# Patient Record
Sex: FEMALE | Race: BLACK OR AFRICAN AMERICAN | NOT HISPANIC OR LATINO | Employment: FULL TIME | URBAN - METROPOLITAN AREA
[De-identification: names, ages, dates, MRNs, and addresses within clinical notes are randomized per-mention and may not be internally consistent; named-entity substitution may affect disease eponyms.]

---

## 2018-05-28 ENCOUNTER — APPOINTMENT (EMERGENCY)
Dept: RADIOLOGY | Facility: HOSPITAL | Age: 28
End: 2018-05-28
Payer: COMMERCIAL

## 2018-05-28 ENCOUNTER — HOSPITAL ENCOUNTER (EMERGENCY)
Facility: HOSPITAL | Age: 28
Discharge: HOME | End: 2018-05-28
Attending: EMERGENCY MEDICINE
Payer: COMMERCIAL

## 2018-05-28 VITALS
RESPIRATION RATE: 18 BRPM | DIASTOLIC BLOOD PRESSURE: 63 MMHG | OXYGEN SATURATION: 100 % | TEMPERATURE: 98.6 F | HEART RATE: 80 BPM | SYSTOLIC BLOOD PRESSURE: 138 MMHG

## 2018-05-28 DIAGNOSIS — S29.019A THORACIC MYOFASCIAL STRAIN, INITIAL ENCOUNTER: ICD-10-CM

## 2018-05-28 DIAGNOSIS — M54.9 MUSCULOSKELETAL BACK PAIN: ICD-10-CM
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
## 2018-05-28 DIAGNOSIS — T23.172A SUPERFICIAL BURN OF LEFT WRIST, INITIAL ENCOUNTER: Primary | ICD-10-CM

## 2018-05-28 PROCEDURE — 73110 X-RAY EXAM OF WRIST: CPT

## 2018-05-28 PROCEDURE — 99284 EMERGENCY DEPT VISIT MOD MDM: CPT

## 2018-05-28 RX ORDER — NAPROXEN 500 MG/1
500 TABLET ORAL 2 TIMES DAILY WITH MEALS
Qty: 14 TABLET | Refills: 0 | Status: SHIPPED | OUTPATIENT
Start: 2018-05-28 | End: 2018-08-10 | Stop reason: SDUPTHER

## 2018-05-28 RX ORDER — ACETAMINOPHEN 325 MG/1
650 TABLET ORAL ONCE
Status: COMPLETED | OUTPATIENT
Start: 2018-05-28 | End: 2018-05-28

## 2018-05-28 RX ORDER — IBUPROFEN 600 MG/1
600 TABLET ORAL ONCE
Status: COMPLETED | OUTPATIENT
Start: 2018-05-28 | End: 2018-05-28

## 2018-05-28 RX ORDER — IBUPROFEN 400 MG/1
400 TABLET ORAL ONCE
Status: DISCONTINUED | OUTPATIENT
Start: 2018-05-28 | End: 2018-05-28

## 2018-05-28 RX ORDER — BACITRACIN, NEOMYCIN, POLYMYXIN B 400; 3.5; 5 [USP'U]/G; MG/G; [USP'U]/G
1 OINTMENT TOPICAL ONCE
Status: COMPLETED | OUTPATIENT
Start: 2018-05-28 | End: 2018-05-28

## 2018-05-28 RX ADMIN — IBUPROFEN 600 MG: 600 TABLET ORAL at 22:24

## 2018-05-28 RX ADMIN — ACETAMINOPHEN 650 MG: 325 TABLET ORAL at 22:13

## 2018-05-28 RX ADMIN — BACITRACIN, NEOMYCIN, POLYMYXIN B 1 SMALL APPLICATION: 400; 3.5; 5 OINTMENT TOPICAL at 22:24
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
## 2018-05-29 NOTE — DISCHARGE INSTRUCTIONS
Please follow up with family practice for further care, if symptoms worsen please return to the emergency department immediately   Airbag Injury   WHAT YOU NEED TO KNOW:   Airbags must inflate quickly to be effective in an accident  The speed and force of the airbag can cause eye injuries, burns, irritated skin, and open wounds when it inflates  DISCHARGE INSTRUCTIONS:   Return to the emergency department if:   · You have new or increased chest pain  · You have a bad headache and feel sleepy or confused  · Your pain gets worse, even with medicine  · Your wounds become red, painful, and tender  They may be hot or swollen  Contact your healthcare provider if:  · You have a new cough or you are wheezing  · You have increased tears, redness, or pain in your eyes  · You hear ringing or buzzing, or you lose your hearing  Airbag injury care:   · Keep wounds covered  with a clean, dry bandage as directed  You may be told to apply antibacterial ointment to your wound to prevent infection  You may need stitches  Care for your stitches as directed  · Use ice packs  as directed to treat swelling around your eyes  · Use cool cloths  to soothe red, irritated, or burned skin  Medicines:   · Prescription pain medicine  may be given  Ask your healthcare provider how to take this medicine safely  · Steroid ointment  decreases redness, pain, and swelling  · Take your medicine as directed  Contact your healthcare provider if you think your medicine is not helping or if you have side effects  Tell him of her if you are allergic to any medicine  Keep a list of the medicines, vitamins, and herbs you take  Include the amounts, and when and why you take them  Bring the list or the pill bottles to follow-up visits  Carry your medicine list with you in case of an emergency  Prevent an airbag injury:   · Make sure everyone wears a seatbelt       · Do not allow children younger than 13 years to sit in the front seat  Children who are 8 years or younger should ride in a properly fitting car seat or booster seat  Ask for more information on child safety seats  · Check that the airbag switch is off if a child 15years of age is riding in the front seat  · Do not place a rear-facing infant seat in the front of a vehicle  Babies should ride in a rear-facing infant seat in the back of a vehicle until they are 3year old and weigh 20 pounds  · Do not place the lap belt over your stomach  The lap belt should fit snugly over your hips  This is very important if you are pregnant  Never place a shoulder belt under your arm or behind your back  · Sit at least 10 inches away from the steering wheel when you drive  Recline the seat or tilt the steering wheel down  · Drive with your hands on each side of the steering wheel  Do not drive with your hands on top of the steering wheel  · Sit as far back from the dashboard as you can if you are a passenger  Follow up with your healthcare provider as directed:  Write down your questions so you remember to ask them during your visits  © 2017 [facility] Information is for End User's use only and may not be sold, redistributed or otherwise used for commercial purposes  All illustrations and images included in CareNotes® are the copyrighted property of A D A M , Inc  or [address]  The above information is an  only  It is not intended as medical advice for individual conditions or treatments  Talk to your doctor, nurse or pharmacist before following any medical regimen to see if it is safe and effective for you  Musculoskeletal Pain   WHAT YOU NEED TO KNOW:   Muscle pain can be dull, achy, or sharp  You may have pain and tenderness to the touch as well  It can occur anywhere on your body and is often brought on by exercise  Muscle pain may occur from an injury, such as a sprain, tendonitis, or bone fracture   Muscle pain can also be the result of medical conditions, such as polymyositis, fibromyalgia, and connective tissue disorders  DISCHARGE INSTRUCTIONS:   Self care:   · Rest  as directed and avoid activity that causes pain  You may be able to return to normal activity when you can move without pain  Follow directions for rest and activity  You are at risk for injury for 3 weeks after your symptoms go away  · Ice  your painful muscle area to decrease pain and swelling  Use an ice pack, or put ice in a plastic bag and cover it with a towel  Always  put a cloth between the ice and your skin  Apply the ice as often as directed for the first 24 to 48 hours  · Compression  with a splint, brace, or elastic bandage helps decrease pain and swelling  This may be needed for muscle pain in arms or legs  A splint, brace, or bandage will also help protect the painful area when you move around  · Elevate  a painful arm or leg to reduce swelling and pain  Elevate your limb while you are sitting or lying down  Prop a painful leg on pillows to keep it above the level of your heart  Medicines:   · NSAIDs  help decrease swelling and pain or fever  This medicine is available with or without a doctor's order  NSAIDs can cause stomach bleeding or kidney problems in certain people  If you take blood thinner medicine, always ask your healthcare provider if NSAIDs are safe for you  Always read the medicine label and follow directions  · Acetaminophen  is used to decrease pain  It is available without a doctor's order  Ask your healthcare provider how much to take and when to take it  Follow directions  Acetaminophen can cause liver damage if not taken correctly  Do not take more than one medicine that contains acetaminophen unless directed  · Muscle relaxers  help relax your muscles to decrease pain and muscle spasms  · Steroids  may be given to decrease redness, pain, and swelling  · Take your medicine as directed    Contact your healthcare provider if you think your medicine is not helping or if you have side effects  Tell him if you are allergic to any medicine  Keep a list of the medicines, vitamins, and herbs you take  Include the amounts, and when and why you take them  Bring the list or the pill bottles to follow-up visits  Carry your medicine list with you in case of an emergency  Follow up with your healthcare provider as directed: You may need more tests to help healthcare providers find the cause of your muscle pain  You may need physical therapy to learn muscle strengthening exercises  Write down your questions so you remember to ask them during your visits  Contact your healthcare provider if:   · You have a fever  · You have trouble sleeping because of your pain  · Your painful area becomes more tender, red, and warm to the touch  · You have decreased movement of the painful area  · You have questions or concerns about your condition or care  Return to the emergency department if:   · You have increased severe pain when you move the painful muscle area  · You lose feeling in your painful muscle area  · You have new or worse swelling in the painful area  Your skin may feel tight  · You have increased muscle pain or pain that does not improve with treatment  © 2017 [employer] [employer]  Information is for End User's use only and may not be sold, redistributed or otherwise used for commercial purposes  All illustrations and images included in CareNotes® are the copyrighted property of A D A M , Inc  or Arash Rice  The above information is an  only  It is not intended as medical advice for individual conditions or treatments  Talk to your doctor, nurse or pharmacist before following any medical regimen to see if it is safe and effective for you
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
## 2018-05-30 PROBLEM — L20.9 ATOPIC DERMATITIS: Status: ACTIVE | Noted: 2018-05-30

## 2018-05-30 PROBLEM — K59.09 CHRONIC CONSTIPATION: Status: ACTIVE | Noted: 2017-05-15

## 2018-05-30 PROBLEM — E66.9 OBESITY WITH BODY MASS INDEX 30 OR GREATER: Status: ACTIVE | Noted: 2017-05-15

## 2018-05-30 RX ORDER — ALBUTEROL SULFATE 90 UG/1
AEROSOL, METERED RESPIRATORY (INHALATION)
COMMUNITY
End: 2018-06-01 | Stop reason: SDUPTHER

## 2018-05-30 RX ORDER — MONTELUKAST SODIUM 10 MG/1
TABLET ORAL
COMMUNITY
End: 2019-06-14 | Stop reason: SDUPTHER

## 2018-06-01 ENCOUNTER — OFFICE VISIT (OUTPATIENT)
Dept: FAMILY MEDICINE CLINIC | Facility: CLINIC | Age: 28
End: 2018-06-01
Payer: COMMERCIAL

## 2018-06-01 VITALS
TEMPERATURE: 99.1 F | DIASTOLIC BLOOD PRESSURE: 64 MMHG | RESPIRATION RATE: 16 BRPM | HEIGHT: 62 IN | BODY MASS INDEX: 35.74 KG/M2 | OXYGEN SATURATION: 97 % | SYSTOLIC BLOOD PRESSURE: 118 MMHG | HEART RATE: 96 BPM | WEIGHT: 194.2 LBS

## 2018-06-01 DIAGNOSIS — T23.162D SUPERFICIAL BURN OF BACK OF LEFT HAND, SUBSEQUENT ENCOUNTER: ICD-10-CM

## 2018-06-01 DIAGNOSIS — F51.01 PRIMARY INSOMNIA: ICD-10-CM

## 2018-06-01 DIAGNOSIS — M54.2 CERVICAL PAIN: ICD-10-CM

## 2018-06-01 DIAGNOSIS — J45.20 MILD INTERMITTENT ASTHMA WITHOUT COMPLICATION: ICD-10-CM
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
## 2018-06-01 DIAGNOSIS — M62.838 TRAPEZIUS MUSCLE SPASM: ICD-10-CM

## 2018-06-01 DIAGNOSIS — J30.1 SEASONAL ALLERGIC RHINITIS DUE TO POLLEN: ICD-10-CM

## 2018-06-01 DIAGNOSIS — E66.9 OBESITY WITH BODY MASS INDEX 30 OR GREATER: ICD-10-CM

## 2018-06-01 PROBLEM — K59.09 CHRONIC CONSTIPATION: Status: RESOLVED | Noted: 2017-05-15 | Resolved: 2018-06-01

## 2018-06-01 PROCEDURE — 99204 OFFICE O/P NEW MOD 45 MIN: CPT | Performed by: NURSE PRACTITIONER

## 2018-06-01 RX ORDER — ALBUTEROL SULFATE 90 UG/1
AEROSOL, METERED RESPIRATORY (INHALATION)
Qty: 1 INHALER | Refills: 2 | Status: SHIPPED | OUTPATIENT
Start: 2018-06-01 | End: 2020-04-23

## 2018-06-01 RX ORDER — CYCLOBENZAPRINE HCL 10 MG
10 TABLET ORAL
Qty: 20 TABLET | Refills: 0 | Status: SHIPPED | OUTPATIENT
Start: 2018-06-01 | End: 2019-03-18 | Stop reason: SDUPTHER

## 2018-06-01 NOTE — LETTER
June 1, 2018     Patient: Jayla Schreiber   YOB: 1990   Date of Visit: 6/1/2018       To Whom it May Concern:    Jayla Schreiber is under my professional care  She was seen in my office on 6/1/2018  She may return to work on 6/6/18  If you have any questions or concerns, please don't hesitate to call           Sincerely,          MARY KAY Burr        CC: No Recipients
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
## 2018-06-04 ENCOUNTER — TELEPHONE (OUTPATIENT)
Dept: FAMILY MEDICINE CLINIC | Facility: CLINIC | Age: 28
End: 2018-06-04
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
## 2018-06-04 NOTE — TELEPHONE ENCOUNTER
I put in a new work excuse  Please let her know that if she is unable to return to work at that time, she will need to be seen by a specialist to determine if she can or cannot return to work    Thank you

## 2018-06-06 ENCOUNTER — TELEPHONE (OUTPATIENT)
Dept: FAMILY MEDICINE CLINIC | Facility: CLINIC | Age: 28
End: 2018-06-06

## 2018-06-08 ENCOUNTER — OFFICE VISIT (OUTPATIENT)
Dept: FAMILY MEDICINE CLINIC | Facility: CLINIC | Age: 28
End: 2018-06-08
Payer: COMMERCIAL

## 2018-06-08 VITALS
DIASTOLIC BLOOD PRESSURE: 80 MMHG | WEIGHT: 198.8 LBS | BODY MASS INDEX: 36.58 KG/M2 | TEMPERATURE: 98.5 F | SYSTOLIC BLOOD PRESSURE: 130 MMHG | HEIGHT: 62 IN | RESPIRATION RATE: 16 BRPM | HEART RATE: 84 BPM

## 2018-06-08 DIAGNOSIS — M54.6 ACUTE BILATERAL THORACIC BACK PAIN: ICD-10-CM
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
## 2018-06-08 DIAGNOSIS — S16.1XXD STRAIN OF NECK MUSCLE, SUBSEQUENT ENCOUNTER: Primary | ICD-10-CM

## 2018-06-08 PROBLEM — S16.1XXA NECK MUSCLE STRAIN: Status: ACTIVE | Noted: 2018-06-08

## 2018-06-08 PROCEDURE — 99214 OFFICE O/P EST MOD 30 MIN: CPT | Performed by: FAMILY MEDICINE
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
## 2018-07-31 ENCOUNTER — TELEPHONE (OUTPATIENT)
Dept: FAMILY MEDICINE CLINIC | Facility: CLINIC | Age: 28
End: 2018-07-31

## 2018-07-31 DIAGNOSIS — S16.1XXS STRAIN OF NECK MUSCLE, SEQUELA: ICD-10-CM
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
## 2018-08-04 RX ORDER — TOPIRAMATE 25 MG/1
TABLET ORAL
Refills: 0 | COMMUNITY
Start: 2018-06-25 | End: 2019-03-08

## 2018-08-10 ENCOUNTER — OFFICE VISIT (OUTPATIENT)
Dept: FAMILY MEDICINE CLINIC | Facility: CLINIC | Age: 28
End: 2018-08-10
Payer: COMMERCIAL

## 2018-08-10 VITALS
WEIGHT: 194.4 LBS | RESPIRATION RATE: 16 BRPM | SYSTOLIC BLOOD PRESSURE: 116 MMHG | TEMPERATURE: 97.8 F | HEART RATE: 78 BPM | DIASTOLIC BLOOD PRESSURE: 72 MMHG | OXYGEN SATURATION: 98 % | BODY MASS INDEX: 35.77 KG/M2 | HEIGHT: 62 IN
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
## 2018-08-10 DIAGNOSIS — E66.09 CLASS 2 OBESITY DUE TO EXCESS CALORIES WITHOUT SERIOUS COMORBIDITY WITH BODY MASS INDEX (BMI) OF 35.0 TO 35.9 IN ADULT: ICD-10-CM

## 2018-08-10 DIAGNOSIS — M79.18 MYOFACIAL MUSCLE PAIN: Primary | ICD-10-CM

## 2018-08-10 DIAGNOSIS — M54.2 BILATERAL POSTERIOR NECK PAIN: ICD-10-CM

## 2018-08-10 DIAGNOSIS — Z13.1 SCREENING FOR DIABETES MELLITUS: ICD-10-CM

## 2018-08-10 DIAGNOSIS — R63.5 WEIGHT GAIN: ICD-10-CM

## 2018-08-10 PROBLEM — E66.812 CLASS 2 OBESITY DUE TO EXCESS CALORIES WITH BODY MASS INDEX (BMI) OF 35.0 TO 35.9 IN ADULT: Status: ACTIVE | Noted: 2018-08-10

## 2018-08-10 PROCEDURE — 99214 OFFICE O/P EST MOD 30 MIN: CPT | Performed by: FAMILY MEDICINE

## 2018-08-10 RX ORDER — NAPROXEN 500 MG/1
500 TABLET ORAL 2 TIMES DAILY WITH MEALS
Qty: 30 TABLET | Refills: 0 | Status: SHIPPED | OUTPATIENT
Start: 2018-08-10 | End: 2019-03-18 | Stop reason: SDUPTHER
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
## 2018-08-11 ENCOUNTER — TELEPHONE (OUTPATIENT)
Dept: FAMILY MEDICINE CLINIC | Facility: CLINIC | Age: 28
End: 2018-08-11

## 2018-08-11 NOTE — ASSESSMENT & PLAN NOTE
Referred patient to weight management program in [facility]  Recommended to follow a low carb, low-fat diet  Encouraged regular exercise

## 2018-08-11 NOTE — TELEPHONE ENCOUNTER
----- Message from Javad Nunez MD sent at 8/10/2018  8:22 PM EDT -----  Please mail order for weight management evaluation to patient  Please provide patient with phone #
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
## 2018-08-28 ENCOUNTER — OFFICE VISIT (OUTPATIENT)
Dept: PAIN MEDICINE | Facility: CLINIC | Age: 28
End: 2018-08-28
Payer: COMMERCIAL

## 2018-08-28 VITALS
DIASTOLIC BLOOD PRESSURE: 78 MMHG | WEIGHT: 198 LBS | SYSTOLIC BLOOD PRESSURE: 100 MMHG | HEART RATE: 92 BPM | BODY MASS INDEX: 36.44 KG/M2 | HEIGHT: 62 IN

## 2018-08-28 DIAGNOSIS — M54.2 BILATERAL POSTERIOR NECK PAIN: ICD-10-CM

## 2018-08-28 DIAGNOSIS — M79.18 MYOFACIAL MUSCLE PAIN: Primary | ICD-10-CM

## 2018-08-28 DIAGNOSIS — M54.6 ACUTE BILATERAL THORACIC BACK PAIN: ICD-10-CM
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
## 2018-08-28 PROCEDURE — 99203 OFFICE O/P NEW LOW 30 MIN: CPT | Performed by: PHYSICAL MEDICINE & REHABILITATION

## 2018-08-28 RX ORDER — DESONIDE 0.5 MG/G
CREAM TOPICAL
COMMUNITY
End: 2019-09-27 | Stop reason: ALTCHOICE
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
## 2018-08-28 NOTE — PATIENT INSTRUCTIONS
1   Continue physical therapy new prescription generated today  2  Continue anti-inflammatory muscle relaxer as needed  3  Blood work will call with results

## 2018-08-29 LAB
25(OH)D3 SERPL-MCNC: 29 NG/ML (ref 30–100)
ALBUMIN SERPL-MCNC: 3.9 G/DL (ref 3.6–5.1)
ALBUMIN/GLOB SERPL: 1.4 (CALC) (ref 1–2.5)
ALP SERPL-CCNC: 80 U/L (ref 33–115)
ALT SERPL-CCNC: 12 U/L (ref 6–29)
AST SERPL-CCNC: 15 U/L (ref 10–30)
BILIRUB SERPL-MCNC: 0.7 MG/DL (ref 0.2–1.2)
BUN SERPL-MCNC: 12 MG/DL (ref 7–25)
BUN/CREAT SERPL: NORMAL (CALC) (ref 6–22)
CALCIUM SERPL-MCNC: 8.9 MG/DL (ref 8.6–10.2)
CHLORIDE SERPL-SCNC: 105 MMOL/L (ref 98–110)
CO2 SERPL-SCNC: 29 MMOL/L (ref 20–32)
CREAT SERPL-MCNC: 0.82 MG/DL (ref 0.5–1.1)
GLOBULIN SER CALC-MCNC: 2.7 G/DL (CALC) (ref 1.9–3.7)
GLUCOSE SERPL-MCNC: 79 MG/DL (ref 65–99)
HBA1C MFR BLD: 5.1 % OF TOTAL HGB
POTASSIUM SERPL-SCNC: 4.2 MMOL/L (ref 3.5–5.3)
PROT SERPL-MCNC: 6.6 G/DL (ref 6.1–8.1)
SL AMB EGFR AFRICAN AMERICAN: 114 ML/MIN/1.73M2
SL AMB EGFR NON AFRICAN AMERICAN: 98 ML/MIN/1.73M2
SODIUM SERPL-SCNC: 139 MMOL/L (ref 135–146)
TSH SERPL-ACNC: 2.2 MIU/L

## 2018-09-08 ENCOUNTER — TELEPHONE (OUTPATIENT)
Dept: FAMILY MEDICINE CLINIC | Facility: CLINIC | Age: 28
End: 2018-09-08

## 2018-10-04 ENCOUNTER — TELEPHONE (OUTPATIENT)
Dept: OBGYN CLINIC | Facility: HOSPITAL | Age: 28
End: 2018-10-04
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
## 2018-10-04 DIAGNOSIS — S16.1XXS STRAIN OF NECK MUSCLE, SEQUELA: ICD-10-CM

## 2018-10-04 DIAGNOSIS — M54.2 BILATERAL POSTERIOR NECK PAIN: ICD-10-CM

## 2018-10-04 DIAGNOSIS — M54.2 NECK PAIN: ICD-10-CM

## 2018-10-04 NOTE — TELEPHONE ENCOUNTER
Patient sees Dr Юлия Odom   [phone]    Patient is asking for a new physical therapy script because her script  on 18   Patient will need a new script that will cover 18 & on   Please fax to [phone]
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
## 2018-10-09 ENCOUNTER — OFFICE VISIT (OUTPATIENT)
Dept: PAIN MEDICINE | Facility: CLINIC | Age: 28
End: 2018-10-09
Payer: COMMERCIAL

## 2018-10-09 VITALS
BODY MASS INDEX: 36.8 KG/M2 | SYSTOLIC BLOOD PRESSURE: 120 MMHG | DIASTOLIC BLOOD PRESSURE: 68 MMHG | HEART RATE: 88 BPM | WEIGHT: 200 LBS | HEIGHT: 62 IN

## 2018-10-09 DIAGNOSIS — S16.1XXS STRAIN OF NECK MUSCLE, SEQUELA: ICD-10-CM
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
## 2018-10-09 DIAGNOSIS — M79.18 MYOFASCIAL MUSCLE PAIN: ICD-10-CM

## 2018-10-09 DIAGNOSIS — R20.9 SENSORY DISTURBANCE: ICD-10-CM

## 2018-10-09 DIAGNOSIS — M54.6 ACUTE BILATERAL THORACIC BACK PAIN: ICD-10-CM

## 2018-10-09 PROCEDURE — 99213 OFFICE O/P EST LOW 20 MIN: CPT | Performed by: PHYSICAL MEDICINE & REHABILITATION

## 2018-10-09 NOTE — PATIENT INSTRUCTIONS
1  Continue physical therapy  2   MRI scanning of the cervical spine [facility] will schedule

## 2018-10-12 ENCOUNTER — TELEPHONE (OUTPATIENT)
Dept: PAIN MEDICINE | Facility: MEDICAL CENTER | Age: 28
End: 2018-10-12

## 2018-10-12 NOTE — TELEPHONE ENCOUNTER
[facility] called asking if pt MRI of cervical spine order can be faxed to them to [phone] so that they could perform the procedure for pt

## 2018-10-23 ENCOUNTER — TRANSCRIBE ORDERS (OUTPATIENT)
Dept: ADMINISTRATIVE | Facility: HOSPITAL | Age: 28
End: 2018-10-23

## 2018-10-24 ENCOUNTER — HOSPITAL ENCOUNTER (OUTPATIENT)
Dept: MRI IMAGING | Facility: HOSPITAL | Age: 28
Discharge: HOME | End: 2018-10-24
Attending: PHYSICAL MEDICINE & REHABILITATION
Payer: COMMERCIAL

## 2018-10-24 DIAGNOSIS — M79.18 MYOFASCIAL MUSCLE PAIN: ICD-10-CM

## 2018-10-24 DIAGNOSIS — S16.1XXS STRAIN OF NECK MUSCLE, SEQUELA: ICD-10-CM

## 2018-10-24 DIAGNOSIS — R20.9 SENSORY DISTURBANCE: ICD-10-CM
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
## 2018-10-24 PROCEDURE — 72141 MRI NECK SPINE W/O DYE: CPT

## 2018-11-09 PROBLEM — M48.02 CERVICAL SPINAL STENOSIS: Status: ACTIVE | Noted: 2018-11-09
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
## 2018-11-12 ENCOUNTER — OFFICE VISIT (OUTPATIENT)
Dept: PAIN MEDICINE | Facility: CLINIC | Age: 28
End: 2018-11-12
Payer: COMMERCIAL

## 2018-11-12 VITALS
BODY MASS INDEX: 37.17 KG/M2 | WEIGHT: 202 LBS | HEART RATE: 68 BPM | DIASTOLIC BLOOD PRESSURE: 82 MMHG | SYSTOLIC BLOOD PRESSURE: 110 MMHG | HEIGHT: 62 IN

## 2018-11-12 DIAGNOSIS — M54.2 BILATERAL POSTERIOR NECK PAIN: ICD-10-CM

## 2018-11-12 DIAGNOSIS — M47.812 FACET ARTHRITIS OF CERVICAL REGION: ICD-10-CM

## 2018-11-12 DIAGNOSIS — M48.02 CERVICAL SPINAL STENOSIS: ICD-10-CM
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
## 2018-11-12 PROCEDURE — 99213 OFFICE O/P EST LOW 20 MIN: CPT | Performed by: PHYSICIAN ASSISTANT

## 2018-11-27 ENCOUNTER — TELEPHONE (OUTPATIENT)
Dept: FAMILY MEDICINE CLINIC | Facility: CLINIC | Age: 28
End: 2018-11-27

## 2018-11-27 NOTE — TELEPHONE ENCOUNTER
Eladio from [facility] is looking for patient's weight for 2017  I can't find the weight listed in the chart  Can be reached at [phone]  Please advise

## 2018-11-27 NOTE — TELEPHONE ENCOUNTER
Looking through patient's visit's since establishing care with us in June 2018, she weighed 194lbs on 6/1/18, 198lbs on 6/8/18, and on 8/10/18 she weighed 194lbs  Called Faith Jean Baptiste back to let her know

## 2018-11-27 NOTE — TELEPHONE ENCOUNTER
They asked to have the notes faxed over to see the weights in writing  Faxed to [phone], and phone number called was [phone]

## 2018-12-04 RX ORDER — ONDANSETRON 4 MG/1
4 TABLET, ORALLY DISINTEGRATING ORAL EVERY 8 HOURS
Refills: 0 | COMMUNITY
Start: 2018-11-26 | End: 2019-03-08

## 2018-12-04 RX ORDER — TACROLIMUS 1 MG/G
OINTMENT TOPICAL
Refills: 0 | COMMUNITY
Start: 2018-10-28 | End: 2019-09-27 | Stop reason: ALTCHOICE

## 2018-12-04 RX ORDER — TOBRAMYCIN AND DEXAMETHASONE 3; 1 MG/ML; MG/ML
SUSPENSION/ DROPS OPHTHALMIC
Refills: 1 | COMMUNITY
Start: 2018-11-07 | End: 2019-03-08

## 2018-12-04 RX ORDER — SCOPOLAMINE 1 MG/3 DAY
PATCH,TRANSDERMAL 3 DAY TRANSDERMAL
Refills: 0 | COMMUNITY
Start: 2018-11-26 | End: 2019-03-08

## 2018-12-05 ENCOUNTER — OFFICE VISIT (OUTPATIENT)
Dept: FAMILY MEDICINE CLINIC | Facility: CLINIC | Age: 28
End: 2018-12-05
Payer: COMMERCIAL

## 2018-12-05 VITALS
DIASTOLIC BLOOD PRESSURE: 70 MMHG | HEIGHT: 62 IN | TEMPERATURE: 98.1 F | SYSTOLIC BLOOD PRESSURE: 110 MMHG | BODY MASS INDEX: 36.8 KG/M2 | RESPIRATION RATE: 20 BRPM | HEART RATE: 100 BPM | OXYGEN SATURATION: 99 % | WEIGHT: 200 LBS

## 2018-12-05 DIAGNOSIS — E66.09 CLASS 2 OBESITY DUE TO EXCESS CALORIES WITHOUT SERIOUS COMORBIDITY WITH BODY MASS INDEX (BMI) OF 35.0 TO 35.9 IN ADULT: ICD-10-CM

## 2018-12-05 DIAGNOSIS — L30.0 NUMMULAR ECZEMATOUS DERMATITIS: Primary | ICD-10-CM

## 2018-12-05 PROBLEM — E66.812 CLASS 2 OBESITY DUE TO EXCESS CALORIES WITHOUT SERIOUS COMORBIDITY WITH BODY MASS INDEX (BMI) OF 36.0 TO 36.9 IN ADULT: Status: ACTIVE | Noted: 2017-05-15

## 2018-12-05 PROBLEM — E66.812 CLASS 2 OBESITY DUE TO EXCESS CALORIES WITHOUT SERIOUS COMORBIDITY WITH BODY MASS INDEX (BMI) OF 36.0 TO 36.9 IN ADULT: Status: RESOLVED | Noted: 2017-05-15 | Resolved: 2018-12-05

## 2018-12-05 PROCEDURE — 3008F BODY MASS INDEX DOCD: CPT | Performed by: FAMILY MEDICINE

## 2018-12-05 PROCEDURE — 99214 OFFICE O/P EST MOD 30 MIN: CPT | Performed by: FAMILY MEDICINE

## 2018-12-05 RX ORDER — FLUOCINONIDE 0.5 MG/G
OINTMENT TOPICAL 2 TIMES DAILY
Qty: 30 G | Refills: 0 | Status: SHIPPED | OUTPATIENT
Start: 2018-12-05 | End: 2019-09-27 | Stop reason: ALTCHOICE

## 2018-12-05 NOTE — ASSESSMENT & PLAN NOTE
Patient is scheduled for gastric sleeve surgery this month with Dr Evie Read at [facility]   Patient tried dietary modification and exercise program with no significant success in losing weight  Will write a letter of medical necessity and send to her medical insurance

## 2018-12-05 NOTE — PROGRESS NOTES
Chief Complaint   Patient presents with    Eczema     Flaring up     Health Maintenance   Topic Date Due    Pneumococcal PPSV23 Highest Risk Adult (1 of 3 - PCV13) 05/05/2010    DTaP,Tdap,and Td Vaccines (1 - Tdap) 05/05/2012    PAP SMEAR  05/05/2012    INFLUENZA VACCINE  07/01/2018    Depression Screening PHQ  06/01/2019     Assessment/Plan:    Nummular eczematous dermatitis  Prescription given for Fluocinonide 0 05% ointment to apply twice daily to affected areas on neck, back, arms, hands  Recommended to schedule appointment with dermatology  Class 2 obesity due to excess calories with body mass index (BMI) of 35 0 to 35 9 in adult  Patient is scheduled for gastric sleeve surgery this month with Dr Ross Swanson at [facility]   Patient tried dietary modification and exercise program with no significant success in losing weight  Will write a letter of medical necessity and send to her medical insurance  I have spent 25 minutes with Patient  today in which greater than 50% of this time was spent in counseling/coordination of care regarding Diagnostic results, Risks and benefits of tx options, Intructions for management, Patient and family education, Importance of tx compliance and Risk factor reductions  There are no diagnoses linked to this encounter  Subjective:      Patient ID: Fiordaliza Abreu is a 33 y o  female  HPI     Patient presents to the office c/o flare up of eczema for the last 2- 3 weeks  C/o dry itchy scaly patches on her neck, back, arms, hands  Patient was previously seen by dermatologist who recently moved to [place]     She needs to find a new dermatologist in the Network  Patient has been using Protopic  0 1 % cream with no significant improvement in symptoms  Patient is scheduled for gastric sleeve surgery for obesity at [facility] with Dr Ross Swanson this month      She is asking for a letter for insurance company stating that PCP recommends to proceed with surgical procedure  Patient tried dietary modifications and exercise program with no significant success in losing weight  Denies chest pain, shortness of breath, dizziness  Patient had pre-admission testing done in [facility] on 11/20/18  Glucose 79, creatinine 0 77, potassium 4 8  Hb 13 2  Denies tobacco use  The following portions of the patient's history were reviewed and updated as appropriate: allergies, current medications, past family history, past social history, past surgical history and problem list     Review of Systems   Constitutional: Negative for activity change, appetite change, chills, fatigue and fever  HENT: Negative for congestion, nosebleeds, sore throat and trouble swallowing  Eyes: Negative for pain, discharge, redness, itching and visual disturbance  Respiratory: Negative for cough, chest tightness, shortness of breath and wheezing  Cardiovascular: Negative for chest pain, palpitations and leg swelling  Gastrointestinal: Negative for abdominal pain, constipation, diarrhea, nausea and vomiting  Genitourinary: Negative for difficulty urinating, dysuria, flank pain, frequency, hematuria and pelvic pain  Musculoskeletal: Negative for arthralgias, back pain, joint swelling, myalgias and neck pain  Skin: Positive for rash (on neck, back, arms, hands)  Negative for wound  Neurological: Negative for dizziness, syncope and headaches  Hematological: Negative  Psychiatric/Behavioral: Negative  Objective:      /70 (BP Location: Left arm, Patient Position: Sitting, Cuff Size: Large)   Pulse 100   Temp 98 1 °F (36 7 °C) (Oral)   Resp 20   Ht 5' 2" (1 575 m)   Wt 90 7 kg (200 lb)   SpO2 99%   BMI 36 58 kg/m²        Physical Exam   Constitutional: She appears well-developed and well-nourished  Obese   HENT:   Head: Normocephalic and atraumatic     Right Ear: External ear normal    Left Ear: External ear normal    Mouth/Throat: Oropharynx is clear and moist    Eyes: Pupils are equal, round, and reactive to light  Cardiovascular: Normal rate, regular rhythm and normal heart sounds  No murmur heard  No BL LE edema   Pulmonary/Chest: Effort normal and breath sounds normal  She has no wheezes  She has no rales  Abdominal: Soft  Bowel sounds are normal  There is no tenderness  Musculoskeletal: Normal range of motion  She exhibits no edema, tenderness or deformity  Skin: Skin is warm and dry  Dry scaly patches on posterior neck, lower back, arms, hands   Psychiatric: She has a normal mood and affect  Nursing note and vitals reviewed  BMI Counseling: Body mass index is 36 58 kg/m²  Discussed the patient's BMI with her  The BMI is above average  BMI counseling and education was provided to the patient  Nutrition recommendations include reducing portion sizes, decreasing overall calorie intake, 3-5 servings of fruits/vegetables daily, reducing fast food intake, consuming healthier snacks, decreasing soda and/or juice intake, moderation in carbohydrate intake, increasing intake of lean protein, reducing intake of saturated fat and trans fat and reducing intake of cholesterol

## 2018-12-05 NOTE — ASSESSMENT & PLAN NOTE
Prescription given for Fluocinonide 0 05% ointment to apply twice daily to affected areas on neck, back, arms, hands  Recommended to schedule appointment with dermatology

## 2019-03-07 NOTE — PROGRESS NOTES
ASSESSMENT & PLAN: Alexsander Falcon is a 33 y o  No obstetric history on file  with normal gynecologic exam     1   Routine well woman exam done today  2  Pap:  The patient's last pap was unkown  Pap with reflex was done today  Current ASCCP Guidelines reviewed  3   STD testing  was performed today  4  Gardasil recommendations reviewed  Patient has not previously been vaccinated  Will double check with insurance and return for injections  5  The following were reviewed in today's visit: breast self exam, STD testing, family planning choices, adequate intake of calcium and vitamin D, exercise and healthy diet  6  Contraception : nexplanon 207    CC: Annual Gynecologic Examination    HPI: Alexsander Falcon is a 33 y o  No obstetric history on file  who presents for annual gynecologic examination  She has the following concerns:  none    Health Maintenance:    She exercises 5 days per week with walking  She wears her seatbelt routinely  She does perform regular monthly self breast exams  She feels safe at home  Patients does not follow a particular diet  Her last pap was : 2017      Past Medical History:   Diagnosis Date    Anemia     Asthma     Eczema     Obesity        Past Surgical History:   Procedure Laterality Date    EXPLORATORY LAPAROTOMY      12/2016, no findings     NO PAST SURGERIES     (diagnostic laparoscopy, not ex lap)    Past OB/Gyn History:  OB History    None     ,   No LMP recorded  Patient has had an implant  History of sexually transmitted infection Yes, chlamydia at the age of 24  History of abnormal pap smears  No     Patient is currently sexually active  heterosexual Birth control: Nexplanon since 2017        Family History   Problem Relation Age of Onset    Hypertension Mother     Hyperlipidemia Maternal Grandmother     Lung cancer Maternal Grandmother     Liver cancer Maternal Grandfather        Social History:  Social History     Socioeconomic History    Marital status: Single     Spouse name: Not on file    Number of children: Not on file    Years of education: Not on file    Highest education level: Not on file   Occupational History    Not on file   Social Needs    Financial resource strain: Not on file    Food insecurity:     Worry: Not on file     Inability: Not on file    Transportation needs:     Medical: Not on file     Non-medical: Not on file   Tobacco Use    Smoking status: Never Smoker    Smokeless tobacco: Never Used   Substance and Sexual Activity    Alcohol use: Yes     Comment: occasional  wine    Drug use: No    Sexual activity: Not on file   Lifestyle    Physical activity:     Days per week: Not on file     Minutes per session: Not on file    Stress: Not on file   Relationships    Social connections:     Talks on phone: Not on file     Gets together: Not on file     Attends [employer] service: Not on file     Active member of club or organization: Not on file     Attends meetings of clubs or organizations: Not on file     Relationship status: Not on file    Intimate partner violence:     Fear of current or ex partner: Not on file     Emotionally abused: Not on file     Physically abused: Not on file     Forced sexual activity: Not on file   Other Topics Concern    Not on file   Social History Narrative    Not on file       Allergies   Allergen Reactions    Cat Hair Extract     Dog Epithelium     Penicillins        Current Outpatient Medications:     albuterol (PROVENTIL HFA) 90 mcg/act inhaler, 1-2 puffs every 4-6 hours prn for wheezing and SOB (Patient not taking: Reported on 12/5/2018 ), Disp: 1 Inhaler, Rfl: 2    cyclobenzaprine (FLEXERIL) 10 mg tablet, Take 1 tablet (10 mg total) by mouth daily at bedtime as needed for muscle spasms, Disp: 20 tablet, Rfl: 0    desonide (DESOWEN) 0 05 % cream, APPLY TO face except eyelids TWICE A WEEK ONLY, Disp: , Rfl:     fluocinonide (LIDEX) 0 05 % ointment, Apply topically 2 (two) times a day, Disp: 30 g, Rfl: 0    montelukast (SINGULAIR) 10 mg tablet, Take 1 tablet by oral route , Disp: , Rfl:     naproxen (NAPROSYN) 500 mg tablet, Take 1 tablet (500 mg total) by mouth 2 (two) times a day with meals, Disp: 30 tablet, Rfl: 0    ondansetron (ZOFRAN-ODT) 4 mg disintegrating tablet, Take 4 mg by mouth every 8 (eight) hours, Disp: , Rfl: 0    tacrolimus (PROTOPIC) 0 1 % ointment, ALLISON A THIN LAYER TO THE AROUND THE EYES AND AROUND THE MOUTH AT NIGHTTIME  DO NOT APPLY TO WET OR DAMP SKIN  AVOID SUN EXPOSURE, Disp: , Rfl: 0    tobramycin-dexamethasone (TOBRADEX) ophthalmic suspension, INSTILL 1 DROP INTO BOTH EYES 3 TIMES A DAY, Disp: , Rfl: 1    topiramate (TOPAMAX) 25 mg tablet, TAKE 1 TABLET BEFORE BREAKFAST AND DINNER, Disp: , Rfl: 0    TRANSDERM-SCOP, 1 5 MG, 1 MG/3DAYS TD 72 hr patch, PLACE 1 PATCH ON THE SKIN TOPICALLY Q 3 DAYS, Disp: , Rfl: 0    Review of Systems:  A complete review of systems was performed and was negative, except as listed  none    Physical Exam:  There were no vitals taken for this visit  GEN: The patient was alert and oriented x3, pleasant well-appearing female in no acute distress  HEENT:  Unremarkable, no anterior or posterior lymphadenopathy, no thyromegaly  CV:  RRR, no murmurs  RESP:  Clear to auscultation bilaterally  BREAST:  Symmetric breasts with no palpable breast masses or obvious breast lesions  She has no retractions or nipple discharge  She has no axillary abnormalities or palpable masses  Self breast exam is taught  ABD:  Soft, nontender, nondistended, normoactive bowel sounds,   EXT:  WWP, nontender, no edema, nexplanon palpable in left arm  BACK:  No CVA tenderness, no tenderness to palpation along spine  PELVIC:  Normal appearing external female genitalia, normal vaginal epithelium, No discharge  Cervix present   Bimanual: absent CMT,  normal uterus, non-tender  No palpable adnexal masses

## 2019-03-08 ENCOUNTER — APPOINTMENT (OUTPATIENT)
Dept: LAB | Facility: AMBULARY SURGERY CENTER | Age: 29
End: 2019-03-08
Attending: OBSTETRICS & GYNECOLOGY
Payer: COMMERCIAL

## 2019-03-08 ENCOUNTER — OFFICE VISIT (OUTPATIENT)
Dept: OBGYN CLINIC | Facility: CLINIC | Age: 29
End: 2019-03-08
Payer: COMMERCIAL

## 2019-03-08 VITALS
BODY MASS INDEX: 33.68 KG/M2 | WEIGHT: 183 LBS | SYSTOLIC BLOOD PRESSURE: 118 MMHG | DIASTOLIC BLOOD PRESSURE: 76 MMHG | HEIGHT: 62 IN

## 2019-03-08 DIAGNOSIS — Z11.3 SCREEN FOR STD (SEXUALLY TRANSMITTED DISEASE): ICD-10-CM

## 2019-03-08 DIAGNOSIS — Z01.419 WELL WOMAN EXAM WITH ROUTINE GYNECOLOGICAL EXAM: Primary | ICD-10-CM

## 2019-03-08 PROCEDURE — 87591 N.GONORRHOEAE DNA AMP PROB: CPT | Performed by: OBSTETRICS & GYNECOLOGY

## 2019-03-08 PROCEDURE — G0145 SCR C/V CYTO,THINLAYER,RESCR: HCPCS | Performed by: OBSTETRICS & GYNECOLOGY

## 2019-03-08 PROCEDURE — 87491 CHLMYD TRACH DNA AMP PROBE: CPT | Performed by: OBSTETRICS & GYNECOLOGY

## 2019-03-08 PROCEDURE — 36415 COLL VENOUS BLD VENIPUNCTURE: CPT

## 2019-03-08 PROCEDURE — 87340 HEPATITIS B SURFACE AG IA: CPT

## 2019-03-08 PROCEDURE — 99385 PREV VISIT NEW AGE 18-39: CPT | Performed by: OBSTETRICS & GYNECOLOGY

## 2019-03-08 PROCEDURE — 86592 SYPHILIS TEST NON-TREP QUAL: CPT

## 2019-03-08 PROCEDURE — 87389 HIV-1 AG W/HIV-1&-2 AB AG IA: CPT

## 2019-03-08 RX ORDER — PHENTERMINE HYDROCHLORIDE 37.5 MG/1
37.5 CAPSULE ORAL EVERY MORNING
COMMUNITY
End: 2019-06-14 | Stop reason: ALTCHOICE

## 2019-03-09 LAB — HBV SURFACE AG SER QL: NORMAL

## 2019-03-11 LAB
C TRACH DNA SPEC QL NAA+PROBE: NEGATIVE
N GONORRHOEA DNA SPEC QL NAA+PROBE: NEGATIVE
RPR SER QL: NORMAL

## 2019-03-13 LAB
HIV 1+2 AB+HIV1 P24 AG SERPL QL IA: NORMAL
LAB AP GYN PRIMARY INTERPRETATION: NORMAL
Lab: NORMAL

## 2019-03-18 DIAGNOSIS — M54.2 CERVICAL PAIN: ICD-10-CM

## 2019-03-18 DIAGNOSIS — M62.838 TRAPEZIUS MUSCLE SPASM: ICD-10-CM

## 2019-03-18 DIAGNOSIS — M79.18 MYOFASCIAL MUSCLE PAIN: ICD-10-CM

## 2019-03-18 DIAGNOSIS — M54.2 BILATERAL POSTERIOR NECK PAIN: ICD-10-CM

## 2019-03-18 RX ORDER — CYCLOBENZAPRINE HCL 10 MG
10 TABLET ORAL
Qty: 20 TABLET | Refills: 0 | Status: SHIPPED | OUTPATIENT
Start: 2019-03-18 | End: 2020-04-23

## 2019-03-18 RX ORDER — NAPROXEN 500 MG/1
TABLET ORAL
Qty: 30 TABLET | Refills: 0 | Status: SHIPPED | OUTPATIENT
Start: 2019-03-18 | End: 2020-04-23

## 2019-06-14 ENCOUNTER — OFFICE VISIT (OUTPATIENT)
Dept: FAMILY MEDICINE CLINIC | Facility: CLINIC | Age: 29
End: 2019-06-14
Payer: COMMERCIAL

## 2019-06-14 VITALS
HEART RATE: 86 BPM | DIASTOLIC BLOOD PRESSURE: 78 MMHG | HEIGHT: 62 IN | SYSTOLIC BLOOD PRESSURE: 116 MMHG | TEMPERATURE: 98.1 F | RESPIRATION RATE: 20 BRPM | WEIGHT: 168.4 LBS | BODY MASS INDEX: 30.99 KG/M2

## 2019-06-14 DIAGNOSIS — J30.1 SEASONAL ALLERGIC RHINITIS DUE TO POLLEN: Primary | ICD-10-CM

## 2019-06-14 DIAGNOSIS — J45.20 MILD INTERMITTENT ASTHMA WITHOUT COMPLICATION: ICD-10-CM

## 2019-06-14 PROCEDURE — 3008F BODY MASS INDEX DOCD: CPT | Performed by: FAMILY MEDICINE

## 2019-06-14 PROCEDURE — 99213 OFFICE O/P EST LOW 20 MIN: CPT | Performed by: FAMILY MEDICINE

## 2019-06-14 PROCEDURE — 1036F TOBACCO NON-USER: CPT | Performed by: FAMILY MEDICINE

## 2019-06-14 RX ORDER — PREDNISONE 10 MG/1
TABLET ORAL
Qty: 14 TABLET | Refills: 0 | Status: SHIPPED | OUTPATIENT
Start: 2019-06-14 | End: 2019-09-17 | Stop reason: ALTCHOICE

## 2019-06-14 RX ORDER — MONTELUKAST SODIUM 10 MG/1
10 TABLET ORAL
Qty: 90 TABLET | Refills: 3 | Status: SHIPPED | OUTPATIENT
Start: 2019-06-14 | End: 2020-04-23

## 2019-06-14 RX ORDER — PREDNISONE 10 MG/1
TABLET ORAL
Refills: 0 | COMMUNITY
Start: 2019-05-31 | End: 2019-06-14 | Stop reason: ALTCHOICE

## 2019-09-17 ENCOUNTER — OFFICE VISIT (OUTPATIENT)
Dept: FAMILY MEDICINE CLINIC | Facility: CLINIC | Age: 29
End: 2019-09-17
Payer: COMMERCIAL

## 2019-09-17 VITALS
RESPIRATION RATE: 16 BRPM | TEMPERATURE: 98.9 F | BODY MASS INDEX: 31.03 KG/M2 | OXYGEN SATURATION: 97 % | DIASTOLIC BLOOD PRESSURE: 74 MMHG | HEIGHT: 62 IN | WEIGHT: 168.6 LBS | HEART RATE: 86 BPM | SYSTOLIC BLOOD PRESSURE: 128 MMHG

## 2019-09-17 DIAGNOSIS — K59.00 CONSTIPATION, UNSPECIFIED CONSTIPATION TYPE: ICD-10-CM

## 2019-09-17 DIAGNOSIS — N94.6 SEVERE MENSTRUAL CRAMPS: Primary | ICD-10-CM

## 2019-09-17 PROCEDURE — 99214 OFFICE O/P EST MOD 30 MIN: CPT | Performed by: FAMILY MEDICINE

## 2019-09-17 RX ORDER — DUPILUMAB 300 MG/2ML
INJECTION, SOLUTION SUBCUTANEOUS
COMMUNITY
Start: 2019-08-26 | End: 2020-04-23

## 2019-09-17 RX ORDER — ACETAMINOPHEN AND CODEINE PHOSPHATE 300; 30 MG/1; MG/1
1 TABLET ORAL EVERY 6 HOURS PRN
Qty: 30 TABLET | Refills: 0 | Status: SHIPPED | OUTPATIENT
Start: 2019-09-17 | End: 2020-04-23

## 2019-09-17 RX ORDER — KETOROLAC TROMETHAMINE 30 MG/ML
30 INJECTION, SOLUTION INTRAMUSCULAR; INTRAVENOUS ONCE
Status: COMPLETED | OUTPATIENT
Start: 2019-09-17 | End: 2019-09-17

## 2019-09-17 RX ORDER — SENNA PLUS 8.6 MG/1
1 TABLET ORAL 2 TIMES DAILY
Qty: 30 TABLET | Refills: 0
Start: 2019-09-17 | End: 2020-04-23

## 2019-09-17 RX ADMIN — KETOROLAC TROMETHAMINE 30 MG: 30 INJECTION, SOLUTION INTRAMUSCULAR; INTRAVENOUS at 10:00

## 2019-09-17 NOTE — PROGRESS NOTES
Chief Complaint   Patient presents with   [id number] [employer] Maintenance   Topic Date Due    Pneumococcal Vaccine: Pediatrics (0 to 5 Years) and At-Risk Patients (6 to 59 Years) (1 of 3 - PCV13) 05/05/1997    DTaP,Tdap,and Td Vaccines (1 - Tdap) 05/05/2012    INFLUENZA VACCINE  07/01/2019    BMI: Followup Plan  12/05/2019    Depression Screening PHQ  09/17/2020    BMI: Adult  09/17/2020    PAP SMEAR  03/08/2022    Pneumococcal Vaccine: 65+ Years (1 of 2 - PCV13) 05/05/2056    HEPATITIS B VACCINES  Aged Out     Assessment/Plan:    Severe menstrual cramps  Toradol 30 mg administered IM  Rx given for Tylenol # 3 to take 1 tablet every 6 hours prn for severe menstrual cramps  PDMP checked and appropriate  Recommended to follow -up with gynecology if ontinues with severe menstrual cramps, heavy menstrual flow  Constipation  Patient was recommended to increase fluid intake  Take Senokot 1 tablet twice daily, try  MiraLax  If continue with constipation, develops nausea,vomiting strongly advised to go to the emergency room for further evaluation to r/o bowel obstruction  I have spent 25 minutes with Patient  today in which greater than 50% of this time was spent in counseling/coordination of care regarding Risks and benefits of tx options, Intructions for management, Patient and family education, Importance of tx compliance, Risk factor reductions and Impressions  Diagnoses and all orders for this visit:    Severe menstrual cramps  -     ketorolac (TORADOL) injection 30 mg  -     acetaminophen-codeine (TYLENOL/CODEINE #3) 300-30 MG per tablet; Take 1 tablet by mouth every 6 (six) hours as needed for moderate pain    Constipation, unspecified constipation type  -     senna (SENOKOT) 8 6 MG tablet; Take 1 tablet (8 6 mg total) by mouth 2 (two) times a day    Other orders  -     DUPIXENT subcutaneous injection          Subjective:      Patient ID: Antonella Anguiano is a 30 y o  female  HPI     Patient presents today c/o severe menstrual cramps, abdominal pain, heavy menstrual flow  He usually has painful menstrual cycles  Started with last menstrual period on 9/12/19  She took Midol, Naproxen with no relief in symptoms  Patient had annual gynecologic exam in  March 2019  She was seen by gynecologist Dr Eliecer Dias  Patient has Nexplanon implant for birth control since 2017  Patient c/o constipation, had last bowel movement 1 week ago  Denies nausea, vomiting  Denies tobacco use  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  The following portions of the patient's history were reviewed and updated as appropriate: current medications, past family history, past medical history, past social history, past surgical history and problem list     Review of Systems   Constitutional: Negative for activity change, appetite change, chills, fatigue and fever  HENT: Negative  Eyes: Negative  Respiratory: Negative for cough, chest tightness, shortness of breath and wheezing  Cardiovascular: Negative for chest pain, palpitations and leg swelling  Gastrointestinal: Positive for abdominal pain (abdominal cramps) and constipation  Negative for blood in stool, nausea and vomiting  Genitourinary: Positive for menstrual problem (severe menstrual cramps)  Negative for difficulty urinating, dysuria, flank pain, frequency and hematuria  Musculoskeletal: Negative for arthralgias and myalgias  Skin: Negative for rash and wound  Neurological: Negative for dizziness and headaches  Hematological: Negative  Psychiatric/Behavioral: Negative            Objective:      /74 (BP Location: Left arm, Patient Position: Sitting, Cuff Size: Large)   Pulse 86   Temp 98 9 °F (37 2 °C) (Tympanic)   Resp 16   Ht 5' 2" (1 575 m)   Wt 76 5 kg (168 lb 9 6 oz)   SpO2 97%   BMI 30 84 kg/m²          Physical Exam   Constitutional: She appears well-developed and well-nourished  HENT:   Head: Normocephalic and atraumatic  Eyes: Pupils are equal, round, and reactive to light  Conjunctivae are normal    Cardiovascular: Normal rate, regular rhythm and normal heart sounds  No murmur heard  Pulmonary/Chest: Effort normal and breath sounds normal    Abdominal: Soft  Bowel sounds are normal  Tenderness: mild tenderness in epigastric area  There is no rebound and no guarding  Musculoskeletal: Normal range of motion  She exhibits no edema, tenderness or deformity  Skin: Skin is warm and dry  No rash noted  Psychiatric: She has a normal mood and affect  Nursing note and vitals reviewed

## 2019-09-17 NOTE — ASSESSMENT & PLAN NOTE
Patient was recommended to increase fluid intake  Take Senokot 1 tablet twice daily, try  MiraLax  If continue with constipation, develops nausea,vomiting strongly advised to go to the emergency room for further evaluation to r/o bowel obstruction

## 2019-09-17 NOTE — ASSESSMENT & PLAN NOTE
Toradol 30 mg administered IM  Rx given for Tylenol # 3 to take 1 tablet every 6 hours prn for severe menstrual cramps  PDMP checked and appropriate  Recommended to follow -up with gynecology if ontinues with severe menstrual cramps, heavy menstrual flow

## 2019-09-27 ENCOUNTER — OFFICE VISIT (OUTPATIENT)
Dept: OBGYN CLINIC | Facility: CLINIC | Age: 29
End: 2019-09-27
Payer: COMMERCIAL

## 2019-09-27 VITALS
BODY MASS INDEX: 30.11 KG/M2 | DIASTOLIC BLOOD PRESSURE: 72 MMHG | WEIGHT: 163.6 LBS | SYSTOLIC BLOOD PRESSURE: 116 MMHG | HEIGHT: 62 IN

## 2019-09-27 DIAGNOSIS — N94.6 SEVERE MENSTRUAL CRAMPS: ICD-10-CM

## 2019-09-27 DIAGNOSIS — N93.9 ABNORMAL UTERINE BLEEDING: Primary | ICD-10-CM

## 2019-09-27 PROCEDURE — 99214 OFFICE O/P EST MOD 30 MIN: CPT | Performed by: OBSTETRICS & GYNECOLOGY

## 2019-09-28 NOTE — PROGRESS NOTES
Assessment/Plan:  1YH [id number] with abnormal uterine bleeding characterized by prolonged bleeding for 2 weeks accompanied with cramping  Complicated by constipation for past 2 weeks  Diagnoses and all orders for this visit:    Abnormal uterine bleeding  -     US pelvis complete non OB; Future    Discussed irregular bleeding pattern and possible denuding of endometrium with prolonged progesterone effect but also frequent side effect of irregular bleeding with Nexplanon  Will have ultrasound performed to evaluate EMS stripe  Discussed consideration of adding low dose OCP to help with bleeding if it continues for 2 more weeks  Will await US results and patient to RTO in 2 weeks for discussion and follow up of bleeding  Severe menstrual cramps  -     US pelvis complete non OB; Future  Discussed that severe menstrual cramps could be associated with her constipation issues  Discussed that her constipation can cause lower pelvic cramping  Encouraged patient to utilize stool softener and miralax to assist  Discussed that if stool softener and miralax does not help consideration should be had for a fleets enema to avoid needing disimpaction  Total visit time was approximately 30 minutes and greater than 50% of the visit in face to face counseling and coordination of care discussing constipation and irregular bleeding  Subjective:    Patient ID: Vu Barber is a 30 y o  female  Chief Complaint   Patient presents with    Vaginal Bleeding     Pt c/o constipation, pelvic pain, and bleeding for 2 wks  Pt has the nexplanon  Patient is a 34yo [id number] here for problem visit with complaints of prolonged menstrual bleeding for the past 2 weeks  Patient states that for the past 2 weeks she has experienced uterine bleeding as well as severe menstrual cramping  She reports her bleeding has waxed and waned in amount of flow and she at most is using about 3 tampons in a day   She also has constipation and states that she has not had a significant bowel movement for the past 2 weeks  She states that she had very small bowel movement that was soft but formed  She recently saw her PCP for the cramping and constipation  She has used miralax once to help with constipation but did not like the effects and felt that it did not help  She reports that her constipation has been a chronic problem and she has used stool softener in the past but did not like the abdominal cramping that it caused  She has Nexplanon for contraception which was inserted 2017  She states that she has been satisfied with it and has experienced irregular menses since insertion  She reports she has been having 3-4 menses in a year which last approximately 7-10 days with light flow until now  The following portions of the patient's history were reviewed and updated as appropriate: allergies, current medications, past family history, past medical history, past social history, past surgical history and problem list     Review of Systems   Constitutional: Negative for chills, fatigue, fever and unexpected weight change  Respiratory: Negative for chest tightness and shortness of breath  Cardiovascular: Negative for chest pain  Gastrointestinal: Positive for abdominal pain and constipation  Negative for nausea and vomiting  Genitourinary: Positive for menstrual problem, pelvic pain and vaginal bleeding  Negative for dysuria, flank pain, vaginal discharge and vaginal pain  Musculoskeletal: Positive for back pain  Negative for myalgias  Neurological: Negative for dizziness, syncope, weakness, light-headedness and headaches  Hematological: Negative  Psychiatric/Behavioral: Negative            Objective:      /72 (BP Location: Right arm, Patient Position: Sitting, Cuff Size: Standard)   Ht 5' 2" (1 575 m)   Wt 74 2 kg (163 lb 9 6 oz)   LMP 09/12/2019   BMI 29 92 kg/m²          Physical Exam   Constitutional: She is oriented to person, place, and time  She appears well-developed and well-nourished  No distress  HENT:   Head: Normocephalic and atraumatic  Cardiovascular: Normal rate  Pulmonary/Chest: Effort normal  No respiratory distress  Neurological: She is alert and oriented to person, place, and time  Skin: She is not diaphoretic  Psychiatric: She has a normal mood and affect   Her behavior is normal  Judgment and thought content normal

## 2019-11-18 NOTE — PROGRESS NOTES
Assessment/Plan:     Bacterial vaginosis noted on wet mount  Rx sent to requested pharmacy  No sex during treatment  Complete all medication as directed  Call with any recurrence of symptoms  Use vaginal yeast treatment as discussed  Return to office 3/20  Diagnoses and all orders for this visit:    BV (bacterial vaginosis)  -     POCT wet mount  -     metroNIDAZOLE (FLAGYL) 500 mg tablet; Take 1 tablet (500 mg total) by mouth every 12 (twelve) hours for 7 days    Vaginal yeast infection  -     POCT wet mount  -     miconazole (MONISTAT-7) 2 % vaginal cream; Insert 1 applicator into the vagina daily at bedtime    Other orders  -     Cancel: POCT wet mount              Subjective:      Patient ID: Andrews Jerez is a 30 y o  female  Andrews Jerez is a 30 y o  female who is here today for a problem visit  She believes she has a yeast infection  She was recently on antibiotics due to a dental issue  She noticed internal vaginal itching x 4-5 days  No vaginal odor or discharge  Menses Q 6 months x 14 days since her Nexplanon insertion  Her menses is acceptable and she is pleased with her nexplanon  Andrews Jerez is sexually active with male partner of 1-2 years  No condom  He is asymptomatic  The following portions of the patient's history were reviewed and updated as appropriate: allergies, current medications, past family history, past medical history, past social history, past surgical history and problem list     Review of Systems   Constitutional: Negative  Gastrointestinal: Negative for abdominal pain, constipation, diarrhea, nausea and vomiting  Genitourinary: Positive for menstrual problem  Negative for decreased urine volume, difficulty urinating, dyspareunia, dysuria, flank pain, genital sores, pelvic pain, urgency, vaginal bleeding, vaginal discharge and vaginal pain  Vaginal itching     Musculoskeletal: Negative for arthralgias and myalgias  Skin: Negative      Hematological: Negative for adenopathy  Psychiatric/Behavioral: Negative  All other systems reviewed and are negative  Objective:      /76 (BP Location: Right arm, Patient Position: Sitting, Cuff Size: Standard)   Pulse 70   Wt 74 9 kg (165 lb 3 2 oz)   BMI 30 22 kg/m²          Physical Exam   Constitutional: She is oriented to person, place, and time  She appears well-developed and well-nourished  Genitourinary: Uterus normal  Rectal exam shows no external hemorrhoid  No labial fusion  There is no rash, tenderness, lesion or injury on the right labia  There is no rash, tenderness, lesion or injury on the left labia  Cervix exhibits discharge  Cervix exhibits no motion tenderness and no friability  Right adnexum displays no mass, no tenderness and no fullness  Left adnexum displays no mass, no tenderness and no fullness  No erythema, tenderness or bleeding in the vagina  No foreign body in the vagina  No signs of injury around the vagina  Vaginal discharge (thin white copious discharge) found  Lymphadenopathy:        Right: No inguinal adenopathy present  Left: No inguinal adenopathy present  Neurological: She is alert and oriented to person, place, and time  Skin: Skin is warm and dry  Psychiatric: She has a normal mood and affect  Nursing note and vitals reviewed

## 2019-11-19 ENCOUNTER — OFFICE VISIT (OUTPATIENT)
Dept: OBGYN CLINIC | Facility: CLINIC | Age: 29
End: 2019-11-19
Payer: COMMERCIAL

## 2019-11-19 VITALS
WEIGHT: 165.2 LBS | SYSTOLIC BLOOD PRESSURE: 104 MMHG | DIASTOLIC BLOOD PRESSURE: 76 MMHG | HEART RATE: 70 BPM | BODY MASS INDEX: 30.22 KG/M2

## 2019-11-19 DIAGNOSIS — N76.0 BV (BACTERIAL VAGINOSIS): Primary | ICD-10-CM

## 2019-11-19 DIAGNOSIS — B96.89 BV (BACTERIAL VAGINOSIS): Primary | ICD-10-CM

## 2019-11-19 DIAGNOSIS — B37.3 VAGINAL YEAST INFECTION: ICD-10-CM

## 2019-11-19 PROBLEM — B37.31 VAGINAL YEAST INFECTION: Status: ACTIVE | Noted: 2019-11-19

## 2019-11-19 PROCEDURE — 87210 SMEAR WET MOUNT SALINE/INK: CPT | Performed by: NURSE PRACTITIONER

## 2019-11-19 PROCEDURE — 99214 OFFICE O/P EST MOD 30 MIN: CPT | Performed by: NURSE PRACTITIONER

## 2019-11-19 RX ORDER — METRONIDAZOLE 500 MG/1
500 TABLET ORAL EVERY 12 HOURS SCHEDULED
Qty: 14 TABLET | Refills: 0 | Status: SHIPPED | OUTPATIENT
Start: 2019-11-19 | End: 2019-11-26

## 2019-11-19 NOTE — PATIENT INSTRUCTIONS
Bacterial vaginosis noted on wet mount  Rx sent to requested pharmacy  No sex during treatment  Complete all medication as directed  Call with any recurrence of symptoms     Use vaginal yeast treatment as discussed  Return to office 3/20

## 2019-12-31 ENCOUNTER — OFFICE VISIT (OUTPATIENT)
Dept: OBGYN CLINIC | Facility: CLINIC | Age: 29
End: 2019-12-31
Payer: COMMERCIAL

## 2019-12-31 VITALS
SYSTOLIC BLOOD PRESSURE: 110 MMHG | BODY MASS INDEX: 30.62 KG/M2 | HEART RATE: 82 BPM | WEIGHT: 167.4 LBS | DIASTOLIC BLOOD PRESSURE: 70 MMHG

## 2019-12-31 DIAGNOSIS — Z30.46 NEXPLANON REMOVAL: Primary | ICD-10-CM

## 2019-12-31 PROCEDURE — 11982 REMOVE DRUG IMPLANT DEVICE: CPT | Performed by: NURSE PRACTITIONER

## 2019-12-31 NOTE — PROGRESS NOTES
Remove and insert drug implant  Date/Time: 12/31/2019 8:51 AM  Performed by: MARY KAY Jackson  Authorized by: MARY KAY Jackson     Consent:     Consent obtained:  Verbal and written    Consent given by:  Patient    Procedural risks discussed:  Bleeding, failure rate, infection, repeat procedure, possible loss of function, possible continued pain and possible conversion to open surgery    Patient questions answered: yes      Patient agrees, verbalizes understanding, and wants to proceed: yes      Educational handouts given: no      Instructions and paperwork completed: yes    Indication:     Indication: Presence of non-biodegradable drug delivery implant      Indication comment:  Removal of nexplanon  Pre-procedure:     Pre-procedure timeout performed: yes      Prepped with: povidone-iodine      Local anesthetic: bupivicaine 1%    The site was cleaned and prepped in a sterile fashion: yes    Procedure:     Procedure:  Removal    Small stab incision was made in arm: yes      Left/right:  Left    Site was closed with steri-strips and pressure bandage applied: yes    Comments:      4 cm long implant is removed completely and shown to patient  Tolerated procedure without complaints

## 2019-12-31 NOTE — PATIENT INSTRUCTIONS
4 cm long implant is removed completely  Keep pressure bandage on site x 24 hours then remove  Remove small bandage over incision in 3-5 days  Shower daily once dressings are removed with dial antibacterial soap  Call office with any abnormal signs (increased pain, bleeding, drainage or fever)  Use immediate back up contraceptive method to prevent pregnancy  RTO in 1 week for incision check

## 2020-01-09 ENCOUNTER — OFFICE VISIT (OUTPATIENT)
Dept: OBGYN CLINIC | Facility: CLINIC | Age: 30
End: 2020-01-09
Payer: COMMERCIAL

## 2020-01-09 VITALS
SYSTOLIC BLOOD PRESSURE: 110 MMHG | HEART RATE: 76 BPM | DIASTOLIC BLOOD PRESSURE: 78 MMHG | WEIGHT: 168.6 LBS | BODY MASS INDEX: 30.84 KG/M2

## 2020-01-09 DIAGNOSIS — Z31.9 PATIENT DESIRES PREGNANCY: ICD-10-CM

## 2020-01-09 DIAGNOSIS — Z98.890 POST-OPERATIVE STATE: Primary | ICD-10-CM

## 2020-01-09 PROCEDURE — 99212 OFFICE O/P EST SF 10 MIN: CPT | Performed by: NURSE PRACTITIONER

## 2020-01-09 NOTE — PROGRESS NOTES
Assessment/Plan:     Nexplanon removal site is healed  Start daily MVI with folic acid or prenatal vitamin  Discussed appropriate diet and exercise in pregnancy      Diagnoses and all orders for this visit:    Post-operative state    Patient desires pregnancy              Subjective:      Patient ID: Jennifer Muñoz is a 30 y o  female  Jennifer Muñoz is a 30 y o  female who is here today for a follow up visit  Nexplanon removed as requested on 12/31/19 as pregnancy is desired   No health concerns and states her removal site healed well  No pain or signs of infeciton  The following portions of the patient's history were reviewed and updated as appropriate: allergies, current medications, past family history, past medical history, past social history, past surgical history and problem list     Review of Systems   Constitutional: Negative  Eyes: Negative for visual disturbance  Respiratory: Negative for chest tightness and shortness of breath  Cardiovascular: Negative for chest pain, palpitations and leg swelling  Gastrointestinal: Negative for abdominal pain, constipation, diarrhea, nausea and vomiting  Genitourinary: Negative for dysuria, menstrual problem, vaginal bleeding and vaginal discharge  Skin: Negative  Neurological: Negative for weakness, light-headedness and headaches  Psychiatric/Behavioral: Negative  All other systems reviewed and are negative  Objective:      /78 (BP Location: Right arm, Patient Position: Sitting, Cuff Size: Standard)   Pulse 76   Wt 76 5 kg (168 lb 9 6 oz)   LMP 12/17/2019   BMI 30 84 kg/m²          Physical Exam   Constitutional: She is oriented to person, place, and time  She appears well-developed and well-nourished  Neurological: She is alert and oriented to person, place, and time  Skin: Skin is warm and dry  Left upper arm nexplanon removal site is healed with a small scab  Psychiatric: She has a normal mood and affect  Nursing note and vitals reviewed

## 2020-01-09 NOTE — PATIENT INSTRUCTIONS
Nexplanon removal site is healed  Start daily MVI with folic acid or prenatal vitamin  Discussed appropriate diet in pregnancy

## 2020-03-10 ENCOUNTER — ANNUAL EXAM (OUTPATIENT)
Dept: OBGYN CLINIC | Facility: CLINIC | Age: 30
End: 2020-03-10
Payer: COMMERCIAL

## 2020-03-10 VITALS
BODY MASS INDEX: 32.39 KG/M2 | HEIGHT: 62 IN | HEART RATE: 80 BPM | SYSTOLIC BLOOD PRESSURE: 100 MMHG | WEIGHT: 176 LBS | DIASTOLIC BLOOD PRESSURE: 62 MMHG

## 2020-03-10 DIAGNOSIS — Z01.419 ENCNTR FOR GYN EXAM (GENERAL) (ROUTINE) W/O ABN FINDINGS: Primary | ICD-10-CM

## 2020-03-10 DIAGNOSIS — N94.6 DYSMENORRHEA: ICD-10-CM

## 2020-03-10 PROCEDURE — 99395 PREV VISIT EST AGE 18-39: CPT | Performed by: NURSE PRACTITIONER

## 2020-03-10 NOTE — PATIENT INSTRUCTIONS
Pap every 3 years if normal-due 2022, STI testing as indicated-declined, exercise most days of week, obtain appropriate diet and hydration, Calcium 1000 mg + 600 vit D daily, birth control as directed (ACHES reviewed)  HPV 9 vaccine recommended through age 39  Check with your insurance for coverage  If covered, call office to schedule start of vaccine series  Annual mammogram starting at age 36, monthly breast self exam  [facility] 20 times twice daily  Continue daily prenatal vitamin  Ibuprofen 600 mg by mouth with onset of bleeding or cramping, whichever is first  Take second dose of ibuprofen  400 mg by mouth with food and repeat every 6 hours x 3 days

## 2020-03-10 NOTE — PROGRESS NOTES
Assessment/Plan:     Pap every 3 years if normal-due 2022, STI testing as indicated-declined, exercise most days of week, obtain appropriate diet and hydration, Calcium 1000 mg + 600 vit D daily, birth control as directed (ACHES reviewed)  HPV 9 vaccine recommended through age 39  Check with your insurance for coverage  If covered, call office to schedule start of vaccine series  Annual mammogram starting at age [address], monthly breast self exam  [facility] 20 times twice daily  Continue daily prenatal vitamin  Ibuprofen 600 mg by mouth with onset of bleeding or cramping, whichever is first  Take second dose of ibuprofen  400 mg by mouth with food and repeat every 6 hours x 3 days  Negative depression screen  Diagnoses and all orders for this visit:    Encntr for gyn exam (general) (routine) w/o abn findings    Dysmenorrhea    BMI 32 0-32 9,adult              Subjective:      Patient ID: Jhony Uriarte is a 30 y o  female  Jhony Uriarte is a 30 y o  female who is here today for her annual visit  No health concerns  Monthly menses x 5-6 days with mod flow  Menses is acceptable  Admits to lower pelvic cramping that starts 1-2 days before her menses  Tylenol extra strength ineffective  Jhony Uriarte is sexually active with male partner of 3 years  Taking daily prenatal vitamin  Pregnancy is desired  No symptoms of BV  Not currently exercising  Works FT at the [employer]  The following portions of the patient's history were reviewed and updated as appropriate: allergies, current medications, past family history, past medical history, past social history, past surgical history and problem list     Review of Systems   Constitutional: Negative  Negative for activity change, appetite change, chills, diaphoresis, fatigue, fever and unexpected weight change  HENT: Negative for congestion, dental problem, sneezing, sore throat and trouble swallowing  Eyes: Negative for visual disturbance     Respiratory: Negative for chest tightness and shortness of breath  Cardiovascular: Negative for chest pain and leg swelling  Gastrointestinal: Negative for abdominal pain, constipation, diarrhea, nausea and vomiting  Genitourinary: Negative for difficulty urinating, dyspareunia, dysuria, frequency, hematuria, menstrual problem, pelvic pain, urgency, vaginal bleeding, vaginal discharge and vaginal pain  Musculoskeletal: Negative for back pain and neck pain  Skin: Negative  Allergic/Immunologic: Negative  Neurological: Negative for weakness and headaches  Hematological: Negative for adenopathy  Psychiatric/Behavioral: Negative  Objective:      /62 (BP Location: Left arm, Patient Position: Sitting, Cuff Size: Standard)   Pulse 80   Ht 5' 1 5" (1 562 m)   Wt 79 8 kg (176 lb)   LMP 02/16/2020   BMI 32 72 kg/m²          Physical Exam   Constitutional: She is oriented to person, place, and time  She appears well-developed and well-nourished  HENT:   Head: Normocephalic  Neck: Normal range of motion  No thyromegaly present  Cardiovascular: Normal rate, regular rhythm, normal heart sounds and intact distal pulses  Pulmonary/Chest: Effort normal and breath sounds normal  Right breast exhibits no inverted nipple, no mass, no nipple discharge, no skin change and no tenderness  Left breast exhibits no inverted nipple, no mass, no nipple discharge, no skin change and no tenderness  No breast tenderness, discharge or bleeding  Breasts are symmetrical    Abdominal: Soft  Normal appearance  Genitourinary: Vagina normal and uterus normal  Rectal exam shows no external hemorrhoid  No breast tenderness, discharge or bleeding  Pelvic exam was performed with patient supine  No labial fusion  There is no rash, tenderness, lesion or injury on the right labia  There is no rash, tenderness, lesion or injury on the left labia  Cervix exhibits no motion tenderness, no discharge and no friability   Right adnexum displays no mass, no tenderness and no fullness  Left adnexum displays no mass, no tenderness and no fullness  Musculoskeletal: Normal range of motion  Lymphadenopathy:        Head (right side): No submental, no submandibular, no tonsillar and no occipital adenopathy present  Head (left side): No submental, no submandibular, no tonsillar and no occipital adenopathy present  She has no axillary adenopathy  No inguinal adenopathy noted on the right or left side  Right: No inguinal and no supraclavicular adenopathy present  Left: No inguinal and no supraclavicular adenopathy present  Neurological: She is alert and oriented to person, place, and time  Skin: Skin is warm and dry  Psychiatric: She has a normal mood and affect

## 2020-03-16 NOTE — PROGRESS NOTES
Assessment/Plan:     UPT positive today  Complete dating US in 2-3 weeks  OB intake in 5 weeks after dating US  Continue daily prenatal vitamin  Discussed dietary concerns in pregnancy  Tylenol only for pain needs  Diagnoses and all orders for this visit:    Amenorrhea  -     Chlamydia/GC amplified DNA by PCR  -     US OB < 14 weeks with transvaginal; Future    Possible exposure to STD  -     Chlamydia/GC amplified DNA by PCR              Subjective:      Patient ID: Annamarie Dominguez is a 30 y o  female  Annamarie Dominguez is a 30 y o   (2 VIP) female who is here today for a problem visit with c/o amenorrhea  She had a positive UPT at home 3 days ago  LMP 2020  [facility] 2020  Today 4 1 weeks  Nexplanon removed with desire to conceive 19  Monthly menses x 5 days with mod flow  Pregnancy is planned and she is thrilled  She has not told her partner of 1 year yet  She is trying to figure out a clever way to tell him  Taking a daily PNV  Works FT at [employer]  The following portions of the patient's history were reviewed and updated as appropriate: allergies, current medications, past family history, past medical history, past social history, past surgical history and problem list     Review of Systems   Constitutional: Negative  Eyes: Negative for visual disturbance  Respiratory: Negative for chest tightness and shortness of breath  Cardiovascular: Negative for chest pain, palpitations and leg swelling  Gastrointestinal: Negative for abdominal pain, constipation, diarrhea, nausea and vomiting  Genitourinary: Positive for menstrual problem  Negative for difficulty urinating, dyspareunia, dysuria, flank pain, frequency, pelvic pain, vaginal bleeding, vaginal discharge and vaginal pain  Skin: Negative  Neurological: Negative for weakness, light-headedness and headaches  Psychiatric/Behavioral: Negative  All other systems reviewed and are negative          Objective:      BP 114/66 (BP Location: Right arm, Patient Position: Sitting, Cuff Size: Standard)   Pulse 84   Wt 79 1 kg (174 lb 6 4 oz)   LMP 02/16/2020   BMI 32 42 kg/m²          Physical Exam   Constitutional: She is oriented to person, place, and time  She appears well-developed and well-nourished  Genitourinary: Vagina normal  Rectal exam shows no external hemorrhoid  Pelvic exam was performed with patient supine  No labial fusion  There is no rash, tenderness, lesion or injury on the right labia  There is no rash, tenderness, lesion or injury on the left labia  Uterus is enlarged  Uterus is not deviated, not fixed and not tender  Cervix exhibits no motion tenderness, no discharge and no friability  Right adnexum displays no mass, no tenderness and no fullness  Left adnexum displays no mass, no tenderness and no fullness  Musculoskeletal: Normal range of motion  Lymphadenopathy: No inguinal adenopathy noted on the right or left side  Right: No inguinal adenopathy present  Left: No inguinal adenopathy present  Neurological: She is alert and oriented to person, place, and time  Skin: Skin is warm and dry  Psychiatric: She has a normal mood and affect  Nursing note and vitals reviewed

## 2020-03-17 ENCOUNTER — OFFICE VISIT (OUTPATIENT)
Dept: OBGYN CLINIC | Facility: CLINIC | Age: 30
End: 2020-03-17
Payer: COMMERCIAL

## 2020-03-17 VITALS
SYSTOLIC BLOOD PRESSURE: 114 MMHG | DIASTOLIC BLOOD PRESSURE: 66 MMHG | WEIGHT: 174.4 LBS | HEART RATE: 84 BPM | BODY MASS INDEX: 32.42 KG/M2

## 2020-03-17 DIAGNOSIS — Z20.2 POSSIBLE EXPOSURE TO STD: ICD-10-CM

## 2020-03-17 DIAGNOSIS — N91.2 AMENORRHEA: Primary | ICD-10-CM

## 2020-03-17 PROCEDURE — 87591 N.GONORRHOEAE DNA AMP PROB: CPT | Performed by: NURSE PRACTITIONER

## 2020-03-17 PROCEDURE — 87491 CHLMYD TRACH DNA AMP PROBE: CPT | Performed by: NURSE PRACTITIONER

## 2020-03-17 PROCEDURE — 99214 OFFICE O/P EST MOD 30 MIN: CPT | Performed by: NURSE PRACTITIONER

## 2020-03-17 PROCEDURE — 1036F TOBACCO NON-USER: CPT | Performed by: NURSE PRACTITIONER

## 2020-03-17 NOTE — PATIENT INSTRUCTIONS
UPT positive today  Complete dating US this or next week  OB intake in 1-2 weeks after dating US  Continue daily prenatal vitamin  Discussed dietary concerns in pregnancy  Tylenol only for pain needs

## 2020-03-19 LAB
C TRACH DNA SPEC QL NAA+PROBE: NEGATIVE
N GONORRHOEA DNA SPEC QL NAA+PROBE: NEGATIVE

## 2020-03-31 ENCOUNTER — OFFICE VISIT (OUTPATIENT)
Dept: URGENT CARE | Facility: CLINIC | Age: 30
End: 2020-03-31
Payer: COMMERCIAL

## 2020-03-31 VITALS
RESPIRATION RATE: 14 BRPM | BODY MASS INDEX: 32.13 KG/M2 | OXYGEN SATURATION: 100 % | HEART RATE: 85 BPM | TEMPERATURE: 98.4 F | SYSTOLIC BLOOD PRESSURE: 108 MMHG | WEIGHT: 174.6 LBS | HEIGHT: 62 IN | DIASTOLIC BLOOD PRESSURE: 53 MMHG

## 2020-03-31 DIAGNOSIS — H66.001 ACUTE SUPPURATIVE OTITIS MEDIA OF RIGHT EAR WITHOUT SPONTANEOUS RUPTURE OF TYMPANIC MEMBRANE, RECURRENCE NOT SPECIFIED: Primary | ICD-10-CM

## 2020-03-31 PROCEDURE — G0382 LEV 3 HOSP TYPE B ED VISIT: HCPCS | Performed by: PHYSICIAN ASSISTANT

## 2020-03-31 RX ORDER — AZITHROMYCIN 250 MG/1
TABLET, FILM COATED ORAL
Qty: 6 TABLET | Refills: 0 | Status: SHIPPED | OUTPATIENT
Start: 2020-03-31 | End: 2020-04-04

## 2020-03-31 NOTE — PATIENT INSTRUCTIONS
Prescription sent to the pharmacy for azithromycin-use as directed  Tylenol as needed for pain  Follow up with PCP in 3-5 days  Proceed to  ER if symptoms worsen  Otitis Media   AMBULATORY CARE:   Otitis media  is an ear infection  Common symptoms include the following:   · Fever or a headache    · Ear pain    · Trouble hearing    · Ear feels plugged or full or you have ringing or buzzing in your ear    · Dizziness or you lose your balance    · Nausea or vomiting  Seek immediate care for the following symptoms:   · Seizure    · Fever and a stiff neck  Treatment for otitis media  may include any of the following:  · NSAIDs , such as ibuprofen, help decrease swelling, pain, and fever  This medicine is available with or without a doctor's order  NSAIDs can cause stomach bleeding or kidney problems in certain people  If you take blood thinner medicine, always ask your healthcare provider if NSAIDs are safe for you  Always read the medicine label and follow directions  · Ear drops  to help treat your ear pain  · Antibiotics  to help kill the germs that caused your ear infection  Care for otitis media:   · Use heat  Place a warm, moist washcloth on your ear to decrease pain  Apply for 15 to 20 minutes, 3 to 4 times a day    · Use ice  Ice helps decrease swelling and pain  Use an ice pack or put crushed ice in a plastic bag  Cover the ice pack with a towel and place it on your ear for 15 to 20 minutes, 3 to 4 times a day for 2 days  Prevent otitis media:   · Wash your hands often  This will help prevent the spread of germs  Encourage everyone in your house to wash their hands with soap and water after they use the bathroom  Everyone should also wash their hands after they change a child's diaper and before they prepare or eat food  · Stay away from people who are ill  Germs are easily and quickly spread through contact    Follow up with your healthcare provider as directed:  Write down your questions so you remember to ask them during your visits  © 2017 [employer] Information is for End User's use only and may not be sold, redistributed or otherwise used for commercial purposes  All illustrations and images included in CareNotes® are the copyrighted property of A [employer]  or [employer]ash Rice  The above information is an  only  It is not intended as medical advice for individual conditions or treatments  Talk to your doctor, nurse or pharmacist before following any medical regimen to see if it is safe and effective for you

## 2020-03-31 NOTE — PROGRESS NOTES
[facility]        NAME: Jennifer Muñoz is a 30 y o  female  : 1990    MRN: [id number]  DATE: 2020  TIME: 9:02 AM    Assessment and Plan   Acute suppurative otitis media of right ear without spontaneous rupture of tympanic membrane, recurrence not specified [H66 001]  1  Acute suppurative otitis media of right ear without spontaneous rupture of tympanic membrane, recurrence not specified  azithromycin (ZITHROMAX) 250 mg tablet         Patient Instructions   Prescription sent to the pharmacy for azithromycin-use as directed  Tylenol as needed for pain  Follow up with PCP in 3-5 days  Proceed to  ER if symptoms worsen  Chief Complaint     Chief Complaint   Patient presents with    Earache     Pt c/o right ear pain that when she swallows it hurts  Pt reports that is started yesterday am         History of Present Illness   The patient is a 25-year-old female who presents with right ear pain that started yesterday  She is currently 6 weeks pregnant  She denies nasal or sinus congestion  Negative facial pain or tenderness  Right ear pain without drainage or hearing changes  Negative dizziness  Negative headache  Negative fever or chills  Negative cough  She states that she has had ear infections in the past     HPI    Review of Systems   Review of Systems   Constitutional: Negative for activity change, chills, fatigue and fever  HENT: Positive for ear pain  Negative for congestion, ear discharge, facial swelling, mouth sores, postnasal drip, rhinorrhea, sinus pressure, sinus pain, sneezing, sore throat and trouble swallowing  Eyes: Negative for pain, discharge, redness and itching  Respiratory: Negative for apnea, cough, chest tightness, shortness of breath, wheezing and stridor  Cardiovascular: Negative for chest pain  Gastrointestinal: Negative for abdominal distention, abdominal pain, diarrhea, nausea and vomiting     Genitourinary: Negative for difficulty urinating  Musculoskeletal: Negative for arthralgias and myalgias  Skin: Negative for pallor and rash  Allergic/Immunologic: Negative  Neurological: Negative for dizziness, light-headedness and headaches  Hematological: Negative  Psychiatric/Behavioral: Negative  All other systems reviewed and are negative          Current Medications       Current Outpatient Medications:     acetaminophen-codeine (TYLENOL/CODEINE #3) 300-30 MG per tablet, Take 1 tablet by mouth every 6 (six) hours as needed for moderate pain, Disp: 30 tablet, Rfl: 0    albuterol (PROVENTIL HFA) 90 mcg/act inhaler, 1-2 puffs every 4-6 hours prn for wheezing and SOB, Disp: 1 Inhaler, Rfl: 2    cyclobenzaprine (FLEXERIL) 10 mg tablet, TAKE 1 TABLET (10 MG TOTAL) BY MOUTH DAILY AT BEDTIME AS NEEDED FOR MUSCLE SPASMS, Disp: 20 tablet, Rfl: 0    DUPIXENT subcutaneous injection, , Disp: , Rfl:     montelukast (SINGULAIR) 10 mg tablet, Take 1 tablet (10 mg total) by mouth daily at bedtime, Disp: 90 tablet, Rfl: 3    naproxen (NAPROSYN) 500 mg tablet, TAKE 1 TABLET BY MOUTH TWICE A DAY WITH FOOD, Disp: 30 tablet, Rfl: 0    senna (SENOKOT) 8 6 MG tablet, Take 1 tablet (8 6 mg total) by mouth 2 (two) times a day, Disp: 30 tablet, Rfl: 0    azithromycin (ZITHROMAX) 250 mg tablet, Take 2 tablets on day 1 and then 1 tablet daily x 4 days, Disp: 6 tablet, Rfl: 0    Current Allergies     Allergies as of 03/31/2020 - Reviewed 03/31/2020   Allergen Reaction Noted    Cat hair extract  06/01/2018    Dog epithelium  06/01/2018    Penicillins  05/28/2018            The following portions of the patient's history were reviewed and updated as appropriate: allergies, current medications, past family history, past medical history, past social history, past surgical history and problem list      Past Medical History:   Diagnosis Date    Anemia     Asthma     Eczema     Obesity        Past Surgical History:   Procedure Laterality Date    BARIATRIC SURGERY  2019    EXPLORATORY LAPAROTOMY      2016, no findings     GASTRECTOMY SLEEVE LAPAROSCOPIC      NO PAST SURGERIES         Family History   Problem Relation Age of Onset    Hypertension Mother    Sepulveda Diabetes Mother         gestastional    Migraines Mother     Diabetes Maternal Grandmother     Heart failure Maternal Grandmother     Liver cancer Maternal Grandfather     Diabetes Maternal Grandfather     Asthma Father             Lung cancer Paternal Grandmother     No Known Problems Sister     No Known Problems Brother     No Known Problems Sister     No Known Problems Sister          Medications have been verified  Objective   /53   Pulse 85   Temp 98 4 °F (36 9 °C)   Resp 14   Ht 5' 2" (1 575 m)   Wt 79 2 kg (174 lb 9 6 oz)   SpO2 100%   BMI 31 93 kg/m²        Physical Exam     Physical Exam   Constitutional: She appears well-developed and well-nourished  No distress  HENT:   Head: Normocephalic and atraumatic  Right Ear: Hearing, external ear and ear canal normal  No lacerations  No drainage, swelling or tenderness  No foreign bodies  No mastoid tenderness  Tympanic membrane is injected, scarred and erythematous  Tympanic membrane is not perforated, not retracted and not bulging  No middle ear effusion  No hemotympanum  No decreased hearing is noted  Left Ear: Hearing, tympanic membrane, external ear and ear canal normal  No lacerations  No drainage, swelling or tenderness  No foreign bodies  No mastoid tenderness  Tympanic membrane is not injected, not scarred, not perforated, not erythematous, not retracted and not bulging  No middle ear effusion  No hemotympanum  No decreased hearing is noted  Nose: Nose normal    Mouth/Throat: Oropharynx is clear and moist  No oropharyngeal exudate  Eyes: Pupils are equal, round, and reactive to light  Conjunctivae and EOM are normal  Right eye exhibits no discharge  Left eye exhibits no discharge   No scleral icterus  Neck: Normal range of motion  Neck supple  Pulmonary/Chest: Effort normal  No respiratory distress  Skin: Skin is warm and dry  Capillary refill takes less than 2 seconds  She is not diaphoretic  Psychiatric: She has a normal mood and affect  Her behavior is normal    Nursing note and vitals reviewed

## 2020-04-09 ENCOUNTER — HOSPITAL ENCOUNTER (OUTPATIENT)
Dept: ULTRASOUND IMAGING | Facility: HOSPITAL | Age: 30
Discharge: HOME | End: 2020-04-09
Payer: COMMERCIAL

## 2020-04-09 DIAGNOSIS — N91.2 AMENORRHEA: ICD-10-CM

## 2020-04-09 PROCEDURE — 76801 OB US < 14 WKS SINGLE FETUS: CPT

## 2020-04-23 ENCOUNTER — TELEMEDICINE (OUTPATIENT)
Dept: OBGYN CLINIC | Facility: CLINIC | Age: 30
End: 2020-04-23

## 2020-04-23 ENCOUNTER — TELEPHONE (OUTPATIENT)
Dept: OBGYN CLINIC | Facility: CLINIC | Age: 30
End: 2020-04-23

## 2020-04-23 VITALS — BODY MASS INDEX: 32.02 KG/M2 | WEIGHT: 174 LBS | HEIGHT: 62 IN

## 2020-04-23 DIAGNOSIS — Z3A.09 9 WEEKS GESTATION OF PREGNANCY: ICD-10-CM

## 2020-04-23 DIAGNOSIS — O21.9 NAUSEA/VOMITING IN PREGNANCY: ICD-10-CM

## 2020-04-23 DIAGNOSIS — Z34.81 ENCOUNTER FOR SUPERVISION OF OTHER NORMAL PREGNANCY IN FIRST TRIMESTER: Primary | ICD-10-CM

## 2020-04-23 PROBLEM — Z34.90 SUPERVISION OF NORMAL PREGNANCY: Status: ACTIVE | Noted: 2020-04-23

## 2020-04-23 PROCEDURE — OBC: Performed by: NURSE PRACTITIONER

## 2020-04-23 RX ORDER — .BETA.-CAROTENE, ASCORBIC ACID, CHOLECALCIFEROL, .ALPHA.-TOCOPHEROL ACETATE, DL-, THIAMINE MONONITRATE, RIBOFLAVIN, NIACINAMIDE, PYRIDOXINE HYDROCHLORIDE, FOLIC ACID, CYANOCOBALAMIN, CALCIUM PANTOTHENATE, CALCIUM CARBONATE, FERROUS FUMARATE, ZINC OXIDE, AND DOCUSATE SODIUM 1000; 100; 400; 30; 3; 3; 15; 20; 1; 12; 7; 200; 29; 20; 25 [IU]/1; MG/1; [IU]/1; [IU]/1; MG/1; MG/1; MG/1; MG/1; MG/1; UG/1; MG/1; MG/1; MG/1; MG/1; MG/1
TABLET, COATED ORAL
COMMUNITY
End: 2021-07-26

## 2020-04-23 RX ORDER — PYRIDOXINE HCL (VITAMIN B6) 25 MG
25 TABLET ORAL EVERY 8 HOURS PRN
Qty: 30 TABLET | Refills: 0 | Status: SHIPPED | OUTPATIENT
Start: 2020-04-23 | End: 2020-07-23

## 2020-04-28 DIAGNOSIS — Z3A.09 9 WEEKS GESTATION OF PREGNANCY: Primary | ICD-10-CM

## 2020-04-28 DIAGNOSIS — Z13.1 SCREENING FOR DIABETES MELLITUS (DM): ICD-10-CM

## 2020-04-28 DIAGNOSIS — Z98.84 H/O GASTRIC BYPASS: ICD-10-CM

## 2020-04-28 RX ORDER — LANCETS 28 GAUGE
EACH MISCELLANEOUS
Qty: 1 EACH | Refills: 0 | Status: SHIPPED | OUTPATIENT
Start: 2020-04-28 | End: 2020-07-23

## 2020-04-28 RX ORDER — BLOOD-GLUCOSE METER
KIT MISCELLANEOUS
Qty: 1 EACH | Refills: 0 | Status: SHIPPED | OUTPATIENT
Start: 2020-04-28 | End: 2020-07-23

## 2020-04-30 ENCOUNTER — APPOINTMENT (OUTPATIENT)
Dept: LAB | Facility: CLINIC | Age: 30
End: 2020-04-30
Payer: COMMERCIAL

## 2020-04-30 ENCOUNTER — TELEPHONE (OUTPATIENT)
Dept: OBGYN CLINIC | Facility: CLINIC | Age: 30
End: 2020-04-30

## 2020-04-30 DIAGNOSIS — Z34.81 ENCOUNTER FOR SUPERVISION OF OTHER NORMAL PREGNANCY IN FIRST TRIMESTER: ICD-10-CM

## 2020-04-30 DIAGNOSIS — Z3A.09 9 WEEKS GESTATION OF PREGNANCY: ICD-10-CM

## 2020-04-30 LAB
ABO GROUP BLD: NORMAL
BACTERIA UR QL AUTO: ABNORMAL /HPF
BASOPHILS # BLD AUTO: 0.03 THOUSANDS/ΜL (ref 0–0.1)
BASOPHILS NFR BLD AUTO: 0 % (ref 0–1)
BILIRUB UR QL STRIP: NEGATIVE
BLD GP AB SCN SERPL QL: NEGATIVE
CLARITY UR: CLEAR
COLOR UR: YELLOW
EOSINOPHIL # BLD AUTO: 0.27 THOUSAND/ΜL (ref 0–0.61)
EOSINOPHIL NFR BLD AUTO: 2 % (ref 0–6)
ERYTHROCYTE [DISTWIDTH] IN BLOOD BY AUTOMATED COUNT: 12.3 % (ref 11.6–15.1)
GLUCOSE UR STRIP-MCNC: NEGATIVE MG/DL
HBV SURFACE AG SER QL: NORMAL
HCT VFR BLD AUTO: 35.9 % (ref 34.8–46.1)
HCV AB SER QL: NORMAL
HGB BLD-MCNC: 11.9 G/DL (ref 11.5–15.4)
HGB UR QL STRIP.AUTO: NEGATIVE
IMM GRANULOCYTES # BLD AUTO: 0.04 THOUSAND/UL (ref 0–0.2)
IMM GRANULOCYTES NFR BLD AUTO: 0 % (ref 0–2)
KETONES UR STRIP-MCNC: NEGATIVE MG/DL
LEUKOCYTE ESTERASE UR QL STRIP: NEGATIVE
LYMPHOCYTES # BLD AUTO: 2.28 THOUSANDS/ΜL (ref 0.6–4.47)
LYMPHOCYTES NFR BLD AUTO: 19 % (ref 14–44)
MCH RBC QN AUTO: 30 PG (ref 26.8–34.3)
MCHC RBC AUTO-ENTMCNC: 33.1 G/DL (ref 31.4–37.4)
MCV RBC AUTO: 90 FL (ref 82–98)
MONOCYTES # BLD AUTO: 0.68 THOUSAND/ΜL (ref 0.17–1.22)
MONOCYTES NFR BLD AUTO: 6 % (ref 4–12)
MUCOUS THREADS UR QL AUTO: ABNORMAL
NEUTROPHILS # BLD AUTO: 8.58 THOUSANDS/ΜL (ref 1.85–7.62)
NEUTS SEG NFR BLD AUTO: 73 % (ref 43–75)
NITRITE UR QL STRIP: NEGATIVE
NON-SQ EPI CELLS URNS QL MICRO: ABNORMAL /HPF
NRBC BLD AUTO-RTO: 0 /100 WBCS
PH UR STRIP.AUTO: 6 [PH]
PLATELET # BLD AUTO: 306 THOUSANDS/UL (ref 149–390)
PMV BLD AUTO: 10.3 FL (ref 8.9–12.7)
PROT UR STRIP-MCNC: NEGATIVE MG/DL
RBC # BLD AUTO: 3.97 MILLION/UL (ref 3.81–5.12)
RBC #/AREA URNS AUTO: ABNORMAL /HPF
RH BLD: POSITIVE
RPR SER QL: NORMAL
RUBV IGG SERPL IA-ACNC: 47.6 IU/ML
SP GR UR STRIP.AUTO: >=1.03 (ref 1–1.03)
SPECIMEN EXPIRATION DATE: NORMAL
UROBILINOGEN UR QL STRIP.AUTO: 1 E.U./DL
WBC # BLD AUTO: 11.88 THOUSAND/UL (ref 4.31–10.16)
WBC #/AREA URNS AUTO: ABNORMAL /HPF

## 2020-04-30 PROCEDURE — 36415 COLL VENOUS BLD VENIPUNCTURE: CPT

## 2020-04-30 PROCEDURE — 81001 URINALYSIS AUTO W/SCOPE: CPT

## 2020-04-30 PROCEDURE — 86787 VARICELLA-ZOSTER ANTIBODY: CPT

## 2020-04-30 PROCEDURE — 83020 HEMOGLOBIN ELECTROPHORESIS: CPT

## 2020-04-30 PROCEDURE — 80081 OBSTETRIC PANEL INC HIV TSTG: CPT

## 2020-04-30 PROCEDURE — 87086 URINE CULTURE/COLONY COUNT: CPT

## 2020-04-30 PROCEDURE — 86803 HEPATITIS C AB TEST: CPT

## 2020-05-01 LAB
BACTERIA UR CULT: NORMAL
HIV 1+2 AB+HIV1 P24 AG SERPL QL IA: NORMAL

## 2020-05-05 ENCOUNTER — TELEPHONE (OUTPATIENT)
Dept: PERINATAL CARE | Facility: CLINIC | Age: 30
End: 2020-05-05

## 2020-05-05 LAB
DEPRECATED HGB OTHER BLD-IMP: 0 %
HGB A MFR BLD: 1.8 % (ref 1.8–3.2)
HGB A MFR BLD: 98.2 % (ref 96.4–98.8)
HGB C MFR BLD: 0 %
HGB F MFR BLD: 0 % (ref 0–2)
HGB FRACT BLD-IMP: NORMAL
HGB S BLD QL SOLY: NEGATIVE
HGB S MFR BLD: 0 %
VZV IGG SER IA-ACNC: NORMAL

## 2020-05-07 ENCOUNTER — TELEPHONE (OUTPATIENT)
Dept: PERINATAL CARE | Facility: CLINIC | Age: 30
End: 2020-05-07

## 2020-05-20 ENCOUNTER — TELEPHONE (OUTPATIENT)
Dept: OTHER | Facility: OTHER | Age: 30
End: 2020-05-20

## 2020-05-20 ENCOUNTER — TELEPHONE (OUTPATIENT)
Dept: PERINATAL CARE | Facility: CLINIC | Age: 30
End: 2020-05-20

## 2020-05-22 ENCOUNTER — TELEPHONE (OUTPATIENT)
Dept: PERINATAL CARE | Facility: CLINIC | Age: 30
End: 2020-05-22

## 2020-05-26 ENCOUNTER — TELEPHONE (OUTPATIENT)
Dept: PERINATAL CARE | Facility: CLINIC | Age: 30
End: 2020-05-26

## 2020-05-28 ENCOUNTER — TELEMEDICINE (OUTPATIENT)
Dept: OBGYN CLINIC | Facility: CLINIC | Age: 30
End: 2020-05-28

## 2020-05-28 DIAGNOSIS — Z34.82 ENCOUNTER FOR SUPERVISION OF OTHER NORMAL PREGNANCY IN SECOND TRIMESTER: Primary | ICD-10-CM

## 2020-05-28 DIAGNOSIS — Z3A.14 14 WEEKS GESTATION OF PREGNANCY: ICD-10-CM

## 2020-05-28 PROCEDURE — PNV: Performed by: NURSE PRACTITIONER

## 2020-05-28 RX ORDER — DUPILUMAB 300 MG/2ML
300 INJECTION, SOLUTION SUBCUTANEOUS
COMMUNITY
Start: 2020-04-29

## 2020-05-28 RX ORDER — CEPHALEXIN 500 MG/1
500 CAPSULE ORAL EVERY 6 HOURS SCHEDULED
COMMUNITY
End: 2020-07-23

## 2020-06-03 ENCOUNTER — TRANSCRIBE ORDERS (OUTPATIENT)
Dept: LAB | Facility: CLINIC | Age: 30
End: 2020-06-03

## 2020-06-03 ENCOUNTER — TELEPHONE (OUTPATIENT)
Dept: OTHER | Facility: OTHER | Age: 30
End: 2020-06-03

## 2020-06-12 ENCOUNTER — TELEPHONE (OUTPATIENT)
Dept: GYNECOLOGY | Facility: CLINIC | Age: 30
End: 2020-06-12

## 2020-06-12 NOTE — TELEPHONE ENCOUNTER
Can you please verify that she is going to complete her glucose test? See Dr Miranda Ferrera note  She vomited the last time and doesn't want to do the glucose checks herself       Emi Yancey

## 2020-06-23 ENCOUNTER — TELEPHONE (OUTPATIENT)
Dept: OBGYN CLINIC | Facility: CLINIC | Age: 30
End: 2020-06-23

## 2020-06-24 ENCOUNTER — ROUTINE PRENATAL (OUTPATIENT)
Dept: OBGYN CLINIC | Facility: CLINIC | Age: 30
End: 2020-06-24

## 2020-06-24 VITALS — DIASTOLIC BLOOD PRESSURE: 68 MMHG | BODY MASS INDEX: 32.63 KG/M2 | SYSTOLIC BLOOD PRESSURE: 116 MMHG | WEIGHT: 178.4 LBS

## 2020-06-24 DIAGNOSIS — Z3A.18 18 WEEKS GESTATION OF PREGNANCY: ICD-10-CM

## 2020-06-24 DIAGNOSIS — Z34.82 ENCOUNTER FOR SUPERVISION OF OTHER NORMAL PREGNANCY IN SECOND TRIMESTER: Primary | ICD-10-CM

## 2020-06-24 PROCEDURE — PNV: Performed by: OBSTETRICS & GYNECOLOGY

## 2020-07-22 ENCOUNTER — TELEPHONE (OUTPATIENT)
Dept: PERINATAL CARE | Facility: OTHER | Age: 30
End: 2020-07-22

## 2020-07-22 NOTE — TELEPHONE ENCOUNTER
Spoke with patient and confirmed appointment with [facility]  1 support person (must be over age of 15) may accompany patient  Will you and your support person be able to wear a mask, without a valve, during entire appointment? yes  [facility] does not allow cell phone use, recording device or streaming during the ultrasound  Check in and rooming questions will be done via phone, when you arrive in the parking lot please call the following inside line # prior to entering office:    [place] line: [phone]    Have you or your support person traveled outside the state in the last 2 weeks? No  If yes, what state did you travel to? n/a     Do you or your support person have:  Fever or flu-like symptoms? No  Symptoms of upper respiratory infection like runny nose, sore throat or cough? No  Do you have new headache that you have not had in the past? No  Have you experienced any new shortness of breath recently? No  Do you have any new loss of taste or smell? No  Do you have any new diarrhea, nausea or vomiting? No  Have you recently been in contact with anyone who has been sick or diagnosed with COVID19 infection? No  Have you been recommended to quarantine because of an exposure to a confirmed positive COVID19 person? No    You and your support person will have temperature screening upon arrival     Patient verbalized understanding of all instructions

## 2020-07-23 ENCOUNTER — ROUTINE PRENATAL (OUTPATIENT)
Dept: PERINATAL CARE | Facility: CLINIC | Age: 30
End: 2020-07-23
Payer: COMMERCIAL

## 2020-07-23 ENCOUNTER — ROUTINE PRENATAL (OUTPATIENT)
Dept: OBGYN CLINIC | Facility: CLINIC | Age: 30
End: 2020-07-23

## 2020-07-23 VITALS
BODY MASS INDEX: 34.85 KG/M2 | TEMPERATURE: 97.9 F | SYSTOLIC BLOOD PRESSURE: 119 MMHG | DIASTOLIC BLOOD PRESSURE: 73 MMHG | HEIGHT: 62 IN | WEIGHT: 189.4 LBS | HEART RATE: 80 BPM

## 2020-07-23 VITALS — WEIGHT: 189.8 LBS | TEMPERATURE: 97.9 F | BODY MASS INDEX: 34.71 KG/M2

## 2020-07-23 DIAGNOSIS — Z34.82 ENCOUNTER FOR SUPERVISION OF OTHER NORMAL PREGNANCY IN SECOND TRIMESTER: ICD-10-CM

## 2020-07-23 DIAGNOSIS — Z3A.22 22 WEEKS GESTATION OF PREGNANCY: ICD-10-CM

## 2020-07-23 DIAGNOSIS — O99.842 BARIATRIC SURGERY STATUS COMPLICATING PREGNANCY, SECOND TRIMESTER: Primary | ICD-10-CM

## 2020-07-23 DIAGNOSIS — O99.212 MATERNAL OBESITY, ANTEPARTUM, SECOND TRIMESTER: ICD-10-CM

## 2020-07-23 DIAGNOSIS — Z34.82 ENCOUNTER FOR SUPERVISION OF OTHER NORMAL PREGNANCY IN SECOND TRIMESTER: Primary | ICD-10-CM

## 2020-07-23 DIAGNOSIS — Z36.86 ENCOUNTER FOR ANTENATAL SCREENING FOR CERVICAL LENGTH: ICD-10-CM

## 2020-07-23 PROBLEM — Z98.890 POST-OPERATIVE STATE: Status: RESOLVED | Noted: 2020-01-09 | Resolved: 2020-07-23

## 2020-07-23 PROBLEM — O21.9 NAUSEA/VOMITING IN PREGNANCY: Status: RESOLVED | Noted: 2020-04-23 | Resolved: 2020-07-23

## 2020-07-23 PROBLEM — N94.6 DYSMENORRHEA: Status: RESOLVED | Noted: 2020-03-10 | Resolved: 2020-07-23

## 2020-07-23 PROBLEM — Z01.419 ENCNTR FOR GYN EXAM (GENERAL) (ROUTINE) W/O ABN FINDINGS: Status: RESOLVED | Noted: 2020-03-10 | Resolved: 2020-07-23

## 2020-07-23 PROBLEM — B37.3 VAGINAL YEAST INFECTION: Status: RESOLVED | Noted: 2019-11-19 | Resolved: 2020-07-23

## 2020-07-23 PROBLEM — N76.0 BV (BACTERIAL VAGINOSIS): Status: RESOLVED | Noted: 2019-11-19 | Resolved: 2020-07-23

## 2020-07-23 PROBLEM — N91.2 AMENORRHEA: Status: RESOLVED | Noted: 2020-03-17 | Resolved: 2020-07-23

## 2020-07-23 PROBLEM — B37.31 VAGINAL YEAST INFECTION: Status: RESOLVED | Noted: 2019-11-19 | Resolved: 2020-07-23

## 2020-07-23 PROBLEM — Z31.9 PATIENT DESIRES PREGNANCY: Status: RESOLVED | Noted: 2020-01-09 | Resolved: 2020-07-23

## 2020-07-23 PROBLEM — B96.89 BV (BACTERIAL VAGINOSIS): Status: RESOLVED | Noted: 2019-11-19 | Resolved: 2020-07-23

## 2020-07-23 PROBLEM — R63.5 WEIGHT GAIN: Status: RESOLVED | Noted: 2018-08-10 | Resolved: 2020-07-23

## 2020-07-23 PROBLEM — Z30.46 NEXPLANON REMOVAL: Status: RESOLVED | Noted: 2019-12-31 | Resolved: 2020-07-23

## 2020-07-23 PROBLEM — N94.6 SEVERE MENSTRUAL CRAMPS: Status: RESOLVED | Noted: 2019-09-17 | Resolved: 2020-07-23

## 2020-07-23 PROBLEM — E66.812 CLASS 2 OBESITY DUE TO EXCESS CALORIES WITH BODY MASS INDEX (BMI) OF 35.0 TO 35.9 IN ADULT: Status: RESOLVED | Noted: 2018-08-10 | Resolved: 2020-07-23

## 2020-07-23 PROBLEM — E66.09 CLASS 2 OBESITY DUE TO EXCESS CALORIES WITH BODY MASS INDEX (BMI) OF 35.0 TO 35.9 IN ADULT: Status: RESOLVED | Noted: 2018-08-10 | Resolved: 2020-07-23

## 2020-07-23 PROCEDURE — 99201 PR OFFICE OUTPATIENT NEW 10 MINUTES: CPT | Performed by: OBSTETRICS & GYNECOLOGY

## 2020-07-23 PROCEDURE — 1036F TOBACCO NON-USER: CPT | Performed by: OBSTETRICS & GYNECOLOGY

## 2020-07-23 PROCEDURE — 76811 OB US DETAILED SNGL FETUS: CPT | Performed by: OBSTETRICS & GYNECOLOGY

## 2020-07-23 PROCEDURE — 76817 TRANSVAGINAL US OBSTETRIC: CPT | Performed by: OBSTETRICS & GYNECOLOGY

## 2020-07-23 PROCEDURE — PNV: Performed by: OBSTETRICS & GYNECOLOGY

## 2020-07-23 PROCEDURE — 3008F BODY MASS INDEX DOCD: CPT | Performed by: OBSTETRICS & GYNECOLOGY

## 2020-07-23 RX ORDER — ASPIRIN 81 MG/1
162 TABLET, CHEWABLE ORAL DAILY
Qty: 180 TABLET | Refills: 1 | Status: SHIPPED | OUTPATIENT
Start: 2020-07-23 | End: 2020-09-20 | Stop reason: HOSPADM

## 2020-07-23 NOTE — PROGRESS NOTES
Please refer to the [facility] ultrasound report in Ob Procedures for additional information regarding today's visit

## 2020-07-23 NOTE — PROGRESS NOTES
This is a 35 y o   at 22w4d who presents for return OB visit  No complaints  Denies contractions, leakage, bleeding  Endorses fetal movement   BP: 119/73 TWG: 15lb    Had level 2 US today - normal but not complete, repeat in 4 wks scheduled  Completed work forms today for patient RE: lifting during pregnancy  Patient plans to breastfeed  Plans for contraception: nexplanon  NOT eligible for IP LARC and will need interval insertion     F/up 4 wks

## 2020-07-23 NOTE — PROGRESS NOTES
A transvaginal ultrasound was performed   Sonographer note on use of High Level Disinfection Process (Trophon) for transvaginal probe# 2 used, serial # [id number]  Em Davenport RDMS

## 2020-07-23 NOTE — PROGRESS NOTES
Patient feels well, has no concerns  Would like to discuss work restrictions  Had ultrasound at [facility] earlier today  Urine trace protein, negative glucose

## 2020-07-23 NOTE — LETTER
July 23, 2020     Khadar Parker MD  [address]  [address]  [place]    Patient: Dc Solomon   YOB: 1990   Date of Visit: 7/23/2020       Dear Dr Gomez Erm:    Thank you for referring Kylie Jha to me for evaluation  Below are my notes for this consultation  If you have questions, please do not hesitate to call me  I look forward to following your patient along with you           Sincerely,        Houston Rojas MD        CC: No Recipients

## 2020-08-03 ENCOUNTER — TELEPHONE (OUTPATIENT)
Dept: OBGYN CLINIC | Facility: CLINIC | Age: 30
End: 2020-08-03

## 2020-08-03 NOTE — TELEPHONE ENCOUNTER
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

## 2020-08-03 NOTE — TELEPHONE ENCOUNTER
Advised patient we can give her OB pregnancy note with lifting restrictions, states she already has that note  Verbalized understanding

## 2020-08-18 ENCOUNTER — DOCUMENTATION (OUTPATIENT)
Dept: OBGYN CLINIC | Facility: CLINIC | Age: 30
End: 2020-08-18

## 2020-08-18 NOTE — PROGRESS NOTES
Pt's dermatologist's office called and asked whether Ketoconazole and Triamcinolone creams applied topically are safe in pregnancy  Per Dr Adele Crisostomo, both are fine

## 2020-08-19 ENCOUNTER — TELEPHONE (OUTPATIENT)
Dept: OBGYN CLINIC | Facility: CLINIC | Age: 30
End: 2020-08-19

## 2020-08-19 ENCOUNTER — OFFICE VISIT (OUTPATIENT)
Dept: URGENT CARE | Facility: CLINIC | Age: 30
End: 2020-08-19
Payer: COMMERCIAL

## 2020-08-19 ENCOUNTER — TELEPHONE (OUTPATIENT)
Dept: PERINATAL CARE | Facility: OTHER | Age: 30
End: 2020-08-19

## 2020-08-19 ENCOUNTER — APPOINTMENT (INPATIENT)
Dept: CT IMAGING | Facility: HOSPITAL | Age: 30
End: 2020-08-19
Payer: COMMERCIAL

## 2020-08-19 ENCOUNTER — HOSPITAL ENCOUNTER (INPATIENT)
Facility: HOSPITAL | Age: 30
LOS: 32 days | Discharge: HOME | End: 2020-09-20
Attending: EMERGENCY MEDICINE | Admitting: OBSTETRICS & GYNECOLOGY
Payer: COMMERCIAL

## 2020-08-19 VITALS
HEIGHT: 62 IN | BODY MASS INDEX: 34.78 KG/M2 | TEMPERATURE: 98.9 F | RESPIRATION RATE: 20 BRPM | WEIGHT: 189 LBS | DIASTOLIC BLOOD PRESSURE: 105 MMHG | OXYGEN SATURATION: 100 % | HEART RATE: 81 BPM | SYSTOLIC BLOOD PRESSURE: 187 MMHG

## 2020-08-19 DIAGNOSIS — Z3A.28 28 WEEKS GESTATION OF PREGNANCY: ICD-10-CM

## 2020-08-19 DIAGNOSIS — Z3A.26 26 WEEKS GESTATION OF PREGNANCY: ICD-10-CM

## 2020-08-19 DIAGNOSIS — R06.02 SHORTNESS OF BREATH: ICD-10-CM

## 2020-08-19 DIAGNOSIS — M48.02 CERVICAL SPINAL STENOSIS: ICD-10-CM

## 2020-08-19 DIAGNOSIS — Z3A.22 22 WEEKS GESTATION OF PREGNANCY: ICD-10-CM

## 2020-08-19 DIAGNOSIS — O36.5990 IUGR (INTRAUTERINE GROWTH RESTRICTION) AFFECTING CARE OF MOTHER: ICD-10-CM

## 2020-08-19 DIAGNOSIS — R03.0 ELEVATED BP WITHOUT DIAGNOSIS OF HYPERTENSION: Primary | ICD-10-CM

## 2020-08-19 DIAGNOSIS — O14.90 PREECLAMPSIA: ICD-10-CM

## 2020-08-19 LAB
ABO GROUP BLD: NORMAL
ALBUMIN SERPL BCP-MCNC: 2.7 G/DL (ref 3.5–5)
ALP SERPL-CCNC: 125 U/L (ref 46–116)
ALT SERPL W P-5'-P-CCNC: 28 U/L (ref 12–78)
AMPHETAMINES SERPL QL SCN: NEGATIVE
ANION GAP SERPL CALCULATED.3IONS-SCNC: 5 MMOL/L (ref 4–13)
APTT PPP: 29 SECONDS (ref 23–37)
AST SERPL W P-5'-P-CCNC: 30 U/L (ref 5–45)
BACTERIA UR QL AUTO: ABNORMAL /HPF
BARBITURATES UR QL: NEGATIVE
BASOPHILS # BLD AUTO: 0.06 THOUSANDS/ΜL (ref 0–0.1)
BASOPHILS NFR BLD AUTO: 0 % (ref 0–1)
BENZODIAZ UR QL: NEGATIVE
BILIRUB SERPL-MCNC: 0.31 MG/DL (ref 0.2–1)
BILIRUB UR QL STRIP: NEGATIVE
BLD GP AB SCN SERPL QL: NEGATIVE
BUN SERPL-MCNC: 12 MG/DL (ref 5–25)
CALCIUM SERPL-MCNC: 8.6 MG/DL (ref 8.3–10.1)
CHLORIDE SERPL-SCNC: 106 MMOL/L (ref 100–108)
CLARITY UR: CLEAR
CO2 SERPL-SCNC: 26 MMOL/L (ref 21–32)
COCAINE UR QL: NEGATIVE
COLOR UR: YELLOW
CREAT SERPL-MCNC: 0.85 MG/DL (ref 0.6–1.3)
CREAT UR-MCNC: 422 MG/DL
EOSINOPHIL # BLD AUTO: 0.64 THOUSAND/ΜL (ref 0–0.61)
EOSINOPHIL NFR BLD AUTO: 4 % (ref 0–6)
ERYTHROCYTE [DISTWIDTH] IN BLOOD BY AUTOMATED COUNT: 12.5 % (ref 11.6–15.1)
FIBRINOGEN PPP-MCNC: 410 MG/DL (ref 227–495)
FINE GRAN CASTS URNS QL MICRO: ABNORMAL /LPF
GFR SERPL CREATININE-BSD FRML MDRD: 107 ML/MIN/1.73SQ M
GLUCOSE SERPL-MCNC: 78 MG/DL (ref 65–140)
GLUCOSE UR STRIP-MCNC: NEGATIVE MG/DL
HCT VFR BLD AUTO: 37.6 % (ref 34.8–46.1)
HGB BLD-MCNC: 12.7 G/DL (ref 11.5–15.4)
HGB UR QL STRIP.AUTO: ABNORMAL
HYALINE CASTS #/AREA URNS LPF: ABNORMAL /LPF
IMM GRANULOCYTES # BLD AUTO: 0.11 THOUSAND/UL (ref 0–0.2)
IMM GRANULOCYTES NFR BLD AUTO: 1 % (ref 0–2)
INR PPP: 1.08 (ref 0.84–1.19)
KETONES UR STRIP-MCNC: ABNORMAL MG/DL
LEUKOCYTE ESTERASE UR QL STRIP: NEGATIVE
LYMPHOCYTES # BLD AUTO: 2.32 THOUSANDS/ΜL (ref 0.6–4.47)
LYMPHOCYTES NFR BLD AUTO: 13 % (ref 14–44)
MCH RBC QN AUTO: 31.4 PG (ref 26.8–34.3)
MCHC RBC AUTO-ENTMCNC: 33.8 G/DL (ref 31.4–37.4)
MCV RBC AUTO: 93 FL (ref 82–98)
METHADONE UR QL: NEGATIVE
MONOCYTES # BLD AUTO: 1.54 THOUSAND/ΜL (ref 0.17–1.22)
MONOCYTES NFR BLD AUTO: 9 % (ref 4–12)
MUCOUS THREADS UR QL AUTO: ABNORMAL
NEUTROPHILS # BLD AUTO: 13.27 THOUSANDS/ΜL (ref 1.85–7.62)
NEUTS SEG NFR BLD AUTO: 73 % (ref 43–75)
NITRITE UR QL STRIP: NEGATIVE
NON-SQ EPI CELLS URNS QL MICRO: ABNORMAL /HPF
NRBC BLD AUTO-RTO: 0 /100 WBCS
NT-PROBNP SERPL-MCNC: 888 PG/ML
OPIATES UR QL SCN: NEGATIVE
OXYCODONE+OXYMORPHONE UR QL SCN: NEGATIVE
PCP UR QL: NEGATIVE
PH UR STRIP.AUTO: 5.5 [PH] (ref 4.5–8)
PLATELET # BLD AUTO: 240 THOUSANDS/UL (ref 149–390)
PMV BLD AUTO: 11.1 FL (ref 8.9–12.7)
POTASSIUM SERPL-SCNC: 3.7 MMOL/L (ref 3.5–5.3)
PROT SERPL-MCNC: 6.6 G/DL (ref 6.4–8.2)
PROT UR STRIP-MCNC: >=300 MG/DL
PROT UR-MCNC: 1491 MG/DL
PROT/CREAT UR: 3.53 MG/G{CREAT} (ref 0–0.1)
PROTHROMBIN TIME: 14.2 SECONDS (ref 11.6–14.5)
RBC # BLD AUTO: 4.05 MILLION/UL (ref 3.81–5.12)
RBC #/AREA URNS AUTO: ABNORMAL /HPF
RH BLD: POSITIVE
SARS-COV-2 RNA RESP QL NAA+PROBE: NEGATIVE
SODIUM SERPL-SCNC: 137 MMOL/L (ref 136–145)
SP GR UR STRIP.AUTO: >=1.03 (ref 1–1.03)
SPECIMEN EXPIRATION DATE: NORMAL
THC UR QL: NEGATIVE
UROBILINOGEN UR QL STRIP.AUTO: 0.2 E.U./DL
WBC # BLD AUTO: 17.94 THOUSAND/UL (ref 4.31–10.16)
WBC #/AREA URNS AUTO: ABNORMAL /HPF

## 2020-08-19 PROCEDURE — 87086 URINE CULTURE/COLONY COUNT: CPT

## 2020-08-19 PROCEDURE — 83880 ASSAY OF NATRIURETIC PEPTIDE: CPT | Performed by: STUDENT IN AN ORGANIZED HEALTH CARE EDUCATION/TRAINING PROGRAM

## 2020-08-19 PROCEDURE — 4A1HXCZ MONITORING OF PRODUCTS OF CONCEPTION, CARDIAC RATE, EXTERNAL APPROACH: ICD-10-PCS | Performed by: OBSTETRICS & GYNECOLOGY

## 2020-08-19 PROCEDURE — 86900 BLOOD TYPING SEROLOGIC ABO: CPT | Performed by: STUDENT IN AN ORGANIZED HEALTH CARE EDUCATION/TRAINING PROGRAM

## 2020-08-19 PROCEDURE — 99215 OFFICE O/P EST HI 40 MIN: CPT

## 2020-08-19 PROCEDURE — 82570 ASSAY OF URINE CREATININE: CPT | Performed by: STUDENT IN AN ORGANIZED HEALTH CARE EDUCATION/TRAINING PROGRAM

## 2020-08-19 PROCEDURE — 85730 THROMBOPLASTIN TIME PARTIAL: CPT | Performed by: STUDENT IN AN ORGANIZED HEALTH CARE EDUCATION/TRAINING PROGRAM

## 2020-08-19 PROCEDURE — NC001 PR NO CHARGE: Performed by: OBSTETRICS & GYNECOLOGY

## 2020-08-19 PROCEDURE — 85025 COMPLETE CBC W/AUTO DIFF WBC: CPT | Performed by: PHYSICIAN ASSISTANT

## 2020-08-19 PROCEDURE — 85384 FIBRINOGEN ACTIVITY: CPT | Performed by: STUDENT IN AN ORGANIZED HEALTH CARE EDUCATION/TRAINING PROGRAM

## 2020-08-19 PROCEDURE — 80053 COMPREHEN METABOLIC PANEL: CPT | Performed by: PHYSICIAN ASSISTANT

## 2020-08-19 PROCEDURE — G0382 LEV 3 HOSP TYPE B ED VISIT: HCPCS | Performed by: PHYSICIAN ASSISTANT

## 2020-08-19 PROCEDURE — 99285 EMERGENCY DEPT VISIT HI MDM: CPT

## 2020-08-19 PROCEDURE — 99222 1ST HOSP IP/OBS MODERATE 55: CPT | Performed by: OBSTETRICS & GYNECOLOGY

## 2020-08-19 PROCEDURE — 81001 URINALYSIS AUTO W/SCOPE: CPT

## 2020-08-19 PROCEDURE — 86901 BLOOD TYPING SEROLOGIC RH(D): CPT | Performed by: STUDENT IN AN ORGANIZED HEALTH CARE EDUCATION/TRAINING PROGRAM

## 2020-08-19 PROCEDURE — 87635 SARS-COV-2 COVID-19 AMP PRB: CPT | Performed by: STUDENT IN AN ORGANIZED HEALTH CARE EDUCATION/TRAINING PROGRAM

## 2020-08-19 PROCEDURE — 85610 PROTHROMBIN TIME: CPT | Performed by: STUDENT IN AN ORGANIZED HEALTH CARE EDUCATION/TRAINING PROGRAM

## 2020-08-19 PROCEDURE — 96375 TX/PRO/DX INJ NEW DRUG ADDON: CPT

## 2020-08-19 PROCEDURE — 86850 RBC ANTIBODY SCREEN: CPT | Performed by: STUDENT IN AN ORGANIZED HEALTH CARE EDUCATION/TRAINING PROGRAM

## 2020-08-19 PROCEDURE — 99221 1ST HOSP IP/OBS SF/LOW 40: CPT | Performed by: OBSTETRICS & GYNECOLOGY

## 2020-08-19 PROCEDURE — 99284 EMERGENCY DEPT VISIT MOD MDM: CPT | Performed by: PHYSICIAN ASSISTANT

## 2020-08-19 PROCEDURE — 84156 ASSAY OF PROTEIN URINE: CPT | Performed by: STUDENT IN AN ORGANIZED HEALTH CARE EDUCATION/TRAINING PROGRAM

## 2020-08-19 PROCEDURE — 96374 THER/PROPH/DIAG INJ IV PUSH: CPT

## 2020-08-19 PROCEDURE — 86592 SYPHILIS TEST NON-TREP QUAL: CPT | Performed by: STUDENT IN AN ORGANIZED HEALTH CARE EDUCATION/TRAINING PROGRAM

## 2020-08-19 PROCEDURE — G1004 CDSM NDSC: HCPCS

## 2020-08-19 PROCEDURE — 36415 COLL VENOUS BLD VENIPUNCTURE: CPT | Performed by: PHYSICIAN ASSISTANT

## 2020-08-19 PROCEDURE — 80307 DRUG TEST PRSMV CHEM ANLYZR: CPT | Performed by: STUDENT IN AN ORGANIZED HEALTH CARE EDUCATION/TRAINING PROGRAM

## 2020-08-19 PROCEDURE — 71275 CT ANGIOGRAPHY CHEST: CPT

## 2020-08-19 RX ORDER — MAGNESIUM SULFATE HEPTAHYDRATE 40 MG/ML
2 INJECTION, SOLUTION INTRAVENOUS ONCE
Status: COMPLETED | OUTPATIENT
Start: 2020-08-19 | End: 2020-08-19

## 2020-08-19 RX ORDER — SODIUM CHLORIDE, SODIUM LACTATE, POTASSIUM CHLORIDE, CALCIUM CHLORIDE 600; 310; 30; 20 MG/100ML; MG/100ML; MG/100ML; MG/100ML
25 INJECTION, SOLUTION INTRAVENOUS CONTINUOUS
Status: DISCONTINUED | OUTPATIENT
Start: 2020-08-19 | End: 2020-08-21

## 2020-08-19 RX ORDER — ALBUTEROL SULFATE 90 UG/1
2 AEROSOL, METERED RESPIRATORY (INHALATION) EVERY 4 HOURS PRN
Status: DISCONTINUED | OUTPATIENT
Start: 2020-08-19 | End: 2020-09-20 | Stop reason: HOSPADM

## 2020-08-19 RX ORDER — HYDRALAZINE HYDROCHLORIDE 20 MG/ML
5 INJECTION INTRAMUSCULAR; INTRAVENOUS ONCE
Status: COMPLETED | OUTPATIENT
Start: 2020-08-19 | End: 2020-08-19

## 2020-08-19 RX ORDER — BETAMETHASONE SODIUM PHOSPHATE AND BETAMETHASONE ACETATE 3; 3 MG/ML; MG/ML
12 INJECTION, SUSPENSION INTRA-ARTICULAR; INTRALESIONAL; INTRAMUSCULAR; SOFT TISSUE EVERY 24 HOURS
Status: COMPLETED | OUTPATIENT
Start: 2020-08-19 | End: 2020-08-20

## 2020-08-19 RX ORDER — ALBUTEROL SULFATE 90 UG/1
AEROSOL, METERED RESPIRATORY (INHALATION)
COMMUNITY
Start: 2020-08-17

## 2020-08-19 RX ORDER — NIFEDIPINE 30 MG/1
30 TABLET, EXTENDED RELEASE ORAL DAILY
Status: DISCONTINUED | OUTPATIENT
Start: 2020-08-19 | End: 2020-09-18

## 2020-08-19 RX ORDER — MAGNESIUM SULFATE HEPTAHYDRATE 40 MG/ML
4 INJECTION, SOLUTION INTRAVENOUS ONCE
Status: COMPLETED | OUTPATIENT
Start: 2020-08-19 | End: 2020-08-19

## 2020-08-19 RX ORDER — NIFEDIPINE 30 MG/1
30 TABLET, EXTENDED RELEASE ORAL DAILY
Status: DISCONTINUED | OUTPATIENT
Start: 2020-08-19 | End: 2020-08-19

## 2020-08-19 RX ORDER — ACETAMINOPHEN 325 MG/1
650 TABLET ORAL EVERY 6 HOURS PRN
COMMUNITY
End: 2021-06-03

## 2020-08-19 RX ORDER — MAGNESIUM SULFATE HEPTAHYDRATE 40 MG/ML
2 INJECTION, SOLUTION INTRAVENOUS CONTINUOUS
Status: DISCONTINUED | OUTPATIENT
Start: 2020-08-19 | End: 2020-08-20

## 2020-08-19 RX ORDER — HYDRALAZINE HYDROCHLORIDE 20 MG/ML
10 INJECTION INTRAMUSCULAR; INTRAVENOUS ONCE
Status: COMPLETED | OUTPATIENT
Start: 2020-08-19 | End: 2020-08-19

## 2020-08-19 RX ADMIN — BETAMETHASONE SODIUM PHOSPHATE AND BETAMETHASONE ACETATE 12 MG: 3; 3 INJECTION, SUSPENSION INTRA-ARTICULAR; INTRALESIONAL; INTRAMUSCULAR at 19:15

## 2020-08-19 RX ADMIN — NIFEDIPINE 30 MG: 30 TABLET, FILM COATED, EXTENDED RELEASE ORAL at 19:15

## 2020-08-19 RX ADMIN — IOHEXOL 85 ML: 350 INJECTION, SOLUTION INTRAVENOUS at 18:51

## 2020-08-19 RX ADMIN — HYDRALAZINE HYDROCHLORIDE 5 MG: 20 INJECTION INTRAMUSCULAR; INTRAVENOUS at 15:47

## 2020-08-19 RX ADMIN — HYDRALAZINE HYDROCHLORIDE 5 MG: 20 INJECTION INTRAMUSCULAR; INTRAVENOUS at 15:18

## 2020-08-19 RX ADMIN — HYDRALAZINE HYDROCHLORIDE 10 MG: 20 INJECTION INTRAMUSCULAR; INTRAVENOUS at 16:43

## 2020-08-19 RX ADMIN — MAGNESIUM SULFATE IN WATER 2 G: 40 INJECTION, SOLUTION INTRAVENOUS at 19:39

## 2020-08-19 RX ADMIN — MAGNESIUM SULFATE IN WATER 4 G: 40 INJECTION, SOLUTION INTRAVENOUS at 19:16

## 2020-08-19 RX ADMIN — MAGNESIUM SULFATE IN WATER 2 G/HR: 40 INJECTION, SOLUTION INTRAVENOUS at 19:54

## 2020-08-19 RX ADMIN — SODIUM CHLORIDE, SODIUM LACTATE, POTASSIUM CHLORIDE, AND CALCIUM CHLORIDE 25 ML/HR: .6; .31; .03; .02 INJECTION, SOLUTION INTRAVENOUS at 17:15

## 2020-08-19 NOTE — TELEPHONE ENCOUNTER
Left message on nurse line at [phone] states she was not feeling any better so she proceeded to Urgent for breathing treatment  They took her BP it was elevated (187/105 per note at urgent care)  They sent her to [facility] ED  Would like return call  Routing to provider to update

## 2020-08-19 NOTE — CONSULTS
Consultation - M  Aleksandra Draper 35 y o  female MRN: [id number]  Unit/Bed#: [facility] Encounter: [id number]      Inpatient consult to Perinatology  Consult performed by: Crow Nguyen MD  Consult ordered by: Crow Nguyen MD          Chief Complaint   Patient presents with    Shortness of Breath     c/o SOB x 1 week  Pt sent to ER from Urgent Center for evaluation/tx of SOB and HTN  Pt stated she feels her asthma is getting bad  Pt denies HA, visual disturbances, CP, or complications with pregnancy  Informed by OBGYN to have pt Evaluated in ER and then sent to OBGYN for non-stress test of baby    Pre-Eclampsia       History of Present Illness   Physician Requesting Consult: María Gregorio MD  Reason for Consult / Principal Problem: Preeclampsia  Subspeciality: Perinatology    HPI:  Aleksandra Draper is a 35 y o   female with an ANCA of 2020, by Last Menstrual Period at 26w3d gestation who is being evaluated for Pre-eclampsia  HPI from H&P: Patient called today with complaints of shortness of breath for 1 week duration  She states that over the last week she has progressively needed to stent in order to breathe appropriately  She is uncomfortable laying on her back or on her side  She does have asthma and recently started using her inhaler again  However due to her recent complaints she was advised to go to Urgent Care  While at urgent care for evaluation she of severe range blood pressure was told to immediately proceed to the emergency room  In the emergency room she continued to have severe range pressures and was transferred to Labor and delivery triage received received IV hydralazine for blood pressure control  Her current obstetrical history is significant for pre-eclampsia, with severe features, obesity, previous bariatric surgery (sleeve gastrectomy), and asthma    She has been unable to tolerate a 1 hour glucose tolerance test and was not compliant with LDASA therapy because she "does not like taking pillls "      Contractions: None  Leakage of fluid: None  Bleeding: None  Fetal movement: present  Review of Systems   Constitutional: Negative  HENT: Negative  Eyes: Negative  Respiratory: Positive for cough and shortness of breath  Cardiovascular: Negative  Gastrointestinal: Negative  Endocrine: Negative  Genitourinary: Negative  Musculoskeletal: Negative  Skin: Negative  Neurological: Negative  Psychiatric/Behavioral: Negative        All systems reviewed are negative    PREGNANCY COMPLICATIONS: pre-eclampsia, with severe features    OB History    Para Term  AB Living   3 0 0 0 2 0   SAB TAB Ectopic Multiple Live Births   0 2 0 0 0      # Outcome Date GA Lbr Garry/2nd Weight Sex Delivery Anes PTL Lv   3 Current            2 TAB            1 TAB               Obstetric Comments   No complication post VIP       Baby complications/comments: Estrada IUP    Historical Information   Past Medical History:   Diagnosis Date    Anemia     Asthma     Eczema     Obesity      Past Surgical History:   Procedure Laterality Date    BARIATRIC SURGERY  2019    EXPLORATORY LAPAROTOMY      2016, no findings     GASTRECTOMY SLEEVE LAPAROSCOPIC      NO PAST SURGERIES       Social History   Social History     Substance and Sexual Activity   Alcohol Use Not Currently    Alcohol/week: 2 0 standard drinks    Types: 2 Glasses of wine per week     Social History     Substance and Sexual Activity   Drug Use No     Social History     Tobacco Use   Smoking Status Never Smoker   Smokeless Tobacco Never Used     Family History:   Family History   Problem Relation Age of Onset    Hypertension Mother     Diabetes Mother         gestastional    Migraines Mother     Diabetes Maternal Grandmother     Heart failure Maternal Grandmother     Liver cancer Maternal Grandfather     Diabetes Maternal Grandfather     Asthma Father     Lung cancer Paternal Grandmother     No Known Problems Sister     No Known Problems Brother     No Known Problems Sister     No Known Problems Sister        Meds/Allergies      Medications Prior to Admission   Medication    acetaminophen (TYLENOL) 325 mg tablet    albuterol (PROVENTIL HFA,VENTOLIN HFA) 90 mcg/act inhaler    aspirin 81 mg chewable tablet    DUPIXENT subcutaneous injection    Prenatal Vit-DSS-Fe Fum-FA (PRENATAL 19) 29-1 MG TABS        Allergies   Allergen Reactions    Cat Hair Extract     Dog Epithelium     Penicillins        OBJECTIVE:    Vitals: Blood pressure (!) 169/103, pulse 104, temperature 98 7 °F (37 1 °C), temperature source Oral, resp  rate 20, height 5' 2" (1 575 m), weight 85 7 kg (189 lb), last menstrual period 2020, SpO2 (!) 57 %  Body mass index is 34 57 kg/m²  Physical Exam  Constitutional:       General: She is not in acute distress  Appearance: She is well-developed  She is not diaphoretic  HENT:      Head: Normocephalic and atraumatic  Eyes:      Pupils: Pupils are equal, round, and reactive to light  Neck:      Musculoskeletal: Normal range of motion  Trachea: No tracheal deviation  Cardiovascular:      Rate and Rhythm: Normal rate and regular rhythm  Heart sounds: Normal heart sounds  No murmur  No friction rub  No gallop  Pulmonary:      Effort: Pulmonary effort is normal  No respiratory distress  Breath sounds: No stridor  Rales present  No wheezing  Abdominal:      General: Bowel sounds are normal  There is no distension  Palpations: Abdomen is soft  There is no mass  Tenderness: There is no abdominal tenderness  There is no guarding or rebound  Musculoskeletal: Normal range of motion  General: No tenderness or deformity  Skin:     General: Skin is warm and dry  Coloration: Skin is not pale  Findings: No erythema or rash     Neurological:      Mental Status: She is alert and oriented to person, place, and time        Coordination: Coordination normal       Deep Tendon Reflexes: Reflexes normal       Comments: No clonus illicited           Fetal heart rate: Baseline: 150 bpm, Variability: Moderate 6 - 25 bpm, Accelerations: Reactive and Decelerations: Absent  Garretson: none    Prenatal Labs:   Blood Type:   Lab Results   Component Value Date/Time    ABO Grouping B 04/30/2020 09:29 AM     , D (Rh type):   Lab Results   Component Value Date/Time    Rh Factor Positive 04/30/2020 09:29 AM     , Antibody Screen: No results found for: ANTIBODYSCR , HCT/HGB:   Lab Results   Component Value Date/Time    Hematocrit 37 6 08/19/2020 02:40 PM    Hemoglobin 12 7 08/19/2020 02:40 PM      , MCV:   Lab Results   Component Value Date/Time    MCV 93 08/19/2020 02:40 PM      , Platelets:   Lab Results   Component Value Date/Time    Platelets 245 16/38/1373 02:40 PM      , 1 hour Glucola: No results found for: [id number], 3 hour GTT: No results found for: [id number], Varicella:   Lab Results   Component Value Date/Time    Varicella IgG IMMUNE 04/30/2020 09:29 AM       , Rubella:   Lab Results   Component Value Date/Time    Rubella IgG Quant 47 6 04/30/2020 09:29 AM        , VDRL/RPR:   Lab Results   Component Value Date/Time    RPR Non-Reactive 04/30/2020 09:29 AM      , Urine Culture/Screen:   Lab Results   Component Value Date/Time    Urine Culture 30,000-39,000 cfu/ml  04/30/2020 09:29 AM       , Urine Drug Screen: No results found for: AMPHETUR, BARBTUR, BDZUR, THCUR, COCAINEUR, METHADONEUR, OPIATEUR, PCPUR, MTHAMUR, ECSTASYUR, TRICYCLICSUR, Hep B:   Lab Results   Component Value Date/Time    Hepatitis B Surface Ag Non-reactive 04/30/2020 09:29 AM     , Hep C: No components found for: HEPCSAG, EXTHEPCSAG   , HIV:   Lab Results   Component Value Date/Time    HIV-1/HIV-2 Ab Non-Reactive 04/30/2020 09:29 AM     , Chlamydia: No results found for: EXTCHLAMYDIA  , Gonorrhea:   Lab Results   Component Value Date/Time    N gonorrhoeae, DNA Probe Negative 2020 10:25 AM           Assessment/Plan     ASSESSMENT:   35 y o    with IUP at 26w3d  gestation with preeclampsia with severe fetaures    PLAN:   Agree with current plan   ProBNP and CTA to rule out intrathoracic pathology, consider echo if proBNP abnormal   Coags   F/u AM labs   Continuous monitoring   Consider pharmacologic prophylactic anticoagulation    Lucina Mejia MD  2020  5:29 PM

## 2020-08-19 NOTE — ED PROVIDER NOTES
History  Chief Complaint   Patient presents with    Shortness of Breath     c/o SOB x 1 week  Pt sent to ER from Urgent Center for evaluation/tx of SOB and HTN  Pt stated she feels her asthma is getting bad  Pt denies HA, visual disturbances, CP, or complications with pregnancy  Informed by OBGYN to have pt Evaluated in ER and then sent to OBGYN for non-stress test of baby   Torin Ta  is a 35year old  female 26w3d pregnant with a pmh of asthma and obesity who is presenting to the ED with SOB x 1 week  Patient states she has been having increased difficulty breathing this week, particularly at night when she is trying to sleep  Patient has been sleeping face down on lots of pillows to try to get comfortable and breathe  She reports it feels like her asthma is acting up  She used her inhaler yesterday for the first time in over a year with minimal relief  Patient has never been hospitalized or intubated for her asthma  Patient went to urgent care for evaluation today and was sent to the ED for her elevated BP  She denies headache, vision changes, dizziness, chest pain, abdominal pain, N/V/D and dysuria  Patient states she has never before been hypertensive and reports no pregnancy complications  Prior to Admission Medications   Prescriptions Last Dose Informant Patient Reported? Taking?    DUPIXENT subcutaneous injection Past Week at Unknown time Self Yes Yes   Sig: Inject 300 mg under the skin every 14 (fourteen) days 300/2ml   Prenatal Vit-DSS-Fe Fum-FA (PRENATAL 19) 29-1 MG TABS Past Week at Unknown time Self Yes Yes   Sig: Take by mouth   acetaminophen (TYLENOL) 325 mg tablet 2020 at Unknown time  Yes Yes   Sig: Take 650 mg by mouth every 6 (six) hours as needed for mild pain   albuterol (PROVENTIL HFA,VENTOLIN HFA) 90 mcg/act inhaler 2020 at Unknown time  Yes Yes   aspirin 81 mg chewable tablet Past Month at Unknown time  No Yes   Sig: Chew 2 tablets (162 mg total) daily Facility-Administered Medications: None       Past Medical History:   Diagnosis Date    Abnormal Pap smear of cervix     Anemia     Anxiety     Asthma     Bronchitis     past    Eczema     Hypertension     Migraine     Obesity     Pneumonia     in past       Past Surgical History:   Procedure Laterality Date    BARIATRIC SURGERY  2019    EXPLORATORY LAPAROTOMY      2016, no findings     GASTRECTOMY SLEEVE LAPAROSCOPIC  2019       Family History   Problem Relation Age of Onset    Hypertension Mother     Diabetes Mother         gestastional    Migraines Mother     Diabetes Maternal Grandmother     Heart failure Maternal Grandmother     Liver cancer Maternal Grandfather     Diabetes Maternal Grandfather     Asthma Father             Lung cancer Paternal Grandmother     No Known Problems Sister     No Known Problems Brother     No Known Problems Sister     No Known Problems Sister      I have reviewed and agree with the history as documented  E-Cigarette/Vaping    E-Cigarette Use Never User      E-Cigarette/Vaping Substances    Nicotine No     THC No     CBD No     Flavoring No     Other No     Unknown No      Social History     Tobacco Use    Smoking status: Never Smoker    Smokeless tobacco: Never Used   Substance Use Topics    Alcohol use: Not Currently     Alcohol/week: 2 0 standard drinks     Types: 2 Glasses of wine per week    Drug use: No       Review of Systems   Constitutional: Negative for chills and fever  HENT: Negative for congestion and sore throat  Eyes: Negative for photophobia, pain and visual disturbance  Respiratory: Positive for cough and shortness of breath  Cardiovascular: Negative for chest pain and palpitations  Gastrointestinal: Negative for abdominal pain, diarrhea, nausea and vomiting  Genitourinary: Negative for difficulty urinating and dysuria  Musculoskeletal: Negative for gait problem and joint swelling     Skin: Negative for color change and rash  Neurological: Negative for dizziness, facial asymmetry, light-headedness, numbness and headaches  Physical Exam  Physical Exam  Vitals signs and nursing note reviewed  Constitutional:       General: She is not in acute distress  Appearance: She is obese  She is not ill-appearing, toxic-appearing or diaphoretic  HENT:      Head: Normocephalic and atraumatic  Eyes:      Extraocular Movements: Extraocular movements intact  Neck:      Musculoskeletal: Normal range of motion and neck supple  Cardiovascular:      Rate and Rhythm: Normal rate and regular rhythm  Pulses: Normal pulses  Heart sounds: Normal heart sounds  No murmur  No friction rub  No gallop  Pulmonary:      Effort: Pulmonary effort is normal  No tachypnea, accessory muscle usage or respiratory distress  Abdominal:      General: There is distension  Musculoskeletal: Normal range of motion  Right lower leg: She exhibits no tenderness  No edema  Left lower leg: She exhibits no tenderness  No edema  Skin:     General: Skin is warm and dry  Capillary Refill: Capillary refill takes less than 2 seconds  Neurological:      General: No focal deficit present  Mental Status: She is alert and oriented to person, place, and time  Psychiatric:         Mood and Affect: Mood is anxious           Behavior: Behavior normal          Vital Signs  ED Triage Vitals   Temperature Pulse Respirations Blood Pressure SpO2   08/19/20 1340 08/19/20 1340 08/19/20 1340 08/19/20 1340 08/19/20 1340   98 9 °F (37 2 °C) 87 20 (!) 176/94 100 %      Temp Source Heart Rate Source Patient Position - Orthostatic VS BP Location FiO2 (%)   08/19/20 1340 08/19/20 1435 08/19/20 1435 08/19/20 1435 --   Oral Monitor Sitting Right arm       Pain Score       08/19/20 1517       9           Vitals:    08/20/20 2347 08/21/20 0453 08/21/20 0737 08/21/20 1230   BP: 129/79 129/70 128/90 131/79   Pulse: 103 100 103 (!) 109   Patient Position - Orthostatic VS: Sitting Sitting           Visual Acuity      ED Medications  Medications   albuterol (PROVENTIL HFA,VENTOLIN HFA) inhaler 2 puff (has no administration in time range)   NIFEdipine (PROCARDIA XL) 24 hr tablet 30 mg (30 mg Oral Given 8/21/20 0837)   acetaminophen (TYLENOL) tablet 650 mg (650 mg Oral Given 8/20/20 2013)   heparin (porcine) subcutaneous injection 5,000 Units (5,000 Units Subcutaneous Given 8/21/20 0837)   polyethylene glycol (MIRALAX) packet 17 g (has no administration in time range)   hydrALAZINE (APRESOLINE) injection 5 mg (5 mg Intravenous Given 8/19/20 1518)   hydrALAZINE (APRESOLINE) injection 5 mg (5 mg Intravenous Given 8/19/20 1547)   hydrALAZINE (APRESOLINE) injection 10 mg (10 mg Intravenous Given 8/19/20 1643)   betamethasone acetate-betamethasone sodium phosphate (CELESTONE) injection 12 mg (12 mg Intramuscular Given 8/20/20 1943)   magnesium sulfate 4 g/100 mL IVPB (premix) 4 g (0 g Intravenous Stopped 8/19/20 1938)     Followed by   magnesium sulfate 2 g/50 mL IVPB (premix) 2 g (0 g Intravenous Stopped 8/19/20 1954)   iohexol (OMNIPAQUE) 350 MG/ML injection (MULTI-DOSE) 85 mL (85 mL Intravenous Given 8/19/20 1851)   furosemide (LASIX) injection 20 mg (20 mg Intravenous Given 8/20/20 0144)       Diagnostic Studies  Results Reviewed     Procedure Component Value Units Date/Time    Urine culture [[id number]] Collected:  08/19/20 1435    Lab Status:  Final result Specimen:  Urine, Clean Catch Updated:  08/20/20 1411     Urine Culture No Growth <1000 cfu/mL    NT-BNP PRO [[id number]]  (Abnormal) Collected:  08/19/20 1440    Lab Status:  Final result Specimen:  Blood from Arm, Right Updated:  08/19/20 1754     NT-proBNP 888 pg/mL     Urine Microscopic [[id number]]  (Abnormal) Collected:  08/19/20 1435    Lab Status:  Final result Specimen:  Urine, Clean Catch Updated:  08/19/20 1553     RBC, UA 0-1 /hpf      WBC, UA 2-4 /hpf      Epithelial Cells Occasional /hpf      Bacteria, UA Occasional /hpf      Hyaline Casts, UA 0-1 /lpf      Fine granular casts 0-1 /lpf      MUCUS THREADS Moderate    Comprehensive metabolic panel [[id number]]  (Abnormal) Collected:  08/19/20 1440    Lab Status:  Final result Specimen:  Blood from Arm, Right Updated:  08/19/20 1523     Sodium 137 mmol/L      Potassium 3 7 mmol/L      Chloride 106 mmol/L      CO2 26 mmol/L      ANION GAP 5 mmol/L      BUN 12 mg/dL      Creatinine 0 85 mg/dL      Glucose 78 mg/dL      Calcium 8 6 mg/dL      AST 30 U/L      ALT 28 U/L      Alkaline Phosphatase 125 U/L      Total Protein 6 6 g/dL      Albumin 2 7 g/dL      Total Bilirubin 0 31 mg/dL      eGFR 107 ml/min/1 73sq m     Narrative:       [place] guidelines for Chronic Kidney Disease (CKD):     Stage 1 with normal or high GFR (GFR > 90 mL/min/1 73 square meters)    Stage 2 Mild CKD (GFR = 60-89 mL/min/1 73 square meters)    Stage 3A Moderate CKD (GFR = 45-59 mL/min/1 73 square meters)    Stage 3B Moderate CKD (GFR = 30-44 mL/min/1 73 square meters)    Stage 4 Severe CKD (GFR = 15-29 mL/min/1 73 square meters)    Stage 5 End Stage CKD (GFR <15 mL/min/1 73 square meters)  Note: GFR calculation is accurate only with a steady state creatinine    Protein / creatinine ratio, urine [[id number]]  (Abnormal) Collected:  08/19/20 1441    Lab Status:  Final result Specimen:  Urine Updated:  08/19/20 1522     Creatinine, Ur 422 0 mg/dL      Protein Urine Random 1,491 mg/dL      Prot/Creat Ratio, Ur 3 53    CBC and differential [[id number]]  (Abnormal) Collected:  08/19/20 1440    Lab Status:  Final result Specimen:  Blood from Arm, Right Updated:  08/19/20 1454     WBC 17 94 Thousand/uL      RBC 4 05 Million/uL      Hemoglobin 12 7 g/dL      Hematocrit 37 6 %      MCV 93 fL      MCH 31 4 pg      MCHC 33 8 g/dL      RDW 12 5 %      MPV 11 1 fL      Platelets 343 Thousands/uL      nRBC 0 /100 WBCs      Neutrophils Relative 73 % Immat GRANS % 1 %      Lymphocytes Relative 13 %      Monocytes Relative 9 %      Eosinophils Relative 4 %      Basophils Relative 0 %      Neutrophils Absolute 13 27 Thousands/µL      Immature Grans Absolute 0 11 Thousand/uL      Lymphocytes Absolute 2 32 Thousands/µL      Monocytes Absolute 1 54 Thousand/µL      Eosinophils Absolute 0 64 Thousand/µL      Basophils Absolute 0 06 Thousands/µL     Urine Macroscopic, POC [[id number]]  (Abnormal) Collected:  08/19/20 1451    Lab Status:  Final result Specimen:  Urine Updated:  08/19/20 1433     Color, UA Yellow     Clarity, UA Clear     pH, UA 5 5     Leukocytes, UA Negative     Nitrite, UA Negative     Protein, UA >=300 mg/dl      Glucose, UA Negative mg/dl      Ketones, UA 15 (1+) mg/dl      Urobilinogen, UA 0 2 E U /dl      Bilirubin, UA Negative     Blood, UA Trace     Specific Gravity, UA >=1 030    Narrative:       [facility] RESULT                 CTA chest pe study   Final Result by Brittanie Sheth MD (08/19 1916)      No pulmonary embolism  Small bilateral pleural effusions  Enlarged fatty liver  Workstation performed: [id number]                    Procedures  Procedures         ED Course                                             MDM  Number of Diagnoses or Management Options  Preeclampsia:   Shortness of breath:   Diagnosis management comments: /94 in triage  Repeat BP further elevated  Urinalysis + Protein  Patient is being immediately transferred to L&D for further evaluation and BP control          Amount and/or Complexity of Data Reviewed  Clinical lab tests: ordered and reviewed          Disposition  Final diagnoses:   Preeclampsia   Shortness of breath     Time reflects when diagnosis was documented in both MDM as applicable and the Disposition within this note     Time User Action Codes Description Comment    8/19/2020  2:49 PM Hazle Quitter Add [O14 90] Preeclampsia     8/19/2020  2:50 PM Hazle Quitter Add [R06 02] Shortness of breath     8/19/2020  4:27 PM ShanthiAndrey Prasad Add [Z3A 22] 22 weeks gestation of pregnancy     8/19/2020  6:16 PM Vanessa Keas Add [Z3A 26] 26 weeks gestation of pregnancy     8/21/2020  1:21 PM Paola Tadeo Add [M48 02] Cervical spinal stenosis       ED Disposition     ED Disposition Condition Date/Time Comment    Send to L&D After Provider Cassie  Wed Aug 19, 2020  2:49 PM       Follow-up Information    None         Current Discharge Medication List      CONTINUE these medications which have NOT CHANGED    Details   acetaminophen (TYLENOL) 325 mg tablet Take 650 mg by mouth every 6 (six) hours as needed for mild pain      albuterol (PROVENTIL HFA,VENTOLIN HFA) 90 mcg/act inhaler     Comments: Substitution to a formulary equivalent within the same pharmaceutical class is authorized  aspirin 81 mg chewable tablet Chew 2 tablets (162 mg total) daily  Qty: 180 tablet, Refills: 1    Associated Diagnoses: 22 weeks gestation of pregnancy      DUPIXENT subcutaneous injection Inject 300 mg under the skin every 14 (fourteen) days 300/2ml      Prenatal Vit-DSS-Fe Fum-FA (PRENATAL 19) 29-1 MG TABS Take by mouth           No discharge procedures on file      PDMP Review     None          ED Provider  Electronically Signed by           Erick oJrdan PA-C  08/21/20 3592

## 2020-08-19 NOTE — PROGRESS NOTES
[facility]    NAME: Santosh Prajapati is a 35 y o  female  : 1990    MRN: [id number]  DATE: 2020  TIME: 3:35 PM    Assessment and Plan   Elevated BP without diagnosis of hypertension [R03 0]  1  Elevated BP without diagnosis of hypertension     2  Shortness of breath       Patient Instructions   Pulse ox 100%, lungs clear  More concerned about BP  Sent to ER after making her OB ([facility]) aware    Follow up with PCP in 3-5 days  Proceed to  ER if symptoms worsen  Chief Complaint     Chief Complaint   Patient presents with    Asthma     pt presents c/o asthma and shortness of breath x 1 week, states feels like unable to catch breath, pt also 26 weeks pregnant         History of Present Illness       Elida Fong is a 34 y/o female who presents to clinic complaining of shortness of breath x1 week  She states that the only time she feels like she can catch her breath is when she is on all fours  She states that when she lays on her side when she sleeps or is sitting it is difficult for her to catch her breath  She has a history of mild asthma but has not had to use her albuterol in a while in fact it was   She had to call her primary few days ago to refill it  She has been using for the last 2 days with some improvement   She is also currently 26 weeks pregnant  She denies any headache or visual changes  She has not called her OB  Review of Systems   Review of Systems   Constitutional: Negative  Eyes: Negative for visual disturbance  Respiratory: Positive for chest tightness and shortness of breath  Negative for cough and wheezing  Cardiovascular: Negative for chest pain and leg swelling  Neurological: Negative for dizziness, light-headedness and headaches  Current Medications     No current facility-administered medications for this visit  No current outpatient medications on file      Facility-Administered Medications Ordered in Other Visits:   Wash Caller albuterol (PROVENTIL HFA,VENTOLIN HFA) inhaler 2 puff, 2 puff, Inhalation, Q4H PRN, Emma Velasquez MD    Current Allergies     Allergies as of 2020 - Reviewed 2020   Allergen Reaction Noted    Cat hair extract  2018    Dog epithelium  2018    Penicillins  2018            The following portions of the patient's history were reviewed and updated as appropriate: allergies, current medications, past family history, past medical history, past social history, past surgical history and problem list      Past Medical History:   Diagnosis Date    Anemia     Asthma     Eczema     Obesity        Past Surgical History:   Procedure Laterality Date    BARIATRIC SURGERY  2019    EXPLORATORY LAPAROTOMY      2016, no findings     GASTRECTOMY SLEEVE LAPAROSCOPIC      NO PAST SURGERIES         Family History   Problem Relation Age of Onset    Hypertension Mother     Diabetes Mother         gestastional    Migraines Mother     Diabetes Maternal Grandmother     Heart failure Maternal Grandmother     Liver cancer Maternal Grandfather     Diabetes Maternal Grandfather     Asthma Father             Lung cancer Paternal Grandmother     No Known Problems Sister     No Known Problems Brother     No Known Problems Sister     No Known Problems Sister          Medications have been verified  Objective   BP (!) 187/105   Pulse 81   Temp 98 9 °F (37 2 °C)   Resp 20   Ht 5' 2" (1 575 m)   Wt 85 7 kg (189 lb)   LMP 2020 (Exact Date)   SpO2 100%   BMI 34 57 kg/m²        Physical Exam     Physical Exam  Vitals signs and nursing note reviewed  Constitutional:       General: She is not in acute distress  Appearance: Normal appearance  She is not ill-appearing  Cardiovascular:      Rate and Rhythm: Normal rate and regular rhythm  Heart sounds: Normal heart sounds  Pulmonary:      Effort: Pulmonary effort is normal  No respiratory distress  Breath sounds: Normal breath sounds  No stridor  No wheezing, rhonchi or rales  Musculoskeletal:      Right lower leg: No edema  Left lower leg: No edema  Neurological:      Mental Status: She is alert and oriented to person, place, and time     Psychiatric:         Mood and Affect: Mood normal          Behavior: Behavior normal

## 2020-08-19 NOTE — TELEPHONE ENCOUNTER
26w3d OB calling c/o difficulty breathing  States it is better when she is on hands and knees, but when lying on side she has increased difficulty  She has asthma took inhaler yesterday which helped a little  Denies any headache, visual changes, chest pain, palpations, loss of taste or smell  Advised if she is having difficulty breathing she should report to ED  Unsure if she wants to go to ED could it possible be baby's position  Advised to take inhaler  Routing to provider for further advice

## 2020-08-19 NOTE — TELEPHONE ENCOUNTER
Spoke with patient and confirmed appointment with [facility]  1 support person ( must be over age of 15) may accompany patient  Will you and your support person be able to wear a mask ,without a valve , during entire appointment? YES   To minimize your exposure in our waiting area,check in and rooming questions will be done via phone  When you arrive in the parking lot please call the following inside line # prior to entering office:    Arleen Kearney [phone]      Have you or your support person traveled outside the state in the last 2 weeks? NO  If yes, what state did you travel to? NO    Do you or your support person have:  Fever or flu- like symptoms? NO  Symptoms of upper respiratory infection like runny nose, sore throat or cough? NO  Do you have new headache that you have not had in the past?NO  Have you experienced any new shortness of breath recently? NO  Do you have any new loss of taste or smell? NO  Do you have any new diarrhea, nausea or vomiting? NO  Have you recently been in contact with anyone who has been sick or diagnosed with COVID-19 infection? NO  Have you been recommended to quarantine because of an exposure to a confirmed positive COVID19 person? NO  You and your support person will have temperature screening upon arrival   Patient verbalized understanding of all instructions   -------------------------------------------------------------

## 2020-08-19 NOTE — H&P
H&P Exam - Obstetrics   Davide Sánchez 35 y o  female MRN: [id number]  Unit/Bed#: LD TRIAGE 3 Encounter: [id number]      History of Present Illness     Chief Complaint: Shortness of breath    HPI:  Davide Sánchez is a 35 y o   female with an ANCA of 2020, by Last Menstrual Period at 26w3d weeks gestation who is being admitted for preeclampsia with severe features  Patient called today with complaints of shortness of breath for 1 week duration  She states that over the last week she has progressively needed to stent in order to breathe appropriately  She is uncomfortable laying on her back or on her side  She does have asthma and recently started using her inhaler again  However due to her recent complaints she was advised to go to Urgent Care  While at urgent care for evaluation she of severe range blood pressure was told to immediately proceed to the emergency room  In the emergency room she continued to have severe range pressures and was transferred to Labor and delivery triage received received IV hydralazine for blood pressure control  Contractions:  Denies  Loss of fluid:  Denies  Vaginal bleeding:  Denies  Fetal movement:  Present    She is a [facility] patient  PREGNANCY COMPLICATIONS:   1) history of bariatric surgery  2) obesity  3) asthma    OB History    Para Term  AB Living   3 0 0 0 2 0   SAB TAB Ectopic Multiple Live Births   0 2 0 0 0      # Outcome Date GA Lbr Garry/2nd Weight Sex Delivery Anes PTL Lv   3 Current            2 TAB            1 TAB               Obstetric Comments   No complication post VIP       Baby complications/comments:  Estrada IUP    Review of Systems   Constitutional: Negative  HENT: Negative  Eyes: Negative  Respiratory: Positive for cough and shortness of breath  Cardiovascular: Negative  Gastrointestinal: Negative  Endocrine: Negative  Genitourinary: Negative  Musculoskeletal: Negative  Skin: Negative  Neurological: Negative  Psychiatric/Behavioral: Negative  Historical Information   Past Medical History:   Diagnosis Date    Anemia     Asthma     Eczema     Obesity      Past Surgical History:   Procedure Laterality Date    BARIATRIC SURGERY  1/2019    EXPLORATORY LAPAROTOMY      12/2016, no findings     GASTRECTOMY SLEEVE LAPAROSCOPIC      NO PAST SURGERIES       Social History   Social History     Substance and Sexual Activity   Alcohol Use Not Currently    Alcohol/week: 2 0 standard drinks    Types: 2 Glasses of wine per week     Social History     Substance and Sexual Activity   Drug Use No     Social History     Tobacco Use   Smoking Status Never Smoker   Smokeless Tobacco Never Used     Family History: non-contributory    Meds/Allergies      Medications Prior to Admission   Medication    acetaminophen (TYLENOL) 325 mg tablet    albuterol (PROVENTIL HFA,VENTOLIN HFA) 90 mcg/act inhaler    aspirin 81 mg chewable tablet    DUPIXENT subcutaneous injection    Prenatal Vit-DSS-Fe Fum-FA (PRENATAL 19) 29-1 MG TABS        Allergies   Allergen Reactions    Cat Hair Extract     Dog Epithelium     Penicillins        OBJECTIVE:    Vitals: Blood pressure 169/100, pulse 85, temperature 98 7 °F (37 1 °C), temperature source Oral, resp  rate 20, height 5' 2" (1 575 m), weight 85 7 kg (189 lb), last menstrual period 02/16/2020, SpO2 100 %  Body mass index is 34 57 kg/m²  Physical Exam  Constitutional:       General: She is not in acute distress  Appearance: She is well-developed  She is obese  She is not ill-appearing, toxic-appearing or diaphoretic  HENT:      Head: Normocephalic and atraumatic  Eyes:      Pupils: Pupils are equal, round, and reactive to light  Neck:      Musculoskeletal: Normal range of motion  Trachea: No tracheal deviation  Cardiovascular:      Rate and Rhythm: Normal rate and regular rhythm  Heart sounds: Normal heart sounds  No murmur   No friction rub  No gallop  Pulmonary:      Effort: Pulmonary effort is normal  No respiratory distress  Breath sounds: No stridor  Rales (Right-sided) present  No wheezing  Abdominal:      General: There is no distension  Palpations: Abdomen is soft  There is no mass  Tenderness: There is no abdominal tenderness  There is no guarding or rebound  Musculoskeletal: Normal range of motion  General: No tenderness or deformity  Skin:     General: Skin is warm and dry  Coloration: Skin is not pale  Findings: No erythema or rash  Neurological:      Mental Status: She is alert and oriented to person, place, and time        Coordination: Coordination normal            Fetal heart rate:    150/moderate variability/10 x 10 accelerations,     [place]:     none    EFW: 1 lbs    GBS: Unknown    Prenatal Labs:   Blood Type:   Lab Results   Component Value Date/Time    ABO Grouping B 04/30/2020 09:29 AM     , D (Rh type):   Lab Results   Component Value Date/Time    Rh Factor Positive 04/30/2020 09:29 AM     , Antibody Screen: No results found for: ANTIBODYSCR , HCT/HGB:   Lab Results   Component Value Date/Time    Hematocrit 37 6 08/19/2020 02:40 PM    Hemoglobin 12 7 08/19/2020 02:40 PM      , MCV:   Lab Results   Component Value Date/Time    MCV 93 08/19/2020 02:40 PM      , Platelets:   Lab Results   Component Value Date/Time    Platelets 426 65/13/6367 02:40 PM      , 1 hour Glucola: No results found for: [id number], 3 hour GTT: No results found for: OYPPLAP5QQ, Varicella:   Lab Results   Component Value Date/Time    Varicella IgG IMMUNE 04/30/2020 09:29 AM       , Rubella:   Lab Results   Component Value Date/Time    Rubella IgG Quant 47 6 04/30/2020 09:29 AM        , VDRL/RPR:   Lab Results   Component Value Date/Time    RPR Non-Reactive 04/30/2020 09:29 AM      , Urine Culture/Screen:   Lab Results   Component Value Date/Time    Urine Culture 30,000-39,000 cfu/ml  04/30/2020 09:29 AM       , Urine Drug Screen: No results found for: AMPHETUR, BARBTUR, BDZUR, THCUR, COCAINEUR, METHADONEUR, OPIATEUR, PCPUR, MTHAMUR, ECSTASYUR, TRICYCLICSUR, Hep B:   Lab Results   Component Value Date/Time    Hepatitis B Surface Ag Non-reactive 2020 09:29 AM     , Hep C: No components found for: HEPCSAG, EXTHEPCSAG   , HIV:   Lab Results   Component Value Date/Time    HIV-1/HIV-2 Ab Non-Reactive 2020 09:29 AM     , Chlamydia: No results found for: EXTCHLAMYDIA  , Gonorrhea:   Lab Results   Component Value Date/Time    N gonorrhoeae, DNA Probe Negative 2020 10:25 AM         Invasive Devices     Peripheral Intravenous Line            Peripheral IV 20 Right Antecubital less than 1 day                  Assessment/Plan     ASSESSMENT:  31yo  at 26w3d weeks gestation who is being admitted for preeclampsia with severe features  PLAN:   1) Admit   2) CMP, CBC, P:C ratio, Blood Type   3) Start with magnesium sulfate   4) GBS unknown   5) consider anesthesia consultation for airway management   6) consult MFM   7) Discussed with Dr Pablo Goodman      This patient will be an INPATIENT  and I certify the anticipated length of stay is >2 Midnights      Mya Jordan MD  OBGYN PGY-2  2020  4:14 PM

## 2020-08-19 NOTE — TELEPHONE ENCOUNTER
She is currently in the ED and L&D team has been made aware for evaluation due to continued elevated BP

## 2020-08-19 NOTE — TELEPHONE ENCOUNTER
Reviewed provider's recommendation - please go to ER or Urgent care if she continues to have SOB  Patient states she is feeling a bit better since inhaler  Aware to call PCP if she needs to use inhaler for often  Verbalized understanding

## 2020-08-19 NOTE — TELEPHONE ENCOUNTER
Agree with recommendations provided  If SOB continues should report either to ED or urgent care  If she is needing to use her inhaler more often, she should also consider seeing her PCP for asthma evaluation if increase or change in medication needed

## 2020-08-20 ENCOUNTER — APPOINTMENT (INPATIENT)
Dept: NON INVASIVE DIAGNOSTICS | Facility: HOSPITAL | Age: 30
End: 2020-08-20
Payer: COMMERCIAL

## 2020-08-20 PROBLEM — O36.5990 PREGNANCY AFFECTED BY FETAL GROWTH RESTRICTION: Status: ACTIVE | Noted: 2020-08-20

## 2020-08-20 PROBLEM — O14.12 SEVERE PREECLAMPSIA, SECOND TRIMESTER: Status: ACTIVE | Noted: 2020-08-19

## 2020-08-20 LAB
ALBUMIN SERPL BCP-MCNC: 2.4 G/DL (ref 3.5–5)
ALBUMIN SERPL BCP-MCNC: 2.6 G/DL (ref 3.5–5)
ALP SERPL-CCNC: 107 U/L (ref 46–116)
ALP SERPL-CCNC: 129 U/L (ref 46–116)
ALT SERPL W P-5'-P-CCNC: 23 U/L (ref 12–78)
ALT SERPL W P-5'-P-CCNC: 29 U/L (ref 12–78)
ANION GAP SERPL CALCULATED.3IONS-SCNC: 10 MMOL/L (ref 4–13)
ANION GAP SERPL CALCULATED.3IONS-SCNC: 10 MMOL/L (ref 4–13)
AST SERPL W P-5'-P-CCNC: 20 U/L (ref 5–45)
AST SERPL W P-5'-P-CCNC: 26 U/L (ref 5–45)
BACTERIA UR CULT: NORMAL
BILIRUB SERPL-MCNC: 0.22 MG/DL (ref 0.2–1)
BILIRUB SERPL-MCNC: 0.35 MG/DL (ref 0.2–1)
BUN SERPL-MCNC: 14 MG/DL (ref 5–25)
BUN SERPL-MCNC: 17 MG/DL (ref 5–25)
CALCIUM SERPL-MCNC: 7.7 MG/DL (ref 8.3–10.1)
CALCIUM SERPL-MCNC: 8.4 MG/DL (ref 8.3–10.1)
CHLORIDE SERPL-SCNC: 102 MMOL/L (ref 100–108)
CHLORIDE SERPL-SCNC: 105 MMOL/L (ref 100–108)
CO2 SERPL-SCNC: 22 MMOL/L (ref 21–32)
CO2 SERPL-SCNC: 22 MMOL/L (ref 21–32)
CREAT SERPL-MCNC: 0.97 MG/DL (ref 0.6–1.3)
CREAT SERPL-MCNC: 1.09 MG/DL (ref 0.6–1.3)
ERYTHROCYTE [DISTWIDTH] IN BLOOD BY AUTOMATED COUNT: 12.7 % (ref 11.6–15.1)
ERYTHROCYTE [DISTWIDTH] IN BLOOD BY AUTOMATED COUNT: 12.8 % (ref 11.6–15.1)
GFR SERPL CREATININE-BSD FRML MDRD: 79 ML/MIN/1.73SQ M
GFR SERPL CREATININE-BSD FRML MDRD: 91 ML/MIN/1.73SQ M
GLUCOSE SERPL-MCNC: 136 MG/DL (ref 65–140)
GLUCOSE SERPL-MCNC: 136 MG/DL (ref 65–140)
HCT VFR BLD AUTO: 33.3 % (ref 34.8–46.1)
HCT VFR BLD AUTO: 37.1 % (ref 34.8–46.1)
HGB BLD-MCNC: 11.4 G/DL (ref 11.5–15.4)
HGB BLD-MCNC: 12.6 G/DL (ref 11.5–15.4)
MCH RBC QN AUTO: 31.3 PG (ref 26.8–34.3)
MCH RBC QN AUTO: 31.4 PG (ref 26.8–34.3)
MCHC RBC AUTO-ENTMCNC: 34 G/DL (ref 31.4–37.4)
MCHC RBC AUTO-ENTMCNC: 34.2 G/DL (ref 31.4–37.4)
MCV RBC AUTO: 92 FL (ref 82–98)
MCV RBC AUTO: 92 FL (ref 82–98)
PLATELET # BLD AUTO: 250 THOUSANDS/UL (ref 149–390)
PLATELET # BLD AUTO: 253 THOUSANDS/UL (ref 149–390)
PMV BLD AUTO: 10.6 FL (ref 8.9–12.7)
PMV BLD AUTO: 11.4 FL (ref 8.9–12.7)
POTASSIUM SERPL-SCNC: 4.5 MMOL/L (ref 3.5–5.3)
POTASSIUM SERPL-SCNC: 4.6 MMOL/L (ref 3.5–5.3)
PROT SERPL-MCNC: 6 G/DL (ref 6.4–8.2)
PROT SERPL-MCNC: 6.6 G/DL (ref 6.4–8.2)
RBC # BLD AUTO: 3.63 MILLION/UL (ref 3.81–5.12)
RBC # BLD AUTO: 4.03 MILLION/UL (ref 3.81–5.12)
RPR SER QL: NORMAL
SODIUM SERPL-SCNC: 134 MMOL/L (ref 136–145)
SODIUM SERPL-SCNC: 137 MMOL/L (ref 136–145)
WBC # BLD AUTO: 18.65 THOUSAND/UL (ref 4.31–10.16)
WBC # BLD AUTO: 23.25 THOUSAND/UL (ref 4.31–10.16)

## 2020-08-20 PROCEDURE — 76820 UMBILICAL ARTERY ECHO: CPT | Performed by: OBSTETRICS & GYNECOLOGY

## 2020-08-20 PROCEDURE — 76816 OB US FOLLOW-UP PER FETUS: CPT | Performed by: OBSTETRICS & GYNECOLOGY

## 2020-08-20 PROCEDURE — 59025 FETAL NON-STRESS TEST: CPT | Performed by: OBSTETRICS & GYNECOLOGY

## 2020-08-20 PROCEDURE — 80053 COMPREHEN METABOLIC PANEL: CPT | Performed by: STUDENT IN AN ORGANIZED HEALTH CARE EDUCATION/TRAINING PROGRAM

## 2020-08-20 PROCEDURE — 93306 TTE W/DOPPLER COMPLETE: CPT | Performed by: INTERNAL MEDICINE

## 2020-08-20 PROCEDURE — 93306 TTE W/DOPPLER COMPLETE: CPT

## 2020-08-20 PROCEDURE — 99232 SBSQ HOSP IP/OBS MODERATE 35: CPT | Performed by: OBSTETRICS & GYNECOLOGY

## 2020-08-20 PROCEDURE — NC001 PR NO CHARGE: Performed by: OBSTETRICS & GYNECOLOGY

## 2020-08-20 PROCEDURE — 85027 COMPLETE CBC AUTOMATED: CPT | Performed by: STUDENT IN AN ORGANIZED HEALTH CARE EDUCATION/TRAINING PROGRAM

## 2020-08-20 RX ORDER — HEPARIN SODIUM 5000 [USP'U]/ML
5000 INJECTION, SOLUTION INTRAVENOUS; SUBCUTANEOUS EVERY 12 HOURS SCHEDULED
Status: DISCONTINUED | OUTPATIENT
Start: 2020-08-20 | End: 2020-09-08

## 2020-08-20 RX ORDER — ACETAMINOPHEN 325 MG/1
650 TABLET ORAL EVERY 6 HOURS PRN
Status: DISCONTINUED | OUTPATIENT
Start: 2020-08-20 | End: 2020-09-15

## 2020-08-20 RX ORDER — FUROSEMIDE 10 MG/ML
20 INJECTION INTRAMUSCULAR; INTRAVENOUS ONCE
Status: COMPLETED | OUTPATIENT
Start: 2020-08-20 | End: 2020-08-20

## 2020-08-20 RX ADMIN — MAGNESIUM SULFATE IN WATER 2 G/HR: 40 INJECTION, SOLUTION INTRAVENOUS at 05:51

## 2020-08-20 RX ADMIN — NIFEDIPINE 30 MG: 30 TABLET, FILM COATED, EXTENDED RELEASE ORAL at 10:05

## 2020-08-20 RX ADMIN — BETAMETHASONE SODIUM PHOSPHATE AND BETAMETHASONE ACETATE 12 MG: 3; 3 INJECTION, SUSPENSION INTRA-ARTICULAR; INTRALESIONAL; INTRAMUSCULAR at 19:43

## 2020-08-20 RX ADMIN — FUROSEMIDE 20 MG: 10 INJECTION, SOLUTION INTRAMUSCULAR; INTRAVENOUS at 01:44

## 2020-08-20 RX ADMIN — HEPARIN SODIUM 5000 UNITS: 5000 INJECTION INTRAVENOUS; SUBCUTANEOUS at 22:30

## 2020-08-20 RX ADMIN — ACETAMINOPHEN 650 MG: 325 TABLET, FILM COATED ORAL at 20:13

## 2020-08-20 RX ADMIN — ACETAMINOPHEN 650 MG: 325 TABLET, FILM COATED ORAL at 08:03

## 2020-08-20 NOTE — QUICK NOTE
I reviewed Mariam's lab work, which was significant for a NT-proBNP of 888  WBC 17 94  Hgb 12 7g/dL  COVID neg  Coags, fibrinogen WNL  UDS negative  I also reviewed her CTA PE study which showed no pulmonary embolism, small bilateral pleural effusions, enlarged fatty liver  I ordered an echo last evening at 1815, given SOB with known diagnosis of preeclampsia with severe features, with elevated NT-proBNP of 888 and concern for cardiomyopathy  Hospital supervisor was called, stated that she can try to call in tech from home to perform echo  Tech stated they do not come in from home unless cardiology approves the request      I spoke with Dr Jovita Epperson of cardiology  He stated that many patients get admitted with SOB and elevated proBNP and that it does not meet indication for after hours study  I explained that she is a 30w1d pregnant with comorbidities including preeclampsia with severe features, obesity, previous bariatric surgery, asthma, and that cardiomyopathy is a serious diagnosis for a pregnant patient  He re-iterated that echo would not  and is not a treatment  A stat echo is rarely down after hours  He assured me that she will receive her echo tomorrow morning and recommended a dose of lasix for symptomatic management  I discussed this with Dr Cortez Steve (attending) and Dr Shivam Johnson (MFM)  Both are okay with dose of lasix although will have to watch closely for intravascular depletion given fluid restriction with magnesium for preE with severe features  I evaluated patient  She is currently sleeping, states her SOB is still present but better while lying in bed  States she feels better than when she originally presented at 1700 today  Current vitals:  Vitals:    08/19/20 2330   BP: 133/72   Pulse: 104   Resp: 18   Temp: 98 °F (36 7 °C)   SpO2: 99%     Will continue to closely monitor  Susan Dean (RN) notified of updates         Mark Carmichael MD  OBGYN, PGY-4  8/20/2020  1:00 AM

## 2020-08-20 NOTE — PROGRESS NOTES
Pt seen on PM rounds with Dr Leonardo Wall  Reviewed normal echo results, as well as today's ultrasound which noted fetal growth restriction with elevated Doppler studies  Reviewed her trend in rising creatinine, which we are watching closely  It continues to up-trend to 1 09 this evening  If Cr were 1 5 or greater, this would be considered a contraindication to expectant management of severe preeclampsia  We also reviewed the pleural effusion and normal echo  This is likely a severe feature of her preeclampsia  If she requires additional diuresis or develops an oxygen requirement, that would be a contraindication to expectant management as well  Continue strict I/O's overnight  Will start SQ Heparin for DVT prophylaxis    Bari Villagran MD

## 2020-08-20 NOTE — PLAN OF CARE
Problem: ANTEPARTUM  Goal: Maintain pregnancy as long as maternal and/or fetal condition is stable  Description: INTERVENTIONS:  - Maternal surveillance  - Fetal surveillance  - Monitor uterine activity  - Medications as ordered  - Bedrest  Outcome: Progressing

## 2020-08-20 NOTE — UTILIZATION REVIEW
Initial Clinical Review    Admission: Date/Time/Statement:   Admission Orders (From admission, onward)     Ordered        08/19/20 1636  Inpatient Admission  Once                   Orders Placed This Encounter   Procedures    Inpatient Admission     Standing Status:   Standing     Number of Occurrences:   1     Order Specific Question:   Admitting Physician     Answer:   Jamila Salas [[id number]]     Order Specific Question:   Level of Care     Answer:   Med Surg [[id number]]     Order Specific Question:   Estimated length of stay     Answer:   More than 2 Midnights     Order Specific Question:   Certification     Answer:   I certify that inpatient services are medically necessary for this patient for a duration of greater than two midnights  See H&P and MD Progress Notes for additional information about the patient's course of treatment  ED Arrival Information     Expected Arrival Acuity Means of Arrival Escorted By Service Admission Type    - 8/19/2020 13:36 Urgent Walk-In Family Member OB/GYN Urgent    Arrival Complaint    hbp/26wks preg        Chief Complaint   Patient presents with    Shortness of Breath     c/o SOB x 1 week  Pt sent to ER from Urgent Center for evaluation/tx of SOB and HTN  Pt stated she feels her asthma is getting bad  Pt denies HA, visual disturbances, CP, or complications with pregnancy  Informed by OBGYN to have pt Evaluated in ER and then sent to OBGYN for non-stress test of baby    Pre-Eclampsia     Assessment/Plan:   36 yo G 3 P 0 @ 26 4/7 wks  presented to ED and transferred to [facility] as inpatient admission for preeclampsia Patient states SOB x 1 week, she was a hx of asthma and recently started inhalers again  Patient Bp noted to be severe and IV hydralazine given  On exam rales noted right side  Will start po procardia,Trend HELLP labs and monitor UOP closely (Cr slightly above expected for pregnancy at 0 85)  Will evaluate for causes of concurrent shortness of breath  COVID-19 negative  Plan for CT PE protocol, and Pro-BNP to evaluate for cardiomyopathy   Pt at risk for fluid overload, will limit IV fluids  BTM X 2 doses continuous external monitoring  Labs valves are concerning for cardiomyopathy consult cardiology will have ECHO and was given 1 doses lasix     ED Triage Vitals   Temperature Pulse Respirations Blood Pressure SpO2   08/19/20 1340 08/19/20 1340 08/19/20 1340 08/19/20 1340 08/19/20 1340   98 9 °F (37 2 °C) 87 20 (!) 176/94 100 %      Temp Source Heart Rate Source Patient Position - Orthostatic VS BP Location FiO2 (%)   08/19/20 1340 08/19/20 1435 08/19/20 1435 08/19/20 1435 --   Oral Monitor Sitting Right arm       Pain Score       08/19/20 1517       9          Wt Readings from Last 1 Encounters:   08/19/20 85 7 kg (189 lb)     Additional Vital Signs:   Date/Time   Temp   Pulse   Resp   BP   MAP (mmHg)   SpO2   O2 Device   Cardiac (WDL)   Patient Position - Orthostatic VS    08/20/20 0928   98 4 °F (36 9 °C)   102   20   132/71   --   99 %   --   --   --    08/20/20 0600   97 8 °F (36 6 °C)   98   18   135/82   --   98 %   --   --   Lying    08/20/20 0345   97 8 °F (36 6 °C)   101   18   125/81   --   98 %   None (Room air)   --   Lying    08/20/20 0147   97 5 °F (36 4 °C)   95   18   135/82   --   100 %   None (Room air)   --   Lying    08/19/20 2330   98 °F (36 7 °C)   104   18   133/72   --   99 %   None (Room air)   WDL   Lying    08/19/20 2230   --   109Abnormal     24Abnormal     140/82   --   --   --   --   Lying    08/19/20 2144   --   --   28Abnormal     --   --   99 %   None (Room air)   --   --    Comment rows:    OBSERV: trans to 332 at 08/19/20 2144 08/19/20 2126   --   121Abnormal     --   128/76   --   --   --   --   --    08/19/20 2107   --   122Abnormal     --   130/77   --   --   --   --   --    08/19/20 2056   --   118Abnormal     --   112/61   --   --   --   --   --    08/19/20 2027   --   --   --   --   --   --   --   --   --    Comment rows:    OBSERV: very difficult to trace fhr, 10 min spent searching for traceable site for fhr at 08/19/20 2027 08/19/20 2026   --   123Abnormal     --   121/74   --   --   --   --   --    08/19/20 1917   --   102   --   --   --   100 %   --   --   --    08/19/20 1912   --   94   --   --   --   89 %Abnormal     --   --   --    08/19/20 1911   --   118Abnormal     --   --   --   97 %   --   --   --    08/19/20 1907   --   79   --   --   --   97 %   --   --   --    08/19/20 1906   --   99   --   128/75   --   97 %   --   --   Lying    08/19/20 1830   --   106Abnormal     --   132/75   --   --   --   --   --    Comment rows:    OBSERV: pt trans to CT via stretcher at 08/19/20 1830 08/19/20 1758   --   110Abnormal     --   --   --   82 %Abnormal      --   --   --    SpO2: pt moved pulse ox at 08/19/20 1758 08/19/20 1752   --   123Abnormal     --   --   --   100 %   --   --   --    08/19/20 1747   --   --   --   --   --   --   --   --   --    Comment rows:    OBSERV: DR Roberto Bell, DR Scott Menjivar and Dr Martita Mccollum in to discuss plan of care with patient and FOB at 08/19/20 1747 08/19/20 1724   --   106Abnormal     28Abnormal     147/84   --   --   --   --   --    08/19/20 1719   --   104   --   --   --   --   --   --   --    08/19/20 1718   --   --   --   --   --   57 %Abnormal      --   --   --    SpO2: not accurate, probe not well applied at 08/19/20 1718 08/19/20 1714   --   104   --   --   --   85 %Abnormal      --   --   --    SpO2: not accurate, pulse ox slipped at 08/19/20 1714 08/19/20 1713   --   91   --   --   --   100 %   --   --   --    08/19/20 1710   --   93   --   169/103Abnormal     --   --   --   --   --    08/19/20 1708   --   97   --   --   --   100 %   --   --   --    08/19/20 1703   --   91   --   --   --   100 %   --   --   --    08/19/20 1658   --   97   --   --   --   100 %   --   --   --    08/19/20 1654   --   93   --   162/104Abnormal     --   --   --   --   --    08/19/20 1653   --   102   --   --   --   100 %   --   --   --    08/19/20 1648   --   95   --   --   --   100 %   --   --   --    08/19/20 1643   --   102   --   --   --   100 %   --   --   --    Comment rows:    OBSERV: pt refused procardia, states she does not like to take meds, discussed, indications and side effects  reviewed haydralazine which is short acting and procardia is long acting to maintain lower bp  pt states this is her first day with high blood pressure and she wants to see if the hydralizine will keep her bp down   DR Stone Reyes notifiedof same at 08/19/20 1643    08/19/20 1639   --   99   --   157/99   --   --   --   --   --    08/19/20 1638   --   107Abnormal     --   --   --   100 %   --   --   --    08/19/20 1633   --   104   --   --   --   100 %   --   --   --    08/19/20 1628   --   99   --   --   --   100 %   --   --   --    08/19/20 1624   --   99   --   168/97   --   --   --   --   --    08/19/20 1623   --   103   --   --   --   100 %   --   --   --    08/19/20 1618   --   101   --   --   --   100 %   --   --   --    08/19/20 1613   --   120Abnormal     --   --   --   100 %   --   --   --    08/19/20 1611   --   106Abnormal     --   164/93   --   --   --   --   --    08/19/20 1609   --   96   --   154/89   --   --   --   --   --    08/19/20 1608   --   100   --   --   --   95 %   --   --   --    08/19/20 1607   --   56   --   --   --   92 %   --   --   --    08/19/20 1547   --   --   --   169/100   --   --   --   --   --    08/19/20 1518   --   --   --   188/106Abnormal      --   --   --   --   Sitting    BP: Dr Stone Reyes aware at 08/19/20 1518    08/19/20 1517   98 7 °F (37 1 °C)   85   20   --   --   --   --   --   --    08/19/20 1435   --   81   20   192/108Abnormal     139   100 %   None (Room air)            Pertinent Labs/Diagnostic Test Results:   Results from last 7 days   Lab Units 08/19/20  1603   SARS-COV-2  Negative     Results from last 7 days   Lab Units 08/20/20  0551 08/19/20  1440   WBC Thousand/uL 18 65* 17 94* HEMOGLOBIN g/dL 12 6 12 7   HEMATOCRIT % 37 1 37 6   PLATELETS Thousands/uL 250 240   NEUTROS ABS Thousands/µL  --  13 27*         Results from last 7 days   Lab Units 08/20/20  0551 08/19/20  1440   SODIUM mmol/L 134* 137   POTASSIUM mmol/L 4 5 3 7   CHLORIDE mmol/L 102 106   CO2 mmol/L 22 26   ANION GAP mmol/L 10 5   BUN mg/dL 14 12   CREATININE mg/dL 0 97 0 85   EGFR ml/min/1 73sq m 91 107   CALCIUM mg/dL 8 4 8 6     Results from last 7 days   Lab Units 08/20/20  0551 08/19/20  1440   AST U/L 26 30   ALT U/L 29 28   ALK PHOS U/L 129* 125*   TOTAL PROTEIN g/dL 6 6 6 6   ALBUMIN g/dL 2 6* 2 7*   TOTAL BILIRUBIN mg/dL 0 35 0 31         Results from last 7 days   Lab Units 08/20/20  0551 08/19/20  1440   GLUCOSE RANDOM mg/dL 136 78     Results from last 7 days   Lab Units 08/19/20  1941   PROTIME seconds 14 2   INR  1 08   PTT seconds 29     Results from last 7 days   Lab Units 08/19/20  1440   NT-PRO BNP pg/mL 888*       Results from last 7 days   Lab Units 08/19/20  1451 08/19/20  1441 08/19/20  1435   CLARITY UA  Clear  --   --    COLOR UA  Yellow  --   --    SPEC GRAV UA  >=1 030  --   --    PH UA  5 5  --   --    GLUCOSE UA mg/dl Negative  --   --    KETONES UA mg/dl 15 (1+)*  --   --    BLOOD UA  Trace*  --   --    PROTEIN UA mg/dl >=300*  --   --    NITRITE UA  Negative  --   --    BILIRUBIN UA  Negative  --   --    UROBILINOGEN UA E U /dl 0 2  --   --    LEUKOCYTES UA  Negative  --   --    WBC UA /hpf  --   --  2-4*   RBC UA /hpf  --   --  0-1*   BACTERIA UA /hpf  --   --  Occasional   EPITHELIAL CELLS WET PREP /hpf  --   --  Occasional   MUCUS THREADS   --   --  Moderate*   CREATININE UR mg/dL  --  422 0  --    PROTEIN UR mg/dL  --  1,491  --    PROT/CREAT RATIO UR   --  3 53*  --      Results from last 7 days   Lab Units 08/19/20  1739   AMPH/METH  Negative   BARBITURATE UR  Negative   BENZODIAZEPINE UR  Negative   COCAINE UR  Negative   METHADONE URINE  Negative   OPIATE UR  Negative   PCP UR  Negative THC UR  Negative     CTA PE study 08-19-20  No pulmonary embolism  Small bilateral pleural effusions  Enlarged fatty liver  Past Medical History:   Diagnosis Date    Abnormal Pap smear of cervix     Anemia     Anxiety     Asthma     Bronchitis     past    Eczema     Hypertension     Migraine     Obesity     Pneumonia     in past     Present on Admission:   Shortness of breath   Preeclampsia   Maternal obesity, antepartum, second trimester   Bariatric surgery status complicating pregnancy, second trimester      Admitting Diagnosis: Shortness of breath [R06 02]  Preeclampsia [O14 90]  HBP (high blood pressure) [I10]  Age/Sex: 35 y o  female  Admission Orders:  Scheduled Medications:  betamethasone acetate-betamethasone sodium phosphate, 12 mg, Intramuscular, Q24H  NIFEdipine, 30 mg, Oral, Daily      Continuous IV Infusions:  S/P IV MGSO 4 x 12 hrs   lactated ringers, 25 mL/hr, Intravenous, Continuous      PRN Meds:  acetaminophen, 650 mg, Oral, Q6H PRN  albuterol, 2 puff, Inhalation, Q4H PRN        IP CONSULT TO PERINATOLOGY  IP CONSULT TO NEONATOLOGY  ECHO 08-20-20  Continuous external monitoring  SCD    Network Utilization Review Department  [email or web address]  ATTENTION: Please call with any questions or concerns to [phone] and carefully listen to the prompts so that you are directed to the right person  All voicemails are confidential   [facility] all requests for admission clinical reviews, approved or denied determinations and any other requests to dedicated fax number below belonging to the campus where the patient is receiving treatment   List of dedicated fax numbers for the Facilities:  FACILITY NAME UR FAX NUMBER   ADMISSION DENIALS (Administrative/Medical Necessity) [phone]   [address] [place] [address] (Maternity/NICU/Pediatrics) [phone]   [facility] [address] [facility] [address] [facility] [phone]   [address] [facility] [address] [facility] [phone]   Yesy Chance [address] [phone]   [address] [facility], [facility]  [phone] [address] [address]20 [address] [phone]

## 2020-08-20 NOTE — PROGRESS NOTES
Progress Note - Maternal Fetal Medicine   Dwight Read 35 y o  female MRN: [id number]  Unit/Bed#: -01 Encounter: [id number]    Assessment:  35 y o   at 26w4d admitted with preeclampsia with severe features  Hospital day 2    Plan:  AM labs reveal increase in Cr, (0 85 -> 0 97), stable LFT and platelets and hgb  Plan for echo today in setting of elevated BNP  [id number]  Continue Procardia XL 30 mg daily  Negative CTA  On Magnesium sulfate  S/p lasix overnight per cardiology recommendations    Subjective/Objective   Chief Complaint:     Patient reports improvements in her breathing from when she arrived at the hospital   She was laying in bed reclined on evaluation and reports breathing comfortably  She endorses good fetal movement and denies bleeding, leakage, ,or contractions  She Denies headache, visual changes, or RUQ/epigastric pain  She states her swelling is about the same but was not bothersome to her beforehand  CTA overnight reveal pleural effusion but no signs of pulmonary embolism  Rene Lowery continues to have a nonproductive cough this AM     Subjective:     Contractions: no  Loss of fluid: no  Vaginal bleeding: no  Fetal movement: yes    Pain: no  Tolerating PO: yes  Voiding: yes  Ambulating: yes  Headaches: no  Visual changes: no  Chest pain: no  Shortness of breath: yes, improved  Nausea: no  Vomiting/Diarrhea: no  Dysuria: no  Leg pain: no    Objective:     Vitals:   Temp:  [97 5 °F (36 4 °C)-98 9 °F (37 2 °C)] 97 8 °F (36 6 °C)  HR:  [] 98  Resp:  [18-28] 18  BP: (112-192)/() 135/82     Physical Exam:     Physical Exam  Constitutional:       General: She is not in acute distress  Appearance: She is well-developed  She is not diaphoretic  HENT:      Head: Normocephalic and atraumatic  Eyes:      Pupils: Pupils are equal, round, and reactive to light  Neck:      Musculoskeletal: Normal range of motion  Trachea: No tracheal deviation  Cardiovascular:      Rate and Rhythm: Normal rate and regular rhythm  Heart sounds: Normal heart sounds  No murmur  No friction rub  No gallop  Pulmonary:      Effort: Pulmonary effort is normal  No respiratory distress  Breath sounds: Normal breath sounds  No stridor  No wheezing or rales  Abdominal:      General: Bowel sounds are normal  There is no distension  Palpations: Abdomen is soft  There is no mass  Tenderness: There is no abdominal tenderness  There is no guarding or rebound  Musculoskeletal: Normal range of motion  General: No tenderness or deformity  Skin:     General: Skin is warm and dry  Coloration: Skin is not pale  Findings: No erythema or rash  Neurological:      Mental Status: She is alert and oriented to person, place, and time  Coordination: Coordination normal            Fetal Assessment:  FHT: 125 / Moderate 6 - 25 bpm / 10x10 accelerations, reactive  [place]: none    Lab, Imaging and other studies: I have personally reviewed pertinent reports        Lab Results   Component Value Date    WBC 18 65 (H) 08/20/2020    HGB 12 6 08/20/2020    HCT 37 1 08/20/2020    MCV 92 08/20/2020     08/20/2020       Lab Results   Component Value Date    CALCIUM 8 4 08/20/2020    K 4 5 08/20/2020    CO2 22 08/20/2020     08/20/2020    BUN 14 08/20/2020    CREATININE 0 97 08/20/2020       Lab Results   Component Value Date    ALT 29 08/20/2020    AST 26 08/20/2020    ALKPHOS 129 (H) 08/20/2020       Carmen Garibay MD  OBGYN, PGY-3  8/20/2020  6:50 AM

## 2020-08-21 LAB
ALBUMIN SERPL BCP-MCNC: 2.4 G/DL (ref 3.5–5)
ALP SERPL-CCNC: 109 U/L (ref 46–116)
ALT SERPL W P-5'-P-CCNC: 25 U/L (ref 12–78)
ANION GAP SERPL CALCULATED.3IONS-SCNC: 7 MMOL/L (ref 4–13)
AST SERPL W P-5'-P-CCNC: 16 U/L (ref 5–45)
BILIRUB SERPL-MCNC: 0.24 MG/DL (ref 0.2–1)
BUN SERPL-MCNC: 24 MG/DL (ref 5–25)
CALCIUM SERPL-MCNC: 7.8 MG/DL (ref 8.3–10.1)
CHLORIDE SERPL-SCNC: 105 MMOL/L (ref 100–108)
CO2 SERPL-SCNC: 23 MMOL/L (ref 21–32)
CREAT SERPL-MCNC: 1.02 MG/DL (ref 0.6–1.3)
ERYTHROCYTE [DISTWIDTH] IN BLOOD BY AUTOMATED COUNT: 12.8 % (ref 11.6–15.1)
GFR SERPL CREATININE-BSD FRML MDRD: 86 ML/MIN/1.73SQ M
GLUCOSE SERPL-MCNC: 162 MG/DL (ref 65–140)
HCT VFR BLD AUTO: 33.8 % (ref 34.8–46.1)
HGB BLD-MCNC: 11.3 G/DL (ref 11.5–15.4)
MCH RBC QN AUTO: 31 PG (ref 26.8–34.3)
MCHC RBC AUTO-ENTMCNC: 33.4 G/DL (ref 31.4–37.4)
MCV RBC AUTO: 93 FL (ref 82–98)
PLATELET # BLD AUTO: 257 THOUSANDS/UL (ref 149–390)
PMV BLD AUTO: 10.9 FL (ref 8.9–12.7)
POTASSIUM SERPL-SCNC: 4.8 MMOL/L (ref 3.5–5.3)
PROT SERPL-MCNC: 6 G/DL (ref 6.4–8.2)
RBC # BLD AUTO: 3.65 MILLION/UL (ref 3.81–5.12)
SODIUM SERPL-SCNC: 135 MMOL/L (ref 136–145)
WBC # BLD AUTO: 21.25 THOUSAND/UL (ref 4.31–10.16)

## 2020-08-21 PROCEDURE — NC001 PR NO CHARGE: Performed by: OBSTETRICS & GYNECOLOGY

## 2020-08-21 PROCEDURE — 85027 COMPLETE CBC AUTOMATED: CPT | Performed by: STUDENT IN AN ORGANIZED HEALTH CARE EDUCATION/TRAINING PROGRAM

## 2020-08-21 PROCEDURE — 59025 FETAL NON-STRESS TEST: CPT | Performed by: OBSTETRICS & GYNECOLOGY

## 2020-08-21 PROCEDURE — 99024 POSTOP FOLLOW-UP VISIT: CPT | Performed by: OBSTETRICS & GYNECOLOGY

## 2020-08-21 PROCEDURE — 80053 COMPREHEN METABOLIC PANEL: CPT | Performed by: STUDENT IN AN ORGANIZED HEALTH CARE EDUCATION/TRAINING PROGRAM

## 2020-08-21 PROCEDURE — 99232 SBSQ HOSP IP/OBS MODERATE 35: CPT | Performed by: OBSTETRICS & GYNECOLOGY

## 2020-08-21 RX ORDER — POLYETHYLENE GLYCOL 3350 17 G/17G
17 POWDER, FOR SOLUTION ORAL DAILY PRN
Status: DISCONTINUED | OUTPATIENT
Start: 2020-08-21 | End: 2020-09-20 | Stop reason: HOSPADM

## 2020-08-21 RX ADMIN — NIFEDIPINE 30 MG: 30 TABLET, FILM COATED, EXTENDED RELEASE ORAL at 08:37

## 2020-08-21 RX ADMIN — HEPARIN SODIUM 5000 UNITS: 5000 INJECTION INTRAVENOUS; SUBCUTANEOUS at 22:31

## 2020-08-21 RX ADMIN — HEPARIN SODIUM 5000 UNITS: 5000 INJECTION INTRAVENOUS; SUBCUTANEOUS at 08:37

## 2020-08-21 NOTE — PLAN OF CARE
Problem: ANTEPARTUM  Goal: Maintain pregnancy as long as maternal and/or fetal condition is stable  Description: INTERVENTIONS:  - Maternal surveillance  - Fetal surveillance  - Monitor uterine activity  - Medications as ordered  - Bedrest  Outcome: Progressing     Problem: NEUROSENSORY - ADULT  Goal: Achieves stable or improved neurological status  Description: INTERVENTIONS  - Monitor and report changes in neurological status  - Monitor vital signs such as temperature, blood pressure, glucose, and any other labs ordered   - Initiate measures to prevent increased intracranial pressure  - Monitor for seizure activity and implement precautions if appropriate      Outcome: Progressing  Goal: Remains free of injury related to seizures activity  Description: INTERVENTIONS  - Maintain airway, patient safety  and administer oxygen as ordered  - Monitor patient for seizure activity, document and report duration and description of seizure to physician/advanced practitioner  - If seizure occurs,  ensure patient safety during seizure  - Reorient patient post seizure  - Seizure pads on all 4 side rails  - Instruct patient/family to notify RN of any seizure activity including if an aura is experienced  - Instruct patient/family to call for assistance with activity based on nursing assessment  - Administer anti-seizure medications if ordered    Outcome: Progressing     Problem: CARDIOVASCULAR - ADULT  Goal: Maintains optimal cardiac output and hemodynamic stability  Description: INTERVENTIONS:  - Monitor I/O, vital signs and rhythm  - Monitor for S/S and trends of decreased cardiac output  - Administer and titrate ordered vasoactive medications to optimize hemodynamic stability  - Assess quality of pulses, skin color and temperature  - Assess for signs of decreased coronary artery perfusion  - Instruct patient to report change in severity of symptoms  Outcome: Progressing     Problem: PAIN - ADULT  Goal: Verbalizes/displays adequate comfort level or baseline comfort level  Description: Interventions:  - Encourage patient to monitor pain and request assistance  - Assess pain using appropriate pain scale  - Administer analgesics based on type and severity of pain and evaluate response  - Implement non-pharmacological measures as appropriate and evaluate response  - Consider cultural and social influences on pain and pain management  - Notify physician/advanced practitioner if interventions unsuccessful or patient reports new pain  Outcome: Progressing     Problem: INFECTION - ADULT  Goal: Absence or prevention of progression during hospitalization  Description: INTERVENTIONS:  - Assess and monitor for signs and symptoms of infection  - Monitor lab/diagnostic results  - Monitor all insertion sites, i e  indwelling lines, tubes, and drains  - Monitor endotracheal if appropriate and nasal secretions for changes in amount and color  - [place] appropriate cooling/warming therapies per order  - Administer medications as ordered  - Instruct and encourage patient and family to use good hand hygiene technique  - Identify and instruct in appropriate isolation precautions for identified infection/condition  Outcome: Progressing  Goal: Absence of fever/infection during neutropenic period  Description: INTERVENTIONS:  - Monitor WBC    Outcome: Progressing     Problem: SAFETY ADULT  Goal: Patient will remain free of falls  Description: INTERVENTIONS:  - Assess patient frequently for physical needs  -  Identify cognitive and physical deficits and behaviors that affect risk of falls    -  [place] fall precautions as indicated by assessment   - Educate patient/family on patient safety including physical limitations  - Instruct patient to call for assistance with activity based on assessment  - Modify environment to reduce risk of injury  - Consider OT/PT consult to assist with strengthening/mobility  Outcome: Progressing  Goal: Maintain or return to baseline ADL function  Description: INTERVENTIONS:  -  Assess patient's ability to carry out ADLs; assess patient's baseline for ADL function and identify physical deficits which impact ability to perform ADLs (bathing, care of mouth/teeth, toileting, grooming, dressing, etc )  - Assess/evaluate cause of self-care deficits   - Assess range of motion  - Assess patient's mobility; develop plan if impaired  - Assess patient's need for assistive devices and provide as appropriate  - Encourage maximum independence but intervene and supervise when necessary  - Involve family in performance of ADLs  - Assess for home care needs following discharge   - Consider OT consult to assist with ADL evaluation and planning for discharge  - Provide patient education as appropriate  Outcome: Progressing  Goal: Maintain or return mobility status to optimal level  Description: INTERVENTIONS:  - Assess patient's baseline mobility status (ambulation, transfers, stairs, etc )    - Identify cognitive and physical deficits and behaviors that affect mobility  - Identify mobility aids required to assist with transfers and/or ambulation (gait belt, sit-to-stand, lift, walker, cane, etc )  - [place] fall precautions as indicated by assessment  - Record patient progress and toleration of activity level on Mobility SBAR; progress patient to next Phase/Stage  - Instruct patient to call for assistance with activity based on assessment  - Consider rehabilitation consult to assist with strengthening/weightbearing, etc   Outcome: Progressing     Problem: Knowledge Deficit  Goal: Patient/family/caregiver demonstrates understanding of disease process, treatment plan, medications, and discharge instructions  Description: Complete learning assessment and assess knowledge base    Interventions:  - Provide teaching at level of understanding  - Provide teaching via preferred learning methods  Outcome: Progressing     Problem: DISCHARGE PLANNING  Goal: Discharge to home or other facility with appropriate resources  Description: INTERVENTIONS:  - Identify barriers to discharge w/patient and caregiver  - Arrange for needed discharge resources and transportation as appropriate  - Identify discharge learning needs (meds, wound care, etc )  - Arrange for interpretive services to assist at discharge as needed  - Refer to Case Management Department for coordinating discharge planning if the patient needs post-hospital services based on physician/advanced practitioner order or complex needs related to functional status, cognitive ability, or social support system  Outcome: Progressing

## 2020-08-21 NOTE — QUICK NOTE
Saw and examined the pt today   She is feeling well without complaints  Cr improved slightly to 1 02  Wt Readings from Last 3 Encounters:   08/19/20 85 7 kg (189 lb)   08/19/20 85 7 kg (189 lb)   07/23/20 86 1 kg (189 lb 12 8 oz)     Temp Readings from Last 3 Encounters:   08/21/20 98 3 °F (36 8 °C) (Oral)   08/19/20 98 9 °F (37 2 °C)   07/23/20 97 9 °F (36 6 °C)     BP Readings from Last 3 Encounters:   08/21/20 128/90   08/19/20 (!) 187/105   07/23/20 119/73     Pulse Readings from Last 3 Encounters:   08/21/20 103   08/19/20 81   07/23/20 80       At this point will continue expectant management of preE with severe features with guidence from [facility]
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
## 2020-08-21 NOTE — PROGRESS NOTES
Progress Note - Maternal Fetal Medicine   Dc Solomon 35 y o  female MRN: [id number]  Unit/Bed#: -01 Encounter: [id number]    Assessment:  35 y o   at 26w5d admitted with preeclampsia with severe features  Hospital day 3    Plan:  Following rising Cr, (0 85 -> 0 97->1 09 -> 1 02), stable LFT and platelets and hgb  Normal echo  [id number]  Continue Procardia XL 30 mg daily  Breathing subjectively improved  S/p Magnesium sulfate  No additional lasix required  S/p BTM    DVT ppx: SC heparin 5000U BID    Subjective/Objective   Chief Complaint:     Patient reports breathing comfortably today  She endorses good fetal movement and denies bleeding, leakage, ,or contractions  She denies headache, visual changes, or RUQ/epigastric pain  Subjective:     Contractions: no  Loss of fluid: no  Vaginal bleeding: no  Fetal movement: yes    Pain: no  Tolerating PO: yes  Voiding: yes  Ambulating: yes  Headaches: no  Visual changes: no  Chest pain: no  Shortness of breath: no  Nausea: no  Vomiting/Diarrhea: no  Dysuria: no  Leg pain: no    Objective:     Vitals:   Temp:  [97 7 °F (36 5 °C)-98 4 °F (36 9 °C)] 97 8 °F (36 6 °C)  HR:  [100-104] 100  Resp:  [18-20] 18  BP: (120-138)/(60-83) 129/70     Physical Exam:     Physical Exam  Constitutional:       General: She is not in acute distress  Appearance: She is well-developed  She is not diaphoretic  HENT:      Head: Normocephalic and atraumatic  Eyes:      Pupils: Pupils are equal, round, and reactive to light  Neck:      Musculoskeletal: Normal range of motion  Trachea: No tracheal deviation  Cardiovascular:      Rate and Rhythm: Normal rate and regular rhythm  Heart sounds: Normal heart sounds  No murmur  No friction rub  No gallop  Pulmonary:      Effort: Pulmonary effort is normal  No respiratory distress  Breath sounds: Normal breath sounds  No stridor  No wheezing or rales     Abdominal:      General: Bowel sounds are normal  There is no distension  Palpations: Abdomen is soft  There is no mass  Tenderness: There is no abdominal tenderness  There is no guarding or rebound  Musculoskeletal: Normal range of motion  General: No tenderness or deformity  Skin:     General: Skin is warm and dry  Coloration: Skin is not pale  Findings: No erythema or rash  Neurological:      Mental Status: She is alert and oriented to person, place, and time  Coordination: Coordination normal            Fetal Assessment:  FHT: 125 / Moderate 6 - 25 bpm / 10x10 accelerations, reactive  [place]: none    Lab, Imaging and other studies: I have personally reviewed pertinent reports        Lab Results   Component Value Date    WBC 21 25 (H) 08/21/2020    HGB 11 3 (L) 08/21/2020    HCT 33 8 (L) 08/21/2020    MCV 93 08/21/2020     08/21/2020       Lab Results   Component Value Date    CALCIUM 7 7 (L) 08/20/2020    K 4 6 08/20/2020    CO2 22 08/20/2020     08/20/2020    BUN 17 08/20/2020    CREATININE 1 09 08/20/2020       Lab Results   Component Value Date    ALT 23 08/20/2020    AST 20 08/20/2020    ALKPHOS 107 08/20/2020       Marc Calvillo MD  OBGYN, PGY-3  8/21/2020  6:48 AM

## 2020-08-21 NOTE — DISCHARGE SUMMARY
Discharge Summary - OB/GYN   Dc Solomon 35 y o  female MRN: [id number]  Unit/Bed#: -01 Encounter: [id number]      Admission Date: 2020     Discharge Date: 2020    Admitting Diagnosis:   1  Pregnancy at 26w5d  2  Preeclampsia with severe features  3  History of bariatric surgery  4  Maternal obesity  5  Asthma    Discharge Diagnosis:   Primary  section for evolving pre-eclampsia with severe features    Procedures: primary  section, low transverse incision    Admitting Attending: Khadar Parker MD   Delivery Attending: Dr Shailesh Adams physician:  Jeremy Conway Course:     Dc Solomon is a 35 y o  Nevelyn Coon at 26w5d wks who was initially admitted for preeclampsia with severe features  She presented with shortness of breath and found to have severe range pressures and elevated protein to creatinine ratio diagnostic for preeclampsia with severe features  She was treated with IV hydralazine to achieve hypertensive control and started on Procardia XL 30 mg daily  She received a course of betamethasone on - as well as 24 hours of magnesium sulfate, and her labs were trended on a daily basis  Of note, on admission she had a pro BNP of 888, received an echo that showed an ejection fraction within normal limits and a small pericardial effusion  She also had a CTA to rule out pulmonary embolism, it showed bilateral pleural effusions but no signs of thrombosis with pulmonary edema  During her hospital course there was noted to be increased but stable umbilical artery dopplers  This later became absent on 20  Additionally, although clinically Mariam's initial presenting symptoms had resolved, the fetal growth had dramatically decreased, maternal liver enzymes were elevated and for this,  delivery was recommended by MFM after a 2nd full course dose of betamethasone and 12hr of Mag       She delivered a viable female  on 9/15/2020 at [facility]  Weight 770g via primary  section, low transverse incision  Apgars were 7 (1 min) and 8 (5 min)   was transferred to the NICU  Patient tolerated the procedure well and was transferred to recovery in stable condition  She was continued on Magnesium    Her post-partum course was uncomplicated  She did require an increase in Procardia to 60mgXL daily  Her post-partum pain was well controlled with oral analgesics  On day of discharge, she was ambulating and able to reasonably perform all ADLs  She was voiding and had appropriate bowel function  Pain was well controlled  She was discharged home on post-op day #4 without complications  Patient was instructed to follow up with her OB as an outpatient and was given appropriate warnings to call provider if she develops signs of infection or uncontrolled pain  Complications: none apparent    Condition at discharge: good     Discharge instructions/Information to patient and family:   See after visit summary for information provided to patient and family  Provisions for Follow-Up Care:  See after visit summary for information related to follow-up care and any pertinent home health orders  Disposition: Home    Planned Readmission: No    Discharge Medications: For a complete list of the patient's medications, please refer to her med rec

## 2020-08-22 LAB
ABO GROUP BLD: NORMAL
ALBUMIN SERPL BCP-MCNC: 2.5 G/DL (ref 3.5–5)
ALP SERPL-CCNC: 100 U/L (ref 46–116)
ALT SERPL W P-5'-P-CCNC: 23 U/L (ref 12–78)
ANION GAP SERPL CALCULATED.3IONS-SCNC: 5 MMOL/L (ref 4–13)
AST SERPL W P-5'-P-CCNC: 20 U/L (ref 5–45)
BILIRUB SERPL-MCNC: 0.21 MG/DL (ref 0.2–1)
BLD GP AB SCN SERPL QL: NEGATIVE
BUN SERPL-MCNC: 27 MG/DL (ref 5–25)
CALCIUM SERPL-MCNC: 7.7 MG/DL (ref 8.3–10.1)
CHLORIDE SERPL-SCNC: 105 MMOL/L (ref 100–108)
CO2 SERPL-SCNC: 24 MMOL/L (ref 21–32)
CREAT SERPL-MCNC: 1 MG/DL (ref 0.6–1.3)
ERYTHROCYTE [DISTWIDTH] IN BLOOD BY AUTOMATED COUNT: 12.7 % (ref 11.6–15.1)
GFR SERPL CREATININE-BSD FRML MDRD: 88 ML/MIN/1.73SQ M
GLUCOSE SERPL-MCNC: 89 MG/DL (ref 65–140)
HCT VFR BLD AUTO: 33.3 % (ref 34.8–46.1)
HGB BLD-MCNC: 10.9 G/DL (ref 11.5–15.4)
MCH RBC QN AUTO: 30.9 PG (ref 26.8–34.3)
MCHC RBC AUTO-ENTMCNC: 32.7 G/DL (ref 31.4–37.4)
MCV RBC AUTO: 94 FL (ref 82–98)
PLATELET # BLD AUTO: 252 THOUSANDS/UL (ref 149–390)
PMV BLD AUTO: 10.7 FL (ref 8.9–12.7)
POTASSIUM SERPL-SCNC: 4.6 MMOL/L (ref 3.5–5.3)
PROT SERPL-MCNC: 6 G/DL (ref 6.4–8.2)
RBC # BLD AUTO: 3.53 MILLION/UL (ref 3.81–5.12)
RH BLD: POSITIVE
SODIUM SERPL-SCNC: 134 MMOL/L (ref 136–145)
SPECIMEN EXPIRATION DATE: NORMAL
WBC # BLD AUTO: 19.25 THOUSAND/UL (ref 4.31–10.16)

## 2020-08-22 PROCEDURE — 59025 FETAL NON-STRESS TEST: CPT | Performed by: OBSTETRICS & GYNECOLOGY

## 2020-08-22 PROCEDURE — 86850 RBC ANTIBODY SCREEN: CPT | Performed by: OBSTETRICS & GYNECOLOGY

## 2020-08-22 PROCEDURE — 85027 COMPLETE CBC AUTOMATED: CPT | Performed by: OBSTETRICS & GYNECOLOGY

## 2020-08-22 PROCEDURE — 86900 BLOOD TYPING SEROLOGIC ABO: CPT | Performed by: OBSTETRICS & GYNECOLOGY

## 2020-08-22 PROCEDURE — 80053 COMPREHEN METABOLIC PANEL: CPT | Performed by: OBSTETRICS & GYNECOLOGY

## 2020-08-22 PROCEDURE — NC001 PR NO CHARGE: Performed by: OBSTETRICS & GYNECOLOGY

## 2020-08-22 PROCEDURE — 86901 BLOOD TYPING SEROLOGIC RH(D): CPT | Performed by: OBSTETRICS & GYNECOLOGY

## 2020-08-22 PROCEDURE — 99232 SBSQ HOSP IP/OBS MODERATE 35: CPT | Performed by: OBSTETRICS & GYNECOLOGY

## 2020-08-22 RX ADMIN — HEPARIN SODIUM 5000 UNITS: 5000 INJECTION INTRAVENOUS; SUBCUTANEOUS at 09:10

## 2020-08-22 RX ADMIN — NIFEDIPINE 30 MG: 30 TABLET, FILM COATED, EXTENDED RELEASE ORAL at 09:10

## 2020-08-22 RX ADMIN — HEPARIN SODIUM 5000 UNITS: 5000 INJECTION INTRAVENOUS; SUBCUTANEOUS at 21:32

## 2020-08-22 NOTE — PLAN OF CARE
Chief Complaint:   Congestion and Cough      History of Present Illness   Source of history provided by:  patient. Keyla Dowell is a 65 y.o. old female with a past medical history of:   Past Medical History:   Diagnosis Date    Alcoholic ([facility]     Anxiety     Convulsions/seizures ([facility] 9/16/2011    Depression     GERD (gastroesophageal reflux disease)     Headache     HTN (hypertension) 6/16/2015    Mixed hyperlipidemia 2/17/2016    Osteoarthritis     Psychiatric problem     Substance abuse     methadone    presents to the [facility] care for nasal congestion which began 14 days ago. States there is facial pressure, discolored nasal mucous, a dry cough, ear pressure, SOB with activity, and a scratchy throat. Subjective fevers noted. Reports productive cough with green mucous. Denies any CP, progressive SOB, abdominal pain,body aches, chills, neck stiffness, rash, or lethargy. Taking galileo seltzer plus. ROS    Unless otherwise stated in this report or unable to obtain because of the patient's clinical or mental status as evidenced by the medical record, this patients's positive and negative responses for Review of Systems, constitutional, psych, eyes, ENT, cardiovascular, respiratory, gastrointestinal, neurological, genitourinary, musculoskeletal, integument systems and systems related to the presenting problem are either stated in the preceding or were not pertinent or were negative for the symptoms and/or complaints related to the medical problem. Past Surgical History:  has a past surgical history that includes Ectopic pregnancy surgery; Breast surgery (1988); and Tubal ligation (01/01/1993). Social History:  reports that she has been smoking Cigarettes. She has a 7.50 pack-year smoking history. She has never used smokeless tobacco. She reports that she does not drink alcohol or use drugs.   Family History: family history includes Arthritis in her father and mother; Diabetes in her father Problem: ANTEPARTUM  Goal: Maintain pregnancy as long as maternal and/or fetal condition is stable  Description: INTERVENTIONS:  - Maternal surveillance  - Fetal surveillance  - Monitor uterine activity  - Medications as ordered  - Bedrest  Outcome: Progressing     Problem: NEUROSENSORY - ADULT  Goal: Achieves stable or improved neurological status  Description: INTERVENTIONS  - Monitor and report changes in neurological status  - Monitor vital signs such as temperature, blood pressure, glucose, and any other labs ordered   - Initiate measures to prevent increased intracranial pressure  - Monitor for seizure activity and implement precautions if appropriate      Outcome: Progressing  Goal: Remains free of injury related to seizures activity  Description: INTERVENTIONS  - Maintain airway, patient safety  and administer oxygen as ordered  - Monitor patient for seizure activity, document and report duration and description of seizure to physician/advanced practitioner  - If seizure occurs,  ensure patient safety during seizure  - Reorient patient post seizure  - Seizure pads on all 4 side rails  - Instruct patient/family to notify RN of any seizure activity including if an aura is experienced  - Instruct patient/family to call for assistance with activity based on nursing assessment  - Administer anti-seizure medications if ordered    Outcome: Progressing     Problem: CARDIOVASCULAR - ADULT  Goal: Maintains optimal cardiac output and hemodynamic stability  Description: INTERVENTIONS:  - Monitor I/O, vital signs and rhythm  - Monitor for S/S and trends of decreased cardiac output  - Administer and titrate ordered vasoactive medications to optimize hemodynamic stability  - Assess quality of pulses, skin color and temperature  - Assess for signs of decreased coronary artery perfusion  - Instruct patient to report change in severity of symptoms  Outcome: Progressing     Problem: PAIN - ADULT  Goal: Verbalizes/displays adequate comfort level or baseline comfort level  Description: Interventions:  - Encourage patient to monitor pain and request assistance  - Assess pain using appropriate pain scale  - Administer analgesics based on type and severity of pain and evaluate response  - Implement non-pharmacological measures as appropriate and evaluate response  - Consider cultural and social influences on pain and pain management  - Notify physician/advanced practitioner if interventions unsuccessful or patient reports new pain  Outcome: Progressing     Problem: INFECTION - ADULT  Goal: Absence or prevention of progression during hospitalization  Description: INTERVENTIONS:  - Assess and monitor for signs and symptoms of infection  - Monitor lab/diagnostic results  - Monitor all insertion sites, i e  indwelling lines, tubes, and drains  - Monitor endotracheal if appropriate and nasal secretions for changes in amount and color  - [place] appropriate cooling/warming therapies per order  - Administer medications as ordered  - Instruct and encourage patient and family to use good hand hygiene technique  - Identify and instruct in appropriate isolation precautions for identified infection/condition  Outcome: Progressing  Goal: Absence of fever/infection during neutropenic period  Description: INTERVENTIONS:  - Monitor WBC    Outcome: Progressing     Problem: SAFETY ADULT  Goal: Patient will remain free of falls  Description: INTERVENTIONS:  - Assess patient frequently for physical needs  -  Identify cognitive and physical deficits and behaviors that affect risk of falls    -  [place] fall precautions as indicated by assessment   - Educate patient/family on patient safety including physical limitations  - Instruct patient to call for assistance with activity based on assessment  - Modify environment to reduce risk of injury  - Consider OT/PT consult to assist with strengthening/mobility  Outcome: Progressing  Goal: Maintain or return to Disposition:  Disposition: Discharge to home  Rx doxycycline  Rx predniosne  Rx Flonase advised to use at [facility]  Outpatient chest xray to rule out aspiration pneumonia  Recommend  Mucinex   Recommend daily antihistamine  Avoid decongestants due to HTN   Recommend tylenol for pain or fevers prn   Increase clear fluids  Recommend tylenol for fevers  Follow up with PCP in 7-10 days. ER if changes or worse. Patient advised to take all medications as prescribed. baseline ADL function  Description: INTERVENTIONS:  -  Assess patient's ability to carry out ADLs; assess patient's baseline for ADL function and identify physical deficits which impact ability to perform ADLs (bathing, care of mouth/teeth, toileting, grooming, dressing, etc )  - Assess/evaluate cause of self-care deficits   - Assess range of motion  - Assess patient's mobility; develop plan if impaired  - Assess patient's need for assistive devices and provide as appropriate  - Encourage maximum independence but intervene and supervise when necessary  - Involve family in performance of ADLs  - Assess for home care needs following discharge   - Consider OT consult to assist with ADL evaluation and planning for discharge  - Provide patient education as appropriate  Outcome: Progressing  Goal: Maintain or return mobility status to optimal level  Description: INTERVENTIONS:  - Assess patient's baseline mobility status (ambulation, transfers, stairs, etc )    - Identify cognitive and physical deficits and behaviors that affect mobility  - Identify mobility aids required to assist with transfers and/or ambulation (gait belt, sit-to-stand, lift, walker, cane, etc )  - [place] fall precautions as indicated by assessment  - Record patient progress and toleration of activity level on Mobility SBAR; progress patient to next Phase/Stage  - Instruct patient to call for assistance with activity based on assessment  - Consider rehabilitation consult to assist with strengthening/weightbearing, etc   Outcome: Progressing     Problem: Knowledge Deficit  Goal: Patient/family/caregiver demonstrates understanding of disease process, treatment plan, medications, and discharge instructions  Description: Complete learning assessment and assess knowledge base    Interventions:  - Provide teaching at level of understanding  - Provide teaching via preferred learning methods  Outcome: Progressing     Problem: DISCHARGE PLANNING  Goal: Discharge to home or other facility with appropriate resources  Description: INTERVENTIONS:  - Identify barriers to discharge w/patient and caregiver  - Arrange for needed discharge resources and transportation as appropriate  - Identify discharge learning needs (meds, wound care, etc )  - Arrange for interpretive services to assist at discharge as needed  - Refer to Case Management Department for coordinating discharge planning if the patient needs post-hospital services based on physician/advanced practitioner order or complex needs related to functional status, cognitive ability, or social support system  Outcome: Progressing

## 2020-08-22 NOTE — PROGRESS NOTES
Progress Note - [facility]  Bassam Gacria 35 y o  female MRN: [id number]  Unit/Bed#: -01 Encounter: [id number]      Bassam Garcia has no current complaints  She is breathing comfortably  She denies contractions, vaginal bleeding, leakage of fluid  She denies contractions, vaginal bleeding, leakage of fluid  /82 (BP Location: Left arm)   Pulse 84   Temp 98 2 °F (36 8 °C) (Oral)   Resp 18   Ht 5' 2" (1 575 m)   Wt 85 7 kg (189 lb)   LMP 2020 (Exact Date)   SpO2 98%   BMI 34 57 kg/m²     Physical Exam:   General: AAOx3  No acute distress  Heart: Regular rate & rhythm  No murmurs/clicks/gallops  Lungs: Clear bilaterally  Normal effort  No wheezes/rales/rhonchi  Abdominal: Soft, non-tender gravid uterus  Extremities: No TTP  Lower limbs symmetric bilaterally  FHT:  Baseline Rate: 145 bpm  Variability: Moderate 6-25 bpm  Accelerations: 10 x 10 (<32 weeks)  Decelerations: None  FHR Category: Category I  TOCO:   Contraction Frequency (minutes): 0  Contraction Quality: Not applicable    Lab Results   Component Value Date    WBC 19 25 (H) 2020    HGB 10 9 (L) 2020    HCT 33 3 (L) 2020     2020     Lab Results   Component Value Date    K 4 6 2020     2020    CO2 24 2020    BUN 27 (H) 2020    CREATININE 1 00 2020    AST 20 2020    ALT 23 2020       A/P: Bassam Garcia is a 35 y o   at 26w6d admitted with preeclampsia with severe features  Currently in stable condition   Hospital Day: 4   1) Preeclampsia with severe features: following HELLP labs, s/p magnesium sulfate, continue Procardia XL 30 mg qday, CTA motable for pleural effusions,  with echo WNL   2) IUP @ 26 weeks: s/p BTM, magnesium sulfate for  status and preeclampsia with severe features, needs GTT and TDAP later this week   3) FEN: regular  4) DVT PPx: SCDs  5) Encouraged ambulation as tolerated  6) Dispo: inpatient until delivery    [place], DO

## 2020-08-23 LAB
ALBUMIN SERPL BCP-MCNC: 2.4 G/DL (ref 3.5–5)
ALP SERPL-CCNC: 99 U/L (ref 46–116)
ALT SERPL W P-5'-P-CCNC: 28 U/L (ref 12–78)
ANION GAP SERPL CALCULATED.3IONS-SCNC: 5 MMOL/L (ref 4–13)
ANION GAP SERPL CALCULATED.3IONS-SCNC: 8 MMOL/L (ref 4–13)
AST SERPL W P-5'-P-CCNC: 34 U/L (ref 5–45)
BILIRUB SERPL-MCNC: 0.36 MG/DL (ref 0.2–1)
BUN SERPL-MCNC: 23 MG/DL (ref 5–25)
BUN SERPL-MCNC: 24 MG/DL (ref 5–25)
CALCIUM SERPL-MCNC: 7.7 MG/DL (ref 8.3–10.1)
CALCIUM SERPL-MCNC: 7.7 MG/DL (ref 8.3–10.1)
CHLORIDE SERPL-SCNC: 104 MMOL/L (ref 100–108)
CHLORIDE SERPL-SCNC: 105 MMOL/L (ref 100–108)
CO2 SERPL-SCNC: 23 MMOL/L (ref 21–32)
CO2 SERPL-SCNC: 24 MMOL/L (ref 21–32)
CREAT SERPL-MCNC: 0.91 MG/DL (ref 0.6–1.3)
CREAT SERPL-MCNC: 0.99 MG/DL (ref 0.6–1.3)
ERYTHROCYTE [DISTWIDTH] IN BLOOD BY AUTOMATED COUNT: 12.4 % (ref 11.6–15.1)
GFR SERPL CREATININE-BSD FRML MDRD: 89 ML/MIN/1.73SQ M
GFR SERPL CREATININE-BSD FRML MDRD: 99 ML/MIN/1.73SQ M
GLUCOSE SERPL-MCNC: 70 MG/DL (ref 65–140)
GLUCOSE SERPL-MCNC: 87 MG/DL (ref 65–140)
HCT VFR BLD AUTO: 35.7 % (ref 34.8–46.1)
HGB BLD-MCNC: 11.9 G/DL (ref 11.5–15.4)
MCH RBC QN AUTO: 31 PG (ref 26.8–34.3)
MCHC RBC AUTO-ENTMCNC: 33.3 G/DL (ref 31.4–37.4)
MCV RBC AUTO: 93 FL (ref 82–98)
NT-PROBNP SERPL-MCNC: 252 PG/ML
PLATELET # BLD AUTO: 274 THOUSANDS/UL (ref 149–390)
PMV BLD AUTO: 10.7 FL (ref 8.9–12.7)
POTASSIUM SERPL-SCNC: 4.2 MMOL/L (ref 3.5–5.3)
POTASSIUM SERPL-SCNC: 5.7 MMOL/L (ref 3.5–5.3)
PROT SERPL-MCNC: 6.1 G/DL (ref 6.4–8.2)
RBC # BLD AUTO: 3.84 MILLION/UL (ref 3.81–5.12)
SODIUM SERPL-SCNC: 133 MMOL/L (ref 136–145)
SODIUM SERPL-SCNC: 136 MMOL/L (ref 136–145)
WBC # BLD AUTO: 16.03 THOUSAND/UL (ref 4.31–10.16)

## 2020-08-23 PROCEDURE — 80048 BASIC METABOLIC PNL TOTAL CA: CPT | Performed by: OBSTETRICS & GYNECOLOGY

## 2020-08-23 PROCEDURE — NC001 PR NO CHARGE: Performed by: OBSTETRICS & GYNECOLOGY

## 2020-08-23 PROCEDURE — 80053 COMPREHEN METABOLIC PANEL: CPT | Performed by: OBSTETRICS & GYNECOLOGY

## 2020-08-23 PROCEDURE — 85027 COMPLETE CBC AUTOMATED: CPT | Performed by: OBSTETRICS & GYNECOLOGY

## 2020-08-23 PROCEDURE — 83880 ASSAY OF NATRIURETIC PEPTIDE: CPT | Performed by: OBSTETRICS & GYNECOLOGY

## 2020-08-23 PROCEDURE — 59025 FETAL NON-STRESS TEST: CPT | Performed by: OBSTETRICS & GYNECOLOGY

## 2020-08-23 PROCEDURE — 99232 SBSQ HOSP IP/OBS MODERATE 35: CPT | Performed by: OBSTETRICS & GYNECOLOGY

## 2020-08-23 RX ADMIN — HEPARIN SODIUM 5000 UNITS: 5000 INJECTION INTRAVENOUS; SUBCUTANEOUS at 21:37

## 2020-08-23 RX ADMIN — NIFEDIPINE 30 MG: 30 TABLET, FILM COATED, EXTENDED RELEASE ORAL at 09:04

## 2020-08-23 RX ADMIN — HEPARIN SODIUM 5000 UNITS: 5000 INJECTION INTRAVENOUS; SUBCUTANEOUS at 09:03

## 2020-08-23 NOTE — PROGRESS NOTES
Progress Note - [facility]  Latasha Corcoran 35 y o  female MRN: [id number]  Unit/Bed#: -01 Encounter: [id number]      Latasha Corcoran has no current complaints  She continues to breath comfortably  She denies labor complaints  /88 (BP Location: Left arm)   Pulse 84   Temp 98 °F (36 7 °C) (Oral)   Resp 18   Ht 5' 2" (1 575 m)   Wt 85 7 kg (189 lb)   LMP 2020 (Exact Date)   SpO2 98%   BMI 34 57 kg/m²     Physical Exam:   General: AAOx3  No acute distress  Heart: Regular rate & rhythm  No murmurs/clicks/gallops  Lungs: Clear bilaterally  Normal effort  No wheezes/rales/rhonchi  Abdominal: Soft, non-tender gravid uterus  Extremities: No TTP  Lower limbs symmetric bilaterally  FHT:  Baseline Rate: 140 bpm  Variability: Moderate 6-25 bpm  Accelerations: 10 x 10 (<32 weeks)  Decelerations: None  FHR Category: Category I  TOCO:   Contraction Frequency (minutes): none  Contraction Quality: Not applicable    Lab Results   Component Value Date    WBC 19 25 (H) 2020    HGB 10 9 (L) 2020    HCT 33 3 (L) 2020     2020     Lab Results   Component Value Date    K 4 6 2020     2020    CO2 24 2020    BUN 27 (H) 2020    CREATININE 1 00 2020    AST 20 2020    ALT 23 2020       A/P: Latasha Corcoran is a 35 y o   at 27w0d admitted with preeclampsia with severe features  Currently in stable condition   Hospital Day: 5   1) Preeclampsia with severe features: following HELLP labs-notable for mild hyperkalemia this pregnancy, s/p magnesium sulfate, continue Procardia XL 30 mg qday, CTA motable for pleural effusions, -->252 with echo WNL   2) IUP @ 27 weeks: s/p BTM, magnesium sulfate for  status and preeclampsia with severe features, needs GTT and TDAP later this week   3) FEN: regular  4) DVT PPx: SCDs  5) Encouraged ambulation as tolerated  6) Dispo: inpatient until delivery    [place], DO

## 2020-08-23 NOTE — PLAN OF CARE
Problem: ANTEPARTUM  Goal: Maintain pregnancy as long as maternal and/or fetal condition is stable  Description: INTERVENTIONS:  - Maternal surveillance  - Fetal surveillance  - Monitor uterine activity  - Medications as ordered  - Bedrest  Outcome: Progressing     Problem: NEUROSENSORY - ADULT  Goal: Achieves stable or improved neurological status  Description: INTERVENTIONS  - Monitor and report changes in neurological status  - Monitor vital signs such as temperature, blood pressure, glucose, and any other labs ordered   - Initiate measures to prevent increased intracranial pressure  - Monitor for seizure activity and implement precautions if appropriate      Outcome: Progressing  Goal: Remains free of injury related to seizures activity  Description: INTERVENTIONS  - Maintain airway, patient safety  and administer oxygen as ordered  - Monitor patient for seizure activity, document and report duration and description of seizure to physician/advanced practitioner  - If seizure occurs,  ensure patient safety during seizure  - Reorient patient post seizure  - Seizure pads on all 4 side rails  - Instruct patient/family to notify RN of any seizure activity including if an aura is experienced  - Instruct patient/family to call for assistance with activity based on nursing assessment  - Administer anti-seizure medications if ordered    Outcome: Progressing     Problem: CARDIOVASCULAR - ADULT  Goal: Maintains optimal cardiac output and hemodynamic stability  Description: INTERVENTIONS:  - Monitor I/O, vital signs and rhythm  - Monitor for S/S and trends of decreased cardiac output  - Administer and titrate ordered vasoactive medications to optimize hemodynamic stability  - Assess quality of pulses, skin color and temperature  - Assess for signs of decreased coronary artery perfusion  - Instruct patient to report change in severity of symptoms  Outcome: Progressing     Problem: PAIN - ADULT  Goal: Verbalizes/displays adequate comfort level or baseline comfort level  Description: Interventions:  - Encourage patient to monitor pain and request assistance  - Assess pain using appropriate pain scale  - Administer analgesics based on type and severity of pain and evaluate response  - Implement non-pharmacological measures as appropriate and evaluate response  - Consider cultural and social influences on pain and pain management  - Notify physician/advanced practitioner if interventions unsuccessful or patient reports new pain  Outcome: Progressing     Problem: INFECTION - ADULT  Goal: Absence or prevention of progression during hospitalization  Description: INTERVENTIONS:  - Assess and monitor for signs and symptoms of infection  - Monitor lab/diagnostic results  - Monitor all insertion sites, i e  indwelling lines, tubes, and drains  - Monitor endotracheal if appropriate and nasal secretions for changes in amount and color  - [place] appropriate cooling/warming therapies per order  - Administer medications as ordered  - Instruct and encourage patient and family to use good hand hygiene technique  - Identify and instruct in appropriate isolation precautions for identified infection/condition  Outcome: Progressing  Goal: Absence of fever/infection during neutropenic period  Description: INTERVENTIONS:  - Monitor WBC    Outcome: Progressing     Problem: SAFETY ADULT  Goal: Patient will remain free of falls  Description: INTERVENTIONS:  - Assess patient frequently for physical needs  -  Identify cognitive and physical deficits and behaviors that affect risk of falls    -  [place] fall precautions as indicated by assessment   - Educate patient/family on patient safety including physical limitations  - Instruct patient to call for assistance with activity based on assessment  - Modify environment to reduce risk of injury  - Consider OT/PT consult to assist with strengthening/mobility  Outcome: Progressing  Goal: Maintain or return to baseline ADL function  Description: INTERVENTIONS:  -  Assess patient's ability to carry out ADLs; assess patient's baseline for ADL function and identify physical deficits which impact ability to perform ADLs (bathing, care of mouth/teeth, toileting, grooming, dressing, etc )  - Assess/evaluate cause of self-care deficits   - Assess range of motion  - Assess patient's mobility; develop plan if impaired  - Assess patient's need for assistive devices and provide as appropriate  - Encourage maximum independence but intervene and supervise when necessary  - Involve family in performance of ADLs  - Assess for home care needs following discharge   - Consider OT consult to assist with ADL evaluation and planning for discharge  - Provide patient education as appropriate  Outcome: Progressing  Goal: Maintain or return mobility status to optimal level  Description: INTERVENTIONS:  - Assess patient's baseline mobility status (ambulation, transfers, stairs, etc )    - Identify cognitive and physical deficits and behaviors that affect mobility  - Identify mobility aids required to assist with transfers and/or ambulation (gait belt, sit-to-stand, lift, walker, cane, etc )  - [place] fall precautions as indicated by assessment  - Record patient progress and toleration of activity level on Mobility SBAR; progress patient to next Phase/Stage  - Instruct patient to call for assistance with activity based on assessment  - Consider rehabilitation consult to assist with strengthening/weightbearing, etc   Outcome: Progressing     Problem: Knowledge Deficit  Goal: Patient/family/caregiver demonstrates understanding of disease process, treatment plan, medications, and discharge instructions  Description: Complete learning assessment and assess knowledge base    Interventions:  - Provide teaching at level of understanding  - Provide teaching via preferred learning methods  Outcome: Progressing     Problem: DISCHARGE PLANNING  Goal: Discharge to home or other facility with appropriate resources  Description: INTERVENTIONS:  - Identify barriers to discharge w/patient and caregiver  - Arrange for needed discharge resources and transportation as appropriate  - Identify discharge learning needs (meds, wound care, etc )  - Arrange for interpretive services to assist at discharge as needed  - Refer to Case Management Department for coordinating discharge planning if the patient needs post-hospital services based on physician/advanced practitioner order or complex needs related to functional status, cognitive ability, or social support system  Outcome: Progressing

## 2020-08-24 PROCEDURE — 99232 SBSQ HOSP IP/OBS MODERATE 35: CPT | Performed by: OBSTETRICS & GYNECOLOGY

## 2020-08-24 PROCEDURE — 59025 FETAL NON-STRESS TEST: CPT | Performed by: OBSTETRICS & GYNECOLOGY

## 2020-08-24 PROCEDURE — NC001 PR NO CHARGE: Performed by: OBSTETRICS & GYNECOLOGY

## 2020-08-24 RX ADMIN — NIFEDIPINE 30 MG: 30 TABLET, FILM COATED, EXTENDED RELEASE ORAL at 09:07

## 2020-08-24 RX ADMIN — HEPARIN SODIUM 5000 UNITS: 5000 INJECTION INTRAVENOUS; SUBCUTANEOUS at 21:01

## 2020-08-24 RX ADMIN — HEPARIN SODIUM 5000 UNITS: 5000 INJECTION INTRAVENOUS; SUBCUTANEOUS at 09:07

## 2020-08-24 NOTE — PLAN OF CARE
Problem: ANTEPARTUM  Goal: Maintain pregnancy as long as maternal and/or fetal condition is stable  Description: INTERVENTIONS:  - Maternal surveillance  - Fetal surveillance  - Monitor uterine activity  - Medications as ordered  - Bedrest  Outcome: Progressing     Problem: NEUROSENSORY - ADULT  Goal: Achieves stable or improved neurological status  Description: INTERVENTIONS  - Monitor and report changes in neurological status  - Monitor vital signs such as temperature, blood pressure, glucose, and any other labs ordered   - Initiate measures to prevent increased intracranial pressure  - Monitor for seizure activity and implement precautions if appropriate      Outcome: Progressing  Goal: Remains free of injury related to seizures activity  Description: INTERVENTIONS  - Maintain airway, patient safety  and administer oxygen as ordered  - Monitor patient for seizure activity, document and report duration and description of seizure to physician/advanced practitioner  - If seizure occurs,  ensure patient safety during seizure  - Reorient patient post seizure  - Seizure pads on all 4 side rails  - Instruct patient/family to notify RN of any seizure activity including if an aura is experienced  - Instruct patient/family to call for assistance with activity based on nursing assessment  - Administer anti-seizure medications if ordered    Outcome: Progressing     Problem: CARDIOVASCULAR - ADULT  Goal: Maintains optimal cardiac output and hemodynamic stability  Description: INTERVENTIONS:  - Monitor I/O, vital signs and rhythm  - Monitor for S/S and trends of decreased cardiac output  - Administer and titrate ordered vasoactive medications to optimize hemodynamic stability  - Assess quality of pulses, skin color and temperature  - Assess for signs of decreased coronary artery perfusion  - Instruct patient to report change in severity of symptoms  Outcome: Progressing     Problem: PAIN - ADULT  Goal: Verbalizes/displays adequate comfort level or baseline comfort level  Description: Interventions:  - Encourage patient to monitor pain and request assistance  - Assess pain using appropriate pain scale  - Administer analgesics based on type and severity of pain and evaluate response  - Implement non-pharmacological measures as appropriate and evaluate response  - Consider cultural and social influences on pain and pain management  - Notify physician/advanced practitioner if interventions unsuccessful or patient reports new pain  Outcome: Progressing     Problem: INFECTION - ADULT  Goal: Absence or prevention of progression during hospitalization  Description: INTERVENTIONS:  - Assess and monitor for signs and symptoms of infection  - Monitor lab/diagnostic results  - Monitor all insertion sites, i e  indwelling lines, tubes, and drains  - Monitor endotracheal if appropriate and nasal secretions for changes in amount and color  - [place] appropriate cooling/warming therapies per order  - Administer medications as ordered  - Instruct and encourage patient and family to use good hand hygiene technique  - Identify and instruct in appropriate isolation precautions for identified infection/condition  Outcome: Progressing  Goal: Absence of fever/infection during neutropenic period  Description: INTERVENTIONS:  - Monitor WBC    Outcome: Progressing     Problem: SAFETY ADULT  Goal: Patient will remain free of falls  Description: INTERVENTIONS:  - Assess patient frequently for physical needs  -  Identify cognitive and physical deficits and behaviors that affect risk of falls    -  [place] fall precautions as indicated by assessment   - Educate patient/family on patient safety including physical limitations  - Instruct patient to call for assistance with activity based on assessment  - Modify environment to reduce risk of injury  - Consider OT/PT consult to assist with strengthening/mobility  Outcome: Progressing  Goal: Maintain or return to baseline ADL function  Description: INTERVENTIONS:  -  Assess patient's ability to carry out ADLs; assess patient's baseline for ADL function and identify physical deficits which impact ability to perform ADLs (bathing, care of mouth/teeth, toileting, grooming, dressing, etc )  - Assess/evaluate cause of self-care deficits   - Assess range of motion  - Assess patient's mobility; develop plan if impaired  - Assess patient's need for assistive devices and provide as appropriate  - Encourage maximum independence but intervene and supervise when necessary  - Involve family in performance of ADLs  - Assess for home care needs following discharge   - Consider OT consult to assist with ADL evaluation and planning for discharge  - Provide patient education as appropriate  Outcome: Progressing  Goal: Maintain or return mobility status to optimal level  Description: INTERVENTIONS:  - Assess patient's baseline mobility status (ambulation, transfers, stairs, etc )    - Identify cognitive and physical deficits and behaviors that affect mobility  - Identify mobility aids required to assist with transfers and/or ambulation (gait belt, sit-to-stand, lift, walker, cane, etc )  - [place] fall precautions as indicated by assessment  - Record patient progress and toleration of activity level on Mobility SBAR; progress patient to next Phase/Stage  - Instruct patient to call for assistance with activity based on assessment  - Consider rehabilitation consult to assist with strengthening/weightbearing, etc   Outcome: Progressing     Problem: Knowledge Deficit  Goal: Patient/family/caregiver demonstrates understanding of disease process, treatment plan, medications, and discharge instructions  Description: Complete learning assessment and assess knowledge base    Interventions:  - Provide teaching at level of understanding  - Provide teaching via preferred learning methods  Outcome: Progressing     Problem: DISCHARGE PLANNING  Goal: Discharge to home or other facility with appropriate resources  Description: INTERVENTIONS:  - Identify barriers to discharge w/patient and caregiver  - Arrange for needed discharge resources and transportation as appropriate  - Identify discharge learning needs (meds, wound care, etc )  - Arrange for interpretive services to assist at discharge as needed  - Refer to Case Management Department for coordinating discharge planning if the patient needs post-hospital services based on physician/advanced practitioner order or complex needs related to functional status, cognitive ability, or social support system  Outcome: Progressing

## 2020-08-24 NOTE — UTILIZATION REVIEW
Continued Stay Review    Date:  08-24-20                       Current Patient Class:  inpatient  Current Level of Care: medical    HPI:30 y o  female initially admitted on *08-19-20 for pre-eclampsia with severe features  @ 26 3/7 wks     Assessment/Plan   HD # 6  27 2/7 wks   :  Pre-eclampsia with severe features diagnosed @26w3d              - - 130s/60-70s (x2 150/90s but per nursing cuff issue)              - Midnight labs wnl, stable AST/ALT, Cr 0 99, Hg 11 9, plt 274k              - No new signs and Sx of pre-eclampsia              - Adequate urine output noted  IUP 27w1d              - /moderate variability/10x10 reactive accelerations overnight/no decelerationss  FEN              - Regular diet  Disposition              - Inpatient, consider possible discharge tomorrow     FHT:  Baseline Rate: 140 bpm  Variability: Moderate 6-25 bpm  Accelerations: 10 x 10 (<32 weeks)  Decelerations: None  FHR Category: Category I  TOCO:   Contraction Frequency (minutes): none  Contraction Quality: Not applicable    Pertinent Labs/Diagnostic Results:   Results from last 7 days   Lab Units 08/19/20  1603   SARS-COV-2  Negative     Results from last 7 days   Lab Units 08/23/20  0617 08/22/20  0532 08/21/20  0611 08/20/20  1640 08/20/20  0551 08/19/20  1440   WBC Thousand/uL 16 03* 19 25* 21 25* 23 25* 18 65* 17 94*   HEMOGLOBIN g/dL 11 9 10 9* 11 3* 11 4* 12 6 12 7   HEMATOCRIT % 35 7 33 3* 33 8* 33 3* 37 1 37 6   PLATELETS Thousands/uL 274 252 257 253 250 240   NEUTROS ABS Thousands/µL  --   --   --   --   --  13 27*         Results from last 7 days   Lab Units 08/23/20  1324 08/23/20  0617 08/22/20  0532 08/21/20  0611 08/20/20  1640   SODIUM mmol/L 136 133* 134* 135* 137   POTASSIUM mmol/L 4 2 5 7* 4 6 4 8 4 6   CHLORIDE mmol/L 105 104 105 105 105   CO2 mmol/L 23 24 24 23 22   ANION GAP mmol/L 8 5 5 7 10   BUN mg/dL 24 23 27* 24 17   CREATININE mg/dL 0 99 0 91 1 00 1 02 1 09   EGFR ml/min/1 73sq m 89 99 88 86 79   CALCIUM mg/dL 7 7* 7 7* 7 7* 7 8* 7 7*     Results from last 7 days   Lab Units 08/23/20  0617 08/22/20  0532 08/21/20  0611 08/20/20  1640 08/20/20  0551   AST U/L 34 20 16 20 26   ALT U/L 28 23 25 23 29   ALK PHOS U/L 99 100 109 107 129*   TOTAL PROTEIN g/dL 6 1* 6 0* 6 0* 6 0* 6 6   ALBUMIN g/dL 2 4* 2 5* 2 4* 2 4* 2 6*   TOTAL BILIRUBIN mg/dL 0 36 0 21 0 24 0 22 0 35         Results from last 7 days   Lab Units 08/23/20  1324 08/23/20  0617 08/22/20  0532 08/21/20  0611 08/20/20  1640 08/20/20  0551 08/19/20  1440   GLUCOSE RANDOM mg/dL 87 70 89 162* 136 136 78         Results from last 7 days   Lab Units 08/19/20  1941   PROTIME seconds 14 2   INR  1 08   PTT seconds 29     Results from last 7 days   Lab Units 08/23/20  0617 08/19/20  1440   NT-PRO BNP pg/mL 252* 888*     Results from last 7 days   Lab Units 08/19/20  1451 08/19/20  1441 08/19/20  1435   CLARITY UA  Clear  --   --    COLOR UA  Yellow  --   --    SPEC GRAV UA  >=1 030  --   --    PH UA  5 5  --   --    GLUCOSE UA mg/dl Negative  --   --    KETONES UA mg/dl 15 (1+)*  --   --    BLOOD UA  Trace*  --   --    PROTEIN UA mg/dl >=300*  --   --    NITRITE UA  Negative  --   --    BILIRUBIN UA  Negative  --   --    UROBILINOGEN UA E U /dl 0 2  --   --    LEUKOCYTES UA  Negative  --   --    WBC UA /hpf  --   --  2-4*   RBC UA /hpf  --   --  0-1*   BACTERIA UA /hpf  --   --  Occasional   EPITHELIAL CELLS WET PREP /hpf  --   --  Occasional   MUCUS THREADS   --   --  Moderate*   CREATININE UR mg/dL  --  422 0  --    PROTEIN UR mg/dL  --  1,491  --    PROT/CREAT RATIO UR   --  3 53*  --      Results from last 7 days   Lab Units 08/19/20  1739   AMPH/METH  Negative   BARBITURATE UR  Negative   BENZODIAZEPINE UR  Negative   COCAINE UR  Negative   METHADONE URINE  Negative   OPIATE UR  Negative   PCP UR  Negative   THC UR  Negative     Results from last 7 days   Lab Units 08/19/20  1435   URINE CULTURE  No Growth <1000 cfu/mL               Vital Signs: Date/Time   Temp   Pulse   Resp   BP   SpO2   O2 Device   Cardiac (WDL)   Patient Position - Orthostatic VS    08/24/20 0900   98 5 °F (36 9 °C)   72   18   145/95   100 %   --   --   --    Comment rows:    OBSERV: according to the patient, the baby is active and she denies s/s of labor  at 08/24/20 0900    08/24/20 0504   --   73   --   122/78    --   --   --   --    BP: pt asked for BP to be rechecked  at 08/24/20 0504    08/24/20 0440   98 3 °F (36 8 °C)   70   16   150/89   98 %   None (Room air)   --   Lying    08/24/20 0025   --   73   --   132/75   --   --   --   --    08/24/20 0005   97 9 °F (36 6 °C)   79   18   155/93   98 %   None (Room air)   --   Sitting    08/23/20 2011   98 7 °F (37 1 °C)   88   18   131/67   --   --   WDL   --    Comment rows:    OBSERV: pt reports + fetal movement, denies contractions, offers no complaints at this time  at 08/23/20 2011 08/23/20 1624   98 8 °F (37 1 °C)   93   18   132/72   98 %   None (Room air)   --   Sitting    08/23/20 1418   --   --   --   --   --   None (Room air)   --   --    08/23/20 1231   98 6 °F (37 °C)   101   18   128/73   --   --   --   Sitting    08/23/20 0817   98 3 °F (36 8 °C)   79   18   134/84   --   --   --   Sitting    08/23/20 0430   --   79   --   139/80   99 %   --   --   --    08/23/20 0111   98 °F (36 7 °C)   84   18   135/88   98 %   None (Room air)   --   Lying    08/22/20 2044   98 3 °F (36 8 °C)   89   18   128/71   98 %   None (Room air)   --   Sitting    08/22/20 2015   --   --   --   --   --   --   WDL   --    Comment rows:    OBSERV: pt reports + fetal movement, offers no complaints at this time   at 08/22/20 2015 08/22/20 1704   99 1 °F (37 3 °C)                        Medications:   Scheduled Medications:  heparin (porcine), 5,000 Units, Subcutaneous, Q12H [place]  NIFEdipine, 30 mg, Oral, Daily      Continuous IV Infusions:     PRN Meds:  acetaminophen, 650 mg, Oral, Q6H PRN  albuterol, 2 puff, Inhalation, Q4H PRN  polyethylene glycol, 17 g, Oral, Daily PRN        Discharge Plan:   TBD    Network Utilization Review Department  [email or web address]  ATTENTION: Please call with any questions or concerns to [phone] and carefully listen to the prompts so that you are directed to the right person  All voicemails are confidential   Ibrahim Shannon all requests for admission clinical reviews, approved or denied determinations and any other requests to dedicated fax number below belonging to the campus where the patient is receiving treatment   List of dedicated fax numbers for the Facilities:  [address] DENIALS (Administrative/Medical Necessity) [phone]   [address] (Maternity/NICU/Pediatrics) [phone]   Ange Fines [phone]   [facility] [phone]   [facility] [place] [phone]   Kelly Read [phone]   [facility] [address] [place] [address] [phone]   [facility]  [phone]   [address] [facility] [address]  [address] [facility] [address] [facility] [phone]

## 2020-08-24 NOTE — PROGRESS NOTES
Antepartum Progress Note - OB/GYN   Carrie Merritt 35 y o  female MRN: [id number]  Unit/Bed#: -01 Encounter: [id number]    Carrie Merritt is a patient of [address] [place] [address] Street    Assessment:  Leonardo Colby admitted for pre-eclampsia with severe features Hospital Day: 6 is stable and doing well she denies vaginal bleeding, leakage of fluid, contractions, and endorses fetal movement, no new Sx, plan if stable is for discharge tomorrow with close outpatient follow up     Plan:  Pre-eclampsia with severe features diagnosed @26w3d   - - 130s/60-70s (x2 150/90s but per nursing cuff issue)   - Midnight labs wnl, stable AST/ALT, Cr 0 99, Hg 11 9, plt 274k   - No new signs and Sx of pre-eclampsia   - Adequate urine output noted  IUP 27w1d   - /moderate variability/10x10 reactive accelerations overnight/no decelerationss  FEN   - Regular diet  Disposition   - Inpatient, consider possible discharge tomorrow      Subjective/Objective     Chief Complaint:     None    Subjective:   Pain: no  Tolerating Oral Intake: yes  Voiding: yes  Flatus: yes  Bowel Movement: yes  Ambulating: yes  Chest Pain: no  Shortness of Breath: no  Leg Pain/Discomfort: no    Objective:   Vitals:   /78 Comment: pt asked for BP to be rechecked  Pulse 73   Temp 98 3 °F (36 8 °C) (Oral)   Resp 16   Ht 5' 2" (1 575 m)   Wt 85 7 kg (189 lb)   LMP 02/16/2020 (Exact Date)   SpO2 98%   BMI 34 57 kg/m²   Body mass index is 34 57 kg/m²  I/O       08/22 0701 - 08/23 0700 08/23 0701 - 08/24 0700    P  O  220 480    Total Intake(mL/kg) 220 (2 6) 480 (5 6)    Urine (mL/kg/hr) 975 (0 5) 1075 (0 5)    Total Output 975 1075    Net -648 -912              Lab Results   Component Value Date    WBC 16 03 (H) 08/23/2020    HGB 11 9 08/23/2020    HCT 35 7 08/23/2020    MCV 93 08/23/2020     08/23/2020       Meds/Allergies   Current Facility-Administered Medications   Medication Dose Route Frequency    acetaminophen (TYLENOL) tablet 650 mg  650 mg Oral Q6H PRN    albuterol (PROVENTIL HFA,VENTOLIN HFA) inhaler 2 puff  2 puff Inhalation Q4H PRN    heparin (porcine) subcutaneous injection 5,000 Units  5,000 Units Subcutaneous Q12H [address]    NIFEdipine (PROCARDIA XL) 24 hr tablet 30 mg  30 mg Oral Daily    polyethylene glycol (MIRALAX) packet 17 g  17 g Oral Daily PRN       Physical Exam:  General: in no apparent distress, well developed and well nourished and non-toxic  Cardiovascular: deferred  Lungs: not in respiratory distress  Abdomen: abdomen is soft without significant tenderness, masses, organomegaly or guarding  Fundus: S = D, soft, non-tender  Lower extremeties: nontender    [place], DO  PGY-4 OB/GYN   8/24/2020 6:54 AM

## 2020-08-25 LAB
ABO GROUP BLD: NORMAL
ALBUMIN SERPL BCP-MCNC: 2.1 G/DL (ref 3.5–5)
ALP SERPL-CCNC: 96 U/L (ref 46–116)
ALT SERPL W P-5'-P-CCNC: 22 U/L (ref 12–78)
ANION GAP SERPL CALCULATED.3IONS-SCNC: 9 MMOL/L (ref 4–13)
AST SERPL W P-5'-P-CCNC: 21 U/L (ref 5–45)
BILIRUB SERPL-MCNC: 0.21 MG/DL (ref 0.2–1)
BLD GP AB SCN SERPL QL: NEGATIVE
BUN SERPL-MCNC: 16 MG/DL (ref 5–25)
CALCIUM SERPL-MCNC: 7.6 MG/DL (ref 8.3–10.1)
CHLORIDE SERPL-SCNC: 106 MMOL/L (ref 100–108)
CO2 SERPL-SCNC: 22 MMOL/L (ref 21–32)
CREAT SERPL-MCNC: 0.75 MG/DL (ref 0.6–1.3)
ERYTHROCYTE [DISTWIDTH] IN BLOOD BY AUTOMATED COUNT: 12.3 % (ref 11.6–15.1)
GFR SERPL CREATININE-BSD FRML MDRD: 125 ML/MIN/1.73SQ M
GLUCOSE SERPL-MCNC: 69 MG/DL (ref 65–140)
HCT VFR BLD AUTO: 33.8 % (ref 34.8–46.1)
HGB BLD-MCNC: 11.1 G/DL (ref 11.5–15.4)
MCH RBC QN AUTO: 30.4 PG (ref 26.8–34.3)
MCHC RBC AUTO-ENTMCNC: 32.8 G/DL (ref 31.4–37.4)
MCV RBC AUTO: 93 FL (ref 82–98)
PLATELET # BLD AUTO: 272 THOUSANDS/UL (ref 149–390)
PMV BLD AUTO: 10.5 FL (ref 8.9–12.7)
POTASSIUM SERPL-SCNC: 4.6 MMOL/L (ref 3.5–5.3)
PROT SERPL-MCNC: 5.5 G/DL (ref 6.4–8.2)
RBC # BLD AUTO: 3.65 MILLION/UL (ref 3.81–5.12)
RH BLD: POSITIVE
SODIUM SERPL-SCNC: 137 MMOL/L (ref 136–145)
SPECIMEN EXPIRATION DATE: NORMAL
WBC # BLD AUTO: 17.02 THOUSAND/UL (ref 4.31–10.16)

## 2020-08-25 PROCEDURE — 80053 COMPREHEN METABOLIC PANEL: CPT | Performed by: OBSTETRICS & GYNECOLOGY

## 2020-08-25 PROCEDURE — 86901 BLOOD TYPING SEROLOGIC RH(D): CPT | Performed by: OBSTETRICS & GYNECOLOGY

## 2020-08-25 PROCEDURE — 86900 BLOOD TYPING SEROLOGIC ABO: CPT | Performed by: OBSTETRICS & GYNECOLOGY

## 2020-08-25 PROCEDURE — 86850 RBC ANTIBODY SCREEN: CPT | Performed by: OBSTETRICS & GYNECOLOGY

## 2020-08-25 PROCEDURE — 85027 COMPLETE CBC AUTOMATED: CPT | Performed by: OBSTETRICS & GYNECOLOGY

## 2020-08-25 PROCEDURE — 99231 SBSQ HOSP IP/OBS SF/LOW 25: CPT | Performed by: OBSTETRICS & GYNECOLOGY

## 2020-08-25 RX ADMIN — NIFEDIPINE 30 MG: 30 TABLET, FILM COATED, EXTENDED RELEASE ORAL at 08:36

## 2020-08-25 RX ADMIN — HEPARIN SODIUM 5000 UNITS: 5000 INJECTION INTRAVENOUS; SUBCUTANEOUS at 08:36

## 2020-08-25 RX ADMIN — HEPARIN SODIUM 5000 UNITS: 5000 INJECTION INTRAVENOUS; SUBCUTANEOUS at 21:26

## 2020-08-25 NOTE — PROGRESS NOTES
Antepartum Progress Note - OB/GYN   Neel Yip 35 y o  female MRN: [id number]  Unit/Bed#: -01 Encounter: [id number]    Neel Yip is a patient of [address] [place] [address]    Assessment:  Ssusy Joy admitted for pre-eclampsia with severe features Hospital Day: 7 is stable and doing well she denies vaginal bleeding, leakage of fluid, contractions, and endorses fetal movement, no new Sx, continue inpatient management    Plan:  Pre-eclampsia with severe features diagnosed @26w3d   - - 130s/60-70s (x2 150/90s but per nursing cuff issue)   - Midnight labs wnl, stable AST/ALT (21/22), Cr 0 99 -> 0 75, Hg 11 9 -> 11 1, plt 274k -> 272k   - No new signs and Sx of pre-eclampsia   - Adequate urine output noted  IUP 27w1d   - /moderate variability/10x10 reactive accelerations overnight/no decelerationss  FEN   - Regular diet  Disposition   - Inpatient      Subjective/Objective     Chief Complaint:     None    Subjective:   Pain: no  Tolerating Oral Intake: yes  Voiding: yes  Flatus: yes  Bowel Movement: yes  Ambulating: yes  Chest Pain: no  Shortness of Breath: no  Leg Pain/Discomfort: no    Objective:   Vitals:   /60 (BP Location: Right arm)   Pulse 93   Temp 98 1 °F (36 7 °C) (Oral)   Resp 18   Ht 5' 2" (1 575 m)   Wt 85 7 kg (189 lb)   LMP 02/16/2020 (Exact Date)   SpO2 97%   BMI 34 57 kg/m²   Body mass index is 34 57 kg/m²  I/O       08/22 0701 - 08/23 0700 08/23 0701 - 08/24 0700    P  O  220 480    Total Intake(mL/kg) 220 (2 6) 480 (5 6)    Urine (mL/kg/hr) 975 (0 5) 1075 (0 5)    Total Output 975 1075    Net -778 -887              Lab Results   Component Value Date    WBC 17 02 (H) 08/25/2020    HGB 11 1 (L) 08/25/2020    HCT 33 8 (L) 08/25/2020    MCV 93 08/25/2020     08/25/2020       Meds/Allergies   Current Facility-Administered Medications   Medication Dose Route Frequency    acetaminophen (TYLENOL) tablet 650 mg  650 mg Oral Q6H PRN    albuterol (PROVENTIL HFA,VENTOLIN HFA) inhaler 2 puff  2 puff Inhalation Q4H PRN    heparin (porcine) subcutaneous injection 5,000 Units  5,000 Units Subcutaneous Q12H [address]    NIFEdipine (PROCARDIA XL) 24 hr tablet 30 mg  30 mg Oral Daily    polyethylene glycol (MIRALAX) packet 17 g  17 g Oral Daily PRN       Physical Exam:  General: in no apparent distress, well developed and well nourished and non-toxic  Cardiovascular: regular rate and rhyhtym  Lungs: CTAB  Abdomen: abdomen is soft without significant tenderness, masses, organomegaly or guarding  Fundus: S = D, soft, non-tender  Lower extremeties: nontender    David Walker DO  PGY-4 OB/GYN   8/25/2020 6:52 AM

## 2020-08-26 PROCEDURE — 76820 UMBILICAL ARTERY ECHO: CPT | Performed by: OBSTETRICS & GYNECOLOGY

## 2020-08-26 PROCEDURE — 76815 OB US LIMITED FETUS(S): CPT | Performed by: OBSTETRICS & GYNECOLOGY

## 2020-08-26 PROCEDURE — NC001 PR NO CHARGE: Performed by: OBSTETRICS & GYNECOLOGY

## 2020-08-26 PROCEDURE — 99232 SBSQ HOSP IP/OBS MODERATE 35: CPT | Performed by: OBSTETRICS & GYNECOLOGY

## 2020-08-26 RX ORDER — SIMETHICONE 80 MG
80 TABLET,CHEWABLE ORAL EVERY 6 HOURS PRN
Status: DISCONTINUED | OUTPATIENT
Start: 2020-08-26 | End: 2020-09-16

## 2020-08-26 RX ADMIN — HEPARIN SODIUM 5000 UNITS: 5000 INJECTION INTRAVENOUS; SUBCUTANEOUS at 08:55

## 2020-08-26 RX ADMIN — NIFEDIPINE 30 MG: 30 TABLET, FILM COATED, EXTENDED RELEASE ORAL at 08:55

## 2020-08-26 RX ADMIN — SIMETHICONE 80 MG: 80 TABLET, CHEWABLE ORAL at 22:11

## 2020-08-26 RX ADMIN — HEPARIN SODIUM 5000 UNITS: 5000 INJECTION INTRAVENOUS; SUBCUTANEOUS at 21:18

## 2020-08-26 NOTE — PROGRESS NOTES
Antepartum Progress Note - OB/GYN   Santa Rousseau 35 y o  female MRN: [id number]  Unit/Bed#: -01 Encounter: [id number]    Santa Rousseau is a patient of [address] [place] [address]    Assessment:  Inocente Spatz admitted for pre-eclampsia with severe features Hospital Day: 9 is stable and doing well she denies vaginal bleeding, leakage of fluid, contractions, and endorses fetal movement, no new Sx, continue inpatient management    Plan:  Pre-eclampsia with severe features diagnosed @26w3d   - - 150s/60-70s   - Midnight labs wnl, stable AST/ALT (21/22), Cr 0 99 -> 0 75, Hg 11 9 -> 11 1, plt 274k -> 272k   - No new signs and Sx of pre-eclampsia   - Adequate urine output noted   - No difficulty breathing  IUP 27w3d   - /moderate variability/10x10 reactive accelerations overnight/no decelerationss  FEN   - Regular diet  Disposition   - Inpatient      Subjective/Objective     Chief Complaint:     None    Subjective:   Pain: no  Tolerating Oral Intake: yes  Voiding: yes  Flatus: yes  Bowel Movement: yes  Ambulating: yes  Chest Pain: no  Shortness of Breath: no  Leg Pain/Discomfort: no    Objective:   Vitals:   /82 (BP Location: Right arm)   Pulse 81   Temp 98 2 °F (36 8 °C) (Oral)   Resp 18   Ht 5' 2" (1 575 m)   Wt 85 7 kg (189 lb)   LMP 02/16/2020 (Exact Date)   SpO2 98%   BMI 34 57 kg/m²   Body mass index is 34 57 kg/m²  I/O       08/22 0701 - 08/23 0700 08/23 0701 - 08/24 0700    P  O  220 480    Total Intake(mL/kg) 220 (2 6) 480 (5 6)    Urine (mL/kg/hr) 975 (0 5) 1075 (0 5)    Total Output 975 1075    Net -958 -814              Lab Results   Component Value Date    WBC 17 02 (H) 08/25/2020    HGB 11 1 (L) 08/25/2020    HCT 33 8 (L) 08/25/2020    MCV 93 08/25/2020     08/25/2020       Meds/Allergies   Current Facility-Administered Medications   Medication Dose Route Frequency    acetaminophen (TYLENOL) tablet 650 mg  650 mg Oral Q6H PRN    albuterol (PROVENTIL HFA,VENTOLIN HFA) inhaler 2 puff  2 puff Inhalation Q4H PRN    heparin (porcine) subcutaneous injection 5,000 Units  5,000 Units Subcutaneous Q12H [address]    NIFEdipine (PROCARDIA XL) 24 hr tablet 30 mg  30 mg Oral Daily    polyethylene glycol (MIRALAX) packet 17 g  17 g Oral Daily PRN       Physical Exam:  General: in no apparent distress, well developed and well nourished and non-toxic  Cardiovascular: regular rate and rhyhtym  Lungs: CTAB  Abdomen: abdomen is soft without significant tenderness, masses, organomegaly or guarding  Fundus: S = D, soft, non-tender  Lower extremeties: nontender    Lizabeth Cha,   PGY-4 OB/GYN   8/26/2020 5:50 AM

## 2020-08-26 NOTE — QUICK NOTE
Patient seen this am  She was crying when I saw her due to decision for inpatient management which I support  Patient denies any obstetrical complaints  She is feeling her baby move  She denies ha/vision changes/n/v          Assessment:  Vane Kat admitted for pre-eclampsia with severe features Hospital Day: 7 is stable and doing well she denies vaginal bleeding, leakage of fluid, contractions, and endorses fetal movement, no new Sx, continue inpatient management     Plan:  Pre-eclampsia with severe features diagnosed @26w3d              - - 130s/60-70s (x2 150/90s but per nursing cuff issue)              - Midnight labs wnl, stable AST/ALT (21/22), Cr 0 99 -> 0 75, Hg 11 9 -> 11 1, plt 274k -> 272k              - No new signs and Sx of pre-eclampsia              - Adequate urine output noted  IUP 27w1d              - /moderate variability/10x10 reactive accelerations overnight/no decelerationss  FEN              - Regular diet  Disposition              - Inpatient

## 2020-08-27 PROCEDURE — 59025 FETAL NON-STRESS TEST: CPT | Performed by: OBSTETRICS & GYNECOLOGY

## 2020-08-27 PROCEDURE — 99231 SBSQ HOSP IP/OBS SF/LOW 25: CPT | Performed by: OBSTETRICS & GYNECOLOGY

## 2020-08-27 PROCEDURE — NC001 PR NO CHARGE: Performed by: OBSTETRICS & GYNECOLOGY

## 2020-08-27 RX ORDER — LANOLIN ALCOHOL/MO/W.PET/CERES
400 CREAM (GRAM) TOPICAL DAILY
Status: DISCONTINUED | OUTPATIENT
Start: 2020-08-27 | End: 2020-08-27

## 2020-08-27 RX ADMIN — NIFEDIPINE 30 MG: 30 TABLET, FILM COATED, EXTENDED RELEASE ORAL at 08:11

## 2020-08-27 RX ADMIN — HEPARIN SODIUM 5000 UNITS: 5000 INJECTION INTRAVENOUS; SUBCUTANEOUS at 08:11

## 2020-08-27 RX ADMIN — HEPARIN SODIUM 5000 UNITS: 5000 INJECTION INTRAVENOUS; SUBCUTANEOUS at 21:32

## 2020-08-27 NOTE — PROGRESS NOTES
Antepartum Progress Note - OB/GYN   Santosh Prajapati 35 y o  female MRN: [id number]  Unit/Bed#: -01 Encounter: [id number]    Santosh Prajapati is a patient of [facility]    Assessment:  Sarah Guzmán admitted for pre-eclampsia with severe features Hospital Day: 10 is stable and doing well she denies vaginal bleeding, leakage of fluid, contractions, and endorses fetal movement, no new Sx, continue inpatient management    Plan:  Pre-eclampsia with severe features diagnosed @26w3d   - - 140s/70-90s   - labs wnl, stable AST/ALT (21/22), Cr 0 99 -> 0 75, Hg 11 9 -> 11 1, plt 274k -> 272k   - No new signs and Sx of pre-eclampsia   - Adequate urine output noted   - No difficulty breathing  IUP 27w4d   - /moderate variability/10x10 reactive accelerations overnight/no decelerations  FEN   - Regular diet  Disposition   - Inpatient      Subjective/Objective     Chief Complaint:     None    Subjective:   Pain: no  Tolerating Oral Intake: yes  Voiding: yes  Flatus: yes  Bowel Movement: yes  Ambulating: yes  Chest Pain: no  Shortness of Breath: no  Leg Pain/Discomfort: no    Objective:   Vitals:   /85 (BP Location: Right arm)   Pulse 76   Temp 98 °F (36 7 °C) (Oral)   Resp 18   Ht 5' 2" (1 575 m)   Wt 85 7 kg (189 lb)   LMP 02/16/2020 (Exact Date)   SpO2 98%   BMI 34 57 kg/m²   Body mass index is 34 57 kg/m²  I/O       08/22 0701 - 08/23 0700 08/23 0701 - 08/24 0700    P  O  220 480    Total Intake(mL/kg) 220 (2 6) 480 (5 6)    Urine (mL/kg/hr) 975 (0 5) 1075 (0 5)    Total Output 975 1075    Net -291 -347              Lab Results   Component Value Date    WBC 17 02 (H) 08/25/2020    HGB 11 1 (L) 08/25/2020    HCT 33 8 (L) 08/25/2020    MCV 93 08/25/2020     08/25/2020       Meds/Allergies   Current Facility-Administered Medications   Medication Dose Route Frequency    acetaminophen (TYLENOL) tablet 650 mg  650 mg Oral Q6H PRN    albuterol (PROVENTIL HFA,VENTOLIN HFA) inhaler 2 puff  2 puff Inhalation Q4H PRN    dupilumab (DUPIXENT) subcutaneous injection 300 mg  300 mg Subcutaneous Q14 Days    heparin (porcine) subcutaneous injection 5,000 Units  5,000 Units Subcutaneous Q12H [place]    NIFEdipine (PROCARDIA XL) 24 hr tablet 30 mg  30 mg Oral Daily    polyethylene glycol (MIRALAX) packet 17 g  17 g Oral Daily PRN    simethicone (MYLICON) chewable tablet 80 mg  80 mg Oral Q6H PRN       Physical Exam:  General: in no apparent distress, well developed and well nourished and non-toxic  Cardiovascular: regular rate and rhyhtym  Lungs: CTAB  Abdomen: abdomen is soft without significant tenderness, masses, organomegaly or guarding  Fundus: S = D, soft, non-tender  Lower extremeties: nontender    Alfie Han DO  PGY-4 OB/GYN   8/27/2020 6:37 AM

## 2020-08-27 NOTE — PLAN OF CARE
Problem: ANTEPARTUM  Goal: Maintain pregnancy as long as maternal and/or fetal condition is stable  Description: INTERVENTIONS:  - Maternal surveillance  - Fetal surveillance  - Monitor uterine activity  - Medications as ordered  - Bedrest  Outcome: Progressing     Problem: NEUROSENSORY - ADULT  Goal: Achieves stable or improved neurological status  Description: INTERVENTIONS  - Monitor and report changes in neurological status  - Monitor vital signs such as temperature, blood pressure, glucose, and any other labs ordered   - Initiate measures to prevent increased intracranial pressure  - Monitor for seizure activity and implement precautions if appropriate      Outcome: Progressing  Goal: Remains free of injury related to seizures activity  Description: INTERVENTIONS  - Maintain airway, patient safety  and administer oxygen as ordered  - Monitor patient for seizure activity, document and report duration and description of seizure to physician/advanced practitioner  - If seizure occurs,  ensure patient safety during seizure  - Reorient patient post seizure  - Seizure pads on all 4 side rails  - Instruct patient/family to notify RN of any seizure activity including if an aura is experienced  - Instruct patient/family to call for assistance with activity based on nursing assessment  - Administer anti-seizure medications if ordered    Outcome: Progressing     Problem: CARDIOVASCULAR - ADULT  Goal: Maintains optimal cardiac output and hemodynamic stability  Description: INTERVENTIONS:  - Monitor I/O, vital signs and rhythm  - Monitor for S/S and trends of decreased cardiac output  - Administer and titrate ordered vasoactive medications to optimize hemodynamic stability  - Assess quality of pulses, skin color and temperature  - Assess for signs of decreased coronary artery perfusion  - Instruct patient to report change in severity of symptoms  Outcome: Progressing     Problem: PAIN - ADULT  Goal: Verbalizes/displays adequate comfort level or baseline comfort level  Description: Interventions:  - Encourage patient to monitor pain and request assistance  - Assess pain using appropriate pain scale  - Administer analgesics based on type and severity of pain and evaluate response  - Implement non-pharmacological measures as appropriate and evaluate response  - Consider cultural and social influences on pain and pain management  - Notify physician/advanced practitioner if interventions unsuccessful or patient reports new pain  Outcome: Progressing     Problem: INFECTION - ADULT  Goal: Absence or prevention of progression during hospitalization  Description: INTERVENTIONS:  - Assess and monitor for signs and symptoms of infection  - Monitor lab/diagnostic results  - Monitor all insertion sites, i e  indwelling lines, tubes, and drains  - Monitor endotracheal if appropriate and nasal secretions for changes in amount and color  - [place] appropriate cooling/warming therapies per order  - Administer medications as ordered  - Instruct and encourage patient and family to use good hand hygiene technique  - Identify and instruct in appropriate isolation precautions for identified infection/condition  Outcome: Progressing  Goal: Absence of fever/infection during neutropenic period  Description: INTERVENTIONS:  - Monitor WBC    Outcome: Progressing     Problem: SAFETY ADULT  Goal: Patient will remain free of falls  Description: INTERVENTIONS:  - Assess patient frequently for physical needs  -  Identify cognitive and physical deficits and behaviors that affect risk of falls    -  [place] fall precautions as indicated by assessment   - Educate patient/family on patient safety including physical limitations  - Instruct patient to call for assistance with activity based on assessment  - Modify environment to reduce risk of injury  - Consider OT/PT consult to assist with strengthening/mobility  Outcome: Progressing  Goal: Maintain or return to baseline ADL function  Description: INTERVENTIONS:  -  Assess patient's ability to carry out ADLs; assess patient's baseline for ADL function and identify physical deficits which impact ability to perform ADLs (bathing, care of mouth/teeth, toileting, grooming, dressing, etc )  - Assess/evaluate cause of self-care deficits   - Assess range of motion  - Assess patient's mobility; develop plan if impaired  - Assess patient's need for assistive devices and provide as appropriate  - Encourage maximum independence but intervene and supervise when necessary  - Involve family in performance of ADLs  - Assess for home care needs following discharge   - Consider OT consult to assist with ADL evaluation and planning for discharge  - Provide patient education as appropriate  Outcome: Progressing  Goal: Maintain or return mobility status to optimal level  Description: INTERVENTIONS:  - Assess patient's baseline mobility status (ambulation, transfers, stairs, etc )    - Identify cognitive and physical deficits and behaviors that affect mobility  - Identify mobility aids required to assist with transfers and/or ambulation (gait belt, sit-to-stand, lift, walker, cane, etc )  - [place] fall precautions as indicated by assessment  - Record patient progress and toleration of activity level on Mobility SBAR; progress patient to next Phase/Stage  - Instruct patient to call for assistance with activity based on assessment  - Consider rehabilitation consult to assist with strengthening/weightbearing, etc   Outcome: Progressing     Problem: Knowledge Deficit  Goal: Patient/family/caregiver demonstrates understanding of disease process, treatment plan, medications, and discharge instructions  Description: Complete learning assessment and assess knowledge base    Interventions:  - Provide teaching at level of understanding  - Provide teaching via preferred learning methods  Outcome: Progressing     Problem: DISCHARGE PLANNING  Goal: Discharge to home or other facility with appropriate resources  Description: INTERVENTIONS:  - Identify barriers to discharge w/patient and caregiver  - Arrange for needed discharge resources and transportation as appropriate  - Identify discharge learning needs (meds, wound care, etc )  - Arrange for interpretive services to assist at discharge as needed  - Refer to Case Management Department for coordinating discharge planning if the patient needs post-hospital services based on physician/advanced practitioner order or complex needs related to functional status, cognitive ability, or social support system  Outcome: Progressing

## 2020-08-27 NOTE — NURSING NOTE
Dr Gibson Call in room to remove stitches from prior mole biopsy on L upper back  Patient offers no complaints at this time  Will continue to monitor

## 2020-08-27 NOTE — PLAN OF CARE
Problem: ANTEPARTUM  Goal: Maintain pregnancy as long as maternal and/or fetal condition is stable  Description: INTERVENTIONS:  - Maternal surveillance  - Fetal surveillance  - Monitor uterine activity  - Medications as ordered  - Bedrest  Outcome: Progressing     Problem: NEUROSENSORY - ADULT  Goal: Achieves stable or improved neurological status  Description: INTERVENTIONS  - Monitor and report changes in neurological status  - Monitor vital signs such as temperature, blood pressure, glucose, and any other labs ordered   - Initiate measures to prevent increased intracranial pressure  - Monitor for seizure activity and implement precautions if appropriate      Outcome: Progressing  Goal: Remains free of injury related to seizures activity  Description: INTERVENTIONS  - Maintain airway, patient safety  and administer oxygen as ordered  - Monitor patient for seizure activity, document and report duration and description of seizure to physician/advanced practitioner  - If seizure occurs,  ensure patient safety during seizure  - Reorient patient post seizure  - Seizure pads on all 4 side rails  - Instruct patient/family to notify RN of any seizure activity including if an aura is experienced  - Instruct patient/family to call for assistance with activity based on nursing assessment  - Administer anti-seizure medications if ordered    Outcome: Progressing     Problem: CARDIOVASCULAR - ADULT  Goal: Maintains optimal cardiac output and hemodynamic stability  Description: INTERVENTIONS:  - Monitor I/O, vital signs and rhythm  - Monitor for S/S and trends of decreased cardiac output  - Administer and titrate ordered vasoactive medications to optimize hemodynamic stability  - Assess quality of pulses, skin color and temperature  - Assess for signs of decreased coronary artery perfusion  - Instruct patient to report change in severity of symptoms  Outcome: Progressing     Problem: PAIN - ADULT  Goal: Verbalizes/displays adequate comfort level or baseline comfort level  Description: Interventions:  - Encourage patient to monitor pain and request assistance  - Assess pain using appropriate pain scale  - Administer analgesics based on type and severity of pain and evaluate response  - Implement non-pharmacological measures as appropriate and evaluate response  - Consider cultural and social influences on pain and pain management  - Notify physician/advanced practitioner if interventions unsuccessful or patient reports new pain  Outcome: Progressing     Problem: INFECTION - ADULT  Goal: Absence or prevention of progression during hospitalization  Description: INTERVENTIONS:  - Assess and monitor for signs and symptoms of infection  - Monitor lab/diagnostic results  - Monitor all insertion sites, i e  indwelling lines, tubes, and drains  - Monitor endotracheal if appropriate and nasal secretions for changes in amount and color  - [place] appropriate cooling/warming therapies per order  - Administer medications as ordered  - Instruct and encourage patient and family to use good hand hygiene technique  - Identify and instruct in appropriate isolation precautions for identified infection/condition  Outcome: Progressing  Goal: Absence of fever/infection during neutropenic period  Description: INTERVENTIONS:  - Monitor WBC    Outcome: Progressing     Problem: SAFETY ADULT  Goal: Patient will remain free of falls  Description: INTERVENTIONS:  - Assess patient frequently for physical needs  -  Identify cognitive and physical deficits and behaviors that affect risk of falls    -  [place] fall precautions as indicated by assessment   - Educate patient/family on patient safety including physical limitations  - Instruct patient to call for assistance with activity based on assessment  - Modify environment to reduce risk of injury  - Consider OT/PT consult to assist with strengthening/mobility  Outcome: Progressing  Goal: Maintain or return to baseline ADL function  Description: INTERVENTIONS:  -  Assess patient's ability to carry out ADLs; assess patient's baseline for ADL function and identify physical deficits which impact ability to perform ADLs (bathing, care of mouth/teeth, toileting, grooming, dressing, etc )  - Assess/evaluate cause of self-care deficits   - Assess range of motion  - Assess patient's mobility; develop plan if impaired  - Assess patient's need for assistive devices and provide as appropriate  - Encourage maximum independence but intervene and supervise when necessary  - Involve family in performance of ADLs  - Assess for home care needs following discharge   - Consider OT consult to assist with ADL evaluation and planning for discharge  - Provide patient education as appropriate  Outcome: Progressing  Goal: Maintain or return mobility status to optimal level  Description: INTERVENTIONS:  - Assess patient's baseline mobility status (ambulation, transfers, stairs, etc )    - Identify cognitive and physical deficits and behaviors that affect mobility  - Identify mobility aids required to assist with transfers and/or ambulation (gait belt, sit-to-stand, lift, walker, cane, etc )  - [place] fall precautions as indicated by assessment  - Record patient progress and toleration of activity level on Mobility SBAR; progress patient to next Phase/Stage  - Instruct patient to call for assistance with activity based on assessment  - Consider rehabilitation consult to assist with strengthening/weightbearing, etc   Outcome: Progressing     Problem: Knowledge Deficit  Goal: Patient/family/caregiver demonstrates understanding of disease process, treatment plan, medications, and discharge instructions  Description: Complete learning assessment and assess knowledge base    Interventions:  - Provide teaching at level of understanding  - Provide teaching via preferred learning methods  Outcome: Progressing     Problem: DISCHARGE PLANNING  Goal: Discharge to home or other facility with appropriate resources  Description: INTERVENTIONS:  - Identify barriers to discharge w/patient and caregiver  - Arrange for needed discharge resources and transportation as appropriate  - Identify discharge learning needs (meds, wound care, etc )  - Arrange for interpretive services to assist at discharge as needed  - Refer to Case Management Department for coordinating discharge planning if the patient needs post-hospital services based on physician/advanced practitioner order or complex needs related to functional status, cognitive ability, or social support system  Outcome: Progressing

## 2020-08-28 LAB
ABO GROUP BLD: NORMAL
BLD GP AB SCN SERPL QL: NEGATIVE
RH BLD: POSITIVE
SPECIMEN EXPIRATION DATE: NORMAL

## 2020-08-28 PROCEDURE — 86901 BLOOD TYPING SEROLOGIC RH(D): CPT | Performed by: OBSTETRICS & GYNECOLOGY

## 2020-08-28 PROCEDURE — 86850 RBC ANTIBODY SCREEN: CPT | Performed by: OBSTETRICS & GYNECOLOGY

## 2020-08-28 PROCEDURE — NC001 PR NO CHARGE: Performed by: OBSTETRICS & GYNECOLOGY

## 2020-08-28 PROCEDURE — 86900 BLOOD TYPING SEROLOGIC ABO: CPT | Performed by: OBSTETRICS & GYNECOLOGY

## 2020-08-28 PROCEDURE — 99232 SBSQ HOSP IP/OBS MODERATE 35: CPT | Performed by: OBSTETRICS & GYNECOLOGY

## 2020-08-28 RX ORDER — TRIAMCINOLONE ACETONIDE 1 MG/G
CREAM TOPICAL 2 TIMES DAILY
Status: DISCONTINUED | OUTPATIENT
Start: 2020-08-28 | End: 2020-09-20 | Stop reason: HOSPADM

## 2020-08-28 RX ADMIN — NIFEDIPINE 30 MG: 30 TABLET, FILM COATED, EXTENDED RELEASE ORAL at 08:41

## 2020-08-28 RX ADMIN — TRIAMCINOLONE ACETONIDE: 1 CREAM TOPICAL at 18:12

## 2020-08-28 RX ADMIN — HEPARIN SODIUM 5000 UNITS: 5000 INJECTION INTRAVENOUS; SUBCUTANEOUS at 08:41

## 2020-08-28 RX ADMIN — HEPARIN SODIUM 5000 UNITS: 5000 INJECTION INTRAVENOUS; SUBCUTANEOUS at 20:35

## 2020-08-28 NOTE — QUICK NOTE
I saw and examined the pt this morning  She is feeling well  She has no complaints  She denies any headaches or vision changes  She denies any shortness of breath  The baby is moving well  No leaking or bleeding or contractions  One severely elevated blood pressure yesterday followed by a mild we elevated or normal blood pressures  Patient is due for IV change  She is a difficult patient to find a new IV access    Wt Readings from Last 3 Encounters:   20 85 7 kg (189 lb)   20 85 7 kg (189 lb)   20 86 1 kg (189 lb 12 8 oz)     Temp Readings from Last 3 Encounters:   20 98 1 °F (36 7 °C) (Oral)   20 98 9 °F (37 2 °C)   20 97 9 °F (36 6 °C)     BP Readings from Last 3 Encounters:   20 142/94   20 (!) 187/105   20 119/73     Pulse Readings from Last 3 Encounters:   20 82   20 81   20 80     's - reactive    71-year-old  at 27 5 weeks gestational age with preeclampsia with severe features  Continue inpatient management per M recommendations, with plan for delivery at 34 weeks or at any point of worsening maternal or fetal symptoms    Patient declines PICC line at this point

## 2020-08-28 NOTE — PROGRESS NOTES
Pt requests anesthesia to place new IV  Day shift contacted anesthesia multiple times, then I contacted Jose Corey CRNA near beginning of shift, she said she would be able to place after c/s's were done  I then TT Parisa Colin @ 2115 as pt seemed ok with very exp nurse  Eliecer RN said she would come up ASAP, but so far is still busy  Will pass on to night nurse that pt is waiting for either Young Frederick CRNA or Eliecer RN to come replace her IV

## 2020-08-28 NOTE — UTILIZATION REVIEW
Continued Stay Review    Date: 08-28-20                        Current Patient Class: inpatient  Current Level of Care: medical    HPI:30 y o  female initially admitted on 08-19-20  for preeclampsia with severe features @ 26 3/8 wks     Assessment/Plan: HD # 10  27 5/7 wks   Pre-eclampsia with severe features diagnosed @26w3d              - - 140s/70-90s, SRBP 165/111 @ 0836 yesterday without repeat severe, pressures have been normal              - labs wnl, stable AST/ALT (21/22), Cr 0 99 -> 0 75, Hg 11 9 -> 11 1, plt 274k -> 272k              - No new signs and Sx of pre-eclampsia              - Adequate urine output noted              - No difficulty breathing  IUP 27w5d              - /moderate variability/10x10 reactive accelerations overnight/no decelerations    Delivery is recommended at 34 weeks gestation for the indication of preeclampsia with severe features, sooner if otherwise clinically indicated       Pertinent Labs/Diagnostic Results:       Results from last 7 days   Lab Units 08/25/20  0556 08/23/20  0617 08/22/20  0532   WBC Thousand/uL 17 02* 16 03* 19 25*   HEMOGLOBIN g/dL 11 1* 11 9 10 9*   HEMATOCRIT % 33 8* 35 7 33 3*   PLATELETS Thousands/uL 272 274 252         Results from last 7 days   Lab Units 08/25/20  0556 08/23/20  1324 08/23/20  0617 08/22/20  0532   SODIUM mmol/L 137 136 133* 134*   POTASSIUM mmol/L 4 6 4 2 5 7* 4 6   CHLORIDE mmol/L 106 105 104 105   CO2 mmol/L 22 23 24 24   ANION GAP mmol/L 9 8 5 5   BUN mg/dL 16 24 23 27*   CREATININE mg/dL 0 75 0 99 0 91 1 00   EGFR ml/min/1 73sq m 125 89 99 88   CALCIUM mg/dL 7 6* 7 7* 7 7* 7 7*     Results from last 7 days   Lab Units 08/25/20  0556 08/23/20  0617 08/22/20  0532   AST U/L 21 34 20   ALT U/L 22 28 23   ALK PHOS U/L 96 99 100   TOTAL PROTEIN g/dL 5 5* 6 1* 6 0*   ALBUMIN g/dL 2 1* 2 4* 2 5*   TOTAL BILIRUBIN mg/dL 0 21 0 36 0 21         Results from last 7 days   Lab Units 08/25/20  0556 08/23/20  1324 08/23/20  0617 08/22/20  0532   GLUCOSE RANDOM mg/dL 69 87 70 89     Results from last 7 days   Lab Units 08/23/20  0617   NT-PRO BNP pg/mL 252*       Vital Signs:   Date/Time   Temp   Pulse   Resp   BP   SpO2   O2 Device   Cardiac (WDL)   Patient Position - Orthostatic VS    08/28/20 1131   98 1 °F (36 7 °C)   92   20   121/76   --   --   --   --    08/28/20 0840   --   82   --   142/94   --   --   --   --    Comment rows:    OBSERV: according to the patient, baby is active and denies s/s of labor  at 08/28/20 0840    08/28/20 0728   98 1 °F (36 7 °C)   78   20   101/57   --   --   --   --    08/28/20 0700   --   --   --   --   --   --   --   --    Comment rows:    OBSERV: Dr Germania Brewer aware that current IV has been in >4 days  at 08/28/20 0700    08/28/20 0608   98 3 °F (36 8 °C)   --   --   --   100 %   --   --   --    08/28/20 0537   --   75   18   121/70   --   --   --   --    08/28/20 0127   98 1 °F (36 7 °C)   75   18   130/76   --   --   --   --    08/27/20 2350   --   --   --   --   --   --   WDL   --    08/27/20 2010   98 7 °F (37 1 °C)   82   18   136/94   --   --   --   Sitting    08/27/20 1800   --   --   --   --   --   None (Room air)   WDL   --    08/27/20 1540   98 1 °F (36 7 °C)   85   20   139/90   --   --   --   Sitting    08/27/20 1235   98 3 °F (36 8 °C)   91   18   137/79   --   --   --   Sitting    08/27/20 0906   --   86   --   148/94   --   --   --   --    08/27/20 0846   --   77   --   143/84   --   --   --   --    08/27/20 0839   98 2 °F (36 8 °C)   --   20   139/90    --   --   --   Sitting    BP: PCA checked on dynamap per pt request  Will recheck in 30min at 08/27/20 0839    08/27/20 0836   --   76   --   165/111Abnormal      --   --   --   --    BP: Dr Dennis Geiger aware   Will recheck in 30 minutes at 08/27/20 0836    08/27/20 0822   --   85   --   139/101Abnormal     --   --   --   --    08/27/20 0815   --   --   --   --   --   --   WDL   --    Comment rows:    OBSERV: Dr Terrel Burkitt in room to assess and evaluate patient, discuss plan of care  at 08/27/20 0815    08/27/20 0523   --   --   --   132/85   --   --   --   --    08/27/20 0520   98 °F (36 7 °C)   76   18   137/99   --   --   --   --    08/26/20 2345   98 2 °F (36 8 °C)   83   20   145/83    --   --   WDL   --    BP: nurse Nicky BERMAN  notified at 08/26/20 2345 08/26/20 2247   --   --   --   --   --   --   --   --    Comment rows:    OBSERV: given simethicone as ordered at 08/26/20 2247 08/26/20 2100   98 5 °F (36 9 °C)   94   20   126/80   98 %   None (Room air)   --   Sitting    08/26/20 2000   --   --   --   --   --   --   --   --    Comment rows:    OBSERV: bag over IV site for Pt to shower at 08/26/20 2000 08/26/20 1633   --   --   --   --   --   --   WDL   --    Comment rows:    OBSERV: suture left upper back/shoulder needs removal 8/27 Dr Hattie Anna aware at 08/26/20 1633    08/26/20 1609   98 5 °F (36 9 °C)   89   20   137/77   98 %   None (Room air)   --   Sitting    08/26/20 1238   98 5 °F (36 9 °C)   88   20   144/56   --   --   --   --    08/26/20 0853   98 8 °F (37 1 °C)   82   18   148/87   100 %   --   WDL   Sitting    08/26/20 0354   98 2 °F (36 8 °C)   81   18   136/82   98 %   None (Room              Medications:   Scheduled Medications:  dupilumab, 300 mg, Subcutaneous, Q14 Days  heparin (porcine), 5,000 Units, Subcutaneous, Q12H [place]  NIFEdipine, 30 mg, Oral, Daily      Continuous IV Infusions:     PRN Meds:  acetaminophen, 650 mg, Oral, Q6H PRN  albuterol, 2 puff, Inhalation, Q4H PRN  polyethylene glycol, 17 g, Oral, Daily PRN  simethicone, 80 mg, Oral, Q6H PRN        Discharge Plan:home after delivery    Network Utilization Review Department  [email or web address]  ATTENTION: Please call with any questions or concerns to [phone] and carefully listen to the prompts so that you are directed to the right person   All voicemails are confidential   Lynette Kussmaul all requests for admission clinical reviews, approved or denied determinations and any other requests to dedicated fax number below belonging to the campus where the patient is receiving treatment   List of dedicated fax numbers for the Facilities:  [address] [facility] [address] DENIALS (Administrative/Medical Necessity) [phone]   [address]  (Maternity/NICU/Pediatrics) [phone]   S[facility] Oakley [phone]   Gideon Nicholas [phone]   Som Quick [phone]   Krupa Alvarado [phone]   [address] [place] [address] [phone]   [facility]  [phone]   [address] [facility]  [address] [facility] [address] [facility] [phone]

## 2020-08-28 NOTE — PROGRESS NOTES
Antepartum Progress Note - OB/GYN   Antonio Arizmendi 35 y o  female MRN: [id number]  Unit/Bed#: -01 Encounter: [id number]    Antonio Arizmendi is a patient of [address] [place] [address]    Assessment:  Rashaun Lucero [id number] @ 27w5d admitted for pre-eclampsia with severe features Hospital Day: 10 is stable and doing well she denies vaginal bleeding, leakage of fluid, contractions, and endorses fetal movement, no new Sx, continue inpatient management  Consider PICC line today to replace peripheral IV    Plan:  Pre-eclampsia with severe features diagnosed @26w3d   - - 140s/70-90s, SRBP 165/111 @ 0836 yesterday without repeat severe, pressures have been normal   - labs wnl, stable AST/ALT (21/22), Cr 0 99 -> 0 75, Hg 11 9 -> 11 1, plt 274k -> 272k   - No new signs and Sx of pre-eclampsia   - Adequate urine output noted   - No difficulty breathing  IUP 27w5d   - /moderate variability/10x10 reactive accelerations overnight/no decelerations  FEN   - Regular diet  Disposition   - Inpatient      Subjective/Objective     Chief Complaint:     None    Subjective:   Pain: no  Tolerating Oral Intake: yes  Voiding: yes  Flatus: yes  Bowel Movement: yes  Ambulating: yes  Chest Pain: no  Shortness of Breath: no  Leg Pain/Discomfort: no    Objective:   Vitals:   /70 (BP Location: Right arm)   Pulse 75   Temp 98 3 °F (36 8 °C) (Oral)   Resp 18   Ht 5' 2" (1 575 m)   Wt 85 7 kg (189 lb)   LMP 02/16/2020 (Exact Date)   SpO2 100%   BMI 34 57 kg/m²   Body mass index is 34 57 kg/m²  I/O       08/22 0701 - 08/23 0700 08/23 0701 - 08/24 0700    P  O  220 480    Total Intake(mL/kg) 220 (2 6) 480 (5 6)    Urine (mL/kg/hr) 975 (0 5) 1075 (0 5)    Total Output 975 1075    Net -861 -192              Lab Results   Component Value Date    WBC 17 02 (H) 08/25/2020    HGB 11 1 (L) 08/25/2020    HCT 33 8 (L) 08/25/2020    MCV 93 08/25/2020     08/25/2020       Meds/Allergies   Current Facility-Administered Medications   Medication Dose Route Frequency    acetaminophen (TYLENOL) tablet 650 mg  650 mg Oral Q6H PRN    albuterol (PROVENTIL HFA,VENTOLIN HFA) inhaler 2 puff  2 puff Inhalation Q4H PRN    dupilumab (DUPIXENT) subcutaneous injection 300 mg  300 mg Subcutaneous Q14 Days    heparin (porcine) subcutaneous injection 5,000 Units  5,000 Units Subcutaneous Q12H [place]    NIFEdipine (PROCARDIA XL) 24 hr tablet 30 mg  30 mg Oral Daily    polyethylene glycol (MIRALAX) packet 17 g  17 g Oral Daily PRN    simethicone (MYLICON) chewable tablet 80 mg  80 mg Oral Q6H PRN       Physical Exam:  General: in no apparent distress, well developed and well nourished and non-toxic  Cardiovascular: regular rate and rhyhtym  Lungs: CTAB  Abdomen: abdomen is soft without significant tenderness, masses, organomegaly or guarding  Fundus: S = D, soft, non-tender  Lower extremeties: nontender    Mary Robles,   PGY-4 OB/GYN   8/28/2020 6:58 AM

## 2020-08-29 PROCEDURE — NC001 PR NO CHARGE: Performed by: OBSTETRICS & GYNECOLOGY

## 2020-08-29 PROCEDURE — 59025 FETAL NON-STRESS TEST: CPT | Performed by: OBSTETRICS & GYNECOLOGY

## 2020-08-29 PROCEDURE — C9399 UNCLASSIFIED DRUGS OR BIOLOG: HCPCS | Performed by: STUDENT IN AN ORGANIZED HEALTH CARE EDUCATION/TRAINING PROGRAM

## 2020-08-29 PROCEDURE — 99232 SBSQ HOSP IP/OBS MODERATE 35: CPT | Performed by: OBSTETRICS & GYNECOLOGY

## 2020-08-29 RX ADMIN — HEPARIN SODIUM 5000 UNITS: 5000 INJECTION INTRAVENOUS; SUBCUTANEOUS at 20:12

## 2020-08-29 RX ADMIN — NIFEDIPINE 30 MG: 30 TABLET, FILM COATED, EXTENDED RELEASE ORAL at 09:28

## 2020-08-29 RX ADMIN — TRIAMCINOLONE ACETONIDE: 1 CREAM TOPICAL at 18:23

## 2020-08-29 RX ADMIN — HEPARIN SODIUM 5000 UNITS: 5000 INJECTION INTRAVENOUS; SUBCUTANEOUS at 09:28

## 2020-08-29 RX ADMIN — TRIAMCINOLONE ACETONIDE: 1 CREAM TOPICAL at 09:28

## 2020-08-29 RX ADMIN — DUPILUMAB 300 MG: 300 INJECTION, SOLUTION SUBCUTANEOUS at 09:27

## 2020-08-29 NOTE — PLAN OF CARE
Problem: ANTEPARTUM  Goal: Maintain pregnancy as long as maternal and/or fetal condition is stable  Description: INTERVENTIONS:  - Maternal surveillance  - Fetal surveillance  - Monitor uterine activity  - Medications as ordered  - Bedrest  Outcome: Progressing     Problem: NEUROSENSORY - ADULT  Goal: Achieves stable or improved neurological status  Description: INTERVENTIONS  - Monitor and report changes in neurological status  - Monitor vital signs such as temperature, blood pressure, glucose, and any other labs ordered   - Initiate measures to prevent increased intracranial pressure  - Monitor for seizure activity and implement precautions if appropriate      Outcome: Progressing  Goal: Remains free of injury related to seizures activity  Description: INTERVENTIONS  - Maintain airway, patient safety  and administer oxygen as ordered  - Monitor patient for seizure activity, document and report duration and description of seizure to physician/advanced practitioner  - If seizure occurs,  ensure patient safety during seizure  - Reorient patient post seizure  - Seizure pads on all 4 side rails  - Instruct patient/family to notify RN of any seizure activity including if an aura is experienced  - Instruct patient/family to call for assistance with activity based on nursing assessment  - Administer anti-seizure medications if ordered    Outcome: Progressing     Problem: CARDIOVASCULAR - ADULT  Goal: Maintains optimal cardiac output and hemodynamic stability  Description: INTERVENTIONS:  - Monitor I/O, vital signs and rhythm  - Monitor for S/S and trends of decreased cardiac output  - Administer and titrate ordered vasoactive medications to optimize hemodynamic stability  - Assess quality of pulses, skin color and temperature  - Assess for signs of decreased coronary artery perfusion  - Instruct patient to report change in severity of symptoms  Outcome: Progressing     Problem: PAIN - ADULT  Goal: Verbalizes/displays adequate comfort level or baseline comfort level  Description: Interventions:  - Encourage patient to monitor pain and request assistance  - Assess pain using appropriate pain scale  - Administer analgesics based on type and severity of pain and evaluate response  - Implement non-pharmacological measures as appropriate and evaluate response  - Consider cultural and social influences on pain and pain management  - Notify physician/advanced practitioner if interventions unsuccessful or patient reports new pain  Outcome: Progressing     Problem: INFECTION - ADULT  Goal: Absence or prevention of progression during hospitalization  Description: INTERVENTIONS:  - Assess and monitor for signs and symptoms of infection  - Monitor lab/diagnostic results  - Monitor all insertion sites, i e  indwelling lines, tubes, and drains  - Monitor endotracheal if appropriate and nasal secretions for changes in amount and color  - [place] appropriate cooling/warming therapies per order  - Administer medications as ordered  - Instruct and encourage patient and family to use good hand hygiene technique  - Identify and instruct in appropriate isolation precautions for identified infection/condition  Outcome: Progressing  Goal: Absence of fever/infection during neutropenic period  Description: INTERVENTIONS:  - Monitor WBC    Outcome: Progressing     Problem: SAFETY ADULT  Goal: Patient will remain free of falls  Description: INTERVENTIONS:  - Assess patient frequently for physical needs  -  Identify cognitive and physical deficits and behaviors that affect risk of falls    -  [place] fall precautions as indicated by assessment   - Educate patient/family on patient safety including physical limitations  - Instruct patient to call for assistance with activity based on assessment  - Modify environment to reduce risk of injury  - Consider OT/PT consult to assist with strengthening/mobility  Outcome: Progressing  Goal: Maintain or return to baseline ADL function  Description: INTERVENTIONS:  -  Assess patient's ability to carry out ADLs; assess patient's baseline for ADL function and identify physical deficits which impact ability to perform ADLs (bathing, care of mouth/teeth, toileting, grooming, dressing, etc )  - Assess/evaluate cause of self-care deficits   - Assess range of motion  - Assess patient's mobility; develop plan if impaired  - Assess patient's need for assistive devices and provide as appropriate  - Encourage maximum independence but intervene and supervise when necessary  - Involve family in performance of ADLs  - Assess for home care needs following discharge   - Consider OT consult to assist with ADL evaluation and planning for discharge  - Provide patient education as appropriate  Outcome: Progressing  Goal: Maintain or return mobility status to optimal level  Description: INTERVENTIONS:  - Assess patient's baseline mobility status (ambulation, transfers, stairs, etc )    - Identify cognitive and physical deficits and behaviors that affect mobility  - Identify mobility aids required to assist with transfers and/or ambulation (gait belt, sit-to-stand, lift, walker, cane, etc )  - [place] fall precautions as indicated by assessment  - Record patient progress and toleration of activity level on Mobility SBAR; progress patient to next Phase/Stage  - Instruct patient to call for assistance with activity based on assessment  - Consider rehabilitation consult to assist with strengthening/weightbearing, etc   Outcome: Progressing     Problem: Knowledge Deficit  Goal: Patient/family/caregiver demonstrates understanding of disease process, treatment plan, medications, and discharge instructions  Description: Complete learning assessment and assess knowledge base    Interventions:  - Provide teaching at level of understanding  - Provide teaching via preferred learning methods  Outcome: Progressing     Problem: DISCHARGE PLANNING  Goal: Discharge to home or other facility with appropriate resources  Description: INTERVENTIONS:  - Identify barriers to discharge w/patient and caregiver  - Arrange for needed discharge resources and transportation as appropriate  - Identify discharge learning needs (meds, wound care, etc )  - Arrange for interpretive services to assist at discharge as needed  - Refer to Case Management Department for coordinating discharge planning if the patient needs post-hospital services based on physician/advanced practitioner order or complex needs related to functional status, cognitive ability, or social support system  Outcome: Progressing     Problem: Potential for Falls  Goal: Patient will remain free of falls  Description: INTERVENTIONS:  - Assess patient frequently for physical needs  -  Identify cognitive and physical deficits and behaviors that affect risk of falls    -  [place] fall precautions as indicated by assessment   - Educate patient/family on patient safety including physical limitations  - Instruct patient to call for assistance with activity based on assessment  - Modify environment to reduce risk of injury  - Consider OT/PT consult to assist with strengthening/mobility  Outcome: Progressing

## 2020-08-29 NOTE — QUICK NOTE
Late entry note - the patient was seen by me this morning on rounds    Shateea is without complaints this AM  Denies h/a, vision changes, RUQ pain, contractions, vaginal bleeding  Endorses fetal movement  No questions      Vitals:    20 0723   BP: 140/82   Pulse: 82   Resp: 18   Temp: 98 2 °F (36 8 °C)   SpO2:      No severe range BP overnight    A/P:  36 yo  @ 27w6d admitted for inpatient management of preeclampsia with severe features  Continue inpatient management with ideal delivery at 34 weeks, sooner if worsening maternal or fetal status  Appreciate MFM recommendations, US next week for growth and UA dopplers

## 2020-08-29 NOTE — PROGRESS NOTES
Antepartum Progress Note - OB/GYN   Kash Bray 35 y o  female MRN: [id number]  Unit/Bed#: -01 Encounter: [id number]    Kash Bray is a patient of [address] [place] [address] Street    Assessment:  Teresita Eason [id number] @ 27w6d admitted for pre-eclampsia with severe features Hospital Day: 11 is stable and doing well she denies vaginal bleeding, leakage of fluid, contractions, and endorses fetal movement, no new Sx, continue inpatient management  Pt declined PICC line placement    Plan:  Pre-eclampsia with severe features diagnosed @26w3d   - - 140s/70-90s, SRBP 165/111 @ 0836 yesterday without repeat severe, pressures have been normal   - labs wnl, stable AST/ALT (21/22), Cr 0 99 -> 0 75, Hg 11 9 -> 11 1, plt 274k -> 272k   - No new signs and Sx of pre-eclampsia   - Adequate urine output noted   - No difficulty breathing  IUP 27w5d   - /moderate variability/10x10 reactive accelerations/no decelerations  FEN   - Regular diet  Disposition   - Inpatient      Subjective/Objective     Chief Complaint:     None    Subjective:   Pain: no  Tolerating Oral Intake: yes  Voiding: yes  Flatus: yes  Bowel Movement: yes  Ambulating: yes  Chest Pain: no  Shortness of Breath: no  Leg Pain/Discomfort: no    Objective:   Vitals:   /77 (BP Location: Right arm)   Pulse 91   Temp 98 4 °F (36 9 °C) (Oral)   Resp 18   Ht 5' 2" (1 575 m)   Wt 85 7 kg (189 lb)   LMP 02/16/2020 (Exact Date)   SpO2 98%   BMI 34 57 kg/m²   Body mass index is 34 57 kg/m²  I/O       08/22 0701 - 08/23 0700 08/23 0701 - 08/24 0700    P  O  220 480    Total Intake(mL/kg) 220 (2 6) 480 (5 6)    Urine (mL/kg/hr) 975 (0 5) 1075 (0 5)    Total Output 975 1075    Net -178 -414              Lab Results   Component Value Date    WBC 17 02 (H) 08/25/2020    HGB 11 1 (L) 08/25/2020    HCT 33 8 (L) 08/25/2020    MCV 93 08/25/2020     08/25/2020       Meds/Allergies   Current Facility-Administered Medications   Medication Dose Route Frequency    acetaminophen (TYLENOL) tablet 650 mg  650 mg Oral Q6H PRN    albuterol (PROVENTIL HFA,VENTOLIN HFA) inhaler 2 puff  2 puff Inhalation Q4H PRN    dupilumab (DUPIXENT) subcutaneous injection 300 mg  300 mg Subcutaneous Q14 Days    heparin (porcine) subcutaneous injection 5,000 Units  5,000 Units Subcutaneous Q12H [place]    NIFEdipine (PROCARDIA XL) 24 hr tablet 30 mg  30 mg Oral Daily    polyethylene glycol (MIRALAX) packet 17 g  17 g Oral Daily PRN    simethicone (MYLICON) chewable tablet 80 mg  80 mg Oral Q6H PRN    triamcinolone (KENALOG) 0 1 % cream   Topical BID       Physical Exam:  General: in no apparent distress, well developed and well nourished and non-toxic  Cardiovascular: regular rate and rhyhtym  Lungs: CTAB  Abdomen: abdomen is soft without significant tenderness, masses, organomegaly or guarding  Fundus: S = D, soft, non-tender  Lower extremeties: nontender    Katerina Mukherjee, DO  PGY-4 OB/GYN   8/29/2020 4:04 AM

## 2020-08-30 PROBLEM — O14.13 SEVERE PREECLAMPSIA, THIRD TRIMESTER: Status: ACTIVE | Noted: 2020-08-19

## 2020-08-30 PROCEDURE — NC001 PR NO CHARGE: Performed by: OBSTETRICS & GYNECOLOGY

## 2020-08-30 PROCEDURE — 99232 SBSQ HOSP IP/OBS MODERATE 35: CPT | Performed by: OBSTETRICS & GYNECOLOGY

## 2020-08-30 PROCEDURE — 59025 FETAL NON-STRESS TEST: CPT | Performed by: OBSTETRICS & GYNECOLOGY

## 2020-08-30 RX ADMIN — HEPARIN SODIUM 5000 UNITS: 5000 INJECTION INTRAVENOUS; SUBCUTANEOUS at 09:42

## 2020-08-30 RX ADMIN — Medication 1 TABLET: at 17:55

## 2020-08-30 RX ADMIN — TRIAMCINOLONE ACETONIDE: 1 CREAM TOPICAL at 17:55

## 2020-08-30 RX ADMIN — TRIAMCINOLONE ACETONIDE: 1 CREAM TOPICAL at 09:43

## 2020-08-30 RX ADMIN — NIFEDIPINE 30 MG: 30 TABLET, FILM COATED, EXTENDED RELEASE ORAL at 09:43

## 2020-08-30 RX ADMIN — HEPARIN SODIUM 5000 UNITS: 5000 INJECTION INTRAVENOUS; SUBCUTANEOUS at 20:03

## 2020-08-30 NOTE — PLAN OF CARE
Problem: ANTEPARTUM  Goal: Maintain pregnancy as long as maternal and/or fetal condition is stable  Description: INTERVENTIONS:  - Maternal surveillance  - Fetal surveillance  - Monitor uterine activity  - Medications as ordered  - Bedrest  Outcome: Progressing     Problem: NEUROSENSORY - ADULT  Goal: Achieves stable or improved neurological status  Description: INTERVENTIONS  - Monitor and report changes in neurological status  - Monitor vital signs such as temperature, blood pressure, glucose, and any other labs ordered   - Initiate measures to prevent increased intracranial pressure  - Monitor for seizure activity and implement precautions if appropriate      Outcome: Progressing  Goal: Remains free of injury related to seizures activity  Description: INTERVENTIONS  - Maintain airway, patient safety  and administer oxygen as ordered  - Monitor patient for seizure activity, document and report duration and description of seizure to physician/advanced practitioner  - If seizure occurs,  ensure patient safety during seizure  - Reorient patient post seizure  - Seizure pads on all 4 side rails  - Instruct patient/family to notify RN of any seizure activity including if an aura is experienced  - Instruct patient/family to call for assistance with activity based on nursing assessment  - Administer anti-seizure medications if ordered    Outcome: Progressing     Problem: CARDIOVASCULAR - ADULT  Goal: Maintains optimal cardiac output and hemodynamic stability  Description: INTERVENTIONS:  - Monitor I/O, vital signs and rhythm  - Monitor for S/S and trends of decreased cardiac output  - Administer and titrate ordered vasoactive medications to optimize hemodynamic stability  - Assess quality of pulses, skin color and temperature  - Assess for signs of decreased coronary artery perfusion  - Instruct patient to report change in severity of symptoms  Outcome: Progressing     Problem: PAIN - ADULT  Goal: Verbalizes/displays adequate comfort level or baseline comfort level  Description: Interventions:  - Encourage patient to monitor pain and request assistance  - Assess pain using appropriate pain scale  - Administer analgesics based on type and severity of pain and evaluate response  - Implement non-pharmacological measures as appropriate and evaluate response  - Consider cultural and social influences on pain and pain management  - Notify physician/advanced practitioner if interventions unsuccessful or patient reports new pain  Outcome: Progressing     Problem: INFECTION - ADULT  Goal: Absence or prevention of progression during hospitalization  Description: INTERVENTIONS:  - Assess and monitor for signs and symptoms of infection  - Monitor lab/diagnostic results  - Monitor all insertion sites, i e  indwelling lines, tubes, and drains  - Monitor endotracheal if appropriate and nasal secretions for changes in amount and color  - [place] appropriate cooling/warming therapies per order  - Administer medications as ordered  - Instruct and encourage patient and family to use good hand hygiene technique  - Identify and instruct in appropriate isolation precautions for identified infection/condition  Outcome: Progressing  Goal: Absence of fever/infection during neutropenic period  Description: INTERVENTIONS:  - Monitor WBC    Outcome: Progressing     Problem: SAFETY ADULT  Goal: Patient will remain free of falls  Description: INTERVENTIONS:  - Assess patient frequently for physical needs  -  Identify cognitive and physical deficits and behaviors that affect risk of falls    -  [place] fall precautions as indicated by assessment   - Educate patient/family on patient safety including physical limitations  - Instruct patient to call for assistance with activity based on assessment  - Modify environment to reduce risk of injury  - Consider OT/PT consult to assist with strengthening/mobility  Outcome: Progressing  Goal: Maintain or return to baseline ADL function  Description: INTERVENTIONS:  -  Assess patient's ability to carry out ADLs; assess patient's baseline for ADL function and identify physical deficits which impact ability to perform ADLs (bathing, care of mouth/teeth, toileting, grooming, dressing, etc )  - Assess/evaluate cause of self-care deficits   - Assess range of motion  - Assess patient's mobility; develop plan if impaired  - Assess patient's need for assistive devices and provide as appropriate  - Encourage maximum independence but intervene and supervise when necessary  - Involve family in performance of ADLs  - Assess for home care needs following discharge   - Consider OT consult to assist with ADL evaluation and planning for discharge  - Provide patient education as appropriate  Outcome: Progressing  Goal: Maintain or return mobility status to optimal level  Description: INTERVENTIONS:  - Assess patient's baseline mobility status (ambulation, transfers, stairs, etc )    - Identify cognitive and physical deficits and behaviors that affect mobility  - Identify mobility aids required to assist with transfers and/or ambulation (gait belt, sit-to-stand, lift, walker, cane, etc )  - [place] fall precautions as indicated by assessment  - Record patient progress and toleration of activity level on Mobility SBAR; progress patient to next Phase/Stage  - Instruct patient to call for assistance with activity based on assessment  - Consider rehabilitation consult to assist with strengthening/weightbearing, etc   Outcome: Progressing     Problem: Knowledge Deficit  Goal: Patient/family/caregiver demonstrates understanding of disease process, treatment plan, medications, and discharge instructions  Description: Complete learning assessment and assess knowledge base    Interventions:  - Provide teaching at level of understanding  - Provide teaching via preferred learning methods  Outcome: Progressing     Problem: DISCHARGE PLANNING  Goal: Discharge to home or other facility with appropriate resources  Description: INTERVENTIONS:  - Identify barriers to discharge w/patient and caregiver  - Arrange for needed discharge resources and transportation as appropriate  - Identify discharge learning needs (meds, wound care, etc )  - Arrange for interpretive services to assist at discharge as needed  - Refer to Case Management Department for coordinating discharge planning if the patient needs post-hospital services based on physician/advanced practitioner order or complex needs related to functional status, cognitive ability, or social support system  Outcome: Progressing     Problem: Potential for Falls  Goal: Patient will remain free of falls  Description: INTERVENTIONS:  - Assess patient frequently for physical needs  -  Identify cognitive and physical deficits and behaviors that affect risk of falls    -  [place] fall precautions as indicated by assessment   - Educate patient/family on patient safety including physical limitations  - Instruct patient to call for assistance with activity based on assessment  - Modify environment to reduce risk of injury  - Consider OT/PT consult to assist with strengthening/mobility  Outcome: Progressing

## 2020-08-30 NOTE — QUICK NOTE
No complaints  Denies h/a, vision changes, RUQ pain  Endorses fetal movement  Asking about prenatal vitamin - currently not receiving one  Vitals:    20 0841   BP: 128/63   Pulse: 78   Resp: 20   Temp: 98 4 °F (36 9 °C)   SpO2:        A/P:  34 yo  @ 28w0d admitted for preeclampsia with severe features  Normal to mild range BP only  Prenatal vitamins ordered  Continue current management with goal of delivery at 34 weeks

## 2020-08-30 NOTE — PROGRESS NOTES
Antepartum Progress Note - OB/GYN   Davide Sánchez 35 y o  female MRN: [id number]  Unit/Bed#: -01 Encounter: [id number]    Davide Sánchez is a patient of [facility]    Assessment:  Amy Merritt [id number] @ 28w0d admitted for pre-eclampsia with severe features Hospital Day: 12 is stable and doing well she denies vaginal bleeding, leakage of fluid, contractions, and endorses fetal movement, no new Sx, continue inpatient management  Pt declined PICC line placement    Plan:  Pre-eclampsia with severe features diagnosed @26w3d   - - 150s/70-90s, SRBP 165/111 @ 0836 without repeat severe, pressures have been normal   - labs wnl, stable AST/ALT (21/22), Cr 0 99 -> 0 75, Hg 11 9 -> 11 1, plt 274k -> 272k   - No new signs and Sx of pre-eclampsia   - Adequate urine output noted   - No difficulty breathing  IUP 28w0d   - /moderate variability/10x10 reactive accelerations/no decelerations  FEN   - Regular diet  Disposition   - Inpatient      Subjective/Objective     Chief Complaint:     None    Subjective:   Pain: no  Tolerating Oral Intake: yes  Voiding: yes  Flatus: yes  Bowel Movement: yes  Ambulating: yes  Chest Pain: no  Shortness of Breath: no  Leg Pain/Discomfort: no    Objective:   Vitals:   /87 (BP Location: Left arm)   Pulse 79   Temp 98 °F (36 7 °C) (Oral)   Resp 18   Ht 5' 2" (1 575 m)   Wt 85 7 kg (189 lb)   LMP 02/16/2020 (Exact Date)   SpO2 98%   BMI 34 57 kg/m²   Body mass index is 34 57 kg/m²  I/O       08/22 0701 - 08/23 0700 08/23 0701 - 08/24 0700    P  O  220 480    Total Intake(mL/kg) 220 (2 6) 480 (5 6)    Urine (mL/kg/hr) 975 (0 5) 1075 (0 5)    Total Output 975 1075    Net -357 -440              Lab Results   Component Value Date    WBC 17 02 (H) 08/25/2020    HGB 11 1 (L) 08/25/2020    HCT 33 8 (L) 08/25/2020    MCV 93 08/25/2020     08/25/2020       Meds/Allergies   Current Facility-Administered Medications   Medication Dose Route Frequency    acetaminophen (TYLENOL) tablet 650 mg  650 mg Oral Q6H PRN    albuterol (PROVENTIL HFA,VENTOLIN HFA) inhaler 2 puff  2 puff Inhalation Q4H PRN    dupilumab (DUPIXENT) subcutaneous injection 300 mg  300 mg Subcutaneous Q14 Days    heparin (porcine) subcutaneous injection 5,000 Units  5,000 Units Subcutaneous Q12H [place]    NIFEdipine (PROCARDIA XL) 24 hr tablet 30 mg  30 mg Oral Daily    polyethylene glycol (MIRALAX) packet 17 g  17 g Oral Daily PRN    simethicone (MYLICON) chewable tablet 80 mg  80 mg Oral Q6H PRN    triamcinolone (KENALOG) 0 1 % cream   Topical BID       Physical Exam:  General: in no apparent distress, well developed and well nourished and non-toxic  Cardiovascular: regular rate and rhyhtym  Lungs: CTAB  Abdomen: abdomen is soft without significant tenderness, masses, organomegaly or guarding  Fundus: S = D, soft, non-tender  Lower extremeties: nontender    Alfa Bernardo DO  PGY-4 OB/GYN   8/30/2020 7:32 AM

## 2020-08-31 PROBLEM — R06.02 SHORTNESS OF BREATH: Status: RESOLVED | Noted: 2020-08-19 | Resolved: 2020-08-31

## 2020-08-31 PROBLEM — Z3A.26 26 WEEKS GESTATION OF PREGNANCY: Status: RESOLVED | Noted: 2020-07-23 | Resolved: 2020-08-31

## 2020-08-31 LAB
ALBUMIN SERPL BCP-MCNC: 2.3 G/DL (ref 3.5–5)
ALP SERPL-CCNC: 127 U/L (ref 46–116)
ALT SERPL W P-5'-P-CCNC: 17 U/L (ref 12–78)
ANION GAP SERPL CALCULATED.3IONS-SCNC: 1 MMOL/L (ref 4–13)
AST SERPL W P-5'-P-CCNC: 13 U/L (ref 5–45)
BASOPHILS # BLD AUTO: 0.05 THOUSANDS/ΜL (ref 0–0.1)
BASOPHILS NFR BLD AUTO: 0 % (ref 0–1)
BILIRUB SERPL-MCNC: 0.25 MG/DL (ref 0.2–1)
BUN SERPL-MCNC: 16 MG/DL (ref 5–25)
CALCIUM SERPL-MCNC: 8.3 MG/DL (ref 8.3–10.1)
CHLORIDE SERPL-SCNC: 105 MMOL/L (ref 100–108)
CO2 SERPL-SCNC: 26 MMOL/L (ref 21–32)
CREAT SERPL-MCNC: 0.92 MG/DL (ref 0.6–1.3)
EOSINOPHIL # BLD AUTO: 0.22 THOUSAND/ΜL (ref 0–0.61)
EOSINOPHIL NFR BLD AUTO: 1 % (ref 0–6)
ERYTHROCYTE [DISTWIDTH] IN BLOOD BY AUTOMATED COUNT: 12.5 % (ref 11.6–15.1)
GFR SERPL CREATININE-BSD FRML MDRD: 97 ML/MIN/1.73SQ M
GLUCOSE 1H P 50 G GLC PO SERPL-MCNC: 119 MG/DL
GLUCOSE SERPL-MCNC: 73 MG/DL (ref 65–140)
HCT VFR BLD AUTO: 39.5 % (ref 34.8–46.1)
HGB BLD-MCNC: 13 G/DL (ref 11.5–15.4)
IMM GRANULOCYTES # BLD AUTO: 0.12 THOUSAND/UL (ref 0–0.2)
IMM GRANULOCYTES NFR BLD AUTO: 1 % (ref 0–2)
LYMPHOCYTES # BLD AUTO: 2.39 THOUSANDS/ΜL (ref 0.6–4.47)
LYMPHOCYTES NFR BLD AUTO: 15 % (ref 14–44)
MCH RBC QN AUTO: 30.7 PG (ref 26.8–34.3)
MCHC RBC AUTO-ENTMCNC: 32.9 G/DL (ref 31.4–37.4)
MCV RBC AUTO: 93 FL (ref 82–98)
MONOCYTES # BLD AUTO: 1.34 THOUSAND/ΜL (ref 0.17–1.22)
MONOCYTES NFR BLD AUTO: 8 % (ref 4–12)
NEUTROPHILS # BLD AUTO: 11.79 THOUSANDS/ΜL (ref 1.85–7.62)
NEUTS SEG NFR BLD AUTO: 75 % (ref 43–75)
NRBC BLD AUTO-RTO: 0 /100 WBCS
PLATELET # BLD AUTO: 281 THOUSANDS/UL (ref 149–390)
PMV BLD AUTO: 10.1 FL (ref 8.9–12.7)
POTASSIUM SERPL-SCNC: 4.5 MMOL/L (ref 3.5–5.3)
PROT SERPL-MCNC: 6.3 G/DL (ref 6.4–8.2)
RBC # BLD AUTO: 4.23 MILLION/UL (ref 3.81–5.12)
SODIUM SERPL-SCNC: 132 MMOL/L (ref 136–145)
WBC # BLD AUTO: 15.91 THOUSAND/UL (ref 4.31–10.16)

## 2020-08-31 PROCEDURE — 59025 FETAL NON-STRESS TEST: CPT | Performed by: OBSTETRICS & GYNECOLOGY

## 2020-08-31 PROCEDURE — NC001 PR NO CHARGE: Performed by: OBSTETRICS & GYNECOLOGY

## 2020-08-31 PROCEDURE — 80053 COMPREHEN METABOLIC PANEL: CPT | Performed by: OBSTETRICS & GYNECOLOGY

## 2020-08-31 PROCEDURE — 82950 GLUCOSE TEST: CPT | Performed by: OBSTETRICS & GYNECOLOGY

## 2020-08-31 PROCEDURE — 99232 SBSQ HOSP IP/OBS MODERATE 35: CPT | Performed by: OBSTETRICS & GYNECOLOGY

## 2020-08-31 PROCEDURE — 85025 COMPLETE CBC W/AUTO DIFF WBC: CPT | Performed by: OBSTETRICS & GYNECOLOGY

## 2020-08-31 RX ADMIN — HEPARIN SODIUM 5000 UNITS: 5000 INJECTION INTRAVENOUS; SUBCUTANEOUS at 21:52

## 2020-08-31 RX ADMIN — TRIAMCINOLONE ACETONIDE: 1 CREAM TOPICAL at 09:20

## 2020-08-31 RX ADMIN — Medication 1 TABLET: at 08:39

## 2020-08-31 RX ADMIN — NIFEDIPINE 30 MG: 30 TABLET, FILM COATED, EXTENDED RELEASE ORAL at 08:39

## 2020-08-31 RX ADMIN — HEPARIN SODIUM 5000 UNITS: 5000 INJECTION INTRAVENOUS; SUBCUTANEOUS at 08:40

## 2020-08-31 RX ADMIN — TRIAMCINOLONE ACETONIDE: 1 CREAM TOPICAL at 18:02

## 2020-08-31 NOTE — PROGRESS NOTES
Progress Note - Maternal Fetal Medicine   [facility] 35 y o  female MRN: [id number]  Unit/Bed#:  332-01 Encounter: [id number]    Assessment:  35 y o   at 28w1d admitted with pre-eclampsia with severe features  Hospital day 13  Plan:  1  Pre-eclampsia with severe features   - BP overnight: 125-150/78-98, no SR overnight   - Procardia XL 30mg daily    - Creatinine is stable, AST/ALT improved, platelets stable    - UO: 0 8 cc/kg/hour    2  FEN: Regular diet    3  Disposition: inpatient     Subjective/Objective     Subjective:     Patient is doing well  She has no complaints  She denies headache, changes in vision, RUQ pain and sudden onset edema  Objective:     Vitals:   Temp:  [98 1 °F (36 7 °C)-98 5 °F (36 9 °C)] 98 1 °F (36 7 °C)  HR:  [81-97] 90  Resp:  [16-20] 16  BP: (125-150)/(64-98) 132/64     Physical Exam:     Physical Exam  Constitutional:       Appearance: Normal appearance  She is well-developed  HENT:      Head: Normocephalic and atraumatic  Neck:      Musculoskeletal: Normal range of motion  Cardiovascular:      Rate and Rhythm: Normal rate and regular rhythm  Heart sounds: Normal heart sounds  No murmur  No gallop  Pulmonary:      Effort: Pulmonary effort is normal  No respiratory distress  Breath sounds: Normal breath sounds  No stridor  No wheezing or rales  Abdominal:      General: There is no distension  Palpations: Abdomen is soft  There is no mass  Tenderness: There is no abdominal tenderness  There is no guarding  Musculoskeletal:         General: No tenderness or deformity  Skin:     General: Skin is warm and dry  Neurological:      Mental Status: She is alert and oriented to person, place, and time  Psychiatric:         Behavior: Behavior normal          Thought Content:  Thought content normal          Judgment: Judgment normal        Fetal Assessment:  FHT: 145 / Moderate 6 - 25 bpm /no accelerations nor decelerations  [place]: none    Lab Results   Component Value Date    WBC 15 91 (H) 08/31/2020    HGB 13 0 08/31/2020    HCT 39 5 08/31/2020    MCV 93 08/31/2020     08/31/2020       Lab Results   Component Value Date    CALCIUM 8 3 08/31/2020    K 4 5 08/31/2020    CO2 26 08/31/2020     08/31/2020    BUN 16 08/31/2020    CREATININE 0 92 08/31/2020       Lab Results   Component Value Date    ALT 17 08/31/2020    AST 13 08/31/2020    ALKPHOS 127 (H) 08/31/2020       Zara Waterman MD  OBGYN, PGY-3  8/31/2020  10:39 AM

## 2020-08-31 NOTE — PLAN OF CARE
Problem: ANTEPARTUM  Goal: Maintain pregnancy as long as maternal and/or fetal condition is stable  Description: INTERVENTIONS:  - Maternal surveillance  - Fetal surveillance  - Monitor uterine activity  - Medications as ordered  - Bedrest  Outcome: Progressing     Problem: NEUROSENSORY - ADULT  Goal: Achieves stable or improved neurological status  Description: INTERVENTIONS  - Monitor and report changes in neurological status  - Monitor vital signs such as temperature, blood pressure, glucose, and any other labs ordered   - Initiate measures to prevent increased intracranial pressure  - Monitor for seizure activity and implement precautions if appropriate      Outcome: Progressing  Goal: Remains free of injury related to seizures activity  Description: INTERVENTIONS  - Maintain airway, patient safety  and administer oxygen as ordered  - Monitor patient for seizure activity, document and report duration and description of seizure to physician/advanced practitioner  - If seizure occurs,  ensure patient safety during seizure  - Reorient patient post seizure  - Seizure pads on all 4 side rails  - Instruct patient/family to notify RN of any seizure activity including if an aura is experienced  - Instruct patient/family to call for assistance with activity based on nursing assessment  - Administer anti-seizure medications if ordered    Outcome: Progressing     Problem: CARDIOVASCULAR - ADULT  Goal: Maintains optimal cardiac output and hemodynamic stability  Description: INTERVENTIONS:  - Monitor I/O, vital signs and rhythm  - Monitor for S/S and trends of decreased cardiac output  - Administer and titrate ordered vasoactive medications to optimize hemodynamic stability  - Assess quality of pulses, skin color and temperature  - Assess for signs of decreased coronary artery perfusion  - Instruct patient to report change in severity of symptoms  Outcome: Progressing     Problem: PAIN - ADULT  Goal: Verbalizes/displays adequate comfort level or baseline comfort level  Description: Interventions:  - Encourage patient to monitor pain and request assistance  - Assess pain using appropriate pain scale  - Administer analgesics based on type and severity of pain and evaluate response  - Implement non-pharmacological measures as appropriate and evaluate response  - Consider cultural and social influences on pain and pain management  - Notify physician/advanced practitioner if interventions unsuccessful or patient reports new pain  Outcome: Progressing     Problem: INFECTION - ADULT  Goal: Absence or prevention of progression during hospitalization  Description: INTERVENTIONS:  - Assess and monitor for signs and symptoms of infection  - Monitor lab/diagnostic results  - Monitor all insertion sites, i e  indwelling lines, tubes, and drains  - Monitor endotracheal if appropriate and nasal secretions for changes in amount and color  - [place] appropriate cooling/warming therapies per order  - Administer medications as ordered  - Instruct and encourage patient and family to use good hand hygiene technique  - Identify and instruct in appropriate isolation precautions for identified infection/condition  Outcome: Progressing  Goal: Absence of fever/infection during neutropenic period  Description: INTERVENTIONS:  - Monitor WBC    Outcome: Progressing     Problem: SAFETY ADULT  Goal: Patient will remain free of falls  Description: INTERVENTIONS:  - Assess patient frequently for physical needs  -  Identify cognitive and physical deficits and behaviors that affect risk of falls    -  [place] fall precautions as indicated by assessment   - Educate patient/family on patient safety including physical limitations  - Instruct patient to call for assistance with activity based on assessment  - Modify environment to reduce risk of injury  - Consider OT/PT consult to assist with strengthening/mobility  Outcome: Progressing  Goal: Maintain or return to baseline ADL function  Description: INTERVENTIONS:  -  Assess patient's ability to carry out ADLs; assess patient's baseline for ADL function and identify physical deficits which impact ability to perform ADLs (bathing, care of mouth/teeth, toileting, grooming, dressing, etc )  - Assess/evaluate cause of self-care deficits   - Assess range of motion  - Assess patient's mobility; develop plan if impaired  - Assess patient's need for assistive devices and provide as appropriate  - Encourage maximum independence but intervene and supervise when necessary  - Involve family in performance of ADLs  - Assess for home care needs following discharge   - Consider OT consult to assist with ADL evaluation and planning for discharge  - Provide patient education as appropriate  Outcome: Progressing  Goal: Maintain or return mobility status to optimal level  Description: INTERVENTIONS:  - Assess patient's baseline mobility status (ambulation, transfers, stairs, etc )    - Identify cognitive and physical deficits and behaviors that affect mobility  - Identify mobility aids required to assist with transfers and/or ambulation (gait belt, sit-to-stand, lift, walker, cane, etc )  - [place] fall precautions as indicated by assessment  - Record patient progress and toleration of activity level on Mobility SBAR; progress patient to next Phase/Stage  - Instruct patient to call for assistance with activity based on assessment  - Consider rehabilitation consult to assist with strengthening/weightbearing, etc   Outcome: Progressing     Problem: Knowledge Deficit  Goal: Patient/family/caregiver demonstrates understanding of disease process, treatment plan, medications, and discharge instructions  Description: Complete learning assessment and assess knowledge base    Interventions:  - Provide teaching at level of understanding  - Provide teaching via preferred learning methods  Outcome: Progressing     Problem: DISCHARGE PLANNING  Goal: Discharge to home or other facility with appropriate resources  Description: INTERVENTIONS:  - Identify barriers to discharge w/patient and caregiver  - Arrange for needed discharge resources and transportation as appropriate  - Identify discharge learning needs (meds, wound care, etc )  - Arrange for interpretive services to assist at discharge as needed  - Refer to Case Management Department for coordinating discharge planning if the patient needs post-hospital services based on physician/advanced practitioner order or complex needs related to functional status, cognitive ability, or social support system  Outcome: Progressing     Problem: Potential for Falls  Goal: Patient will remain free of falls  Description: INTERVENTIONS:  - Assess patient frequently for physical needs  -  Identify cognitive and physical deficits and behaviors that affect risk of falls    -  [place] fall precautions as indicated by assessment   - Educate patient/family on patient safety including physical limitations  - Instruct patient to call for assistance with activity based on assessment  - Modify environment to reduce risk of injury  - Consider OT/PT consult to assist with strengthening/mobility  Outcome: Progressing

## 2020-09-01 PROCEDURE — 86901 BLOOD TYPING SEROLOGIC RH(D): CPT | Performed by: OBSTETRICS & GYNECOLOGY

## 2020-09-01 PROCEDURE — 90715 TDAP VACCINE 7 YRS/> IM: CPT | Performed by: OBSTETRICS & GYNECOLOGY

## 2020-09-01 PROCEDURE — 86850 RBC ANTIBODY SCREEN: CPT | Performed by: OBSTETRICS & GYNECOLOGY

## 2020-09-01 PROCEDURE — 87081 CULTURE SCREEN ONLY: CPT | Performed by: OBSTETRICS & GYNECOLOGY

## 2020-09-01 PROCEDURE — 86900 BLOOD TYPING SEROLOGIC ABO: CPT | Performed by: OBSTETRICS & GYNECOLOGY

## 2020-09-01 RX ADMIN — TRIAMCINOLONE ACETONIDE: 1 CREAM TOPICAL at 18:08

## 2020-09-01 RX ADMIN — NIFEDIPINE 30 MG: 30 TABLET, FILM COATED, EXTENDED RELEASE ORAL at 09:15

## 2020-09-01 RX ADMIN — HEPARIN SODIUM 5000 UNITS: 5000 INJECTION INTRAVENOUS; SUBCUTANEOUS at 09:15

## 2020-09-01 RX ADMIN — TRIAMCINOLONE ACETONIDE: 1 CREAM TOPICAL at 09:17

## 2020-09-01 RX ADMIN — TETANUS TOXOID, REDUCED DIPHTHERIA TOXOID AND ACELLULAR PERTUSSIS VACCINE, ADSORBED 0.5 ML: 5; 2.5; 8; 8; 2.5 SUSPENSION INTRAMUSCULAR at 16:28

## 2020-09-01 RX ADMIN — HEPARIN SODIUM 5000 UNITS: 5000 INJECTION INTRAVENOUS; SUBCUTANEOUS at 21:11

## 2020-09-01 RX ADMIN — Medication 1 TABLET: at 09:15

## 2020-09-01 NOTE — PLAN OF CARE
Problem: ANTEPARTUM  Goal: Maintain pregnancy as long as maternal and/or fetal condition is stable  Description: INTERVENTIONS:  - Maternal surveillance  - Fetal surveillance  - Monitor uterine activity  - Medications as ordered  - Bedrest  Outcome: Progressing     Problem: NEUROSENSORY - ADULT  Goal: Achieves stable or improved neurological status  Description: INTERVENTIONS  - Monitor and report changes in neurological status  - Monitor vital signs such as temperature, blood pressure, glucose, and any other labs ordered   - Initiate measures to prevent increased intracranial pressure  - Monitor for seizure activity and implement precautions if appropriate      Outcome: Progressing  Goal: Remains free of injury related to seizures activity  Description: INTERVENTIONS  - Maintain airway, patient safety  and administer oxygen as ordered  - Monitor patient for seizure activity, document and report duration and description of seizure to physician/advanced practitioner  - If seizure occurs,  ensure patient safety during seizure  - Reorient patient post seizure  - Seizure pads on all 4 side rails  - Instruct patient/family to notify RN of any seizure activity including if an aura is experienced  - Instruct patient/family to call for assistance with activity based on nursing assessment  - Administer anti-seizure medications if ordered    Outcome: Progressing     Problem: CARDIOVASCULAR - ADULT  Goal: Maintains optimal cardiac output and hemodynamic stability  Description: INTERVENTIONS:  - Monitor I/O, vital signs and rhythm  - Monitor for S/S and trends of decreased cardiac output  - Administer and titrate ordered vasoactive medications to optimize hemodynamic stability  - Assess quality of pulses, skin color and temperature  - Assess for signs of decreased coronary artery perfusion  - Instruct patient to report change in severity of symptoms  Outcome: Progressing     Problem: PAIN - ADULT  Goal: Verbalizes/displays adequate comfort level or baseline comfort level  Description: Interventions:  - Encourage patient to monitor pain and request assistance  - Assess pain using appropriate pain scale  - Administer analgesics based on type and severity of pain and evaluate response  - Implement non-pharmacological measures as appropriate and evaluate response  - Consider cultural and social influences on pain and pain management  - Notify physician/advanced practitioner if interventions unsuccessful or patient reports new pain  Outcome: Progressing     Problem: INFECTION - ADULT  Goal: Absence or prevention of progression during hospitalization  Description: INTERVENTIONS:  - Assess and monitor for signs and symptoms of infection  - Monitor lab/diagnostic results  - Monitor all insertion sites, i e  indwelling lines, tubes, and drains  - Monitor endotracheal if appropriate and nasal secretions for changes in amount and color  - [place] appropriate cooling/warming therapies per order  - Administer medications as ordered  - Instruct and encourage patient and family to use good hand hygiene technique  - Identify and instruct in appropriate isolation precautions for identified infection/condition  Outcome: Progressing  Goal: Absence of fever/infection during neutropenic period  Description: INTERVENTIONS:  - Monitor WBC    Outcome: Progressing     Problem: SAFETY ADULT  Goal: Patient will remain free of falls  Description: INTERVENTIONS:  - Assess patient frequently for physical needs  -  Identify cognitive and physical deficits and behaviors that affect risk of falls    -  [place] fall precautions as indicated by assessment   - Educate patient/family on patient safety including physical limitations  - Instruct patient to call for assistance with activity based on assessment  - Modify environment to reduce risk of injury  - Consider OT/PT consult to assist with strengthening/mobility  Outcome: Progressing  Goal: Maintain or return to baseline ADL function  Description: INTERVENTIONS:  -  Assess patient's ability to carry out ADLs; assess patient's baseline for ADL function and identify physical deficits which impact ability to perform ADLs (bathing, care of mouth/teeth, toileting, grooming, dressing, etc )  - Assess/evaluate cause of self-care deficits   - Assess range of motion  - Assess patient's mobility; develop plan if impaired  - Assess patient's need for assistive devices and provide as appropriate  - Encourage maximum independence but intervene and supervise when necessary  - Involve family in performance of ADLs  - Assess for home care needs following discharge   - Consider OT consult to assist with ADL evaluation and planning for discharge  - Provide patient education as appropriate  Outcome: Progressing  Goal: Maintain or return mobility status to optimal level  Description: INTERVENTIONS:  - Assess patient's baseline mobility status (ambulation, transfers, stairs, etc )    - Identify cognitive and physical deficits and behaviors that affect mobility  - Identify mobility aids required to assist with transfers and/or ambulation (gait belt, sit-to-stand, lift, walker, cane, etc )  - [place] fall precautions as indicated by assessment  - Record patient progress and toleration of activity level on Mobility SBAR; progress patient to next Phase/Stage  - Instruct patient to call for assistance with activity based on assessment  - Consider rehabilitation consult to assist with strengthening/weightbearing, etc   Outcome: Progressing     Problem: Knowledge Deficit  Goal: Patient/family/caregiver demonstrates understanding of disease process, treatment plan, medications, and discharge instructions  Description: Complete learning assessment and assess knowledge base    Interventions:  - Provide teaching at level of understanding  - Provide teaching via preferred learning methods  Outcome: Progressing     Problem: DISCHARGE PLANNING  Goal: Discharge to home or other facility with appropriate resources  Description: INTERVENTIONS:  - Identify barriers to discharge w/patient and caregiver  - Arrange for needed discharge resources and transportation as appropriate  - Identify discharge learning needs (meds, wound care, etc )  - Arrange for interpretive services to assist at discharge as needed  - Refer to Case Management Department for coordinating discharge planning if the patient needs post-hospital services based on physician/advanced practitioner order or complex needs related to functional status, cognitive ability, or social support system  Outcome: Progressing     Problem: Potential for Falls  Goal: Patient will remain free of falls  Description: INTERVENTIONS:  - Assess patient frequently for physical needs  -  Identify cognitive and physical deficits and behaviors that affect risk of falls    -  [place] fall precautions as indicated by assessment   - Educate patient/family on patient safety including physical limitations  - Instruct patient to call for assistance with activity based on assessment  - Modify environment to reduce risk of injury  - Consider OT/PT consult to assist with strengthening/mobility  Outcome: Progressing

## 2020-09-01 NOTE — PROGRESS NOTES
Progress Note - Maternal Fetal Medicine   Dwight Read 35 y o  female MRN: [id number]  Unit/Bed#: -01 Encounter: [id number]    Assessment:  35 y o   at 28w1d admitted with pre-eclampsia with severe features  Hospital day 13  Plan:  1  Pre-eclampsia with severe features   - BP overnight: 138-148/75-98, no SR overnight   - Procardia XL 30mg daily    - Creatinine is stable, AST/ALT improved, platelets stable    - UO: 0 6 cc/kg/hour    2  Routine Prenatal care   - s/p 1 hour GTT   - Patient is now agreeable to Tdap which will be given today    - T&S due today     3  FEN: Regular diet    Subjective/Objective     Subjective:     Patient is doing well  She has no complaints  She denies headache, changes in vision, RUQ pain and sudden onset edema  She underwent 1 hour glucola yesterday and passed with a result of 119  Objective:     Vitals:   Temp:  [98 1 °F (36 7 °C)] 98 1 °F (36 7 °C)  HR:  [] 97  Resp:  [16-20] 20  BP: (132-148)/(64-98) 148/98     Physical Exam:     Physical Exam  Constitutional:       Appearance: Normal appearance  She is well-developed  HENT:      Head: Normocephalic and atraumatic  Neck:      Musculoskeletal: Normal range of motion  Cardiovascular:      Rate and Rhythm: Normal rate and regular rhythm  Heart sounds: Normal heart sounds  No murmur  No gallop  Pulmonary:      Effort: Pulmonary effort is normal  No respiratory distress  Breath sounds: Normal breath sounds  No stridor  No wheezing or rales  Abdominal:      General: There is no distension  Palpations: Abdomen is soft  There is no mass  Tenderness: There is no abdominal tenderness  There is no guarding  Musculoskeletal: Normal range of motion  General: No swelling, tenderness, deformity or signs of injury  Right lower leg: No edema  Left lower leg: No edema  Skin:     General: Skin is warm and dry     Neurological:      Mental Status: She is alert and oriented to person, place, and time  Psychiatric:         Behavior: Behavior normal          Thought Content:  Thought content normal          Judgment: Judgment normal        Fetal Assessment:  FHT: 155 / Moderate 6 - 25 bpm /no accelerations nor decelerations  [place]: none    Lab Results   Component Value Date    WBC 15 91 (H) 08/31/2020    HGB 13 0 08/31/2020    HCT 39 5 08/31/2020    MCV 93 08/31/2020     08/31/2020       Lab Results   Component Value Date    CALCIUM 8 3 08/31/2020    K 4 5 08/31/2020    CO2 26 08/31/2020     08/31/2020    BUN 16 08/31/2020    CREATININE 0 92 08/31/2020       Lab Results   Component Value Date    ALT 17 08/31/2020    AST 13 08/31/2020    ALKPHOS 127 (H) 08/31/2020       Joy Hayden MD  OBGYN, PGY-3  9/1/2020  6:57 AM

## 2020-09-01 NOTE — QUICK NOTE
Quick Note - Attending Rounds    Assessment:    80-year-old  3 para 0020 at 21, 1 days admitted with preeclampsia with severe features  Hospital day 13  Plan:  1  Preeclampsia with severe features  -  Continue to monitor blood pressure  -  Creatinine stable  -  Continue Procardia XL 30 mg daily  2  Routine prenatal care   -   passed 1 hour glucose test    -   Tdap vaccination today   -   type and screen today  -   continue nonstress testing   -   growth ultrasound this week per /Maternal-Fetal Medicine recommendations  -   continue inpatient management        Subjective:    Patient has no complaints today  She is unhappy about being in the hospital   We discussed her clinical situation  She will have a growth ultrasound this week  Patient denies contractions, leakage of fluid, vaginal bleeding, headaches, blurry vision, vision changes, right upper quadrant pain  She reports good fetal movement  /74 (BP Location: Right arm)   Pulse 79   Temp 98 2 °F (36 8 °C) (Oral)   Resp 20   Ht 5' 2" (1 575 m)   Wt 85 7 kg (189 lb)   LMP 2020 (Exact Date)   SpO2 98%   BMI 34 57 kg/m²     Physical Exam  Constitutional:       Appearance: Normal appearance  Cardiovascular:      Rate and Rhythm: Normal rate  Pulmonary:      Effort: Pulmonary effort is normal  No respiratory distress  Abdominal:      Comments: Gravid     Neurological:      Mental Status: She is alert  Psychiatric:         Mood and Affect: Mood normal          Behavior: Behavior normal          Thought Content: Thought content normal    Vitals signs reviewed  Nonstress test reviewed  Labs reviewed

## 2020-09-02 PROCEDURE — 99231 SBSQ HOSP IP/OBS SF/LOW 25: CPT | Performed by: OBSTETRICS & GYNECOLOGY

## 2020-09-02 PROCEDURE — NC001 PR NO CHARGE: Performed by: OBSTETRICS & GYNECOLOGY

## 2020-09-02 PROCEDURE — 59025 FETAL NON-STRESS TEST: CPT | Performed by: OBSTETRICS & GYNECOLOGY

## 2020-09-02 PROCEDURE — 99232 SBSQ HOSP IP/OBS MODERATE 35: CPT | Performed by: OBSTETRICS & GYNECOLOGY

## 2020-09-02 RX ADMIN — TRIAMCINOLONE ACETONIDE 1 APPLICATION: 1 CREAM TOPICAL at 09:10

## 2020-09-02 RX ADMIN — HEPARIN SODIUM 5000 UNITS: 5000 INJECTION INTRAVENOUS; SUBCUTANEOUS at 21:58

## 2020-09-02 RX ADMIN — NIFEDIPINE 30 MG: 30 TABLET, FILM COATED, EXTENDED RELEASE ORAL at 11:06

## 2020-09-02 RX ADMIN — TRIAMCINOLONE ACETONIDE 1 APPLICATION: 1 CREAM TOPICAL at 17:50

## 2020-09-02 RX ADMIN — Medication 1 TABLET: at 09:06

## 2020-09-02 RX ADMIN — HEPARIN SODIUM 5000 UNITS: 5000 INJECTION INTRAVENOUS; SUBCUTANEOUS at 09:06

## 2020-09-02 NOTE — PROGRESS NOTES
Progress Note - Maternal Fetal Medicine   Kash Brya 35 y o  female MRN: [id number]  Unit/Bed#: -01 Encounter: [id number]    Assessment:  35 y o   at 28w2d admitted with pre-eclampsia with severe features  Hospital day 14  Plan:  1  Pre-eclampsia with severe features   - BP overnight: 128-138/85-91, no SR overnight   - Procardia XL 30mg daily    - Creatinine is stable, AST/ALT improved, platelets stable    - UO: 0 6 cc/kg/hour    2  Routine Prenatal care   - Passed 1 hour GTT    - s/p Tdap   - T&/    - - patient refusing IV, she understands the risks of not having immediate venous access in case of an emergency   - Growth scan scheduled for tomorrow, 9/3    3  FEN: Regular diet    Subjective/Objective     Subjective:     Patient is doing well  She has no complaints  She denies headache, changes in vision, RUQ pain and sudden onset edema  Patient is declining IV access  She has been counseled about the risks  Objective:     Vitals:   Vitals:    20 2315   BP: 128/85   Pulse: 92   Resp: 18   Temp: 98 4 °F (36 9 °C)   SpO2: 100%         Physical Exam:     Physical Exam  Constitutional:       Appearance: Normal appearance  She is well-developed  HENT:      Head: Normocephalic and atraumatic  Neck:      Musculoskeletal: Normal range of motion  Cardiovascular:      Rate and Rhythm: Normal rate and regular rhythm  Heart sounds: Normal heart sounds  No murmur  No gallop  Pulmonary:      Effort: Pulmonary effort is normal  No respiratory distress  Breath sounds: Normal breath sounds  No stridor  No wheezing or rales  Abdominal:      General: There is no distension  Palpations: Abdomen is soft  There is no mass  Tenderness: There is no abdominal tenderness  There is no guarding  Musculoskeletal: Normal range of motion  General: No swelling, tenderness, deformity or signs of injury  Right lower leg: No edema  Left lower leg: No edema  Skin:     General: Skin is warm and dry  Neurological:      Mental Status: She is alert and oriented to person, place, and time  Psychiatric:         Behavior: Behavior normal          Thought Content:  Thought content normal          Judgment: Judgment normal        Fetal Assessment:  FHT: 150 / Moderate 6 - 25 bpm /no accelerations nor decelerations  [place]: none    Lab Results   Component Value Date    WBC 15 91 (H) 08/31/2020    HGB 13 0 08/31/2020    HCT 39 5 08/31/2020    MCV 93 08/31/2020     08/31/2020       Lab Results   Component Value Date    CALCIUM 8 3 08/31/2020    K 4 5 08/31/2020    CO2 26 08/31/2020     08/31/2020    BUN 16 08/31/2020    CREATININE 0 92 08/31/2020       Lab Results   Component Value Date    ALT 17 08/31/2020    AST 13 08/31/2020    ALKPHOS 127 (H) 08/31/2020       Steph Kirkpatrick MD  OBGYN, PGY-3  9/2/2020  5:52 AM

## 2020-09-02 NOTE — SOCIAL WORK
Consult: Pt requesting to see case management  Pt states has no income due to being in hospital; needs follow up on Thursday  SWCM unable to see patient today  Will follow up on Thursday for assessment and to provide appropriate resources

## 2020-09-02 NOTE — UTILIZATION REVIEW
Continued Stay Review    Date: *09-02-20                       Current Patient Class:  inpatient  Current Level of Care: medical    HPI:30 y o  female initially admitted on  08-18-20 for severe preeclampsia @ 26 3/7 wks    Assessment/Plan: HD # 14   28 2/7 wks    BP's over night 128-138/85-91 continues procardia XL 30 mg daily Creatinine is stable, AST/ALT improved, platelets stable   Growth scan 09-03-20   NST q shift  Continue inpatient admission until delivery   Delivery is recommended at 34 weeks gestation for the indication of preeclampsia with severe features, sooner if otherwise clinically indicated  Fetal Assessment:  FHT: 150 / Moderate 6 - 25 bpm /no accelerations nor decelerations  [place]: none      Pertinent Labs/Diagnostic Results:       Results from last 7 days   Lab Units 08/31/20  0509   WBC Thousand/uL 15 91*   HEMOGLOBIN g/dL 13 0   HEMATOCRIT % 39 5   PLATELETS Thousands/uL 281   NEUTROS ABS Thousands/µL 11 79*         Results from last 7 days   Lab Units 08/31/20  0510   SODIUM mmol/L 132*   POTASSIUM mmol/L 4 5   CHLORIDE mmol/L 105   CO2 mmol/L 26   ANION GAP mmol/L 1*   BUN mg/dL 16   CREATININE mg/dL 0 92   EGFR ml/min/1 73sq m 97   CALCIUM mg/dL 8 3     Results from last 7 days   Lab Units 08/31/20  0510   AST U/L 13   ALT U/L 17   ALK PHOS U/L 127*   TOTAL PROTEIN g/dL 6 3*   ALBUMIN g/dL 2 3*   TOTAL BILIRUBIN mg/dL 0 25         Results from last 7 days   Lab Units 08/31/20  0510   GLUCOSE RANDOM mg/dL 73         Vital Signs:   Date/Time   Temp   Pulse   Resp   BP   SpO2   O2 Device   Cardiac (WDL)   Patient Position - Orthostatic VS    09/02/20 0901   97 9 °F (36 6 °C)   88   18   108/59   --   --   --   Lying    09/02/20 0430   --   86   18   136/99   --   --   --   Sitting    09/01/20 2315   98 4 °F (36 9 °C)   92   18   128/85   100 %   None (Room air)   --   Sitting    09/01/20 2014   --   93   18   138/91   --   None (Room air)   WDL   Sitting    09/01/20 1708   98 5 °F (36 9 °C)   93 18   130/85   99 %   --   --   --    09/01/20 1630   --   --   --   --   --   --   --   --    Comment rows:    OBSERV: offers no complaints at this time  at 09/01/20 1630    09/01/20 1550   --   --   --   --   --   --   --   --    Comment rows:    OBSERV: patient requesting that iv not be put back in  at 09/01/20 1550    09/01/20 1500   --   --   --   --   --   --   --   --    Comment rows:    OBSERV: called into room, cannot stand new iv site  requesting it be pulled  at 09/01/20 1500    09/01/20 1256   --   79   20   132/74   --   --   --   Sitting    09/01/20 0900   --   --   --   --   --   --   --   --    Comment rows:    OBSERV: according to the patient, baby is active  denies s/s of labor  offers no complaints  at 09/01/20 0900    09/01/20 0823   98 2 °F (36 8 °C)   89   20   140/82   --   --   --   Sitting    08/31/20 2342   98 1 °F (36 7 °C)   97   20   148/98   --   None (Room air)   --   --    Comment rows:    OBSERV: Pt denies leakage of fluid, vaginal bleedin, and ctx   Pt reports positive fetal movement at 08/31/20 2342    08/31/20 2100   --   --   --   --   --   None (Room air)   WDL   --    08/31/20 1943   --   --   --   141/89   --   --   --   Standing    08/31/20 1606   98 1 °F (36 7 °C)   106Abnormal     18   135/75   --   --   --   Lying    Comment rows:    OBSERV: offers no complaints  at 08/31/20 1606    08/31/20 1240   --   87   --   138/88   --   --   --   Sitting    08/31/20 0825   98 1 °F (36 7 °C)   90   16   132/64   --   --   --   Lying    Comment rows:    OBSERV: according to the patient, baby is active, denies s/s of labor   at 08/31/20 0825    08/31/20 0446   --   81   16   125/78   98 %   --   --   Lying    08/31/20 0436   98 1 °F (36 7 °C)   --   --   --   --   --   --   --    08/31/20 0000   98 5 °F (36 9 °C)                        Medications:   Scheduled Medications:  dupilumab, 300 mg, Subcutaneous, Q14 Days  heparin (porcine), 5,000 Units, Subcutaneous, Q12H [place]  NIFEdipine, 30 mg, Oral, Daily  prenatal multivitamin, 1 tablet, Oral, Daily  triamcinolone, , Topical, BID      Continuous IV Infusions:     PRN Meds:  acetaminophen, 650 mg, Oral, Q6H PRN  albuterol, 2 puff, Inhalation, Q4H PRN  polyethylene glycol, 17 g, Oral, Daily PRN  simethicone, 80 mg, Oral, Q6H PRN        Discharge Plan:  Home after delivery      Network Utilization Review Department  [email or web address]  ATTENTION: Please call with any questions or concerns to [phone] and carefully listen to the prompts so that you are directed to the right person  All voicemails are confidential   Julieta Bee all requests for admission clinical reviews, approved or denied determinations and any other requests to dedicated fax number below belonging to the campus where the patient is receiving treatment   List of dedicated fax numbers for the Facilities:  [address] [place] [address] DENIALS (Administrative/Medical Necessity) [phone]   [address] [place] [address] (Maternity/NICU/Pediatrics) [phone]   Isidoro Gallardo [phone]   Jayna Dash [phone]   Kit Galvez [phone]   Escobar Aggarwal [phone]   [address] [phone]   [facility]  [phone]   [address] [facility]  [address] [facility] [address] [facility] [phone]

## 2020-09-02 NOTE — PLAN OF CARE
Problem: ANTEPARTUM  Goal: Maintain pregnancy as long as maternal and/or fetal condition is stable  Description: INTERVENTIONS:  - Maternal surveillance  - Fetal surveillance  - Monitor uterine activity  - Medications as ordered  - Bedrest  Outcome: Progressing     Problem: NEUROSENSORY - ADULT  Goal: Achieves stable or improved neurological status  Description: INTERVENTIONS  - Monitor and report changes in neurological status  - Monitor vital signs such as temperature, blood pressure, glucose, and any other labs ordered   - Initiate measures to prevent increased intracranial pressure  - Monitor for seizure activity and implement precautions if appropriate      Outcome: Progressing  Goal: Remains free of injury related to seizures activity  Description: INTERVENTIONS  - Maintain airway, patient safety  and administer oxygen as ordered  - Monitor patient for seizure activity, document and report duration and description of seizure to physician/advanced practitioner  - If seizure occurs,  ensure patient safety during seizure  - Reorient patient post seizure  - Seizure pads on all 4 side rails  - Instruct patient/family to notify RN of any seizure activity including if an aura is experienced  - Instruct patient/family to call for assistance with activity based on nursing assessment  - Administer anti-seizure medications if ordered    Outcome: Progressing     Problem: CARDIOVASCULAR - ADULT  Goal: Maintains optimal cardiac output and hemodynamic stability  Description: INTERVENTIONS:  - Monitor I/O, vital signs and rhythm  - Monitor for S/S and trends of decreased cardiac output  - Administer and titrate ordered vasoactive medications to optimize hemodynamic stability  - Assess quality of pulses, skin color and temperature  - Assess for signs of decreased coronary artery perfusion  - Instruct patient to report change in severity of symptoms  Outcome: Progressing     Problem: PAIN - ADULT  Goal: Verbalizes/displays adequate comfort level or baseline comfort level  Description: Interventions:  - Encourage patient to monitor pain and request assistance  - Assess pain using appropriate pain scale  - Administer analgesics based on type and severity of pain and evaluate response  - Implement non-pharmacological measures as appropriate and evaluate response  - Consider cultural and social influences on pain and pain management  - Notify physician/advanced practitioner if interventions unsuccessful or patient reports new pain  Outcome: Progressing     Problem: INFECTION - ADULT  Goal: Absence or prevention of progression during hospitalization  Description: INTERVENTIONS:  - Assess and monitor for signs and symptoms of infection  - Monitor lab/diagnostic results  - Monitor all insertion sites, i e  indwelling lines, tubes, and drains  - Monitor endotracheal if appropriate and nasal secretions for changes in amount and color  - [place] appropriate cooling/warming therapies per order  - Administer medications as ordered  - Instruct and encourage patient and family to use good hand hygiene technique  - Identify and instruct in appropriate isolation precautions for identified infection/condition  Outcome: Progressing  Goal: Absence of fever/infection during neutropenic period  Description: INTERVENTIONS:  - Monitor WBC    Outcome: Progressing     Problem: SAFETY ADULT  Goal: Patient will remain free of falls  Description: INTERVENTIONS:  - Assess patient frequently for physical needs  -  Identify cognitive and physical deficits and behaviors that affect risk of falls    -  [place] fall precautions as indicated by assessment   - Educate patient/family on patient safety including physical limitations  - Instruct patient to call for assistance with activity based on assessment  - Modify environment to reduce risk of injury  - Consider OT/PT consult to assist with strengthening/mobility  Outcome: Progressing  Goal: Maintain or return to baseline ADL function  Description: INTERVENTIONS:  -  Assess patient's ability to carry out ADLs; assess patient's baseline for ADL function and identify physical deficits which impact ability to perform ADLs (bathing, care of mouth/teeth, toileting, grooming, dressing, etc )  - Assess/evaluate cause of self-care deficits   - Assess range of motion  - Assess patient's mobility; develop plan if impaired  - Assess patient's need for assistive devices and provide as appropriate  - Encourage maximum independence but intervene and supervise when necessary  - Involve family in performance of ADLs  - Assess for home care needs following discharge   - Consider OT consult to assist with ADL evaluation and planning for discharge  - Provide patient education as appropriate  Outcome: Progressing  Goal: Maintain or return mobility status to optimal level  Description: INTERVENTIONS:  - Assess patient's baseline mobility status (ambulation, transfers, stairs, etc )    - Identify cognitive and physical deficits and behaviors that affect mobility  - Identify mobility aids required to assist with transfers and/or ambulation (gait belt, sit-to-stand, lift, walker, cane, etc )  - [place] fall precautions as indicated by assessment  - Record patient progress and toleration of activity level on Mobility SBAR; progress patient to next Phase/Stage  - Instruct patient to call for assistance with activity based on assessment  - Consider rehabilitation consult to assist with strengthening/weightbearing, etc   Outcome: Progressing     Problem: Knowledge Deficit  Goal: Patient/family/caregiver demonstrates understanding of disease process, treatment plan, medications, and discharge instructions  Description: Complete learning assessment and assess knowledge base    Interventions:  - Provide teaching at level of understanding  - Provide teaching via preferred learning methods  Outcome: Progressing     Problem: DISCHARGE PLANNING  Goal: Discharge to home or other facility with appropriate resources  Description: INTERVENTIONS:  - Identify barriers to discharge w/patient and caregiver  - Arrange for needed discharge resources and transportation as appropriate  - Identify discharge learning needs (meds, wound care, etc )  - Arrange for interpretive services to assist at discharge as needed  - Refer to Case Management Department for coordinating discharge planning if the patient needs post-hospital services based on physician/advanced practitioner order or complex needs related to functional status, cognitive ability, or social support system  Outcome: Progressing     Problem: Potential for Falls  Goal: Patient will remain free of falls  Description: INTERVENTIONS:  - Assess patient frequently for physical needs  -  Identify cognitive and physical deficits and behaviors that affect risk of falls    -  [place] fall precautions as indicated by assessment   - Educate patient/family on patient safety including physical limitations  - Instruct patient to call for assistance with activity based on assessment  - Modify environment to reduce risk of injury  - Consider OT/PT consult to assist with strengthening/mobility  Outcome: Progressing

## 2020-09-03 PROCEDURE — 59025 FETAL NON-STRESS TEST: CPT | Performed by: OBSTETRICS & GYNECOLOGY

## 2020-09-03 PROCEDURE — 76816 OB US FOLLOW-UP PER FETUS: CPT | Performed by: OBSTETRICS & GYNECOLOGY

## 2020-09-03 PROCEDURE — 99232 SBSQ HOSP IP/OBS MODERATE 35: CPT | Performed by: OBSTETRICS & GYNECOLOGY

## 2020-09-03 PROCEDURE — 76820 UMBILICAL ARTERY ECHO: CPT | Performed by: OBSTETRICS & GYNECOLOGY

## 2020-09-03 PROCEDURE — NC001 PR NO CHARGE: Performed by: OBSTETRICS & GYNECOLOGY

## 2020-09-03 RX ADMIN — Medication 1 TABLET: at 09:12

## 2020-09-03 RX ADMIN — TRIAMCINOLONE ACETONIDE: 1 CREAM TOPICAL at 18:25

## 2020-09-03 RX ADMIN — HEPARIN SODIUM 5000 UNITS: 5000 INJECTION INTRAVENOUS; SUBCUTANEOUS at 09:12

## 2020-09-03 RX ADMIN — HEPARIN SODIUM 5000 UNITS: 5000 INJECTION INTRAVENOUS; SUBCUTANEOUS at 21:58

## 2020-09-03 RX ADMIN — TRIAMCINOLONE ACETONIDE: 1 CREAM TOPICAL at 09:58

## 2020-09-03 RX ADMIN — NIFEDIPINE 30 MG: 30 TABLET, FILM COATED, EXTENDED RELEASE ORAL at 09:12

## 2020-09-03 NOTE — PROGRESS NOTES
Late entry regarding discussed with patient approximately 19:30 on 2020  Patient was seen and assessed in the evening on 2020  Patient reports that she is doing well  She is frustrated with being inpatient for so long and does express her desire to be discharged home  Discussed with patient my concern for clinical situation and at this time cannot recommend discharge home  Discussed that more information will be obtained during planned growth ultrasound performed by Maternal-Fetal Medicine on Thursday, 2020  Discussed with patient pathology of preeclampsia and especially preeclampsia was severe features at this early stage in her pregnancy  Reviewed that while her blood pressures may be stable she is still at increased risk for stroke, vascular compromise, and hypertensive emergency  Also expressed a concern for fetus as fetus was displaying growth restriction as well as abnormal Dopplers with her last ultrasound  Reviewed that if there is minimal growth seen within interval on the growth ultrasound scheduled for tomorrow, there is concern that the fetus may not be getting all that it needs in order to grow appropriately from her placenta  If that continues there will likely be recommendation for delivery in order to provide fetus with adequate nutrition and support outside of the womb  Patient expressed understanding of all that was discussed and acknowledged are abraham discussion regarding our concern for her own well-being and continued recommendation for inpatient care  Assessment:  67-year-old  3 para 0020 and 28 3 weeks gestation with preeclampsia with severe features  Hospital day 14    1  Preeclampsia with severe features  -continue to monitor blood pressure closely   -creatinine stable  -continue Procardia XL 30 mg daily    2    Routine prenatal care  -normal 1 hour glucose tolerance test  -status post even vaccination  -discussed with patient importance for IV establishment  Reviewed possible option for PICC PICC line in order to reduce her IV sticks and lab draws that required  - Will await growth US results from growth scan and dopplers tomorrow    -continue inpatient management    Karolyn BERMAN   [address] [place] [address]  [phone]

## 2020-09-03 NOTE — PROGRESS NOTES
Progress Note - Maternal Fetal Medicine   Blaze Jimenez 35 y o  female MRN: [id number]  Unit/Bed#: -01 Encounter: [id number]    Assessment:  35 y o   at 28w3d admitted with pre-eclampsia with severe features  Hospital day 15  Plan:  1  Pre-eclampsia with severe features   - BP overnight: 136-140/76-83, no SR overnight   - Procardia XL 30mg daily    - CMP/CBC q3-days with IV changes     2  Routine Prenatal care   - Passed 1 hour GTT    - s/p Tdap   - T&S9/1    -  patient refusing IV, she understands the risks of not having immediate venous access in case of an emergency    3  IUGR with abnormal Dopplers    - Growth scan scheduled for today     4  FEN: Regular diet    Subjective/Objective     Subjective:     Patient is doing well  She has no complaints  She denies headache, changes in vision, RUQ pain and sudden onset edema  Patient is tired of being in the hospital      Objective:     Vitals:   Vitals:    20 1208   BP: 127/74   Pulse: 85   Resp:    Temp:    SpO2:          Physical Exam:     Physical Exam  Constitutional:       Appearance: Normal appearance  She is well-developed  HENT:      Head: Normocephalic and atraumatic  Neck:      Musculoskeletal: Normal range of motion  Cardiovascular:      Rate and Rhythm: Normal rate and regular rhythm  Heart sounds: Normal heart sounds  No murmur  No gallop  Pulmonary:      Effort: Pulmonary effort is normal  No respiratory distress  Breath sounds: Normal breath sounds  No stridor  No wheezing or rales  Abdominal:      General: There is no distension  Palpations: Abdomen is soft  There is no mass  Tenderness: There is no abdominal tenderness  There is no guarding  Musculoskeletal: Normal range of motion  General: No swelling, tenderness, deformity or signs of injury  Right lower leg: No edema  Left lower leg: No edema  Skin:     General: Skin is warm and dry     Neurological:      Mental Status: She is alert and oriented to person, place, and time  Psychiatric:         Behavior: Behavior normal          Thought Content:  Thought content normal          Judgment: Judgment normal        Fetal Assessment:  FHT: 145 / Moderate 6 - 25 bpm /no accelerations nor decelerations  [place]: none    Lab Results   Component Value Date    WBC 15 91 (H) 08/31/2020    HGB 13 0 08/31/2020    HCT 39 5 08/31/2020    MCV 93 08/31/2020     08/31/2020       Lab Results   Component Value Date    CALCIUM 8 3 08/31/2020    K 4 5 08/31/2020    CO2 26 08/31/2020     08/31/2020    BUN 16 08/31/2020    CREATININE 0 92 08/31/2020       Lab Results   Component Value Date    ALT 17 08/31/2020    AST 13 08/31/2020    ALKPHOS 127 (H) 08/31/2020       America Syed MD  OBGYN, PGY-3  9/3/2020  3:48 PM

## 2020-09-04 LAB
ABO GROUP BLD: NORMAL
ALBUMIN SERPL BCP-MCNC: 1.8 G/DL (ref 3.5–5)
ALP SERPL-CCNC: 128 U/L (ref 46–116)
ALT SERPL W P-5'-P-CCNC: 19 U/L (ref 12–78)
ANION GAP SERPL CALCULATED.3IONS-SCNC: 7 MMOL/L (ref 4–13)
AST SERPL W P-5'-P-CCNC: 19 U/L (ref 5–45)
BASOPHILS # BLD AUTO: 0.04 THOUSANDS/ΜL (ref 0–0.1)
BASOPHILS NFR BLD AUTO: 0 % (ref 0–1)
BILIRUB SERPL-MCNC: 0.27 MG/DL (ref 0.2–1)
BLD GP AB SCN SERPL QL: NEGATIVE
BUN SERPL-MCNC: 19 MG/DL (ref 5–25)
CALCIUM SERPL-MCNC: 8.2 MG/DL (ref 8.3–10.1)
CHLORIDE SERPL-SCNC: 106 MMOL/L (ref 100–108)
CO2 SERPL-SCNC: 25 MMOL/L (ref 21–32)
CREAT SERPL-MCNC: 0.86 MG/DL (ref 0.6–1.3)
EOSINOPHIL # BLD AUTO: 0.17 THOUSAND/ΜL (ref 0–0.61)
EOSINOPHIL NFR BLD AUTO: 1 % (ref 0–6)
ERYTHROCYTE [DISTWIDTH] IN BLOOD BY AUTOMATED COUNT: 12.3 % (ref 11.6–15.1)
GFR SERPL CREATININE-BSD FRML MDRD: 106 ML/MIN/1.73SQ M
GLUCOSE SERPL-MCNC: 70 MG/DL (ref 65–140)
GP B STREP GENITAL QL CULT: NORMAL
HCT VFR BLD AUTO: 36.1 % (ref 34.8–46.1)
HGB BLD-MCNC: 12.1 G/DL (ref 11.5–15.4)
IMM GRANULOCYTES # BLD AUTO: 0.08 THOUSAND/UL (ref 0–0.2)
IMM GRANULOCYTES NFR BLD AUTO: 1 % (ref 0–2)
LYMPHOCYTES # BLD AUTO: 2.16 THOUSANDS/ΜL (ref 0.6–4.47)
LYMPHOCYTES NFR BLD AUTO: 16 % (ref 14–44)
MCH RBC QN AUTO: 30.8 PG (ref 26.8–34.3)
MCHC RBC AUTO-ENTMCNC: 33.5 G/DL (ref 31.4–37.4)
MCV RBC AUTO: 92 FL (ref 82–98)
MONOCYTES # BLD AUTO: 1.33 THOUSAND/ΜL (ref 0.17–1.22)
MONOCYTES NFR BLD AUTO: 10 % (ref 4–12)
NEUTROPHILS # BLD AUTO: 9.76 THOUSANDS/ΜL (ref 1.85–7.62)
NEUTS SEG NFR BLD AUTO: 72 % (ref 43–75)
NRBC BLD AUTO-RTO: 0 /100 WBCS
PLATELET # BLD AUTO: 274 THOUSANDS/UL (ref 149–390)
PMV BLD AUTO: 10.3 FL (ref 8.9–12.7)
POTASSIUM SERPL-SCNC: 4.4 MMOL/L (ref 3.5–5.3)
PROT SERPL-MCNC: 5.8 G/DL (ref 6.4–8.2)
RBC # BLD AUTO: 3.93 MILLION/UL (ref 3.81–5.12)
RH BLD: POSITIVE
SODIUM SERPL-SCNC: 138 MMOL/L (ref 136–145)
SPECIMEN EXPIRATION DATE: NORMAL
WBC # BLD AUTO: 13.54 THOUSAND/UL (ref 4.31–10.16)

## 2020-09-04 PROCEDURE — 59025 FETAL NON-STRESS TEST: CPT | Performed by: OBSTETRICS & GYNECOLOGY

## 2020-09-04 PROCEDURE — 99232 SBSQ HOSP IP/OBS MODERATE 35: CPT | Performed by: OBSTETRICS & GYNECOLOGY

## 2020-09-04 PROCEDURE — 86901 BLOOD TYPING SEROLOGIC RH(D): CPT | Performed by: OBSTETRICS & GYNECOLOGY

## 2020-09-04 PROCEDURE — 86850 RBC ANTIBODY SCREEN: CPT | Performed by: OBSTETRICS & GYNECOLOGY

## 2020-09-04 PROCEDURE — 86900 BLOOD TYPING SEROLOGIC ABO: CPT | Performed by: OBSTETRICS & GYNECOLOGY

## 2020-09-04 PROCEDURE — NC001 PR NO CHARGE: Performed by: OBSTETRICS & GYNECOLOGY

## 2020-09-04 PROCEDURE — 80053 COMPREHEN METABOLIC PANEL: CPT | Performed by: OBSTETRICS & GYNECOLOGY

## 2020-09-04 PROCEDURE — 85025 COMPLETE CBC W/AUTO DIFF WBC: CPT | Performed by: OBSTETRICS & GYNECOLOGY

## 2020-09-04 RX ADMIN — HEPARIN SODIUM 5000 UNITS: 5000 INJECTION INTRAVENOUS; SUBCUTANEOUS at 08:55

## 2020-09-04 RX ADMIN — TRIAMCINOLONE ACETONIDE: 1 CREAM TOPICAL at 18:46

## 2020-09-04 RX ADMIN — HEPARIN SODIUM 5000 UNITS: 5000 INJECTION INTRAVENOUS; SUBCUTANEOUS at 21:51

## 2020-09-04 RX ADMIN — NIFEDIPINE 30 MG: 30 TABLET, FILM COATED, EXTENDED RELEASE ORAL at 08:55

## 2020-09-04 RX ADMIN — Medication 1 TABLET: at 08:55

## 2020-09-04 RX ADMIN — TRIAMCINOLONE ACETONIDE: 1 CREAM TOPICAL at 08:56

## 2020-09-04 NOTE — UTILIZATION REVIEW
Continued Stay Review    Date: *09-02-20                       Current Patient Class:  inpatient  Current Level of Care: medical    HPI:30 y o  female initially admitted on  08-18-20 for severe preeclampsia @ 26 3/7 wks    Assessment/Plan: HD # 14   28 2/7 wks    BP's over night 128-138/85-91 continues procardia XL 30 mg daily Creatinine is stable, AST/ALT improved, platelets stable   Growth scan 09-03-20   NST q shift  Continue inpatient admission until delivery   Delivery is recommended at 34 weeks gestation for the indication of preeclampsia with severe features, sooner if otherwise clinically indicated  Fetal Assessment:  FHT: 150 / Moderate 6 - 25 bpm /no accelerations nor decelerations  [place]: none      Pertinent Labs/Diagnostic Results:       Results from last 7 days   Lab Units 09/04/20 0627 08/31/20  0509   WBC Thousand/uL 13 54* 15 91*   HEMOGLOBIN g/dL 12 1 13 0   HEMATOCRIT % 36 1 39 5   PLATELETS Thousands/uL 274 281   NEUTROS ABS Thousands/µL 9 76* 11 79*         Results from last 7 days   Lab Units 09/04/20  0627 08/31/20  0510   SODIUM mmol/L 138 132*   POTASSIUM mmol/L 4 4 4 5   CHLORIDE mmol/L 106 105   CO2 mmol/L 25 26   ANION GAP mmol/L 7 1*   BUN mg/dL 19 16   CREATININE mg/dL 0 86 0 92   EGFR ml/min/1 73sq m 106 97   CALCIUM mg/dL 8 2* 8 3     Results from last 7 days   Lab Units 09/04/20  0627 08/31/20  0510   AST U/L 19 13   ALT U/L 19 17   ALK PHOS U/L 128* 127*   TOTAL PROTEIN g/dL 5 8* 6 3*   ALBUMIN g/dL 1 8* 2 3*   TOTAL BILIRUBIN mg/dL 0 27 0 25         Results from last 7 days   Lab Units 09/04/20 0627 08/31/20  0510   GLUCOSE RANDOM mg/dL 70 73         Vital Signs:   Date/Time   Temp   Pulse   Resp   BP   SpO2   O2 Device   Cardiac (WDL)   Patient Position - Orthostatic VS    09/02/20 0901   97 9 °F (36 6 °C)   88   18   108/59   --   --   --   Lying    09/02/20 0430   --   86   18   136/99   --   --   --   Sitting    09/01/20 2315   98 4 °F (36 9 °C)   92   18   128/85   100 %   None (Room air)   --   Sitting    09/01/20 2014   --   93   18   138/91   --   None (Room air)   WDL   Sitting    09/01/20 1708   98 5 °F (36 9 °C)   93   18   130/85   99 %   --   --   --    09/01/20 1630   --   --   --   --   --   --   --   --    Comment rows:    OBSERV: offers no complaints at this time  at 09/01/20 1630    09/01/20 1550   --   --   --   --   --   --   --   --    Comment rows:    OBSERV: patient requesting that iv not be put back in  at 09/01/20 1550    09/01/20 1500   --   --   --   --   --   --   --   --    Comment rows:    OBSERV: called into room, cannot stand new iv site  requesting it be pulled  at 09/01/20 1500    09/01/20 1256   --   79   20   132/74   --   --   --   Sitting    09/01/20 0900   --   --   --   --   --   --   --   --    Comment rows:    OBSERV: according to the patient, baby is active  denies s/s of labor  offers no complaints  at 09/01/20 0900    09/01/20 0823   98 2 °F (36 8 °C)   89   20   140/82   --   --   --   Sitting    08/31/20 2342   98 1 °F (36 7 °C)   97   20   148/98   --   None (Room air)   --   --    Comment rows:    OBSERV: Pt denies leakage of fluid, vaginal bleedin, and ctx   Pt reports positive fetal movement at 08/31/20 2342    08/31/20 2100   --   --   --   --   --   None (Room air)   WDL   --    08/31/20 1943   --   --   --   141/89   --   --   --   Standing    08/31/20 1606   98 1 °F (36 7 °C)   106Abnormal     18   135/75   --   --   --   Lying    Comment rows:    OBSERV: offers no complaints  at 08/31/20 1606    08/31/20 1240   --   87   --   138/88   --   --   --   Sitting    08/31/20 0825   98 1 °F (36 7 °C)   90   16   132/64   --   --   --   Lying    Comment rows:    OBSERV: according to the patient, baby is active, denies s/s of labor   at 08/31/20 0825    08/31/20 0446   --   81   16   125/78   98 %   --   --   Lying    08/31/20 0436   98 1 °F (36 7 °C)   --   --   --   --   --   --   --    08/31/20 0000   98 5 °F (36 9 °C) Medications:   Scheduled Medications:  dupilumab, 300 mg, Subcutaneous, Q14 Days  heparin (porcine), 5,000 Units, Subcutaneous, Q12H [place]  NIFEdipine, 30 mg, Oral, Daily  prenatal multivitamin, 1 tablet, Oral, Daily  triamcinolone, , Topical, BID      Continuous IV Infusions:     PRN Meds:  acetaminophen, 650 mg, Oral, Q6H PRN  albuterol, 2 puff, Inhalation, Q4H PRN  polyethylene glycol, 17 g, Oral, Daily PRN  simethicone, 80 mg, Oral, Q6H PRN        Discharge Plan:  Home after delivery      Network Utilization Review Department  [email or web address]  ATTENTION: Please call with any questions or concerns to [phone] and carefully listen to the prompts so that you are directed to the right person  All voicemails are confidential   Salome Anders all requests for admission clinical reviews, approved or denied determinations and any other requests to dedicated fax number below belonging to the campus where the patient is receiving treatment   List of dedicated fax numbers for the Facilities:  [address] [place] [address] DENIALS (Administrative/Medical Necessity) [phone]   [address] (Maternity/NICU/Pediatrics) [phone]   Roberto Seen [phone]   Karina Lazaro [phone]   Delorise Ricardo [phone]   [address] [place] [address]  [phone]   [address] [phone]   [facility]  [phone]   [address] [facility]  [address] [facility] 1000 [facility] [phone]

## 2020-09-04 NOTE — PLAN OF CARE
Problem: ANTEPARTUM  Goal: Maintain pregnancy as long as maternal and/or fetal condition is stable  Description: INTERVENTIONS:  - Maternal surveillance  - Fetal surveillance  - Monitor uterine activity  - Medications as ordered  - Bedrest  Outcome: Progressing     Problem: NEUROSENSORY - ADULT  Goal: Achieves stable or improved neurological status  Description: INTERVENTIONS  - Monitor and report changes in neurological status  - Monitor vital signs such as temperature, blood pressure, glucose, and any other labs ordered   - Initiate measures to prevent increased intracranial pressure  - Monitor for seizure activity and implement precautions if appropriate      Outcome: Progressing  Goal: Remains free of injury related to seizures activity  Description: INTERVENTIONS  - Maintain airway, patient safety  and administer oxygen as ordered  - Monitor patient for seizure activity, document and report duration and description of seizure to physician/advanced practitioner  - If seizure occurs,  ensure patient safety during seizure  - Reorient patient post seizure  - Seizure pads on all 4 side rails  - Instruct patient/family to notify RN of any seizure activity including if an aura is experienced  - Instruct patient/family to call for assistance with activity based on nursing assessment  - Administer anti-seizure medications if ordered    Outcome: Progressing     Problem: CARDIOVASCULAR - ADULT  Goal: Maintains optimal cardiac output and hemodynamic stability  Description: INTERVENTIONS:  - Monitor I/O, vital signs and rhythm  - Monitor for S/S and trends of decreased cardiac output  - Administer and titrate ordered vasoactive medications to optimize hemodynamic stability  - Assess quality of pulses, skin color and temperature  - Assess for signs of decreased coronary artery perfusion  - Instruct patient to report change in severity of symptoms  Outcome: Progressing     Problem: PAIN - ADULT  Goal: Verbalizes/displays adequate comfort level or baseline comfort level  Description: Interventions:  - Encourage patient to monitor pain and request assistance  - Assess pain using appropriate pain scale  - Administer analgesics based on type and severity of pain and evaluate response  - Implement non-pharmacological measures as appropriate and evaluate response  - Consider cultural and social influences on pain and pain management  - Notify physician/advanced practitioner if interventions unsuccessful or patient reports new pain  Outcome: Progressing     Problem: INFECTION - ADULT  Goal: Absence or prevention of progression during hospitalization  Description: INTERVENTIONS:  - Assess and monitor for signs and symptoms of infection  - Monitor lab/diagnostic results  - Monitor all insertion sites, i e  indwelling lines, tubes, and drains  - Monitor endotracheal if appropriate and nasal secretions for changes in amount and color  - [place] appropriate cooling/warming therapies per order  - Administer medications as ordered  - Instruct and encourage patient and family to use good hand hygiene technique  - Identify and instruct in appropriate isolation precautions for identified infection/condition  Outcome: Progressing  Goal: Absence of fever/infection during neutropenic period  Description: INTERVENTIONS:  - Monitor WBC    Outcome: Progressing     Problem: SAFETY ADULT  Goal: Patient will remain free of falls  Description: INTERVENTIONS:  - Assess patient frequently for physical needs  -  Identify cognitive and physical deficits and behaviors that affect risk of falls    -  [place] fall precautions as indicated by assessment   - Educate patient/family on patient safety including physical limitations  - Instruct patient to call for assistance with activity based on assessment  - Modify environment to reduce risk of injury  - Consider OT/PT consult to assist with strengthening/mobility  Outcome: Progressing  Goal: Maintain or return to baseline ADL function  Description: INTERVENTIONS:  -  Assess patient's ability to carry out ADLs; assess patient's baseline for ADL function and identify physical deficits which impact ability to perform ADLs (bathing, care of mouth/teeth, toileting, grooming, dressing, etc )  - Assess/evaluate cause of self-care deficits   - Assess range of motion  - Assess patient's mobility; develop plan if impaired  - Assess patient's need for assistive devices and provide as appropriate  - Encourage maximum independence but intervene and supervise when necessary  - Involve family in performance of ADLs  - Assess for home care needs following discharge   - Consider OT consult to assist with ADL evaluation and planning for discharge  - Provide patient education as appropriate  Outcome: Progressing  Goal: Maintain or return mobility status to optimal level  Description: INTERVENTIONS:  - Assess patient's baseline mobility status (ambulation, transfers, stairs, etc )    - Identify cognitive and physical deficits and behaviors that affect mobility  - Identify mobility aids required to assist with transfers and/or ambulation (gait belt, sit-to-stand, lift, walker, cane, etc )  - [place] fall precautions as indicated by assessment  - Record patient progress and toleration of activity level on Mobility SBAR; progress patient to next Phase/Stage  - Instruct patient to call for assistance with activity based on assessment  - Consider rehabilitation consult to assist with strengthening/weightbearing, etc   Outcome: Progressing     Problem: Knowledge Deficit  Goal: Patient/family/caregiver demonstrates understanding of disease process, treatment plan, medications, and discharge instructions  Description: Complete learning assessment and assess knowledge base    Interventions:  - Provide teaching at level of understanding  - Provide teaching via preferred learning methods  Outcome: Progressing     Problem: DISCHARGE PLANNING  Goal: Discharge to home or other facility with appropriate resources  Description: INTERVENTIONS:  - Identify barriers to discharge w/patient and caregiver  - Arrange for needed discharge resources and transportation as appropriate  - Identify discharge learning needs (meds, wound care, etc )  - Arrange for interpretive services to assist at discharge as needed  - Refer to Case Management Department for coordinating discharge planning if the patient needs post-hospital services based on physician/advanced practitioner order or complex needs related to functional status, cognitive ability, or social support system  Outcome: Progressing     Problem: Potential for Falls  Goal: Patient will remain free of falls  Description: INTERVENTIONS:  - Assess patient frequently for physical needs  -  Identify cognitive and physical deficits and behaviors that affect risk of falls    -  [place] fall precautions as indicated by assessment   - Educate patient/family on patient safety including physical limitations  - Instruct patient to call for assistance with activity based on assessment  - Modify environment to reduce risk of injury  - Consider OT/PT consult to assist with strengthening/mobility  Outcome: Progressing

## 2020-09-04 NOTE — PLAN OF CARE
Problem: ANTEPARTUM  Goal: Maintain pregnancy as long as maternal and/or fetal condition is stable  Description: INTERVENTIONS:  - Maternal surveillance  - Fetal surveillance  - Monitor uterine activity  - Medications as ordered  - Bedrest  Outcome: Progressing     Problem: NEUROSENSORY - ADULT  Goal: Achieves stable or improved neurological status  Description: INTERVENTIONS  - Monitor and report changes in neurological status  - Monitor vital signs such as temperature, blood pressure, glucose, and any other labs ordered   - Initiate measures to prevent increased intracranial pressure  - Monitor for seizure activity and implement precautions if appropriate      Outcome: Progressing  Goal: Remains free of injury related to seizures activity  Description: INTERVENTIONS  - Maintain airway, patient safety  and administer oxygen as ordered  - Monitor patient for seizure activity, document and report duration and description of seizure to physician/advanced practitioner  - If seizure occurs,  ensure patient safety during seizure  - Reorient patient post seizure  - Seizure pads on all 4 side rails  - Instruct patient/family to notify RN of any seizure activity including if an aura is experienced  - Instruct patient/family to call for assistance with activity based on nursing assessment  - Administer anti-seizure medications if ordered    Outcome: Progressing     Problem: CARDIOVASCULAR - ADULT  Goal: Maintains optimal cardiac output and hemodynamic stability  Description: INTERVENTIONS:  - Monitor I/O, vital signs and rhythm  - Monitor for S/S and trends of decreased cardiac output  - Administer and titrate ordered vasoactive medications to optimize hemodynamic stability  - Assess quality of pulses, skin color and temperature  - Assess for signs of decreased coronary artery perfusion  - Instruct patient to report change in severity of symptoms  Outcome: Progressing     Problem: PAIN - ADULT  Goal: Verbalizes/displays adequate comfort level or baseline comfort level  Description: Interventions:  - Encourage patient to monitor pain and request assistance  - Assess pain using appropriate pain scale  - Administer analgesics based on type and severity of pain and evaluate response  - Implement non-pharmacological measures as appropriate and evaluate response  - Consider cultural and social influences on pain and pain management  - Notify physician/advanced practitioner if interventions unsuccessful or patient reports new pain  Outcome: Progressing     Problem: INFECTION - ADULT  Goal: Absence or prevention of progression during hospitalization  Description: INTERVENTIONS:  - Assess and monitor for signs and symptoms of infection  - Monitor lab/diagnostic results  - Monitor all insertion sites, i e  indwelling lines, tubes, and drains  - Monitor endotracheal if appropriate and nasal secretions for changes in amount and color  - [place] appropriate cooling/warming therapies per order  - Administer medications as ordered  - Instruct and encourage patient and family to use good hand hygiene technique  - Identify and instruct in appropriate isolation precautions for identified infection/condition  Outcome: Progressing  Goal: Absence of fever/infection during neutropenic period  Description: INTERVENTIONS:  - Monitor WBC    Outcome: Progressing     Problem: SAFETY ADULT  Goal: Patient will remain free of falls  Description: INTERVENTIONS:  - Assess patient frequently for physical needs  -  Identify cognitive and physical deficits and behaviors that affect risk of falls    -  [place] fall precautions as indicated by assessment   - Educate patient/family on patient safety including physical limitations  - Instruct patient to call for assistance with activity based on assessment  - Modify environment to reduce risk of injury  - Consider OT/PT consult to assist with strengthening/mobility  Outcome: Progressing  Goal: Maintain or return to baseline ADL function  Description: INTERVENTIONS:  -  Assess patient's ability to carry out ADLs; assess patient's baseline for ADL function and identify physical deficits which impact ability to perform ADLs (bathing, care of mouth/teeth, toileting, grooming, dressing, etc )  - Assess/evaluate cause of self-care deficits   - Assess range of motion  - Assess patient's mobility; develop plan if impaired  - Assess patient's need for assistive devices and provide as appropriate  - Encourage maximum independence but intervene and supervise when necessary  - Involve family in performance of ADLs  - Assess for home care needs following discharge   - Consider OT consult to assist with ADL evaluation and planning for discharge  - Provide patient education as appropriate  Outcome: Progressing  Goal: Maintain or return mobility status to optimal level  Description: INTERVENTIONS:  - Assess patient's baseline mobility status (ambulation, transfers, stairs, etc )    - Identify cognitive and physical deficits and behaviors that affect mobility  - Identify mobility aids required to assist with transfers and/or ambulation (gait belt, sit-to-stand, lift, walker, cane, etc )  - [place] fall precautions as indicated by assessment  - Record patient progress and toleration of activity level on Mobility SBAR; progress patient to next Phase/Stage  - Instruct patient to call for assistance with activity based on assessment  - Consider rehabilitation consult to assist with strengthening/weightbearing, etc   Outcome: Progressing     Problem: Knowledge Deficit  Goal: Patient/family/caregiver demonstrates understanding of disease process, treatment plan, medications, and discharge instructions  Description: Complete learning assessment and assess knowledge base    Interventions:  - Provide teaching at level of understanding  - Provide teaching via preferred learning methods  Outcome: Progressing     Problem: DISCHARGE PLANNING  Goal: Discharge to home or other facility with appropriate resources  Description: INTERVENTIONS:  - Identify barriers to discharge w/patient and caregiver  - Arrange for needed discharge resources and transportation as appropriate  - Identify discharge learning needs (meds, wound care, etc )  - Arrange for interpretive services to assist at discharge as needed  - Refer to Case Management Department for coordinating discharge planning if the patient needs post-hospital services based on physician/advanced practitioner order or complex needs related to functional status, cognitive ability, or social support system  Outcome: Progressing     Problem: Potential for Falls  Goal: Patient will remain free of falls  Description: INTERVENTIONS:  - Assess patient frequently for physical needs  -  Identify cognitive and physical deficits and behaviors that affect risk of falls    -  [place] fall precautions as indicated by assessment   - Educate patient/family on patient safety including physical limitations  - Instruct patient to call for assistance with activity based on assessment  - Modify environment to reduce risk of injury  - Consider OT/PT consult to assist with strengthening/mobility  Outcome: Progressing

## 2020-09-04 NOTE — PROGRESS NOTES
Progress Note - Maternal Fetal Medicine   Mariposa Rosas 35 y o  female MRN: [id number]  Unit/Bed#: -01 Encounter: [id number]    Assessment:  35 y o   at 28w5d admitted with pre-eclampsia with severe features  Hospital day 16  Plan:  1  Pre-eclampsia with severe features   - BP overnight: 124-133/80-87, no SR overnight   - Procardia XL 30mg daily    - CMP/CBC q3-days with IV changes      2  Routine Prenatal care   - Passed 1 hour GTT    - s/p Tdap   - T&S     -  patient refusing IV, she understands the risks of not having immediate venous access in case of an emergency    3  IUGR with abnormal Dopplers    - Growth scan was yesterday, showed interval growth, around 10% with abnormal Dopplers     4  FEN: Regular diet    Patient requested ability to go outside  After speaking with Nurse Management, patient has permission to go outside for one hour per day with some one  Subjective/Objective     Subjective:     Growth scan results reviewed with patient  She and her partner are displeased that she cannot leave the hospital  She denies any symptoms of pre-eclampsia  Objective:     Vitals:    20 1543   BP: 134/85   Pulse: 87   Resp: 18   Temp: 98 °F (36 7 °C)   SpO2:      Physical Exam:     Physical Exam  Constitutional:       Appearance: Normal appearance  She is well-developed  HENT:      Head: Normocephalic and atraumatic  Neck:      Musculoskeletal: Normal range of motion  Cardiovascular:      Rate and Rhythm: Normal rate and regular rhythm  Heart sounds: Normal heart sounds  No murmur  No gallop  Pulmonary:      Effort: Pulmonary effort is normal  No respiratory distress  Breath sounds: Normal breath sounds  No stridor  No wheezing or rales  Abdominal:      General: There is no distension  Palpations: Abdomen is soft  There is no mass  Tenderness: There is no abdominal tenderness  There is no guarding  Musculoskeletal: Normal range of motion  General: No swelling, tenderness, deformity or signs of injury  Right lower leg: No edema  Left lower leg: No edema  Skin:     General: Skin is warm and dry  Neurological:      Mental Status: She is alert and oriented to person, place, and time  Psychiatric:         Behavior: Behavior normal          Thought Content:  Thought content normal          Judgment: Judgment normal        Fetal Assessment:  FHT: 150 / Moderate 6 - 25 bpm /no accelerations nor decelerations  [place]: none    Lab Results   Component Value Date    WBC 13 54 (H) 09/04/2020    HGB 12 1 09/04/2020    HCT 36 1 09/04/2020    MCV 92 09/04/2020     09/04/2020       Lab Results   Component Value Date    CALCIUM 8 2 (L) 09/04/2020    K 4 4 09/04/2020    CO2 25 09/04/2020     09/04/2020    BUN 19 09/04/2020    CREATININE 0 86 09/04/2020       Lab Results   Component Value Date    ALT 19 09/04/2020    AST 19 09/04/2020    ALKPHOS 128 (H) 09/04/2020       Vicki Lucero MD  OBGYN, PGY-3  9/4/2020  3:48 PM

## 2020-09-05 PROCEDURE — 59025 FETAL NON-STRESS TEST: CPT | Performed by: OBSTETRICS & GYNECOLOGY

## 2020-09-05 PROCEDURE — 99232 SBSQ HOSP IP/OBS MODERATE 35: CPT | Performed by: OBSTETRICS & GYNECOLOGY

## 2020-09-05 PROCEDURE — NC001 PR NO CHARGE: Performed by: OBSTETRICS & GYNECOLOGY

## 2020-09-05 RX ADMIN — TRIAMCINOLONE ACETONIDE: 1 CREAM TOPICAL at 08:48

## 2020-09-05 RX ADMIN — TRIAMCINOLONE ACETONIDE: 1 CREAM TOPICAL at 19:29

## 2020-09-05 RX ADMIN — HEPARIN SODIUM 5000 UNITS: 5000 INJECTION INTRAVENOUS; SUBCUTANEOUS at 21:32

## 2020-09-05 RX ADMIN — Medication 1 TABLET: at 08:48

## 2020-09-05 RX ADMIN — NIFEDIPINE 30 MG: 30 TABLET, FILM COATED, EXTENDED RELEASE ORAL at 08:48

## 2020-09-05 RX ADMIN — HEPARIN SODIUM 5000 UNITS: 5000 INJECTION INTRAVENOUS; SUBCUTANEOUS at 08:48

## 2020-09-05 NOTE — PROGRESS NOTES
Progress Note - Maternal Fetal Medicine   Yareli Frank 35 y o  female MRN: [id number]  Unit/Bed#: -01 Encounter: [id number]    Assessment:  35 y o   at 28w6d admitted with preeclampsia with severe features  Hospital day 17    Plan:  1  Preeclampsia with severe features  -blood pressures overnight 130s-140s/80s-90s  -Procardia 30 XL daily  - CMP, CBC acute q3 days    2  Routine prenatal care  - 1 hour Glucola test normal  - status post Tdap  - type and screen on     3  IUGR with abnormal Dopplers  - Doppler 1st abnormal but stable  - Growth scan EFW 10 percentile    4  FEN  -regular diet    5  Shortness of breath  -resolved, CT negative for PE, pleural effusion, status post Lasix  - , echo normal, cardiology consulted    Subjective/Objective   Chief Complaint:     Patient is in the morning with good mood, she reported good fetal movements decline contractions decline headache, vision changes and right upper quadrant pain  Subjective:     Contractions: no  Loss of fluid: no  Vaginal bleeding: no  Fetal movement: yes    Pain: no  Tolerating PO: no  Voiding: no  Ambulating: no  Headaches: no  Visual changes: no  Chest pain: no  Shortness of breath: no  Nausea: no  Vomiting/Diarrhea: no  Dysuria: no  Leg pain: no    Objective:     Vitals:   Temp:  [98 °F (36 7 °C)-98 3 °F (36 8 °C)] 98 °F (36 7 °C)  HR:  [] 84  Resp:  [18] 18  BP: (131-141)/(74-92) 141/90     Physical Exam:     Physical Exam  Constitutional:       Appearance: Normal appearance  She is normal weight  Neck:      Musculoskeletal: Normal range of motion  Cardiovascular:      Rate and Rhythm: Normal rate and regular rhythm  Pulses: Normal pulses  Pulmonary:      Effort: Pulmonary effort is normal    Abdominal:      Palpations: Abdomen is soft  Skin:     General: Skin is warm  Neurological:      General: No focal deficit present  Mental Status: She is alert and oriented to person, place, and time  Psychiatric:         Mood and Affect: Mood normal          Behavior: Behavior normal            Fetal Assessment:  FHT: 145 / Minimal 1 - 5 bpm / + accelerations and no decelerations, reactive  [place] q no contractions    Lab, Imaging and other studies: I have personally reviewed pertinent reports        Lab Results   Component Value Date    WBC 13 54 (H) 09/04/2020    HGB 12 1 09/04/2020    HCT 36 1 09/04/2020    MCV 92 09/04/2020     09/04/2020       Lab Results   Component Value Date    CALCIUM 8 2 (L) 09/04/2020    K 4 4 09/04/2020    CO2 25 09/04/2020     09/04/2020    BUN 19 09/04/2020    CREATININE 0 86 09/04/2020       Lab Results   Component Value Date    ALT 19 09/04/2020    AST 19 09/04/2020    ALKPHOS 128 (H) 95/05/4366       Daisy Allen MD  OBGYN, PGY-3  9/5/2020  9:12 AM

## 2020-09-05 NOTE — PLAN OF CARE
Problem: ANTEPARTUM  Goal: Maintain pregnancy as long as maternal and/or fetal condition is stable  Description: INTERVENTIONS:  - Maternal surveillance  - Fetal surveillance  - Monitor uterine activity  - Medications as ordered  - Bedrest  Outcome: Progressing     Problem: NEUROSENSORY - ADULT  Goal: Achieves stable or improved neurological status  Description: INTERVENTIONS  - Monitor and report changes in neurological status  - Monitor vital signs such as temperature, blood pressure, glucose, and any other labs ordered   - Initiate measures to prevent increased intracranial pressure  - Monitor for seizure activity and implement precautions if appropriate      Outcome: Progressing  Goal: Remains free of injury related to seizures activity  Description: INTERVENTIONS  - Maintain airway, patient safety  and administer oxygen as ordered  - Monitor patient for seizure activity, document and report duration and description of seizure to physician/advanced practitioner  - If seizure occurs,  ensure patient safety during seizure  - Reorient patient post seizure  - Seizure pads on all 4 side rails  - Instruct patient/family to notify RN of any seizure activity including if an aura is experienced  - Instruct patient/family to call for assistance with activity based on nursing assessment  - Administer anti-seizure medications if ordered    Outcome: Progressing     Problem: CARDIOVASCULAR - ADULT  Goal: Maintains optimal cardiac output and hemodynamic stability  Description: INTERVENTIONS:  - Monitor I/O, vital signs and rhythm  - Monitor for S/S and trends of decreased cardiac output  - Administer and titrate ordered vasoactive medications to optimize hemodynamic stability  - Assess quality of pulses, skin color and temperature  - Assess for signs of decreased coronary artery perfusion  - Instruct patient to report change in severity of symptoms  Outcome: Progressing     Problem: PAIN - ADULT  Goal: Verbalizes/displays adequate comfort level or baseline comfort level  Description: Interventions:  - Encourage patient to monitor pain and request assistance  - Assess pain using appropriate pain scale  - Administer analgesics based on type and severity of pain and evaluate response  - Implement non-pharmacological measures as appropriate and evaluate response  - Consider cultural and social influences on pain and pain management  - Notify physician/advanced practitioner if interventions unsuccessful or patient reports new pain  Outcome: Progressing     Problem: INFECTION - ADULT  Goal: Absence or prevention of progression during hospitalization  Description: INTERVENTIONS:  - Assess and monitor for signs and symptoms of infection  - Monitor lab/diagnostic results  - Monitor all insertion sites, i e  indwelling lines, tubes, and drains  - Monitor endotracheal if appropriate and nasal secretions for changes in amount and color  - [place] appropriate cooling/warming therapies per order  - Administer medications as ordered  - Instruct and encourage patient and family to use good hand hygiene technique  - Identify and instruct in appropriate isolation precautions for identified infection/condition  Outcome: Progressing  Goal: Absence of fever/infection during neutropenic period  Description: INTERVENTIONS:  - Monitor WBC    Outcome: Progressing     Problem: SAFETY ADULT  Goal: Patient will remain free of falls  Description: INTERVENTIONS:  - Assess patient frequently for physical needs  -  Identify cognitive and physical deficits and behaviors that affect risk of falls    -  [place] fall precautions as indicated by assessment   - Educate patient/family on patient safety including physical limitations  - Instruct patient to call for assistance with activity based on assessment  - Modify environment to reduce risk of injury  - Consider OT/PT consult to assist with strengthening/mobility  Outcome: Progressing  Goal: Maintain or return to baseline ADL function  Description: INTERVENTIONS:  -  Assess patient's ability to carry out ADLs; assess patient's baseline for ADL function and identify physical deficits which impact ability to perform ADLs (bathing, care of mouth/teeth, toileting, grooming, dressing, etc )  - Assess/evaluate cause of self-care deficits   - Assess range of motion  - Assess patient's mobility; develop plan if impaired  - Assess patient's need for assistive devices and provide as appropriate  - Encourage maximum independence but intervene and supervise when necessary  - Involve family in performance of ADLs  - Assess for home care needs following discharge   - Consider OT consult to assist with ADL evaluation and planning for discharge  - Provide patient education as appropriate  Outcome: Progressing  Goal: Maintain or return mobility status to optimal level  Description: INTERVENTIONS:  - Assess patient's baseline mobility status (ambulation, transfers, stairs, etc )    - Identify cognitive and physical deficits and behaviors that affect mobility  - Identify mobility aids required to assist with transfers and/or ambulation (gait belt, sit-to-stand, lift, walker, cane, etc )  - [place] fall precautions as indicated by assessment  - Record patient progress and toleration of activity level on Mobility SBAR; progress patient to next Phase/Stage  - Instruct patient to call for assistance with activity based on assessment  - Consider rehabilitation consult to assist with strengthening/weightbearing, etc   Outcome: Progressing     Problem: Knowledge Deficit  Goal: Patient/family/caregiver demonstrates understanding of disease process, treatment plan, medications, and discharge instructions  Description: Complete learning assessment and assess knowledge base    Interventions:  - Provide teaching at level of understanding  - Provide teaching via preferred learning methods  Outcome: Progressing     Problem: DISCHARGE PLANNING  Goal: Discharge to home or other facility with appropriate resources  Description: INTERVENTIONS:  - Identify barriers to discharge w/patient and caregiver  - Arrange for needed discharge resources and transportation as appropriate  - Identify discharge learning needs (meds, wound care, etc )  - Arrange for interpretive services to assist at discharge as needed  - Refer to Case Management Department for coordinating discharge planning if the patient needs post-hospital services based on physician/advanced practitioner order or complex needs related to functional status, cognitive ability, or social support system  Outcome: Progressing     Problem: Potential for Falls  Goal: Patient will remain free of falls  Description: INTERVENTIONS:  - Assess patient frequently for physical needs  -  Identify cognitive and physical deficits and behaviors that affect risk of falls    -  [place] fall precautions as indicated by assessment   - Educate patient/family on patient safety including physical limitations  - Instruct patient to call for assistance with activity based on assessment  - Modify environment to reduce risk of injury  - Consider OT/PT consult to assist with strengthening/mobility  Outcome: Progressing

## 2020-09-06 PROCEDURE — NC001 PR NO CHARGE: Performed by: OBSTETRICS & GYNECOLOGY

## 2020-09-06 PROCEDURE — 59025 FETAL NON-STRESS TEST: CPT | Performed by: OBSTETRICS & GYNECOLOGY

## 2020-09-06 PROCEDURE — 99232 SBSQ HOSP IP/OBS MODERATE 35: CPT | Performed by: OBSTETRICS & GYNECOLOGY

## 2020-09-06 RX ADMIN — TRIAMCINOLONE ACETONIDE: 1 CREAM TOPICAL at 19:30

## 2020-09-06 RX ADMIN — NIFEDIPINE 30 MG: 30 TABLET, FILM COATED, EXTENDED RELEASE ORAL at 08:21

## 2020-09-06 RX ADMIN — HEPARIN SODIUM 5000 UNITS: 5000 INJECTION INTRAVENOUS; SUBCUTANEOUS at 08:21

## 2020-09-06 RX ADMIN — HEPARIN SODIUM 5000 UNITS: 5000 INJECTION INTRAVENOUS; SUBCUTANEOUS at 21:32

## 2020-09-06 RX ADMIN — Medication 1 TABLET: at 08:21

## 2020-09-06 RX ADMIN — TRIAMCINOLONE ACETONIDE: 1 CREAM TOPICAL at 08:21

## 2020-09-06 NOTE — PROGRESS NOTES
Progress Note - Maternal Fetal Medicine   Mary Ch 35 y o  female MRN: [id number]  Unit/Bed#: -01 Encounter: [id number]    Assessment:  35 y o   at 29w0d admitted with preeclampsia with severe features  Hospital day 18    Plan:  1  Preeclampsia with severe features  - blood pressures overnight 120s-130s/80s  - Procardia 30 XL daily  -CBC, CMP q3 days    2  Routine prenatal care  - 1 hour Glucola normal  -status post Tdap  -type and screen on     3  Intrauterine growth restriction with abnormal Dopplers  - Dopplers abnormal but stable, growth scan estimated fetal weight 10 percentile    4  FEN  - regular diet    5  Shortness of breath  -resolved, CT negative for PE, pleural effusions status post Lasix  -, echo normal, cardiology consulted    Subjective/Objective   Chief Complaint:     Patient is doing well, reported good fetal movements decline contractions, fluid leakage, vaginal bleeding  Decline headache, vision changes and right upper quadrant pain  I encouraged her ambulate more  Subjective:     Contractions: no  Loss of fluid: no  Vaginal bleeding: no  Fetal movement: no    Pain: no  Tolerating PO: no  Voiding: no  Ambulating: no  Headaches: no  Visual changes: no  Chest pain: no  Shortness of breath: no  Nausea: no  Vomiting/Diarrhea: no  Dysuria: no  Leg pain: no    Objective:     Vitals:   Temp:  [98 °F (36 7 °C)-98 8 °F (37 1 °C)] 98 4 °F (36 9 °C)  HR:  [81-98] 81  Resp:  [18] 18  BP: (125-141)/(76-90) 138/81     Physical Exam:     Physical Exam  Constitutional:       Appearance: Normal appearance  She is normal weight  Neck:      Musculoskeletal: Normal range of motion  Cardiovascular:      Rate and Rhythm: Normal rate and regular rhythm  Pulses: Normal pulses  Heart sounds: Normal heart sounds  Pulmonary:      Effort: Pulmonary effort is normal       Breath sounds: Normal breath sounds  Abdominal:      Palpations: Abdomen is soft     Musculoskeletal: Normal range of motion  Skin:     General: Skin is warm  Neurological:      General: No focal deficit present  Mental Status: She is alert and oriented to person, place, and time  Psychiatric:         Mood and Affect: Mood normal          Behavior: Behavior normal            Fetal Assessment:  FHT: 141 / Moderate 6 - 25 bpm / 10x10 accelerations and no decelerations, reactive  [place]: q no contractions    Lab, Imaging and other studies: I have personally reviewed pertinent reports        Lab Results   Component Value Date    WBC 13 54 (H) 09/04/2020    HGB 12 1 09/04/2020    HCT 36 1 09/04/2020    MCV 92 09/04/2020     09/04/2020       Lab Results   Component Value Date    CALCIUM 8 2 (L) 09/04/2020    K 4 4 09/04/2020    CO2 25 09/04/2020     09/04/2020    BUN 19 09/04/2020    CREATININE 0 86 09/04/2020       Lab Results   Component Value Date    ALT 19 09/04/2020    AST 19 09/04/2020    ALKPHOS 128 (H) 28/13/8150       Genie Burns MD  OBGYN, PGY-3  9/6/2020  6:53 AM

## 2020-09-06 NOTE — PLAN OF CARE
Problem: ANTEPARTUM  Goal: Maintain pregnancy as long as maternal and/or fetal condition is stable  Description: INTERVENTIONS:  - Maternal surveillance  - Fetal surveillance  - Monitor uterine activity  - Medications as ordered  - Bedrest  Outcome: Progressing     Problem: NEUROSENSORY - ADULT  Goal: Achieves stable or improved neurological status  Description: INTERVENTIONS  - Monitor and report changes in neurological status  - Monitor vital signs such as temperature, blood pressure, glucose, and any other labs ordered   - Initiate measures to prevent increased intracranial pressure  - Monitor for seizure activity and implement precautions if appropriate      Outcome: Progressing  Goal: Remains free of injury related to seizures activity  Description: INTERVENTIONS  - Maintain airway, patient safety  and administer oxygen as ordered  - Monitor patient for seizure activity, document and report duration and description of seizure to physician/advanced practitioner  - If seizure occurs,  ensure patient safety during seizure  - Reorient patient post seizure  - Seizure pads on all 4 side rails  - Instruct patient/family to notify RN of any seizure activity including if an aura is experienced  - Instruct patient/family to call for assistance with activity based on nursing assessment  - Administer anti-seizure medications if ordered    Outcome: Progressing     Problem: CARDIOVASCULAR - ADULT  Goal: Maintains optimal cardiac output and hemodynamic stability  Description: INTERVENTIONS:  - Monitor I/O, vital signs and rhythm  - Monitor for S/S and trends of decreased cardiac output  - Administer and titrate ordered vasoactive medications to optimize hemodynamic stability  - Assess quality of pulses, skin color and temperature  - Assess for signs of decreased coronary artery perfusion  - Instruct patient to report change in severity of symptoms  Outcome: Progressing     Problem: PAIN - ADULT  Goal: Verbalizes/displays adequate comfort level or baseline comfort level  Description: Interventions:  - Encourage patient to monitor pain and request assistance  - Assess pain using appropriate pain scale  - Administer analgesics based on type and severity of pain and evaluate response  - Implement non-pharmacological measures as appropriate and evaluate response  - Consider cultural and social influences on pain and pain management  - Notify physician/advanced practitioner if interventions unsuccessful or patient reports new pain  Outcome: Progressing     Problem: INFECTION - ADULT  Goal: Absence or prevention of progression during hospitalization  Description: INTERVENTIONS:  - Assess and monitor for signs and symptoms of infection  - Monitor lab/diagnostic results  - Monitor all insertion sites, i e  indwelling lines, tubes, and drains  - Monitor endotracheal if appropriate and nasal secretions for changes in amount and color  - [place] appropriate cooling/warming therapies per order  - Administer medications as ordered  - Instruct and encourage patient and family to use good hand hygiene technique  - Identify and instruct in appropriate isolation precautions for identified infection/condition  Outcome: Progressing  Goal: Absence of fever/infection during neutropenic period  Description: INTERVENTIONS:  - Monitor WBC    Outcome: Progressing     Problem: SAFETY ADULT  Goal: Patient will remain free of falls  Description: INTERVENTIONS:  - Assess patient frequently for physical needs  -  Identify cognitive and physical deficits and behaviors that affect risk of falls    -  [place] fall precautions as indicated by assessment   - Educate patient/family on patient safety including physical limitations  - Instruct patient to call for assistance with activity based on assessment  - Modify environment to reduce risk of injury  - Consider OT/PT consult to assist with strengthening/mobility  Outcome: Progressing  Goal: Maintain or return to baseline ADL function  Description: INTERVENTIONS:  -  Assess patient's ability to carry out ADLs; assess patient's baseline for ADL function and identify physical deficits which impact ability to perform ADLs (bathing, care of mouth/teeth, toileting, grooming, dressing, etc )  - Assess/evaluate cause of self-care deficits   - Assess range of motion  - Assess patient's mobility; develop plan if impaired  - Assess patient's need for assistive devices and provide as appropriate  - Encourage maximum independence but intervene and supervise when necessary  - Involve family in performance of ADLs  - Assess for home care needs following discharge   - Consider OT consult to assist with ADL evaluation and planning for discharge  - Provide patient education as appropriate  Outcome: Progressing  Goal: Maintain or return mobility status to optimal level  Description: INTERVENTIONS:  - Assess patient's baseline mobility status (ambulation, transfers, stairs, etc )    - Identify cognitive and physical deficits and behaviors that affect mobility  - Identify mobility aids required to assist with transfers and/or ambulation (gait belt, sit-to-stand, lift, walker, cane, etc )  - [place] fall precautions as indicated by assessment  - Record patient progress and toleration of activity level on Mobility SBAR; progress patient to next Phase/Stage  - Instruct patient to call for assistance with activity based on assessment  - Consider rehabilitation consult to assist with strengthening/weightbearing, etc   Outcome: Progressing     Problem: Knowledge Deficit  Goal: Patient/family/caregiver demonstrates understanding of disease process, treatment plan, medications, and discharge instructions  Description: Complete learning assessment and assess knowledge base    Interventions:  - Provide teaching at level of understanding  - Provide teaching via preferred learning methods  Outcome: Progressing     Problem: DISCHARGE PLANNING  Goal: Discharge to home or other facility with appropriate resources  Description: INTERVENTIONS:  - Identify barriers to discharge w/patient and caregiver  - Arrange for needed discharge resources and transportation as appropriate  - Identify discharge learning needs (meds, wound care, etc )  - Arrange for interpretive services to assist at discharge as needed  - Refer to Case Management Department for coordinating discharge planning if the patient needs post-hospital services based on physician/advanced practitioner order or complex needs related to functional status, cognitive ability, or social support system  Outcome: Progressing     Problem: Potential for Falls  Goal: Patient will remain free of falls  Description: INTERVENTIONS:  - Assess patient frequently for physical needs  -  Identify cognitive and physical deficits and behaviors that affect risk of falls    -  [place] fall precautions as indicated by assessment   - Educate patient/family on patient safety including physical limitations  - Instruct patient to call for assistance with activity based on assessment  - Modify environment to reduce risk of injury  - Consider OT/PT consult to assist with strengthening/mobility  Outcome: Progressing

## 2020-09-07 LAB
ABO GROUP BLD: NORMAL
ALBUMIN SERPL BCP-MCNC: 1.7 G/DL (ref 3.5–5)
ALP SERPL-CCNC: 144 U/L (ref 46–116)
ALT SERPL W P-5'-P-CCNC: 24 U/L (ref 12–78)
ANION GAP SERPL CALCULATED.3IONS-SCNC: 7 MMOL/L (ref 4–13)
AST SERPL W P-5'-P-CCNC: 16 U/L (ref 5–45)
BASOPHILS # BLD AUTO: 0.04 THOUSANDS/ΜL (ref 0–0.1)
BASOPHILS NFR BLD AUTO: 0 % (ref 0–1)
BILIRUB SERPL-MCNC: 0.11 MG/DL (ref 0.2–1)
BLD GP AB SCN SERPL QL: NEGATIVE
BUN SERPL-MCNC: 19 MG/DL (ref 5–25)
CALCIUM SERPL-MCNC: 8.1 MG/DL (ref 8.3–10.1)
CHLORIDE SERPL-SCNC: 105 MMOL/L (ref 100–108)
CO2 SERPL-SCNC: 24 MMOL/L (ref 21–32)
CREAT SERPL-MCNC: 0.86 MG/DL (ref 0.6–1.3)
EOSINOPHIL # BLD AUTO: 0.18 THOUSAND/ΜL (ref 0–0.61)
EOSINOPHIL NFR BLD AUTO: 1 % (ref 0–6)
ERYTHROCYTE [DISTWIDTH] IN BLOOD BY AUTOMATED COUNT: 12.2 % (ref 11.6–15.1)
GFR SERPL CREATININE-BSD FRML MDRD: 106 ML/MIN/1.73SQ M
GLUCOSE SERPL-MCNC: 80 MG/DL (ref 65–140)
HCT VFR BLD AUTO: 34.7 % (ref 34.8–46.1)
HGB BLD-MCNC: 11.8 G/DL (ref 11.5–15.4)
IMM GRANULOCYTES # BLD AUTO: 0.04 THOUSAND/UL (ref 0–0.2)
IMM GRANULOCYTES NFR BLD AUTO: 0 % (ref 0–2)
LYMPHOCYTES # BLD AUTO: 2.33 THOUSANDS/ΜL (ref 0.6–4.47)
LYMPHOCYTES NFR BLD AUTO: 19 % (ref 14–44)
MCH RBC QN AUTO: 31 PG (ref 26.8–34.3)
MCHC RBC AUTO-ENTMCNC: 34 G/DL (ref 31.4–37.4)
MCV RBC AUTO: 91 FL (ref 82–98)
MONOCYTES # BLD AUTO: 1.13 THOUSAND/ΜL (ref 0.17–1.22)
MONOCYTES NFR BLD AUTO: 9 % (ref 4–12)
NEUTROPHILS # BLD AUTO: 8.7 THOUSANDS/ΜL (ref 1.85–7.62)
NEUTS SEG NFR BLD AUTO: 71 % (ref 43–75)
NRBC BLD AUTO-RTO: 0 /100 WBCS
PLATELET # BLD AUTO: 301 THOUSANDS/UL (ref 149–390)
PMV BLD AUTO: 10.3 FL (ref 8.9–12.7)
POTASSIUM SERPL-SCNC: 4.6 MMOL/L (ref 3.5–5.3)
PROT SERPL-MCNC: 5.7 G/DL (ref 6.4–8.2)
RBC # BLD AUTO: 3.81 MILLION/UL (ref 3.81–5.12)
RH BLD: POSITIVE
SODIUM SERPL-SCNC: 136 MMOL/L (ref 136–145)
SPECIMEN EXPIRATION DATE: NORMAL
WBC # BLD AUTO: 12.42 THOUSAND/UL (ref 4.31–10.16)

## 2020-09-07 PROCEDURE — 86900 BLOOD TYPING SEROLOGIC ABO: CPT | Performed by: OBSTETRICS & GYNECOLOGY

## 2020-09-07 PROCEDURE — 85025 COMPLETE CBC W/AUTO DIFF WBC: CPT | Performed by: OBSTETRICS & GYNECOLOGY

## 2020-09-07 PROCEDURE — 59025 FETAL NON-STRESS TEST: CPT | Performed by: OBSTETRICS & GYNECOLOGY

## 2020-09-07 PROCEDURE — 80053 COMPREHEN METABOLIC PANEL: CPT | Performed by: OBSTETRICS & GYNECOLOGY

## 2020-09-07 PROCEDURE — 86850 RBC ANTIBODY SCREEN: CPT | Performed by: OBSTETRICS & GYNECOLOGY

## 2020-09-07 PROCEDURE — NC001 PR NO CHARGE: Performed by: OBSTETRICS & GYNECOLOGY

## 2020-09-07 PROCEDURE — 99232 SBSQ HOSP IP/OBS MODERATE 35: CPT | Performed by: OBSTETRICS & GYNECOLOGY

## 2020-09-07 PROCEDURE — 86901 BLOOD TYPING SEROLOGIC RH(D): CPT | Performed by: OBSTETRICS & GYNECOLOGY

## 2020-09-07 RX ADMIN — HEPARIN SODIUM 5000 UNITS: 5000 INJECTION INTRAVENOUS; SUBCUTANEOUS at 09:00

## 2020-09-07 RX ADMIN — HEPARIN SODIUM 5000 UNITS: 5000 INJECTION INTRAVENOUS; SUBCUTANEOUS at 21:57

## 2020-09-07 RX ADMIN — NIFEDIPINE 30 MG: 30 TABLET, FILM COATED, EXTENDED RELEASE ORAL at 09:00

## 2020-09-07 RX ADMIN — Medication 1 TABLET: at 09:00

## 2020-09-07 NOTE — PROGRESS NOTES
Progress Note - Maternal Fetal Medicine   Micheline Gaucher 35 y o  female MRN: [id number]  Unit/Bed#: -01 Encounter: [id number]    Assessment:  35 y o   at 29w1d admitted with preeclampsia with severe features  Hospital day 19    Plan:  1  Preeclampsia with severe features  - blood pressures overnight 123-147/73-98  Pt asymptomatic   - Procardia 30 XL daily  - CBC, CMP q3 days: f/u this AM  Otherwise have been stable    2  Routine prenatal care  - 1 hour Glucola normal  - status post Tdap  - type and screen on , ordered today    3  Intrauterine growth restriction with abnormal Dopplers  - Dopplers abnormal but stable, growth scan estimated fetal weight 10 percentile on 9/3/20    4  FEN  - regular diet    5  Shortness of breath, resolved  -CT negative for PE, pleural effusions status post Lasix  -, echo normal, cardiology consulted    6  DVT ppx:  - Heparin 5000u q8, SCDs, ambulation    Subjective/Objective   Chief Complaint:     Patient is doing well, without complaint  She denies headache, changes in her vision, chest pain, SOB, leg pain or swelling  Reports good fetal movement  Denies vaginal bleeding, loss of fluid, contractions  Subjective:   Pain: no  Tolerating PO: yes  Voiding: yes  Ambulating: yes  Nausea: no  Vomiting/Diarrhea: no    Objective:     Vitals:   Temp:  [98 3 °F (36 8 °C)-98 7 °F (37 1 °C)] 98 4 °F (36 9 °C)  HR:  [] 84  Resp:  [17-22] 17  BP: (123-147)/(73-98) 130/98     Physical Exam:     Physical Exam  Vitals signs reviewed  Constitutional:       General: She is not in acute distress  Appearance: Normal appearance  She is obese  HENT:      Head: Normocephalic and atraumatic  Cardiovascular:      Rate and Rhythm: Normal rate and regular rhythm  Pulses: Normal pulses  Heart sounds: Normal heart sounds  No murmur  No friction rub  No gallop  Pulmonary:      Effort: Pulmonary effort is normal  No respiratory distress        Breath sounds: No wheezing, rhonchi or rales  Abdominal:      Palpations: Abdomen is soft  Tenderness: There is no abdominal tenderness  There is no guarding or rebound  Comments: Gravid   Musculoskeletal:         General: No swelling, tenderness, deformity or signs of injury  Right lower leg: No edema  Left lower leg: No edema  Neurological:      Mental Status: She is alert and oriented to person, place, and time  Psychiatric:         Mood and Affect: Mood normal          Behavior: Behavior normal          Thought Content: Thought content normal          Judgment: Judgment normal          Fetal Assessment:  FHT: 140 / Moderate 6 - 25 bpm / 10x10 accelerations and no decelerations, reactive  [place]:  no contractions    Lab, Imaging and other studies: I have personally reviewed pertinent reports        Lab Results   Component Value Date    WBC 13 54 (H) 09/04/2020    HGB 12 1 09/04/2020    HCT 36 1 09/04/2020    MCV 92 09/04/2020     09/04/2020       Lab Results   Component Value Date    CALCIUM 8 2 (L) 09/04/2020    K 4 4 09/04/2020    CO2 25 09/04/2020     09/04/2020    BUN 19 09/04/2020    CREATININE 0 86 09/04/2020       Lab Results   Component Value Date    ALT 19 09/04/2020    AST 19 09/04/2020    ALKPHOS 128 (H) 09/04/2020       Rubie Romberg, MD  MFM, PGY-4  9/7/2020 5:40 AM

## 2020-09-07 NOTE — PLAN OF CARE
Problem: ANTEPARTUM  Goal: Maintain pregnancy as long as maternal and/or fetal condition is stable  Description: INTERVENTIONS:  - Maternal surveillance  - Fetal surveillance  - Monitor uterine activity  - Medications as ordered  - Bedrest  Outcome: Progressing     Problem: NEUROSENSORY - ADULT  Goal: Achieves stable or improved neurological status  Description: INTERVENTIONS  - Monitor and report changes in neurological status  - Monitor vital signs such as temperature, blood pressure, glucose, and any other labs ordered   - Initiate measures to prevent increased intracranial pressure  - Monitor for seizure activity and implement precautions if appropriate      Outcome: Progressing  Goal: Remains free of injury related to seizures activity  Description: INTERVENTIONS  - Maintain airway, patient safety  and administer oxygen as ordered  - Monitor patient for seizure activity, document and report duration and description of seizure to physician/advanced practitioner  - If seizure occurs,  ensure patient safety during seizure  - Reorient patient post seizure  - Seizure pads on all 4 side rails  - Instruct patient/family to notify RN of any seizure activity including if an aura is experienced  - Instruct patient/family to call for assistance with activity based on nursing assessment  - Administer anti-seizure medications if ordered    Outcome: Progressing     Problem: CARDIOVASCULAR - ADULT  Goal: Maintains optimal cardiac output and hemodynamic stability  Description: INTERVENTIONS:  - Monitor I/O, vital signs and rhythm  - Monitor for S/S and trends of decreased cardiac output  - Administer and titrate ordered vasoactive medications to optimize hemodynamic stability  - Assess quality of pulses, skin color and temperature  - Assess for signs of decreased coronary artery perfusion  - Instruct patient to report change in severity of symptoms  Outcome: Progressing     Problem: PAIN - ADULT  Goal: Verbalizes/displays adequate comfort level or baseline comfort level  Description: Interventions:  - Encourage patient to monitor pain and request assistance  - Assess pain using appropriate pain scale  - Administer analgesics based on type and severity of pain and evaluate response  - Implement non-pharmacological measures as appropriate and evaluate response  - Consider cultural and social influences on pain and pain management  - Notify physician/advanced practitioner if interventions unsuccessful or patient reports new pain  Outcome: Progressing     Problem: INFECTION - ADULT  Goal: Absence or prevention of progression during hospitalization  Description: INTERVENTIONS:  - Assess and monitor for signs and symptoms of infection  - Monitor lab/diagnostic results  - Monitor all insertion sites, i e  indwelling lines, tubes, and drains  - Monitor endotracheal if appropriate and nasal secretions for changes in amount and color  - [place] appropriate cooling/warming therapies per order  - Administer medications as ordered  - Instruct and encourage patient and family to use good hand hygiene technique  - Identify and instruct in appropriate isolation precautions for identified infection/condition  Outcome: Progressing  Goal: Absence of fever/infection during neutropenic period  Description: INTERVENTIONS:  - Monitor WBC    Outcome: Progressing     Problem: SAFETY ADULT  Goal: Patient will remain free of falls  Description: INTERVENTIONS:  - Assess patient frequently for physical needs  -  Identify cognitive and physical deficits and behaviors that affect risk of falls    -  [place] fall precautions as indicated by assessment   - Educate patient/family on patient safety including physical limitations  - Instruct patient to call for assistance with activity based on assessment  - Modify environment to reduce risk of injury  - Consider OT/PT consult to assist with strengthening/mobility  Outcome: Progressing  Goal: Maintain or return to baseline ADL function  Description: INTERVENTIONS:  -  Assess patient's ability to carry out ADLs; assess patient's baseline for ADL function and identify physical deficits which impact ability to perform ADLs (bathing, care of mouth/teeth, toileting, grooming, dressing, etc )  - Assess/evaluate cause of self-care deficits   - Assess range of motion  - Assess patient's mobility; develop plan if impaired  - Assess patient's need for assistive devices and provide as appropriate  - Encourage maximum independence but intervene and supervise when necessary  - Involve family in performance of ADLs  - Assess for home care needs following discharge   - Consider OT consult to assist with ADL evaluation and planning for discharge  - Provide patient education as appropriate  Outcome: Progressing  Goal: Maintain or return mobility status to optimal level  Description: INTERVENTIONS:  - Assess patient's baseline mobility status (ambulation, transfers, stairs, etc )    - Identify cognitive and physical deficits and behaviors that affect mobility  - Identify mobility aids required to assist with transfers and/or ambulation (gait belt, sit-to-stand, lift, walker, cane, etc )  - [place] fall precautions as indicated by assessment  - Record patient progress and toleration of activity level on Mobility SBAR; progress patient to next Phase/Stage  - Instruct patient to call for assistance with activity based on assessment  - Consider rehabilitation consult to assist with strengthening/weightbearing, etc   Outcome: Progressing     Problem: Knowledge Deficit  Goal: Patient/family/caregiver demonstrates understanding of disease process, treatment plan, medications, and discharge instructions  Description: Complete learning assessment and assess knowledge base    Interventions:  - Provide teaching at level of understanding  - Provide teaching via preferred learning methods  Outcome: Progressing     Problem: DISCHARGE PLANNING  Goal: Discharge to home or other facility with appropriate resources  Description: INTERVENTIONS:  - Identify barriers to discharge w/patient and caregiver  - Arrange for needed discharge resources and transportation as appropriate  - Identify discharge learning needs (meds, wound care, etc )  - Arrange for interpretive services to assist at discharge as needed  - Refer to Case Management Department for coordinating discharge planning if the patient needs post-hospital services based on physician/advanced practitioner order or complex needs related to functional status, cognitive ability, or social support system  Outcome: Progressing     Problem: Potential for Falls  Goal: Patient will remain free of falls  Description: INTERVENTIONS:  - Assess patient frequently for physical needs  -  Identify cognitive and physical deficits and behaviors that affect risk of falls    -  [place] fall precautions as indicated by assessment   - Educate patient/family on patient safety including physical limitations  - Instruct patient to call for assistance with activity based on assessment  - Modify environment to reduce risk of injury  - Consider OT/PT consult to assist with strengthening/mobility  Outcome: Progressing

## 2020-09-08 PROCEDURE — NC001 PR NO CHARGE: Performed by: OBSTETRICS & GYNECOLOGY

## 2020-09-08 PROCEDURE — 99232 SBSQ HOSP IP/OBS MODERATE 35: CPT | Performed by: OBSTETRICS & GYNECOLOGY

## 2020-09-08 PROCEDURE — 59025 FETAL NON-STRESS TEST: CPT | Performed by: OBSTETRICS & GYNECOLOGY

## 2020-09-08 RX ORDER — HEPARIN SODIUM 5000 [USP'U]/ML
5000 INJECTION, SOLUTION INTRAVENOUS; SUBCUTANEOUS EVERY 8 HOURS SCHEDULED
Status: DISCONTINUED | OUTPATIENT
Start: 2020-09-08 | End: 2020-09-09

## 2020-09-08 RX ADMIN — HEPARIN SODIUM 5000 UNITS: 5000 INJECTION INTRAVENOUS; SUBCUTANEOUS at 09:37

## 2020-09-08 RX ADMIN — Medication 1 TABLET: at 09:37

## 2020-09-08 RX ADMIN — TRIAMCINOLONE ACETONIDE: 1 CREAM TOPICAL at 18:16

## 2020-09-08 RX ADMIN — HEPARIN SODIUM 5000 UNITS: 5000 INJECTION INTRAVENOUS; SUBCUTANEOUS at 23:01

## 2020-09-08 RX ADMIN — NIFEDIPINE 30 MG: 30 TABLET, FILM COATED, EXTENDED RELEASE ORAL at 09:37

## 2020-09-08 NOTE — UTILIZATION REVIEW
Continued Stay Review    Date: 09-07-20                          Current Patient Class: inpatient  Current Level of Care: medical    HPI:30 y o  female initially admitted on 08-19-20 for severe preeclampsia  @ 26 3/7 weeks    Assessment/Plan: HD # 19    29 2/7 week gestation  Continues on Procardia 30 XL Bp's less severe BP's 123-147/73-98 Occ  Severe readings,continue NST q shift   Growth US as follow up for Intrauterine growth restriction with abnormal Dopplers, estimated fetal weight 10 percentile on 9/3/20   Continue to monitor labs, BP's   FHT: 140 / Moderate 6 - 25 bpm / 10x10 accelerations and no decelerations, reactive  [place]:  no contractions  Pertinent Labs/Diagnostic Results:      Delivery is recommended at 34 weeks gestation for the indication of preeclampsia with severe features, sooner if otherwise clinically indicated    Results from last 7 days   Lab Units 09/07/20  0803 09/04/20  0627   WBC Thousand/uL 12 42* 13 54*   HEMOGLOBIN g/dL 11 8 12 1   HEMATOCRIT % 34 7* 36 1   PLATELETS Thousands/uL 301 274   NEUTROS ABS Thousands/µL 8 70* 9 76*         Results from last 7 days   Lab Units 09/07/20  0803 09/04/20  0627   SODIUM mmol/L 136 138   POTASSIUM mmol/L 4 6 4 4   CHLORIDE mmol/L 105 106   CO2 mmol/L 24 25   ANION GAP mmol/L 7 7   BUN mg/dL 19 19   CREATININE mg/dL 0 86 0 86   EGFR ml/min/1 73sq m 106 106   CALCIUM mg/dL 8 1* 8 2*     Results from last 7 days   Lab Units 09/07/20  0803 09/04/20  0627   AST U/L 16 19   ALT U/L 24 19   ALK PHOS U/L 144* 128*   TOTAL PROTEIN g/dL 5 7* 5 8*   ALBUMIN g/dL 1 7* 1 8*   TOTAL BILIRUBIN mg/dL 0 11* 0 27         Results from last 7 days   Lab Units 09/07/20  0803 09/04/20  0627   GLUCOSE RANDOM mg/dL 80 70         Vital Signs:   09/07/20 2229   98 5 °F (36 9 °C)   90   16   137/87   98 %   --   --   Sitting    09/07/20 1945   --   --   --   --   --   None (Room air)   WDL   --    09/07/20 1631   98 1 °F (36 7 °C)   97   20   135/82   98 %   None (Room air) --   Lying    09/07/20 1336   --   --   --   --   --   --   --   --    Comment rows:    OBSERV: -- (Pt denies CTXN or vaginal bleeding, reports + fetal movement) at 09/07/20 1336    09/07/20 1158   --   83   20   110/68   98 %   --   --   --    09/07/20 0900   --   --   --   --   --   --   WDL   --    Comment rows:    OBSERV: talkative, comfortable, pt offeres no complaints at this time (Pt denies CTXN or vaginal bleeding, reports + fetal movement) at 09/07/20 0900 09/07/20 0832   98 5 °F (36 9 °C)   86   20   141/85   --   --   --   --    09/06/20 2300   98 4 °F (36 9 °C)   84   17   130/98   98 %   --   --   Lying    09/06/20 2050   --   100   --   147/92   --   --   --   --    09/06/20 2000   --   --   --   --   --   None (Room air)   WDL   --    09/06/20 1641   98 3 °F (36 8 °C)   87   18   123/73   98 %   --   --   --    09/06/20 1155   98 3 °F (36 8 °C)   84   20   126/76   98 %   --   --   --    09/06/20 0736   98 7 °F (37 1 °C)   88   22   139/83   97 %   --   WDL   --    09/06/20 0644   98 4 °F (36 9 °C)   81   18   138/81   98 %   None (Room air)              Medications:   Scheduled Medications:  dupilumab, 300 mg, Subcutaneous, Q14 Days  heparin (porcine), 5,000 Units, Subcutaneous, Q12H [place]  NIFEdipine, 30 mg, Oral, Daily  prenatal multivitamin, 1 tablet, Oral, Daily  triamcinolone, , Topical, BID      Continuous IV Infusions:     PRN Meds:  acetaminophen, 650 mg, Oral, Q6H PRN  albuterol, 2 puff, Inhalation, Q4H PRN  polyethylene glycol, 17 g, Oral, Daily PRN  simethicone, 80 mg, Oral, Q6H PRN        Discharge Plan: home after delivery      Network Utilization Review Department  [email or web address]  ATTENTION: Please call with any questions or concerns to [phone] and carefully listen to the prompts so that you are directed to the right person   All voicemails are confidential   Daniella Lopez all requests for admission clinical reviews, approved or denied determinations and any other requests to dedicated fax number below belonging to the campus where the patient is receiving treatment   List of dedicated fax numbers for the Facilities:  [address] DENIALS (Administrative/Medical Necessity) [phone]   [address]  (Maternity/NICU/Pediatrics) [phone]   Isidoro Gallardo [phone]   Jayna Dash [phone]   Kit Galvez [phone]   Escobar Aggarwal [phone]   [address] [place] [address] [phone]   [facility]  [phone]   [address] [facility]  [address] [facility] [address] [facility] [phone]

## 2020-09-08 NOTE — PLAN OF CARE
Problem: ANTEPARTUM  Goal: Maintain pregnancy as long as maternal and/or fetal condition is stable  Description: INTERVENTIONS:  - Maternal surveillance  - Fetal surveillance  - Monitor uterine activity  - Medications as ordered  - Bedrest  Outcome: Progressing     Problem: NEUROSENSORY - ADULT  Goal: Achieves stable or improved neurological status  Description: INTERVENTIONS  - Monitor and report changes in neurological status  - Monitor vital signs such as temperature, blood pressure, glucose, and any other labs ordered   - Initiate measures to prevent increased intracranial pressure  - Monitor for seizure activity and implement precautions if appropriate      Outcome: Progressing  Goal: Remains free of injury related to seizures activity  Description: INTERVENTIONS  - Maintain airway, patient safety  and administer oxygen as ordered  - Monitor patient for seizure activity, document and report duration and description of seizure to physician/advanced practitioner  - If seizure occurs,  ensure patient safety during seizure  - Reorient patient post seizure  - Seizure pads on all 4 side rails  - Instruct patient/family to notify RN of any seizure activity including if an aura is experienced  - Instruct patient/family to call for assistance with activity based on nursing assessment  - Administer anti-seizure medications if ordered    Outcome: Progressing     Problem: CARDIOVASCULAR - ADULT  Goal: Maintains optimal cardiac output and hemodynamic stability  Description: INTERVENTIONS:  - Monitor I/O, vital signs and rhythm  - Monitor for S/S and trends of decreased cardiac output  - Administer and titrate ordered vasoactive medications to optimize hemodynamic stability  - Assess quality of pulses, skin color and temperature  - Assess for signs of decreased coronary artery perfusion  - Instruct patient to report change in severity of symptoms  Outcome: Progressing     Problem: PAIN - ADULT  Goal: Verbalizes/displays adequate comfort level or baseline comfort level  Description: Interventions:  - Encourage patient to monitor pain and request assistance  - Assess pain using appropriate pain scale  - Administer analgesics based on type and severity of pain and evaluate response  - Implement non-pharmacological measures as appropriate and evaluate response  - Consider cultural and social influences on pain and pain management  - Notify physician/advanced practitioner if interventions unsuccessful or patient reports new pain  Outcome: Progressing     Problem: INFECTION - ADULT  Goal: Absence or prevention of progression during hospitalization  Description: INTERVENTIONS:  - Assess and monitor for signs and symptoms of infection  - Monitor lab/diagnostic results  - Monitor all insertion sites, i e  indwelling lines, tubes, and drains  - Monitor endotracheal if appropriate and nasal secretions for changes in amount and color  - [place] appropriate cooling/warming therapies per order  - Administer medications as ordered  - Instruct and encourage patient and family to use good hand hygiene technique  - Identify and instruct in appropriate isolation precautions for identified infection/condition  Outcome: Progressing  Goal: Absence of fever/infection during neutropenic period  Description: INTERVENTIONS:  - Monitor WBC    Outcome: Progressing     Problem: SAFETY ADULT  Goal: Patient will remain free of falls  Description: INTERVENTIONS:  - Assess patient frequently for physical needs  -  Identify cognitive and physical deficits and behaviors that affect risk of falls    -  [place] fall precautions as indicated by assessment   - Educate patient/family on patient safety including physical limitations  - Instruct patient to call for assistance with activity based on assessment  - Modify environment to reduce risk of injury  - Consider OT/PT consult to assist with strengthening/mobility  Outcome: Progressing  Goal: Maintain or return to baseline ADL function  Description: INTERVENTIONS:  -  Assess patient's ability to carry out ADLs; assess patient's baseline for ADL function and identify physical deficits which impact ability to perform ADLs (bathing, care of mouth/teeth, toileting, grooming, dressing, etc )  - Assess/evaluate cause of self-care deficits   - Assess range of motion  - Assess patient's mobility; develop plan if impaired  - Assess patient's need for assistive devices and provide as appropriate  - Encourage maximum independence but intervene and supervise when necessary  - Involve family in performance of ADLs  - Assess for home care needs following discharge   - Consider OT consult to assist with ADL evaluation and planning for discharge  - Provide patient education as appropriate  Outcome: Progressing  Goal: Maintain or return mobility status to optimal level  Description: INTERVENTIONS:  - Assess patient's baseline mobility status (ambulation, transfers, stairs, etc )    - Identify cognitive and physical deficits and behaviors that affect mobility  - Identify mobility aids required to assist with transfers and/or ambulation (gait belt, sit-to-stand, lift, walker, cane, etc )  - [place] fall precautions as indicated by assessment  - Record patient progress and toleration of activity level on Mobility SBAR; progress patient to next Phase/Stage  - Instruct patient to call for assistance with activity based on assessment  - Consider rehabilitation consult to assist with strengthening/weightbearing, etc   Outcome: Progressing     Problem: Knowledge Deficit  Goal: Patient/family/caregiver demonstrates understanding of disease process, treatment plan, medications, and discharge instructions  Description: Complete learning assessment and assess knowledge base    Interventions:  - Provide teaching at level of understanding  - Provide teaching via preferred learning methods  Outcome: Progressing     Problem: DISCHARGE PLANNING  Goal: Discharge to home or other facility with appropriate resources  Description: INTERVENTIONS:  - Identify barriers to discharge w/patient and caregiver  - Arrange for needed discharge resources and transportation as appropriate  - Identify discharge learning needs (meds, wound care, etc )  - Arrange for interpretive services to assist at discharge as needed  - Refer to Case Management Department for coordinating discharge planning if the patient needs post-hospital services based on physician/advanced practitioner order or complex needs related to functional status, cognitive ability, or social support system  Outcome: Progressing     Problem: Potential for Falls  Goal: Patient will remain free of falls  Description: INTERVENTIONS:  - Assess patient frequently for physical needs  -  Identify cognitive and physical deficits and behaviors that affect risk of falls    -  [place] fall precautions as indicated by assessment   - Educate patient/family on patient safety including physical limitations  - Instruct patient to call for assistance with activity based on assessment  - Modify environment to reduce risk of injury  - Consider OT/PT consult to assist with strengthening/mobility  Outcome: Progressing

## 2020-09-08 NOTE — PLAN OF CARE
Problem: ANTEPARTUM  Goal: Maintain pregnancy as long as maternal and/or fetal condition is stable  Description: INTERVENTIONS:  - Maternal surveillance  - Fetal surveillance  - Monitor uterine activity  - Medications as ordered  - Bedrest  Outcome: Progressing     Problem: NEUROSENSORY - ADULT  Goal: Achieves stable or improved neurological status  Description: INTERVENTIONS  - Monitor and report changes in neurological status  - Monitor vital signs such as temperature, blood pressure, glucose, and any other labs ordered   - Initiate measures to prevent increased intracranial pressure  - Monitor for seizure activity and implement precautions if appropriate      Outcome: Progressing  Goal: Remains free of injury related to seizures activity  Description: INTERVENTIONS  - Maintain airway, patient safety  and administer oxygen as ordered  - Monitor patient for seizure activity, document and report duration and description of seizure to physician/advanced practitioner  - If seizure occurs,  ensure patient safety during seizure  - Reorient patient post seizure  - Seizure pads on all 4 side rails  - Instruct patient/family to notify RN of any seizure activity including if an aura is experienced  - Instruct patient/family to call for assistance with activity based on nursing assessment  - Administer anti-seizure medications if ordered    Outcome: Progressing     Problem: CARDIOVASCULAR - ADULT  Goal: Maintains optimal cardiac output and hemodynamic stability  Description: INTERVENTIONS:  - Monitor I/O, vital signs and rhythm  - Monitor for S/S and trends of decreased cardiac output  - Administer and titrate ordered vasoactive medications to optimize hemodynamic stability  - Assess quality of pulses, skin color and temperature  - Assess for signs of decreased coronary artery perfusion  - Instruct patient to report change in severity of symptoms  Outcome: Progressing     Problem: PAIN - ADULT  Goal: Verbalizes/displays adequate comfort level or baseline comfort level  Description: Interventions:  - Encourage patient to monitor pain and request assistance  - Assess pain using appropriate pain scale  - Administer analgesics based on type and severity of pain and evaluate response  - Implement non-pharmacological measures as appropriate and evaluate response  - Consider cultural and social influences on pain and pain management  - Notify physician/advanced practitioner if interventions unsuccessful or patient reports new pain  Outcome: Progressing     Problem: INFECTION - ADULT  Goal: Absence or prevention of progression during hospitalization  Description: INTERVENTIONS:  - Assess and monitor for signs and symptoms of infection  - Monitor lab/diagnostic results  - Monitor all insertion sites, i e  indwelling lines, tubes, and drains  - Monitor endotracheal if appropriate and nasal secretions for changes in amount and color  - [place] appropriate cooling/warming therapies per order  - Administer medications as ordered  - Instruct and encourage patient and family to use good hand hygiene technique  - Identify and instruct in appropriate isolation precautions for identified infection/condition  Outcome: Progressing  Goal: Absence of fever/infection during neutropenic period  Description: INTERVENTIONS:  - Monitor WBC    Outcome: Progressing     Problem: SAFETY ADULT  Goal: Patient will remain free of falls  Description: INTERVENTIONS:  - Assess patient frequently for physical needs  -  Identify cognitive and physical deficits and behaviors that affect risk of falls    -  [place] fall precautions as indicated by assessment   - Educate patient/family on patient safety including physical limitations  - Instruct patient to call for assistance with activity based on assessment  - Modify environment to reduce risk of injury  - Consider OT/PT consult to assist with strengthening/mobility  Outcome: Progressing  Goal: Maintain or return to baseline ADL function  Description: INTERVENTIONS:  -  Assess patient's ability to carry out ADLs; assess patient's baseline for ADL function and identify physical deficits which impact ability to perform ADLs (bathing, care of mouth/teeth, toileting, grooming, dressing, etc )  - Assess/evaluate cause of self-care deficits   - Assess range of motion  - Assess patient's mobility; develop plan if impaired  - Assess patient's need for assistive devices and provide as appropriate  - Encourage maximum independence but intervene and supervise when necessary  - Involve family in performance of ADLs  - Assess for home care needs following discharge   - Consider OT consult to assist with ADL evaluation and planning for discharge  - Provide patient education as appropriate  Outcome: Progressing  Goal: Maintain or return mobility status to optimal level  Description: INTERVENTIONS:  - Assess patient's baseline mobility status (ambulation, transfers, stairs, etc )    - Identify cognitive and physical deficits and behaviors that affect mobility  - Identify mobility aids required to assist with transfers and/or ambulation (gait belt, sit-to-stand, lift, walker, cane, etc )  - [place] fall precautions as indicated by assessment  - Record patient progress and toleration of activity level on Mobility SBAR; progress patient to next Phase/Stage  - Instruct patient to call for assistance with activity based on assessment  - Consider rehabilitation consult to assist with strengthening/weightbearing, etc   Outcome: Progressing     Problem: Knowledge Deficit  Goal: Patient/family/caregiver demonstrates understanding of disease process, treatment plan, medications, and discharge instructions  Description: Complete learning assessment and assess knowledge base    Interventions:  - Provide teaching at level of understanding  - Provide teaching via preferred learning methods  Outcome: Progressing     Problem: DISCHARGE PLANNING  Goal: Discharge to home or other facility with appropriate resources  Description: INTERVENTIONS:  - Identify barriers to discharge w/patient and caregiver  - Arrange for needed discharge resources and transportation as appropriate  - Identify discharge learning needs (meds, wound care, etc )  - Arrange for interpretive services to assist at discharge as needed  - Refer to Case Management Department for coordinating discharge planning if the patient needs post-hospital services based on physician/advanced practitioner order or complex needs related to functional status, cognitive ability, or social support system  Outcome: Progressing     Problem: Potential for Falls  Goal: Patient will remain free of falls  Description: INTERVENTIONS:  - Assess patient frequently for physical needs  -  Identify cognitive and physical deficits and behaviors that affect risk of falls    -  [place] fall precautions as indicated by assessment   - Educate patient/family on patient safety including physical limitations  - Instruct patient to call for assistance with activity based on assessment  - Modify environment to reduce risk of injury  - Consider OT/PT consult to assist with strengthening/mobility  Outcome: Progressing

## 2020-09-08 NOTE — PROGRESS NOTES
Progress Note - Maternal Fetal Medicine   Lianna Villegas 35 y o  female MRN: [id number]  Unit/Bed#: -01 Encounter: [id number]    Assessment:  35 y o   at 29w2d admitted with pre-eclampsia with severe features  Hospital day 20  Plan:  1  Pre-eclampsia with severe features   - BP overnight: 110-137/68-37 , no SR overnight   - Procardia XL 30mg daily    - CMP/CBC q3-days with IV changes - labs have been stable    2  Routine Prenatal care   - Normal1 hour GTT    - s/p Tdap   - T&S      3  IUGR with abnormal Dopplers    - Growth scan was 9/3 showed interval growth, around 10% with steady, but abnormal Dopplers    4  FEN: Regular diet    5  DVT ppx: Heparin 5,000units x3phunc, SCDs, Ambulation       Subjective/Objective     Subjective:     Patient is in good spirits this am and grateful to be getting outside  BP is stable  She has no headaches, changes in vision, RUQ pain or changes in edema  Objective:     Vitals:    20 2229   BP: 137/87   Pulse: 90   Resp: 16   Temp: 98 5 °F (36 9 °C)   SpO2: 98%     Physical Exam:     Physical Exam  Constitutional:       Appearance: Normal appearance  She is well-developed  HENT:      Head: Normocephalic and atraumatic  Neck:      Musculoskeletal: Normal range of motion  Cardiovascular:      Rate and Rhythm: Normal rate and regular rhythm  Heart sounds: Normal heart sounds  No murmur  No gallop  Pulmonary:      Effort: Pulmonary effort is normal  No respiratory distress  Breath sounds: No stridor  No wheezing or rales  Abdominal:      General: There is no distension  Palpations: Abdomen is soft  There is no mass  Tenderness: There is no abdominal tenderness  There is no guarding  Musculoskeletal: Normal range of motion  General: No swelling, tenderness, deformity or signs of injury  Right lower leg: No edema  Left lower leg: No edema  Skin:     General: Skin is warm and dry     Neurological: General: No focal deficit present  Mental Status: She is alert and oriented to person, place, and time  Psychiatric:         Behavior: Behavior normal          Thought Content:  Thought content normal          Judgment: Judgment normal        Fetal Assessment:  FHT: 155 / Moderate 6 - 25 bpm /no accelerations nor decelerations  [place]: none    Lab Results   Component Value Date    WBC 12 42 (H) 09/07/2020    HGB 11 8 09/07/2020    HCT 34 7 (L) 09/07/2020    MCV 91 09/07/2020     09/07/2020       Lab Results   Component Value Date    CALCIUM 8 1 (L) 09/07/2020    K 4 6 09/07/2020    CO2 24 09/07/2020     09/07/2020    BUN 19 09/07/2020    CREATININE 0 86 09/07/2020       Lab Results   Component Value Date    ALT 24 09/07/2020    AST 16 09/07/2020    ALKPHOS 144 (H) 09/07/2020       Alicia Yeh MD  OBGYN, PGY-3  9/8/2020  6:01 AM

## 2020-09-08 NOTE — QUICK NOTE
NST 09/08/20 Time: 7564-4330    Baseline: 135-140  Variability: moderate  Accelerations: present (10x10)  Decelerations: absent  Contractions: none apparent  Assessment: appropriate for gestational age and clinical condition  Plan: continue TID;  Dopplers Thursday    Patient Vitals for the past 24 hrs:   BP Temp Temp src Pulse Resp SpO2   09/08/20 1541 131/76 98 2 °F (36 8 °C) Oral 94 20 98 %   09/08/20 1100 132/83 -- -- 84 -- --   09/08/20 0735 129/83 98 3 °F (36 8 °C) Oral 88 18 99 %   09/07/20 2229 137/87 98 5 °F (36 9 °C) Oral 90 16 98 %   09/07/20 1631 135/82 98 1 °F (36 7 °C) Oral 97 20 98 %         Roge Sheridan MD 4:04 PM

## 2020-09-09 PROCEDURE — 99232 SBSQ HOSP IP/OBS MODERATE 35: CPT | Performed by: OBSTETRICS & GYNECOLOGY

## 2020-09-09 PROCEDURE — 59025 FETAL NON-STRESS TEST: CPT | Performed by: OBSTETRICS & GYNECOLOGY

## 2020-09-09 PROCEDURE — NC001 PR NO CHARGE: Performed by: OBSTETRICS & GYNECOLOGY

## 2020-09-09 RX ORDER — HEPARIN SODIUM 5000 [USP'U]/ML
10000 INJECTION, SOLUTION INTRAVENOUS; SUBCUTANEOUS EVERY 12 HOURS SCHEDULED
Status: DISCONTINUED | OUTPATIENT
Start: 2020-09-09 | End: 2020-09-14

## 2020-09-09 RX ADMIN — TRIAMCINOLONE ACETONIDE: 1 CREAM TOPICAL at 17:58

## 2020-09-09 RX ADMIN — TRIAMCINOLONE ACETONIDE: 1 CREAM TOPICAL at 09:07

## 2020-09-09 RX ADMIN — MUPIROCIN: 20 OINTMENT TOPICAL at 21:37

## 2020-09-09 RX ADMIN — HEPARIN SODIUM 10000 UNITS: 5000 INJECTION INTRAVENOUS; SUBCUTANEOUS at 21:36

## 2020-09-09 RX ADMIN — MUPIROCIN: 20 OINTMENT TOPICAL at 16:32

## 2020-09-09 RX ADMIN — MUPIROCIN: 20 OINTMENT TOPICAL at 09:07

## 2020-09-09 RX ADMIN — Medication 1 TABLET: at 09:06

## 2020-09-09 RX ADMIN — NIFEDIPINE 30 MG: 30 TABLET, FILM COATED, EXTENDED RELEASE ORAL at 09:06

## 2020-09-09 RX ADMIN — HEPARIN SODIUM 5000 UNITS: 5000 INJECTION INTRAVENOUS; SUBCUTANEOUS at 06:43

## 2020-09-09 NOTE — PLAN OF CARE
Problem: ANTEPARTUM  Goal: Maintain pregnancy as long as maternal and/or fetal condition is stable  Description: INTERVENTIONS:  - Maternal surveillance  - Fetal surveillance  - Monitor uterine activity  - Medications as ordered  - Bedrest  Outcome: Progressing     Problem: NEUROSENSORY - ADULT  Goal: Achieves stable or improved neurological status  Description: INTERVENTIONS  - Monitor and report changes in neurological status  - Monitor vital signs such as temperature, blood pressure, glucose, and any other labs ordered   - Initiate measures to prevent increased intracranial pressure  - Monitor for seizure activity and implement precautions if appropriate      Outcome: Progressing  Goal: Remains free of injury related to seizures activity  Description: INTERVENTIONS  - Maintain airway, patient safety  and administer oxygen as ordered  - Monitor patient for seizure activity, document and report duration and description of seizure to physician/advanced practitioner  - If seizure occurs,  ensure patient safety during seizure  - Reorient patient post seizure  - Seizure pads on all 4 side rails  - Instruct patient/family to notify RN of any seizure activity including if an aura is experienced  - Instruct patient/family to call for assistance with activity based on nursing assessment  - Administer anti-seizure medications if ordered    Outcome: Progressing     Problem: CARDIOVASCULAR - ADULT  Goal: Maintains optimal cardiac output and hemodynamic stability  Description: INTERVENTIONS:  - Monitor I/O, vital signs and rhythm  - Monitor for S/S and trends of decreased cardiac output  - Administer and titrate ordered vasoactive medications to optimize hemodynamic stability  - Assess quality of pulses, skin color and temperature  - Assess for signs of decreased coronary artery perfusion  - Instruct patient to report change in severity of symptoms  Outcome: Progressing     Problem: PAIN - ADULT  Goal: Verbalizes/displays adequate comfort level or baseline comfort level  Description: Interventions:  - Encourage patient to monitor pain and request assistance  - Assess pain using appropriate pain scale  - Administer analgesics based on type and severity of pain and evaluate response  - Implement non-pharmacological measures as appropriate and evaluate response  - Consider cultural and social influences on pain and pain management  - Notify physician/advanced practitioner if interventions unsuccessful or patient reports new pain  Outcome: Progressing     Problem: INFECTION - ADULT  Goal: Absence or prevention of progression during hospitalization  Description: INTERVENTIONS:  - Assess and monitor for signs and symptoms of infection  - Monitor lab/diagnostic results  - Monitor all insertion sites, i e  indwelling lines, tubes, and drains  - Monitor endotracheal if appropriate and nasal secretions for changes in amount and color  - [place] appropriate cooling/warming therapies per order  - Administer medications as ordered  - Instruct and encourage patient and family to use good hand hygiene technique  - Identify and instruct in appropriate isolation precautions for identified infection/condition  Outcome: Progressing  Goal: Absence of fever/infection during neutropenic period  Description: INTERVENTIONS:  - Monitor WBC    Outcome: Progressing     Problem: SAFETY ADULT  Goal: Patient will remain free of falls  Description: INTERVENTIONS:  - Assess patient frequently for physical needs  -  Identify cognitive and physical deficits and behaviors that affect risk of falls    -  [place] fall precautions as indicated by assessment   - Educate patient/family on patient safety including physical limitations  - Instruct patient to call for assistance with activity based on assessment  - Modify environment to reduce risk of injury  - Consider OT/PT consult to assist with strengthening/mobility  Outcome: Progressing  Goal: Maintain or return to baseline ADL function  Description: INTERVENTIONS:  -  Assess patient's ability to carry out ADLs; assess patient's baseline for ADL function and identify physical deficits which impact ability to perform ADLs (bathing, care of mouth/teeth, toileting, grooming, dressing, etc )  - Assess/evaluate cause of self-care deficits   - Assess range of motion  - Assess patient's mobility; develop plan if impaired  - Assess patient's need for assistive devices and provide as appropriate  - Encourage maximum independence but intervene and supervise when necessary  - Involve family in performance of ADLs  - Assess for home care needs following discharge   - Consider OT consult to assist with ADL evaluation and planning for discharge  - Provide patient education as appropriate  Outcome: Progressing  Goal: Maintain or return mobility status to optimal level  Description: INTERVENTIONS:  - Assess patient's baseline mobility status (ambulation, transfers, stairs, etc )    - Identify cognitive and physical deficits and behaviors that affect mobility  - Identify mobility aids required to assist with transfers and/or ambulation (gait belt, sit-to-stand, lift, walker, cane, etc )  - [place] fall precautions as indicated by assessment  - Record patient progress and toleration of activity level on Mobility SBAR; progress patient to next Phase/Stage  - Instruct patient to call for assistance with activity based on assessment  - Consider rehabilitation consult to assist with strengthening/weightbearing, etc   Outcome: Progressing     Problem: Knowledge Deficit  Goal: Patient/family/caregiver demonstrates understanding of disease process, treatment plan, medications, and discharge instructions  Description: Complete learning assessment and assess knowledge base    Interventions:  - Provide teaching at level of understanding  - Provide teaching via preferred learning methods  Outcome: Progressing     Problem: DISCHARGE PLANNING  Goal: Discharge to home or other facility with appropriate resources  Description: INTERVENTIONS:  - Identify barriers to discharge w/patient and caregiver  - Arrange for needed discharge resources and transportation as appropriate  - Identify discharge learning needs (meds, wound care, etc )  - Arrange for interpretive services to assist at discharge as needed  - Refer to Case Management Department for coordinating discharge planning if the patient needs post-hospital services based on physician/advanced practitioner order or complex needs related to functional status, cognitive ability, or social support system  Outcome: Progressing     Problem: Potential for Falls  Goal: Patient will remain free of falls  Description: INTERVENTIONS:  - Assess patient frequently for physical needs  -  Identify cognitive and physical deficits and behaviors that affect risk of falls    -  [place] fall precautions as indicated by assessment   - Educate patient/family on patient safety including physical limitations  - Instruct patient to call for assistance with activity based on assessment  - Modify environment to reduce risk of injury  - Consider OT/PT consult to assist with strengthening/mobility  Outcome: Progressing
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
## 2020-09-10 LAB
ABO GROUP BLD: NORMAL
ALBUMIN SERPL BCP-MCNC: 1.6 G/DL (ref 3.5–5)
ALP SERPL-CCNC: 158 U/L (ref 46–116)
ALT SERPL W P-5'-P-CCNC: 36 U/L (ref 12–78)
ANION GAP SERPL CALCULATED.3IONS-SCNC: 8 MMOL/L (ref 4–13)
AST SERPL W P-5'-P-CCNC: 39 U/L (ref 5–45)
BASOPHILS # BLD AUTO: 0.04 THOUSANDS/ΜL (ref 0–0.1)
BASOPHILS NFR BLD AUTO: 0 % (ref 0–1)
BILIRUB SERPL-MCNC: 0.13 MG/DL (ref 0.2–1)
BLD GP AB SCN SERPL QL: NEGATIVE
BUN SERPL-MCNC: 14 MG/DL (ref 5–25)
CALCIUM SERPL-MCNC: 7.8 MG/DL (ref 8.3–10.1)
CHLORIDE SERPL-SCNC: 105 MMOL/L (ref 100–108)
CO2 SERPL-SCNC: 25 MMOL/L (ref 21–32)
CREAT SERPL-MCNC: 0.94 MG/DL (ref 0.6–1.3)
EOSINOPHIL # BLD AUTO: 0.15 THOUSAND/ΜL (ref 0–0.61)
EOSINOPHIL NFR BLD AUTO: 1 % (ref 0–6)
ERYTHROCYTE [DISTWIDTH] IN BLOOD BY AUTOMATED COUNT: 12.3 % (ref 11.6–15.1)
GFR SERPL CREATININE-BSD FRML MDRD: 95 ML/MIN/1.73SQ M
GLUCOSE SERPL-MCNC: 82 MG/DL (ref 65–140)
HCT VFR BLD AUTO: 37.1 % (ref 34.8–46.1)
HGB BLD-MCNC: 12.4 G/DL (ref 11.5–15.4)
IMM GRANULOCYTES # BLD AUTO: 0.03 THOUSAND/UL (ref 0–0.2)
IMM GRANULOCYTES NFR BLD AUTO: 0 % (ref 0–2)
LYMPHOCYTES # BLD AUTO: 2.26 THOUSANDS/ΜL (ref 0.6–4.47)
LYMPHOCYTES NFR BLD AUTO: 19 % (ref 14–44)
MCH RBC QN AUTO: 31.1 PG (ref 26.8–34.3)
MCHC RBC AUTO-ENTMCNC: 33.4 G/DL (ref 31.4–37.4)
MCV RBC AUTO: 93 FL (ref 82–98)
MONOCYTES # BLD AUTO: 1.03 THOUSAND/ΜL (ref 0.17–1.22)
MONOCYTES NFR BLD AUTO: 9 % (ref 4–12)
NEUTROPHILS # BLD AUTO: 8.58 THOUSANDS/ΜL (ref 1.85–7.62)
NEUTS SEG NFR BLD AUTO: 71 % (ref 43–75)
NRBC BLD AUTO-RTO: 0 /100 WBCS
PLATELET # BLD AUTO: 288 THOUSANDS/UL (ref 149–390)
PMV BLD AUTO: 10.3 FL (ref 8.9–12.7)
POTASSIUM SERPL-SCNC: 4.6 MMOL/L (ref 3.5–5.3)
PROT SERPL-MCNC: 5.5 G/DL (ref 6.4–8.2)
RBC # BLD AUTO: 3.99 MILLION/UL (ref 3.81–5.12)
RH BLD: POSITIVE
SODIUM SERPL-SCNC: 138 MMOL/L (ref 136–145)
SPECIMEN EXPIRATION DATE: NORMAL
WBC # BLD AUTO: 12.09 THOUSAND/UL (ref 4.31–10.16)

## 2020-09-10 PROCEDURE — 86900 BLOOD TYPING SEROLOGIC ABO: CPT | Performed by: OBSTETRICS & GYNECOLOGY

## 2020-09-10 PROCEDURE — 76820 UMBILICAL ARTERY ECHO: CPT | Performed by: OBSTETRICS & GYNECOLOGY

## 2020-09-10 PROCEDURE — 59025 FETAL NON-STRESS TEST: CPT | Performed by: OBSTETRICS & GYNECOLOGY

## 2020-09-10 PROCEDURE — 76815 OB US LIMITED FETUS(S): CPT | Performed by: OBSTETRICS & GYNECOLOGY

## 2020-09-10 PROCEDURE — 99232 SBSQ HOSP IP/OBS MODERATE 35: CPT | Performed by: OBSTETRICS & GYNECOLOGY

## 2020-09-10 PROCEDURE — 86850 RBC ANTIBODY SCREEN: CPT | Performed by: OBSTETRICS & GYNECOLOGY

## 2020-09-10 PROCEDURE — 86901 BLOOD TYPING SEROLOGIC RH(D): CPT | Performed by: OBSTETRICS & GYNECOLOGY

## 2020-09-10 PROCEDURE — 80053 COMPREHEN METABOLIC PANEL: CPT | Performed by: OBSTETRICS & GYNECOLOGY

## 2020-09-10 PROCEDURE — 85025 COMPLETE CBC W/AUTO DIFF WBC: CPT | Performed by: OBSTETRICS & GYNECOLOGY

## 2020-09-10 PROCEDURE — NC001 PR NO CHARGE: Performed by: OBSTETRICS & GYNECOLOGY

## 2020-09-10 RX ADMIN — HEPARIN SODIUM 10000 UNITS: 5000 INJECTION INTRAVENOUS; SUBCUTANEOUS at 22:16

## 2020-09-10 RX ADMIN — Medication 1 TABLET: at 09:36

## 2020-09-10 RX ADMIN — NIFEDIPINE 30 MG: 30 TABLET, FILM COATED, EXTENDED RELEASE ORAL at 09:36

## 2020-09-10 RX ADMIN — MUPIROCIN: 20 OINTMENT TOPICAL at 22:19

## 2020-09-10 RX ADMIN — TRIAMCINOLONE ACETONIDE: 1 CREAM TOPICAL at 18:25

## 2020-09-10 RX ADMIN — MUPIROCIN: 20 OINTMENT TOPICAL at 09:38

## 2020-09-10 RX ADMIN — TRIAMCINOLONE ACETONIDE: 1 CREAM TOPICAL at 09:37

## 2020-09-10 RX ADMIN — MUPIROCIN: 20 OINTMENT TOPICAL at 16:44

## 2020-09-10 RX ADMIN — HEPARIN SODIUM 10000 UNITS: 5000 INJECTION INTRAVENOUS; SUBCUTANEOUS at 09:36

## 2020-09-10 NOTE — PLAN OF CARE
Problem: ANTEPARTUM  Goal: Maintain pregnancy as long as maternal and/or fetal condition is stable  Description: INTERVENTIONS:  - Maternal surveillance  - Fetal surveillance  - Monitor uterine activity  - Medications as ordered  - Bedrest  Outcome: Progressing     Problem: NEUROSENSORY - ADULT  Goal: Achieves stable or improved neurological status  Description: INTERVENTIONS  - Monitor and report changes in neurological status  - Monitor vital signs such as temperature, blood pressure, glucose, and any other labs ordered   - Initiate measures to prevent increased intracranial pressure  - Monitor for seizure activity and implement precautions if appropriate      Outcome: Progressing  Goal: Remains free of injury related to seizures activity  Description: INTERVENTIONS  - Maintain airway, patient safety  and administer oxygen as ordered  - Monitor patient for seizure activity, document and report duration and description of seizure to physician/advanced practitioner  - If seizure occurs,  ensure patient safety during seizure  - Reorient patient post seizure  - Seizure pads on all 4 side rails  - Instruct patient/family to notify RN of any seizure activity including if an aura is experienced  - Instruct patient/family to call for assistance with activity based on nursing assessment  - Administer anti-seizure medications if ordered    Outcome: Progressing     Problem: CARDIOVASCULAR - ADULT  Goal: Maintains optimal cardiac output and hemodynamic stability  Description: INTERVENTIONS:  - Monitor I/O, vital signs and rhythm  - Monitor for S/S and trends of decreased cardiac output  - Administer and titrate ordered vasoactive medications to optimize hemodynamic stability  - Assess quality of pulses, skin color and temperature  - Assess for signs of decreased coronary artery perfusion  - Instruct patient to report change in severity of symptoms  Outcome: Progressing     Problem: PAIN - ADULT  Goal: Verbalizes/displays adequate comfort level or baseline comfort level  Description: Interventions:  - Encourage patient to monitor pain and request assistance  - Assess pain using appropriate pain scale  - Administer analgesics based on type and severity of pain and evaluate response  - Implement non-pharmacological measures as appropriate and evaluate response  - Consider cultural and social influences on pain and pain management  - Notify physician/advanced practitioner if interventions unsuccessful or patient reports new pain  Outcome: Progressing     Problem: INFECTION - ADULT  Goal: Absence or prevention of progression during hospitalization  Description: INTERVENTIONS:  - Assess and monitor for signs and symptoms of infection  - Monitor lab/diagnostic results  - Monitor all insertion sites, i e  indwelling lines, tubes, and drains  - Monitor endotracheal if appropriate and nasal secretions for changes in amount and color  - [place] appropriate cooling/warming therapies per order  - Administer medications as ordered  - Instruct and encourage patient and family to use good hand hygiene technique  - Identify and instruct in appropriate isolation precautions for identified infection/condition  Outcome: Progressing  Goal: Absence of fever/infection during neutropenic period  Description: INTERVENTIONS:  - Monitor WBC    Outcome: Progressing     Problem: SAFETY ADULT  Goal: Patient will remain free of falls  Description: INTERVENTIONS:  - Assess patient frequently for physical needs  -  Identify cognitive and physical deficits and behaviors that affect risk of falls    -  [place] fall precautions as indicated by assessment   - Educate patient/family on patient safety including physical limitations  - Instruct patient to call for assistance with activity based on assessment  - Modify environment to reduce risk of injury  - Consider OT/PT consult to assist with strengthening/mobility  Outcome: Progressing  Goal: Maintain or return to baseline ADL function  Description: INTERVENTIONS:  -  Assess patient's ability to carry out ADLs; assess patient's baseline for ADL function and identify physical deficits which impact ability to perform ADLs (bathing, care of mouth/teeth, toileting, grooming, dressing, etc )  - Assess/evaluate cause of self-care deficits   - Assess range of motion  - Assess patient's mobility; develop plan if impaired  - Assess patient's need for assistive devices and provide as appropriate  - Encourage maximum independence but intervene and supervise when necessary  - Involve family in performance of ADLs  - Assess for home care needs following discharge   - Consider OT consult to assist with ADL evaluation and planning for discharge  - Provide patient education as appropriate  Outcome: Progressing  Goal: Maintain or return mobility status to optimal level  Description: INTERVENTIONS:  - Assess patient's baseline mobility status (ambulation, transfers, stairs, etc )    - Identify cognitive and physical deficits and behaviors that affect mobility  - Identify mobility aids required to assist with transfers and/or ambulation (gait belt, sit-to-stand, lift, walker, cane, etc )  - [place] fall precautions as indicated by assessment  - Record patient progress and toleration of activity level on Mobility SBAR; progress patient to next Phase/Stage  - Instruct patient to call for assistance with activity based on assessment  - Consider rehabilitation consult to assist with strengthening/weightbearing, etc   Outcome: Progressing     Problem: Knowledge Deficit  Goal: Patient/family/caregiver demonstrates understanding of disease process, treatment plan, medications, and discharge instructions  Description: Complete learning assessment and assess knowledge base    Interventions:  - Provide teaching at level of understanding  - Provide teaching via preferred learning methods  Outcome: Progressing     Problem: DISCHARGE PLANNING  Goal: Discharge to home or other facility with appropriate resources  Description: INTERVENTIONS:  - Identify barriers to discharge w/patient and caregiver  - Arrange for needed discharge resources and transportation as appropriate  - Identify discharge learning needs (meds, wound care, etc )  - Arrange for interpretive services to assist at discharge as needed  - Refer to Case Management Department for coordinating discharge planning if the patient needs post-hospital services based on physician/advanced practitioner order or complex needs related to functional status, cognitive ability, or social support system  Outcome: Progressing     Problem: Potential for Falls  Goal: Patient will remain free of falls  Description: INTERVENTIONS:  - Assess patient frequently for physical needs  -  Identify cognitive and physical deficits and behaviors that affect risk of falls    -  [place] fall precautions as indicated by assessment   - Educate patient/family on patient safety including physical limitations  - Instruct patient to call for assistance with activity based on assessment  - Modify environment to reduce risk of injury  - Consider OT/PT consult to assist with strengthening/mobility  Outcome: Progressing

## 2020-09-10 NOTE — QUICK NOTE
Pt seen today during morning rounds  She is feeling well without complaints  Reviewed MFM recommendations with pt  Reviewed delivery no later that 34 weeks        BPs - 126-142/76-94  Reactive NST    A/P  30y  @ 29 4 wga with PreE with severe features and IUGR  Continue management per MFM service

## 2020-09-10 NOTE — QUICK NOTE
NST 09/10/20 Time: 0939-4898    Baseline: 135  Variability: moderate  Accelerations: present 10x10  Decelerations: absent  Contractions: absent  Assessment: appropriate for gestational age and clinical condition  Plan: continue TID    Patient Vitals for the past 24 hrs:   BP Temp Temp src Pulse Resp SpO2   09/10/20 1532 142/88 98 1 °F (36 7 °C) Oral 80 20 100 %   09/10/20 1130 135/70 98 °F (36 7 °C) Oral 89 18 97 %   09/10/20 0828 140/94 98 5 °F (36 9 °C) Oral 88 18 97 %   09/10/20 0607 141/91 98 2 °F (36 8 °C) Oral 92 -- --   09/10/20 0006 131/81 98 5 °F (36 9 °C) Oral 101 16 --   09/09/20 2103 -- 98 °F (36 7 °C) Oral -- -- --       Landy Bueno MD 4:59 PM
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
## 2020-09-11 PROCEDURE — 59025 FETAL NON-STRESS TEST: CPT | Performed by: OBSTETRICS & GYNECOLOGY

## 2020-09-11 PROCEDURE — 99232 SBSQ HOSP IP/OBS MODERATE 35: CPT | Performed by: OBSTETRICS & GYNECOLOGY

## 2020-09-11 PROCEDURE — NC001 PR NO CHARGE: Performed by: OBSTETRICS & GYNECOLOGY

## 2020-09-11 RX ADMIN — HEPARIN SODIUM 10000 UNITS: 5000 INJECTION INTRAVENOUS; SUBCUTANEOUS at 09:27

## 2020-09-11 RX ADMIN — MUPIROCIN: 20 OINTMENT TOPICAL at 18:25

## 2020-09-11 RX ADMIN — MUPIROCIN: 20 OINTMENT TOPICAL at 22:17

## 2020-09-11 RX ADMIN — MUPIROCIN 1 APPLICATION: 20 OINTMENT TOPICAL at 09:29

## 2020-09-11 RX ADMIN — Medication 1 TABLET: at 09:27

## 2020-09-11 RX ADMIN — NIFEDIPINE 30 MG: 30 TABLET, FILM COATED, EXTENDED RELEASE ORAL at 09:27

## 2020-09-11 RX ADMIN — SIMETHICONE 80 MG: 80 TABLET, CHEWABLE ORAL at 22:26

## 2020-09-11 RX ADMIN — TRIAMCINOLONE ACETONIDE 1 APPLICATION: 1 CREAM TOPICAL at 09:29

## 2020-09-11 RX ADMIN — TRIAMCINOLONE ACETONIDE: 1 CREAM TOPICAL at 18:25

## 2020-09-11 RX ADMIN — HEPARIN SODIUM 10000 UNITS: 5000 INJECTION INTRAVENOUS; SUBCUTANEOUS at 22:18

## 2020-09-11 NOTE — PROGRESS NOTES
Progress Note - Maternal Fetal Medicine   Antonio Arizmendi 35 y o  female MRN: [id number]  Unit/Bed#: -01 Encounter: [id number]    Assessment:  35 y o   at 29w4d admitted with pre-eclampsia with severe features  Hospital day 21  Plan:  1  Pre-eclampsia with severe features   - BP overnight: 127-146/64-95, no SR overnight   - Procardia XL 30mg daily    - CMP/CBC q3-days - most recent labs on 9/10, Cr 0 86 --> 0 96, AST/ALT  --> 39/36, will continue to trend    2  Routine Prenatal care   - Normal 1 hour GTT    - s/p Tdap   - T&S 9/10   - NST qshift   - Patient declined the flu vaccine     3  IUGR with abnormal Dopplers    - Growth scan was 9/3 showed interval growth, around 10% with steady, but abnormal Dopplers   - Dopplers on 9/10 were abnormal, but consistent with previous    - Growth scans every 2 weeks     4  FEN: Regular diet    5  DVT ppx: Heparin 10,000 units SQ BID, SCDs, Ambulation     6  Folliculitis: Mupirocin ointment PRN     7  Eczema and Asthma: Dupilumab q14 days       Subjective/Objective     Subjective:     Patient is doing well this morning  She has no complaints  She denies leg pain, chest pain, shortness of breath, headache, vision changes, epigastric pain or sudden onset swelling  Objective:     Vitals:    20 0038   BP: 135/93   Pulse: 91   Resp:    Temp:    SpO2:      Physical Exam:     Physical Exam  Constitutional:       General: She is not in acute distress  Appearance: Normal appearance  She is well-developed  She is obese  She is not ill-appearing, toxic-appearing or diaphoretic  HENT:      Head: Normocephalic and atraumatic  Neck:      Musculoskeletal: Normal range of motion and neck supple  Cardiovascular:      Rate and Rhythm: Normal rate and regular rhythm  Heart sounds: Normal heart sounds  No murmur  No gallop  Pulmonary:      [place]: Pulmonary effort is normal  No respiratory distress  Breath sounds: No stridor   No wheezing or rales    Abdominal:      General: There is no distension  Palpations: Abdomen is soft  There is no mass  Tenderness: There is no abdominal tenderness  There is no guarding  Musculoskeletal: Normal range of motion  General: No swelling, tenderness, deformity or signs of injury  Right lower leg: No edema  Left lower leg: No edema  Skin:     General: Skin is warm and dry  Neurological:      General: No focal deficit present  Mental Status: She is alert and oriented to person, place, and time  Psychiatric:         Behavior: Behavior normal          Thought Content:  Thought content normal          Judgment: Judgment normal        Fetal Assessment:  FHT: 140 / Moderate 6 - 25 bpm /no accelerations, variable decelerations  [place]: none    Lab Results   Component Value Date    WBC 12 09 (H) 09/10/2020    HGB 12 4 09/10/2020    HCT 37 1 09/10/2020    MCV 93 09/10/2020     09/10/2020       Lab Results   Component Value Date    CALCIUM 7 8 (L) 09/10/2020    K 4 6 09/10/2020    CO2 25 09/10/2020     09/10/2020    BUN 14 09/10/2020    CREATININE 0 94 09/10/2020       Lab Results   Component Value Date    ALT 36 09/10/2020    AST 39 09/10/2020    ALKPHOS 158 (H) 09/10/2020       Rico Rashid MD  OBGYN, PGY-3  9/11/2020  6:08 AM

## 2020-09-11 NOTE — SOCIAL WORK
[facility] met with pt to follow up on needs for financial resources  Pt currently admitted with plan for remaining inpatient until delivery  Pt reported the following:    Pt reports this is first baby; pt is employed by [employer], FOB is involved, not financially supportive  Reports support system including her mother and sister and family members  Pt drives, indpendent in IADLs/ADLs PTA  Pt reported that she has FMLA; has exhausted sick/vacation time earlier in pregnancy due to nausea/morning sickness  Pt reports she is not eligible for short/long term disabiity; Expressed concerns with mortgage, car payment and utilities, bills     [facility] provided pt with financial resources including [employer] owners assistance information and telephone number, as well as numbers to utility assistance programs, payment plans available; Pt further provided with information on mortgage assistance programs  Pt appreciative and will begin to make telephone calls to agencies/companies for financial help which she may be eligible for  Pt reported that she has laptop in room that she can use in this regard  [facility] to follow up with patient for support and assistance as needed

## 2020-09-11 NOTE — PLAN OF CARE
Problem: ANTEPARTUM  Goal: Maintain pregnancy as long as maternal and/or fetal condition is stable  Description: INTERVENTIONS:  - Maternal surveillance  - Fetal surveillance  - Monitor uterine activity  - Medications as ordered  - Bedrest  Outcome: Progressing     Problem: NEUROSENSORY - ADULT  Goal: Achieves stable or improved neurological status  Description: INTERVENTIONS  - Monitor and report changes in neurological status  - Monitor vital signs such as temperature, blood pressure, glucose, and any other labs ordered   - Initiate measures to prevent increased intracranial pressure  - Monitor for seizure activity and implement precautions if appropriate      Outcome: Progressing  Goal: Remains free of injury related to seizures activity  Description: INTERVENTIONS  - Maintain airway, patient safety  and administer oxygen as ordered  - Monitor patient for seizure activity, document and report duration and description of seizure to physician/advanced practitioner  - If seizure occurs,  ensure patient safety during seizure  - Reorient patient post seizure  - Seizure pads on all 4 side rails  - Instruct patient/family to notify RN of any seizure activity including if an aura is experienced  - Instruct patient/family to call for assistance with activity based on nursing assessment  - Administer anti-seizure medications if ordered    Outcome: Progressing     Problem: CARDIOVASCULAR - ADULT  Goal: Maintains optimal cardiac output and hemodynamic stability  Description: INTERVENTIONS:  - Monitor I/O, vital signs and rhythm  - Monitor for S/S and trends of decreased cardiac output  - Administer and titrate ordered vasoactive medications to optimize hemodynamic stability  - Assess quality of pulses, skin color and temperature  - Assess for signs of decreased coronary artery perfusion  - Instruct patient to report change in severity of symptoms  Outcome: Progressing     Problem: PAIN - ADULT  Goal: Verbalizes/displays adequate comfort level or baseline comfort level  Description: Interventions:  - Encourage patient to monitor pain and request assistance  - Assess pain using appropriate pain scale  - Administer analgesics based on type and severity of pain and evaluate response  - Implement non-pharmacological measures as appropriate and evaluate response  - Consider cultural and social influences on pain and pain management  - Notify physician/advanced practitioner if interventions unsuccessful or patient reports new pain  Outcome: Progressing     Problem: INFECTION - ADULT  Goal: Absence or prevention of progression during hospitalization  Description: INTERVENTIONS:  - Assess and monitor for signs and symptoms of infection  - Monitor lab/diagnostic results  - Monitor all insertion sites, i e  indwelling lines, tubes, and drains  - Monitor endotracheal if appropriate and nasal secretions for changes in amount and color  - [place] appropriate cooling/warming therapies per order  - Administer medications as ordered  - Instruct and encourage patient and family to use good hand hygiene technique  - Identify and instruct in appropriate isolation precautions for identified infection/condition  Outcome: Progressing  Goal: Absence of fever/infection during neutropenic period  Description: INTERVENTIONS:  - Monitor WBC    Outcome: Progressing     Problem: SAFETY ADULT  Goal: Patient will remain free of falls  Description: INTERVENTIONS:  - Assess patient frequently for physical needs  -  Identify cognitive and physical deficits and behaviors that affect risk of falls    -  [place] fall precautions as indicated by assessment   - Educate patient/family on patient safety including physical limitations  - Instruct patient to call for assistance with activity based on assessment  - Modify environment to reduce risk of injury  - Consider OT/PT consult to assist with strengthening/mobility  Outcome: Progressing  Goal: Maintain or return to baseline ADL function  Description: INTERVENTIONS:  -  Assess patient's ability to carry out ADLs; assess patient's baseline for ADL function and identify physical deficits which impact ability to perform ADLs (bathing, care of mouth/teeth, toileting, grooming, dressing, etc )  - Assess/evaluate cause of self-care deficits   - Assess range of motion  - Assess patient's mobility; develop plan if impaired  - Assess patient's need for assistive devices and provide as appropriate  - Encourage maximum independence but intervene and supervise when necessary  - Involve family in performance of ADLs  - Assess for home care needs following discharge   - Consider OT consult to assist with ADL evaluation and planning for discharge  - Provide patient education as appropriate  Outcome: Progressing  Goal: Maintain or return mobility status to optimal level  Description: INTERVENTIONS:  - Assess patient's baseline mobility status (ambulation, transfers, stairs, etc )    - Identify cognitive and physical deficits and behaviors that affect mobility  - Identify mobility aids required to assist with transfers and/or ambulation (gait belt, sit-to-stand, lift, walker, cane, etc )  - [place] fall precautions as indicated by assessment  - Record patient progress and toleration of activity level on Mobility SBAR; progress patient to next Phase/Stage  - Instruct patient to call for assistance with activity based on assessment  - Consider rehabilitation consult to assist with strengthening/weightbearing, etc   Outcome: Progressing     Problem: Knowledge Deficit  Goal: Patient/family/caregiver demonstrates understanding of disease process, treatment plan, medications, and discharge instructions  Description: Complete learning assessment and assess knowledge base    Interventions:  - Provide teaching at level of understanding  - Provide teaching via preferred learning methods  Outcome: Progressing     Problem: DISCHARGE PLANNING  Goal: Discharge to home or other facility with appropriate resources  Description: INTERVENTIONS:  - Identify barriers to discharge w/patient and caregiver  - Arrange for needed discharge resources and transportation as appropriate  - Identify discharge learning needs (meds, wound care, etc )  - Arrange for interpretive services to assist at discharge as needed  - Refer to Case Management Department for coordinating discharge planning if the patient needs post-hospital services based on physician/advanced practitioner order or complex needs related to functional status, cognitive ability, or social support system  Outcome: Progressing     Problem: Potential for Falls  Goal: Patient will remain free of falls  Description: INTERVENTIONS:  - Assess patient frequently for physical needs  -  Identify cognitive and physical deficits and behaviors that affect risk of falls    -  [place] fall precautions as indicated by assessment   - Educate patient/family on patient safety including physical limitations  - Instruct patient to call for assistance with activity based on assessment  - Modify environment to reduce risk of injury  - Consider OT/PT consult to assist with strengthening/mobility  Outcome: Progressing

## 2020-09-12 LAB
ALBUMIN SERPL BCP-MCNC: 1.4 G/DL (ref 3.5–5)
ALP SERPL-CCNC: 172 U/L (ref 46–116)
ALT SERPL W P-5'-P-CCNC: 31 U/L (ref 12–78)
ANION GAP SERPL CALCULATED.3IONS-SCNC: 8 MMOL/L (ref 4–13)
AST SERPL W P-5'-P-CCNC: 28 U/L (ref 5–45)
BASOPHILS # BLD AUTO: 0.04 THOUSANDS/ΜL (ref 0–0.1)
BASOPHILS NFR BLD AUTO: 0 % (ref 0–1)
BILIRUB SERPL-MCNC: 0.17 MG/DL (ref 0.2–1)
BUN SERPL-MCNC: 14 MG/DL (ref 5–25)
CALCIUM SERPL-MCNC: 7.6 MG/DL (ref 8.3–10.1)
CHLORIDE SERPL-SCNC: 105 MMOL/L (ref 100–108)
CO2 SERPL-SCNC: 23 MMOL/L (ref 21–32)
CREAT SERPL-MCNC: 0.9 MG/DL (ref 0.6–1.3)
EOSINOPHIL # BLD AUTO: 0.2 THOUSAND/ΜL (ref 0–0.61)
EOSINOPHIL NFR BLD AUTO: 2 % (ref 0–6)
ERYTHROCYTE [DISTWIDTH] IN BLOOD BY AUTOMATED COUNT: 12.2 % (ref 11.6–15.1)
GFR SERPL CREATININE-BSD FRML MDRD: 100 ML/MIN/1.73SQ M
GLUCOSE SERPL-MCNC: 135 MG/DL (ref 65–140)
HCT VFR BLD AUTO: 34.9 % (ref 34.8–46.1)
HGB BLD-MCNC: 11.8 G/DL (ref 11.5–15.4)
IMM GRANULOCYTES # BLD AUTO: 0.04 THOUSAND/UL (ref 0–0.2)
IMM GRANULOCYTES NFR BLD AUTO: 0 % (ref 0–2)
LYMPHOCYTES # BLD AUTO: 2.58 THOUSANDS/ΜL (ref 0.6–4.47)
LYMPHOCYTES NFR BLD AUTO: 22 % (ref 14–44)
MCH RBC QN AUTO: 30.6 PG (ref 26.8–34.3)
MCHC RBC AUTO-ENTMCNC: 33.8 G/DL (ref 31.4–37.4)
MCV RBC AUTO: 91 FL (ref 82–98)
MONOCYTES # BLD AUTO: 0.81 THOUSAND/ΜL (ref 0.17–1.22)
MONOCYTES NFR BLD AUTO: 7 % (ref 4–12)
NEUTROPHILS # BLD AUTO: 8.09 THOUSANDS/ΜL (ref 1.85–7.62)
NEUTS SEG NFR BLD AUTO: 69 % (ref 43–75)
NRBC BLD AUTO-RTO: 0 /100 WBCS
PLATELET # BLD AUTO: 279 THOUSANDS/UL (ref 149–390)
PMV BLD AUTO: 10.4 FL (ref 8.9–12.7)
POTASSIUM SERPL-SCNC: 4.2 MMOL/L (ref 3.5–5.3)
PROT SERPL-MCNC: 5.1 G/DL (ref 6.4–8.2)
RBC # BLD AUTO: 3.85 MILLION/UL (ref 3.81–5.12)
SODIUM SERPL-SCNC: 136 MMOL/L (ref 136–145)
WBC # BLD AUTO: 11.76 THOUSAND/UL (ref 4.31–10.16)

## 2020-09-12 PROCEDURE — 59025 FETAL NON-STRESS TEST: CPT | Performed by: OBSTETRICS & GYNECOLOGY

## 2020-09-12 PROCEDURE — 80053 COMPREHEN METABOLIC PANEL: CPT | Performed by: OBSTETRICS & GYNECOLOGY

## 2020-09-12 PROCEDURE — 99231 SBSQ HOSP IP/OBS SF/LOW 25: CPT | Performed by: OBSTETRICS & GYNECOLOGY

## 2020-09-12 PROCEDURE — NC001 PR NO CHARGE: Performed by: OBSTETRICS & GYNECOLOGY

## 2020-09-12 PROCEDURE — 85025 COMPLETE CBC W/AUTO DIFF WBC: CPT | Performed by: OBSTETRICS & GYNECOLOGY

## 2020-09-12 PROCEDURE — C9399 UNCLASSIFIED DRUGS OR BIOLOG: HCPCS | Performed by: STUDENT IN AN ORGANIZED HEALTH CARE EDUCATION/TRAINING PROGRAM

## 2020-09-12 RX ADMIN — Medication 1 TABLET: at 09:00

## 2020-09-12 RX ADMIN — DUPILUMAB 300 MG: 300 INJECTION, SOLUTION SUBCUTANEOUS at 16:55

## 2020-09-12 RX ADMIN — MUPIROCIN: 20 OINTMENT TOPICAL at 16:57

## 2020-09-12 RX ADMIN — TRIAMCINOLONE ACETONIDE: 1 CREAM TOPICAL at 08:59

## 2020-09-12 RX ADMIN — HEPARIN SODIUM 10000 UNITS: 5000 INJECTION INTRAVENOUS; SUBCUTANEOUS at 22:23

## 2020-09-12 RX ADMIN — NIFEDIPINE 30 MG: 30 TABLET, FILM COATED, EXTENDED RELEASE ORAL at 09:01

## 2020-09-12 RX ADMIN — TRIAMCINOLONE ACETONIDE: 1 CREAM TOPICAL at 16:57

## 2020-09-12 RX ADMIN — HEPARIN SODIUM 10000 UNITS: 5000 INJECTION INTRAVENOUS; SUBCUTANEOUS at 09:00

## 2020-09-12 RX ADMIN — MUPIROCIN: 20 OINTMENT TOPICAL at 09:00

## 2020-09-12 RX ADMIN — TRIAMCINOLONE ACETONIDE: 1 CREAM TOPICAL at 22:23

## 2020-09-12 NOTE — PLAN OF CARE
Problem: ANTEPARTUM  Goal: Maintain pregnancy as long as maternal and/or fetal condition is stable  Description: INTERVENTIONS:  - Maternal surveillance  - Fetal surveillance  - Monitor uterine activity  - Medications as ordered  - Bedrest  Outcome: Progressing     Problem: NEUROSENSORY - ADULT  Goal: Achieves stable or improved neurological status  Description: INTERVENTIONS  - Monitor and report changes in neurological status  - Monitor vital signs such as temperature, blood pressure, glucose, and any other labs ordered   - Initiate measures to prevent increased intracranial pressure  - Monitor for seizure activity and implement precautions if appropriate      Outcome: Progressing  Goal: Remains free of injury related to seizures activity  Description: INTERVENTIONS  - Maintain airway, patient safety  and administer oxygen as ordered  - Monitor patient for seizure activity, document and report duration and description of seizure to physician/advanced practitioner  - If seizure occurs,  ensure patient safety during seizure  - Reorient patient post seizure  - Seizure pads on all 4 side rails  - Instruct patient/family to notify RN of any seizure activity including if an aura is experienced  - Instruct patient/family to call for assistance with activity based on nursing assessment  - Administer anti-seizure medications if ordered    Outcome: Progressing     Problem: CARDIOVASCULAR - ADULT  Goal: Maintains optimal cardiac output and hemodynamic stability  Description: INTERVENTIONS:  - Monitor I/O, vital signs and rhythm  - Monitor for S/S and trends of decreased cardiac output  - Administer and titrate ordered vasoactive medications to optimize hemodynamic stability  - Assess quality of pulses, skin color and temperature  - Assess for signs of decreased coronary artery perfusion  - Instruct patient to report change in severity of symptoms  Outcome: Progressing     Problem: PAIN - ADULT  Goal: Verbalizes/displays adequate comfort level or baseline comfort level  Description: Interventions:  - Encourage patient to monitor pain and request assistance  - Assess pain using appropriate pain scale  - Administer analgesics based on type and severity of pain and evaluate response  - Implement non-pharmacological measures as appropriate and evaluate response  - Consider cultural and social influences on pain and pain management  - Notify physician/advanced practitioner if interventions unsuccessful or patient reports new pain  Outcome: Progressing     Problem: INFECTION - ADULT  Goal: Absence or prevention of progression during hospitalization  Description: INTERVENTIONS:  - Assess and monitor for signs and symptoms of infection  - Monitor lab/diagnostic results  - Monitor all insertion sites, i e  indwelling lines, tubes, and drains  - Monitor endotracheal if appropriate and nasal secretions for changes in amount and color  - [place] appropriate cooling/warming therapies per order  - Administer medications as ordered  - Instruct and encourage patient and family to use good hand hygiene technique  - Identify and instruct in appropriate isolation precautions for identified infection/condition  Outcome: Progressing  Goal: Absence of fever/infection during neutropenic period  Description: INTERVENTIONS:  - Monitor WBC    Outcome: Progressing     Problem: SAFETY ADULT  Goal: Patient will remain free of falls  Description: INTERVENTIONS:  - Assess patient frequently for physical needs  -  Identify cognitive and physical deficits and behaviors that affect risk of falls    -  [place] fall precautions as indicated by assessment   - Educate patient/family on patient safety including physical limitations  - Instruct patient to call for assistance with activity based on assessment  - Modify environment to reduce risk of injury  - Consider OT/PT consult to assist with strengthening/mobility  Outcome: Progressing  Goal: Maintain or return to baseline ADL function  Description: INTERVENTIONS:  -  Assess patient's ability to carry out ADLs; assess patient's baseline for ADL function and identify physical deficits which impact ability to perform ADLs (bathing, care of mouth/teeth, toileting, grooming, dressing, etc )  - Assess/evaluate cause of self-care deficits   - Assess range of motion  - Assess patient's mobility; develop plan if impaired  - Assess patient's need for assistive devices and provide as appropriate  - Encourage maximum independence but intervene and supervise when necessary  - Involve family in performance of ADLs  - Assess for home care needs following discharge   - Consider OT consult to assist with ADL evaluation and planning for discharge  - Provide patient education as appropriate  Outcome: Progressing  Goal: Maintain or return mobility status to optimal level  Description: INTERVENTIONS:  - Assess patient's baseline mobility status (ambulation, transfers, stairs, etc )    - Identify cognitive and physical deficits and behaviors that affect mobility  - Identify mobility aids required to assist with transfers and/or ambulation (gait belt, sit-to-stand, lift, walker, cane, etc )  - [place] fall precautions as indicated by assessment  - Record patient progress and toleration of activity level on Mobility SBAR; progress patient to next Phase/Stage  - Instruct patient to call for assistance with activity based on assessment  - Consider rehabilitation consult to assist with strengthening/weightbearing, etc   Outcome: Progressing     Problem: Knowledge Deficit  Goal: Patient/family/caregiver demonstrates understanding of disease process, treatment plan, medications, and discharge instructions  Description: Complete learning assessment and assess knowledge base    Interventions:  - Provide teaching at level of understanding  - Provide teaching via preferred learning methods  Outcome: Progressing     Problem: DISCHARGE PLANNING  Goal: Discharge to home or other facility with appropriate resources  Description: INTERVENTIONS:  - Identify barriers to discharge w/patient and caregiver  - Arrange for needed discharge resources and transportation as appropriate  - Identify discharge learning needs (meds, wound care, etc )  - Arrange for interpretive services to assist at discharge as needed  - Refer to Case Management Department for coordinating discharge planning if the patient needs post-hospital services based on physician/advanced practitioner order or complex needs related to functional status, cognitive ability, or social support system  Outcome: Progressing     Problem: Potential for Falls  Goal: Patient will remain free of falls  Description: INTERVENTIONS:  - Assess patient frequently for physical needs  -  Identify cognitive and physical deficits and behaviors that affect risk of falls    -  [place] fall precautions as indicated by assessment   - Educate patient/family on patient safety including physical limitations  - Instruct patient to call for assistance with activity based on assessment  - Modify environment to reduce risk of injury  - Consider OT/PT consult to assist with strengthening/mobility  Outcome: Progressing

## 2020-09-12 NOTE — PROGRESS NOTES
Progress Note - Maternal Fetal Medicine   Weldon Keepers 35 y o  female MRN: [id number]  Unit/Bed#: -01 Encounter: [id number]    Assessment:  35 y o   at 29w6d admitted with pre-eclampsia with severe features  Hospital day 22  Plan:  1  Pre-eclampsia with severe features   - BP overnight: 117-144/79-93, no SR overnight   - Procardia XL 30mg daily    - CMP/CBC q3-days - most recent labs on 9/10, Cr 0 86 --> 0 96, AST/ALT  --> 39/36, will continue to trend    2  Routine Prenatal care   - Normal 1 hour GTT    - s/p Tdap   - T&S 9/10   - NST qshift   - Patient declined the flu vaccine     3  IUGR with abnormal Dopplers    - Growth scan was 9/3 showed interval growth, around 10% with steady, but abnormal Dopplers   - Dopplers on 9/10 were abnormal, but consistent with previous    - Growth scans every 2 weeks     4  FEN: Regular diet    5  DVT ppx: Heparin 10,000 units SQ BID, SCDs, Ambulation     6  Folliculitis: Mupirocin ointment PRN     7  Eczema and Asthma: Dupilumab q14 days       Subjective/Objective     Subjective:     Patient was resting this morning  She states that she has had some gas overnight, but otherwise is feeling well  She denies headache, epigastric pain, changes in vision or sudden onset edema  She has no calf pain, chest pain nor shortness of breath  Objective:     Vitals:    20 2300   BP: 137/89   Pulse: 89   Resp: 18   Temp: 98 °F (36 7 °C)   SpO2:      Physical Exam:     Physical Exam  Constitutional:       General: She is not in acute distress  Appearance: Normal appearance  She is well-developed  She is obese  She is not ill-appearing, toxic-appearing or diaphoretic  HENT:      Head: Normocephalic and atraumatic  Neck:      Musculoskeletal: Normal range of motion and neck supple  Cardiovascular:      Rate and Rhythm: Normal rate and regular rhythm  Heart sounds: Normal heart sounds  No murmur  No gallop      Pulmonary:      Effort: Pulmonary effort is normal  No respiratory distress  Breath sounds: No stridor  No wheezing or rales  Abdominal:      General: There is no distension  Palpations: Abdomen is soft  There is no mass  Tenderness: There is no abdominal tenderness  There is no guarding  Musculoskeletal: Normal range of motion  General: No swelling, tenderness, deformity or signs of injury  Right lower leg: No edema  Left lower leg: No edema  Skin:     General: Skin is warm and dry  Neurological:      General: No focal deficit present  Mental Status: She is alert and oriented to person, place, and time  Psychiatric:         Behavior: Behavior normal          Thought Content:  Thought content normal          Judgment: Judgment normal        Fetal Assessment:  FHT: 140 / Moderate 6 - 25 bpm /no accelerations, variable decelerations  [place]: none    Lab Results   Component Value Date    WBC 12 09 (H) 09/10/2020    HGB 12 4 09/10/2020    HCT 37 1 09/10/2020    MCV 93 09/10/2020     09/10/2020       Lab Results   Component Value Date    CALCIUM 7 8 (L) 09/10/2020    K 4 6 09/10/2020    CO2 25 09/10/2020     09/10/2020    BUN 14 09/10/2020    CREATININE 0 94 09/10/2020       Lab Results   Component Value Date    ALT 36 09/10/2020    AST 39 09/10/2020    ALKPHOS 158 (H) 09/10/2020       Tena Call MD  OBGYN, PGY-3  9/12/2020  4:18 AM

## 2020-09-12 NOTE — PLAN OF CARE
Problem: ANTEPARTUM  Goal: Maintain pregnancy as long as maternal and/or fetal condition is stable  Description: INTERVENTIONS:  - Maternal surveillance  - Fetal surveillance  - Monitor uterine activity  - Medications as ordered  - Bedrest  Outcome: Progressing     Problem: NEUROSENSORY - ADULT  Goal: Achieves stable or improved neurological status  Description: INTERVENTIONS  - Monitor and report changes in neurological status  - Monitor vital signs such as temperature, blood pressure, glucose, and any other labs ordered   - Initiate measures to prevent increased intracranial pressure  - Monitor for seizure activity and implement precautions if appropriate      Outcome: Progressing  Goal: Remains free of injury related to seizures activity  Description: INTERVENTIONS  - Maintain airway, patient safety  and administer oxygen as ordered  - Monitor patient for seizure activity, document and report duration and description of seizure to physician/advanced practitioner  - If seizure occurs,  ensure patient safety during seizure  - Reorient patient post seizure  - Seizure pads on all 4 side rails  - Instruct patient/family to notify RN of any seizure activity including if an aura is experienced  - Instruct patient/family to call for assistance with activity based on nursing assessment  - Administer anti-seizure medications if ordered    Outcome: Progressing     Problem: CARDIOVASCULAR - ADULT  Goal: Maintains optimal cardiac output and hemodynamic stability  Description: INTERVENTIONS:  - Monitor I/O, vital signs and rhythm  - Monitor for S/S and trends of decreased cardiac output  - Administer and titrate ordered vasoactive medications to optimize hemodynamic stability  - Assess quality of pulses, skin color and temperature  - Assess for signs of decreased coronary artery perfusion  - Instruct patient to report change in severity of symptoms  Outcome: Progressing     Problem: PAIN - ADULT  Goal: Verbalizes/displays adequate comfort level or baseline comfort level  Description: Interventions:  - Encourage patient to monitor pain and request assistance  - Assess pain using appropriate pain scale  - Administer analgesics based on type and severity of pain and evaluate response  - Implement non-pharmacological measures as appropriate and evaluate response  - Consider cultural and social influences on pain and pain management  - Notify physician/advanced practitioner if interventions unsuccessful or patient reports new pain  Outcome: Progressing     Problem: INFECTION - ADULT  Goal: Absence or prevention of progression during hospitalization  Description: INTERVENTIONS:  - Assess and monitor for signs and symptoms of infection  - Monitor lab/diagnostic results  - Monitor all insertion sites, i e  indwelling lines, tubes, and drains  - Monitor endotracheal if appropriate and nasal secretions for changes in amount and color  - [place] appropriate cooling/warming therapies per order  - Administer medications as ordered  - Instruct and encourage patient and family to use good hand hygiene technique  - Identify and instruct in appropriate isolation precautions for identified infection/condition  Outcome: Progressing  Goal: Absence of fever/infection during neutropenic period  Description: INTERVENTIONS:  - Monitor WBC    Outcome: Progressing     Problem: SAFETY ADULT  Goal: Patient will remain free of falls  Description: INTERVENTIONS:  - Assess patient frequently for physical needs  -  Identify cognitive and physical deficits and behaviors that affect risk of falls    -  [place] fall precautions as indicated by assessment   - Educate patient/family on patient safety including physical limitations  - Instruct patient to call for assistance with activity based on assessment  - Modify environment to reduce risk of injury  - Consider OT/PT consult to assist with strengthening/mobility  Outcome: Progressing  Goal: Maintain or return to baseline ADL function  Description: INTERVENTIONS:  -  Assess patient's ability to carry out ADLs; assess patient's baseline for ADL function and identify physical deficits which impact ability to perform ADLs (bathing, care of mouth/teeth, toileting, grooming, dressing, etc )  - Assess/evaluate cause of self-care deficits   - Assess range of motion  - Assess patient's mobility; develop plan if impaired  - Assess patient's need for assistive devices and provide as appropriate  - Encourage maximum independence but intervene and supervise when necessary  - Involve family in performance of ADLs  - Assess for home care needs following discharge   - Consider OT consult to assist with ADL evaluation and planning for discharge  - Provide patient education as appropriate  Outcome: Progressing  Goal: Maintain or return mobility status to optimal level  Description: INTERVENTIONS:  - Assess patient's baseline mobility status (ambulation, transfers, stairs, etc )    - Identify cognitive and physical deficits and behaviors that affect mobility  - Identify mobility aids required to assist with transfers and/or ambulation (gait belt, sit-to-stand, lift, walker, cane, etc )  - [place] fall precautions as indicated by assessment  - Record patient progress and toleration of activity level on Mobility SBAR; progress patient to next Phase/Stage  - Instruct patient to call for assistance with activity based on assessment  - Consider rehabilitation consult to assist with strengthening/weightbearing, etc   Outcome: Progressing     Problem: Knowledge Deficit  Goal: Patient/family/caregiver demonstrates understanding of disease process, treatment plan, medications, and discharge instructions  Description: Complete learning assessment and assess knowledge base    Interventions:  - Provide teaching at level of understanding  - Provide teaching via preferred learning methods  Outcome: Progressing     Problem: DISCHARGE PLANNING  Goal: Discharge to home or other facility with appropriate resources  Description: INTERVENTIONS:  - Identify barriers to discharge w/patient and caregiver  - Arrange for needed discharge resources and transportation as appropriate  - Identify discharge learning needs (meds, wound care, etc )  - Arrange for interpretive services to assist at discharge as needed  - Refer to Case Management Department for coordinating discharge planning if the patient needs post-hospital services based on physician/advanced practitioner order or complex needs related to functional status, cognitive ability, or social support system  Outcome: Progressing     Problem: Potential for Falls  Goal: Patient will remain free of falls  Description: INTERVENTIONS:  - Assess patient frequently for physical needs  -  Identify cognitive and physical deficits and behaviors that affect risk of falls    -  [place] fall precautions as indicated by assessment   - Educate patient/family on patient safety including physical limitations  - Instruct patient to call for assistance with activity based on assessment  - Modify environment to reduce risk of injury  - Consider OT/PT consult to assist with strengthening/mobility  Outcome: Progressing

## 2020-09-12 NOTE — PROGRESS NOTES
Progress Note - OB/GYN   Mariposa Rosas 35 y o  female MRN: [id number]  Unit/Bed#: -01 Encounter: [id number]    Assessment:  32-year-old G 3 P 0020 at 29 weeks 6 days admitted with preeclampsia with severe features  Hospital day number 22    Plan:  1  Preeclampsia with severe features   - blood pressure stable  - Procardia XL daily  - CMP/CBC q 3 days  Slight bump in creatinine on 09/10   2  Routine prenatal care   - NST Q shift   - growth ultrasound next week    3  IUGR with abnormal Dopplers   - goes ultrasound with Dopplers on Thursday  Subjective/Objective   Chief Complaint:  No complaints    Subjective:  Denies contractions, leakage of fluid or vaginal bleeding  Reports good fetal movement  Denies headache, blurry vision, right upper quadrant pain  Objective:     Vitals: Blood pressure 137/89, pulse 89, temperature 98 °F (36 7 °C), temperature source Oral, resp  rate 18, height 5' 2" (1 575 m), weight 85 7 kg (189 lb), last menstrual period 02/16/2020, SpO2 98 %  ,Body mass index is 34 57 kg/m²  No intake or output data in the 24 hours ending 09/12/20 0803    Invasive Devices     None                 Physical Exam:   Physical Exam  Constitutional:       General: She is not in acute distress  Appearance: Normal appearance  She is well-developed  She is not ill-appearing  Cardiovascular:      Rate and Rhythm: Normal rate  Pulmonary:      Effort: Pulmonary effort is normal  No respiratory distress  Abdominal:      Comments: Gravid, non tender     Neurological:      General: No focal deficit present  Mental Status: She is alert and oriented to person, place, and time  Psychiatric:         Mood and Affect: Mood normal          Behavior: Behavior normal          Thought Content: Thought content normal          Judgment: Judgment normal    Vitals signs and nursing note reviewed

## 2020-09-13 LAB
BASOPHILS # BLD AUTO: 0.04 THOUSANDS/ΜL (ref 0–0.1)
BASOPHILS NFR BLD AUTO: 0 % (ref 0–1)
EOSINOPHIL # BLD AUTO: 0.22 THOUSAND/ΜL (ref 0–0.61)
EOSINOPHIL NFR BLD AUTO: 2 % (ref 0–6)
ERYTHROCYTE [DISTWIDTH] IN BLOOD BY AUTOMATED COUNT: 12.1 % (ref 11.6–15.1)
HCT VFR BLD AUTO: 36.6 % (ref 34.8–46.1)
HGB BLD-MCNC: 12.2 G/DL (ref 11.5–15.4)
IMM GRANULOCYTES # BLD AUTO: 0.05 THOUSAND/UL (ref 0–0.2)
IMM GRANULOCYTES NFR BLD AUTO: 1 % (ref 0–2)
LYMPHOCYTES # BLD AUTO: 2.29 THOUSANDS/ΜL (ref 0.6–4.47)
LYMPHOCYTES NFR BLD AUTO: 21 % (ref 14–44)
MCH RBC QN AUTO: 30.3 PG (ref 26.8–34.3)
MCHC RBC AUTO-ENTMCNC: 33.3 G/DL (ref 31.4–37.4)
MCV RBC AUTO: 91 FL (ref 82–98)
MONOCYTES # BLD AUTO: 0.89 THOUSAND/ΜL (ref 0.17–1.22)
MONOCYTES NFR BLD AUTO: 8 % (ref 4–12)
NEUTROPHILS # BLD AUTO: 7.35 THOUSANDS/ΜL (ref 1.85–7.62)
NEUTS SEG NFR BLD AUTO: 68 % (ref 43–75)
NRBC BLD AUTO-RTO: 0 /100 WBCS
PLATELET # BLD AUTO: 276 THOUSANDS/UL (ref 149–390)
PMV BLD AUTO: 10.6 FL (ref 8.9–12.7)
RBC # BLD AUTO: 4.02 MILLION/UL (ref 3.81–5.12)
WBC # BLD AUTO: 10.84 THOUSAND/UL (ref 4.31–10.16)

## 2020-09-13 PROCEDURE — 99231 SBSQ HOSP IP/OBS SF/LOW 25: CPT | Performed by: OBSTETRICS & GYNECOLOGY

## 2020-09-13 PROCEDURE — 59025 FETAL NON-STRESS TEST: CPT | Performed by: OBSTETRICS & GYNECOLOGY

## 2020-09-13 PROCEDURE — 85025 COMPLETE CBC W/AUTO DIFF WBC: CPT | Performed by: OBSTETRICS & GYNECOLOGY

## 2020-09-13 PROCEDURE — NC001 PR NO CHARGE: Performed by: OBSTETRICS & GYNECOLOGY

## 2020-09-13 RX ORDER — ONDANSETRON 2 MG/ML
4 INJECTION INTRAMUSCULAR; INTRAVENOUS EVERY 4 HOURS PRN
Status: DISCONTINUED | OUTPATIENT
Start: 2020-09-13 | End: 2020-09-13

## 2020-09-13 RX ORDER — ONDANSETRON 4 MG/1
4 TABLET, ORALLY DISINTEGRATING ORAL EVERY 6 HOURS PRN
Status: DISCONTINUED | OUTPATIENT
Start: 2020-09-13 | End: 2020-09-20 | Stop reason: HOSPADM

## 2020-09-13 RX ADMIN — MUPIROCIN: 20 OINTMENT TOPICAL at 09:04

## 2020-09-13 RX ADMIN — NIFEDIPINE 30 MG: 30 TABLET, FILM COATED, EXTENDED RELEASE ORAL at 09:04

## 2020-09-13 RX ADMIN — TRIAMCINOLONE ACETONIDE: 1 CREAM TOPICAL at 09:04

## 2020-09-13 RX ADMIN — HEPARIN SODIUM 10000 UNITS: 5000 INJECTION INTRAVENOUS; SUBCUTANEOUS at 22:17

## 2020-09-13 RX ADMIN — ONDANSETRON 4 MG: 4 TABLET, ORALLY DISINTEGRATING ORAL at 09:28

## 2020-09-13 RX ADMIN — ACETAMINOPHEN 650 MG: 325 TABLET, FILM COATED ORAL at 03:58

## 2020-09-13 RX ADMIN — MUPIROCIN: 20 OINTMENT TOPICAL at 16:19

## 2020-09-13 RX ADMIN — MUPIROCIN: 20 OINTMENT TOPICAL at 21:00

## 2020-09-13 RX ADMIN — Medication 1 TABLET: at 09:05

## 2020-09-13 RX ADMIN — HEPARIN SODIUM 10000 UNITS: 5000 INJECTION INTRAVENOUS; SUBCUTANEOUS at 09:05

## 2020-09-13 RX ADMIN — TRIAMCINOLONE ACETONIDE: 1 CREAM TOPICAL at 18:11

## 2020-09-13 NOTE — PLAN OF CARE
Problem: ANTEPARTUM  Goal: Maintain pregnancy as long as maternal and/or fetal condition is stable  Description: INTERVENTIONS:  - Maternal surveillance  - Fetal surveillance  - Monitor uterine activity  - Medications as ordered  - Bedrest  Outcome: Progressing     Problem: NEUROSENSORY - ADULT  Goal: Achieves stable or improved neurological status  Description: INTERVENTIONS  - Monitor and report changes in neurological status  - Monitor vital signs such as temperature, blood pressure, glucose, and any other labs ordered   - Initiate measures to prevent increased intracranial pressure  - Monitor for seizure activity and implement precautions if appropriate      Outcome: Progressing  Goal: Remains free of injury related to seizures activity  Description: INTERVENTIONS  - Maintain airway, patient safety  and administer oxygen as ordered  - Monitor patient for seizure activity, document and report duration and description of seizure to physician/advanced practitioner  - If seizure occurs,  ensure patient safety during seizure  - Reorient patient post seizure  - Seizure pads on all 4 side rails  - Instruct patient/family to notify RN of any seizure activity including if an aura is experienced  - Instruct patient/family to call for assistance with activity based on nursing assessment  - Administer anti-seizure medications if ordered    Outcome: Progressing     Problem: CARDIOVASCULAR - ADULT  Goal: Maintains optimal cardiac output and hemodynamic stability  Description: INTERVENTIONS:  - Monitor I/O, vital signs and rhythm  - Monitor for S/S and trends of decreased cardiac output  - Administer and titrate ordered vasoactive medications to optimize hemodynamic stability  - Assess quality of pulses, skin color and temperature  - Assess for signs of decreased coronary artery perfusion  - Instruct patient to report change in severity of symptoms  Outcome: Progressing     Problem: PAIN - ADULT  Goal: Verbalizes/displays adequate comfort level or baseline comfort level  Description: Interventions:  - Encourage patient to monitor pain and request assistance  - Assess pain using appropriate pain scale  - Administer analgesics based on type and severity of pain and evaluate response  - Implement non-pharmacological measures as appropriate and evaluate response  - Consider cultural and social influences on pain and pain management  - Notify physician/advanced practitioner if interventions unsuccessful or patient reports new pain  Outcome: Progressing     Problem: INFECTION - ADULT  Goal: Absence or prevention of progression during hospitalization  Description: INTERVENTIONS:  - Assess and monitor for signs and symptoms of infection  - Monitor lab/diagnostic results  - Monitor all insertion sites, i e  indwelling lines, tubes, and drains  - Monitor endotracheal if appropriate and nasal secretions for changes in amount and color  - [place] appropriate cooling/warming therapies per order  - Administer medications as ordered  - Instruct and encourage patient and family to use good hand hygiene technique  - Identify and instruct in appropriate isolation precautions for identified infection/condition  Outcome: Progressing  Goal: Absence of fever/infection during neutropenic period  Description: INTERVENTIONS:  - Monitor WBC    Outcome: Progressing     Problem: SAFETY ADULT  Goal: Patient will remain free of falls  Description: INTERVENTIONS:  - Assess patient frequently for physical needs  -  Identify cognitive and physical deficits and behaviors that affect risk of falls    -  [place] fall precautions as indicated by assessment   - Educate patient/family on patient safety including physical limitations  - Instruct patient to call for assistance with activity based on assessment  - Modify environment to reduce risk of injury  - Consider OT/PT consult to assist with strengthening/mobility  Outcome: Progressing  Goal: Maintain or return to baseline ADL function  Description: INTERVENTIONS:  -  Assess patient's ability to carry out ADLs; assess patient's baseline for ADL function and identify physical deficits which impact ability to perform ADLs (bathing, care of mouth/teeth, toileting, grooming, dressing, etc )  - Assess/evaluate cause of self-care deficits   - Assess range of motion  - Assess patient's mobility; develop plan if impaired  - Assess patient's need for assistive devices and provide as appropriate  - Encourage maximum independence but intervene and supervise when necessary  - Involve family in performance of ADLs  - Assess for home care needs following discharge   - Consider OT consult to assist with ADL evaluation and planning for discharge  - Provide patient education as appropriate  Outcome: Progressing  Goal: Maintain or return mobility status to optimal level  Description: INTERVENTIONS:  - Assess patient's baseline mobility status (ambulation, transfers, stairs, etc )    - Identify cognitive and physical deficits and behaviors that affect mobility  - Identify mobility aids required to assist with transfers and/or ambulation (gait belt, sit-to-stand, lift, walker, cane, etc )  - [place] fall precautions as indicated by assessment  - Record patient progress and toleration of activity level on Mobility SBAR; progress patient to next Phase/Stage  - Instruct patient to call for assistance with activity based on assessment  - Consider rehabilitation consult to assist with strengthening/weightbearing, etc   Outcome: Progressing     Problem: Knowledge Deficit  Goal: Patient/family/caregiver demonstrates understanding of disease process, treatment plan, medications, and discharge instructions  Description: Complete learning assessment and assess knowledge base    Interventions:  - Provide teaching at level of understanding  - Provide teaching via preferred learning methods  Outcome: Progressing     Problem: DISCHARGE PLANNING  Goal: Discharge to home or other facility with appropriate resources  Description: INTERVENTIONS:  - Identify barriers to discharge w/patient and caregiver  - Arrange for needed discharge resources and transportation as appropriate  - Identify discharge learning needs (meds, wound care, etc )  - Arrange for interpretive services to assist at discharge as needed  - Refer to Case Management Department for coordinating discharge planning if the patient needs post-hospital services based on physician/advanced practitioner order or complex needs related to functional status, cognitive ability, or social support system  Outcome: Progressing     Problem: Potential for Falls  Goal: Patient will remain free of falls  Description: INTERVENTIONS:  - Assess patient frequently for physical needs  -  Identify cognitive and physical deficits and behaviors that affect risk of falls    -  [place] fall precautions as indicated by assessment   - Educate patient/family on patient safety including physical limitations  - Instruct patient to call for assistance with activity based on assessment  - Modify environment to reduce risk of injury  - Consider OT/PT consult to assist with strengthening/mobility  Outcome: Progressing

## 2020-09-13 NOTE — PROGRESS NOTES
Progress Note - Maternal Fetal Medicine   Andree Botello 35 y o  female MRN: [id number]  Unit/Bed#: -01 Encounter: [id number]    Assessment:  35 y o   at 30w0d admitted with pre-eclampsia with severe features  Hospital day 23  Plan:  1  Pre-eclampsia with severe features   - BP overnight:122-132/78-91 , no SR overnight   - Procardia XL 30mg daily    - CMP/CBC q3-days - most recent labs on , Cr 0 86 --> 0 96 --> 0 9, AST/ALT /17 --> 39/36 --> , will continue to trend    2  Routine Prenatal care   - Normal 1 hour GTT    - s/p Tdap   - T&S 9/10   - NST qshift   - Patient declined the flu vaccine     3  IUGR with abnormal Dopplers    - Growth scan was 9/3 showed interval growth, around 10% with steady, but abnormal Dopplers   - Dopplers on 9/10 were abnormal, but consistent with previous    - Growth scans every 2 weeks     4  FEN: Regular diet    5  DVT ppx: Heparin 10,000 units SQ BID, SCDs, Ambulation     6  Folliculitis: Mupirocin ointment PRN     7  Eczema and Asthma: Dupilumab q14 days       Subjective/Objective     Subjective:     Patient seemed less spirited this morning  She has no complaints  Her gas pain has resolved since yesterday  BP is well controlled  Labs are stable  Objective:     Vitals:    20   BP:    Pulse:    Resp:    Temp: 98 1 °F (36 7 °C)   SpO2:      Physical Exam:     Physical Exam  Constitutional:       General: She is not in acute distress  Appearance: Normal appearance  She is well-developed  She is obese  She is not ill-appearing, toxic-appearing or diaphoretic  HENT:      Head: Normocephalic and atraumatic  Neck:      Musculoskeletal: Normal range of motion and neck supple  Cardiovascular:      Rate and Rhythm: Normal rate and regular rhythm  Heart sounds: Normal heart sounds  No murmur  No gallop  Pulmonary:      Effort: Pulmonary effort is normal  No respiratory distress  Breath sounds: No stridor  No wheezing or rales  Abdominal:      General: There is no distension  Palpations: Abdomen is soft  There is no mass  Tenderness: There is no abdominal tenderness  There is no guarding  Comments: gravid   Musculoskeletal: Normal range of motion  General: No swelling, tenderness, deformity or signs of injury  Right lower leg: No edema  Left lower leg: No edema  Skin:     General: Skin is warm and dry  Neurological:      General: No focal deficit present  Mental Status: She is alert and oriented to person, place, and time  Psychiatric:         Mood and Affect: Mood normal          Behavior: Behavior normal          Thought Content:  Thought content normal          Judgment: Judgment normal        Fetal Assessment:  FHT: 150 / Moderate 6 - 25 bpm /no accelerations, variable decelerations  [place]: none    Lab Results   Component Value Date    WBC 10 84 (H) 09/13/2020    HGB 12 2 09/13/2020    HCT 36 6 09/13/2020    MCV 91 09/13/2020     09/13/2020       Lab Results   Component Value Date    CALCIUM 7 6 (L) 09/12/2020    K 4 2 09/12/2020    CO2 23 09/12/2020     09/12/2020    BUN 14 09/12/2020    CREATININE 0 90 09/12/2020       Lab Results   Component Value Date    ALT 31 09/12/2020    AST 28 09/12/2020    ALKPHOS 172 (H) 09/12/2020       Angela Porter MD  OBGYN, PGY-3  9/13/2020  7:28 AM

## 2020-09-14 ENCOUNTER — ANESTHESIA EVENT (INPATIENT)
Dept: LABOR AND DELIVERY | Facility: HOSPITAL | Age: 30
End: 2020-09-14
Payer: COMMERCIAL

## 2020-09-14 PROCEDURE — 76821 MIDDLE CEREBRAL ARTERY ECHO: CPT | Performed by: OBSTETRICS & GYNECOLOGY

## 2020-09-14 PROCEDURE — 76818 FETAL BIOPHYS PROFILE W/NST: CPT | Performed by: OBSTETRICS & GYNECOLOGY

## 2020-09-14 PROCEDURE — 76820 UMBILICAL ARTERY ECHO: CPT | Performed by: OBSTETRICS & GYNECOLOGY

## 2020-09-14 PROCEDURE — 59025 FETAL NON-STRESS TEST: CPT | Performed by: OBSTETRICS & GYNECOLOGY

## 2020-09-14 PROCEDURE — 99232 SBSQ HOSP IP/OBS MODERATE 35: CPT | Performed by: OBSTETRICS & GYNECOLOGY

## 2020-09-14 PROCEDURE — 76816 OB US FOLLOW-UP PER FETUS: CPT | Performed by: OBSTETRICS & GYNECOLOGY

## 2020-09-14 RX ORDER — MAGNESIUM SULFATE HEPTAHYDRATE 40 MG/ML
1 INJECTION, SOLUTION INTRAVENOUS CONTINUOUS
Status: DISCONTINUED | OUTPATIENT
Start: 2020-09-14 | End: 2020-09-15

## 2020-09-14 RX ORDER — CALCIUM GLUCONATE 94 MG/ML
1 INJECTION, SOLUTION INTRAVENOUS ONCE
Status: DISCONTINUED | OUTPATIENT
Start: 2020-09-14 | End: 2020-09-14

## 2020-09-14 RX ORDER — MAGNESIUM SULFATE HEPTAHYDRATE 40 MG/ML
2 INJECTION, SOLUTION INTRAVENOUS ONCE
Status: COMPLETED | OUTPATIENT
Start: 2020-09-14 | End: 2020-09-14

## 2020-09-14 RX ORDER — BETAMETHASONE SODIUM PHOSPHATE AND BETAMETHASONE ACETATE 3; 3 MG/ML; MG/ML
12 INJECTION, SUSPENSION INTRA-ARTICULAR; INTRALESIONAL; INTRAMUSCULAR; SOFT TISSUE EVERY 24 HOURS
Status: COMPLETED | OUTPATIENT
Start: 2020-09-14 | End: 2020-09-15

## 2020-09-14 RX ORDER — MAGNESIUM SULFATE HEPTAHYDRATE 40 MG/ML
4 INJECTION, SOLUTION INTRAVENOUS ONCE
Status: COMPLETED | OUTPATIENT
Start: 2020-09-14 | End: 2020-09-14

## 2020-09-14 RX ORDER — SODIUM CHLORIDE 9 MG/ML
75 INJECTION, SOLUTION INTRAVENOUS CONTINUOUS
Status: DISCONTINUED | OUTPATIENT
Start: 2020-09-14 | End: 2020-09-15

## 2020-09-14 RX ADMIN — SODIUM CHLORIDE 75 ML/HR: 0.9 INJECTION, SOLUTION INTRAVENOUS at 17:13

## 2020-09-14 RX ADMIN — HEPARIN SODIUM 10000 UNITS: 5000 INJECTION INTRAVENOUS; SUBCUTANEOUS at 09:43

## 2020-09-14 RX ADMIN — Medication 1 TABLET: at 09:42

## 2020-09-14 RX ADMIN — MAGNESIUM SULFATE IN WATER 2 G: 40 INJECTION, SOLUTION INTRAVENOUS at 17:29

## 2020-09-14 RX ADMIN — MAGNESIUM SULFATE IN WATER 4 G: 40 INJECTION, SOLUTION INTRAVENOUS at 17:13

## 2020-09-14 RX ADMIN — TRIAMCINOLONE ACETONIDE: 1 CREAM TOPICAL at 09:00

## 2020-09-14 RX ADMIN — BETAMETHASONE SODIUM PHOSPHATE AND BETAMETHASONE ACETATE 12 MG: 3; 3 INJECTION, SUSPENSION INTRA-ARTICULAR; INTRALESIONAL; INTRAMUSCULAR at 11:26

## 2020-09-14 RX ADMIN — MAGNESIUM SULFATE IN WATER 2 G/HR: 40 INJECTION, SOLUTION INTRAVENOUS at 17:43

## 2020-09-14 RX ADMIN — TRIAMCINOLONE ACETONIDE: 1 CREAM TOPICAL at 17:16

## 2020-09-14 RX ADMIN — MUPIROCIN: 20 OINTMENT TOPICAL at 17:15

## 2020-09-14 RX ADMIN — NIFEDIPINE 30 MG: 30 TABLET, FILM COATED, EXTENDED RELEASE ORAL at 09:42

## 2020-09-14 RX ADMIN — MUPIROCIN: 20 OINTMENT TOPICAL at 09:00

## 2020-09-14 NOTE — QUICK NOTE
This is a late entry note, I discussed the following with this patient earlier this afternoon  I have reviewed recent US findings with MFHIRAM, who recommends delivery based on the following:    No interval growth of AC since admission  Absent end diastolic flow  Intermittently non-reactive FHR tracing    Primary  section recommended given these changes, remote from delivery, likely fetal intolerance of labor  Many questions answered regarding current fetal status, what has changed, why delivery is recommended  We discussed at length the fact that her baby is showing signs of continued poor growth and now worsening UA doppler flow  Delivery is recommended to prevent worsening fetal status  Will plan to repeat course of steroids started this AM with second dose to be given 12 hours after the first  Magnesium to be started with a goal of 12 hours of magnesium prior to delivery for fetal neuroprotection  No further heparin to be given (last dose this AM at 10:00)  NPO after midnight  I discussed risks of c/section with the patient including pain, scarring, infection, bleeding, need for blood transfusion, damage to surrounding structures and organs, need for  delivery for all future pregnancies  She understands and accepts these risks and wishes to proceed  Delivery scheduled for 0800 tomorrow

## 2020-09-14 NOTE — PROGRESS NOTES
Progress Note - Maternal Fetal Medicine   [facility] 35 y o  female MRN: [id number]  Unit/Bed#: -01 Encounter: [id number]    Assessment:  35 y o   at 30w1d admitted with pre-eclampsia with severe features  Hospital day 24  Plan:  1  Pre-eclampsia with severe features   - BP overnight:117-153/72-98 , no SR overnight   - Procardia XL 30mg daily    - CMP/CBC q3-days - most recent labs on , Cr 0 86 --> 0 96 --> 0 9, AST/ALT /17 --> 39/36 --> , will continue to trend    2  IUP @30w1d   - Normal 1 hour GTT    - s/p Tdap   - T&S ordered for today    - NST qshift   - Patient declined the flu vaccine   - s/p BTM -, will do a rescue dose starting today      3  IUGR with abnormal Dopplers    - Growth scan was 9/3 showed interval growth, around 10% with steady, but abnormal Dopplers   - Dopplers on 9/10 were abnormal, but consistent with previous    - Growth scans every 2 weeks    - Given non-reactive tracing, will do BPP and Doppler exam today     4  FEN: Regular diet    5  DVT ppx: Heparin 10,000 units SQ BID, SCDs, Ambulation     6  Folliculitis: Mupirocin ointment PRN     7  Eczema and Asthma: Dupilumab q14 days     Subjective/Objective     Subjective:     Patient is doing well this morning  She had questions about why she is on heparin and we discussed DVT prophylaxis  She is in good spirits  She is asymptomatic  Objective:     Vitals:    20 2330   BP: 117/72   Pulse: 86   Resp: 18   Temp: 98 2 °F (36 8 °C)   SpO2:      Physical Exam:     Physical Exam  Constitutional:       General: She is not in acute distress  Appearance: Normal appearance  She is well-developed  She is obese  She is not ill-appearing, toxic-appearing or diaphoretic  HENT:      Head: Normocephalic and atraumatic  Neck:      Musculoskeletal: Normal range of motion and neck supple  Cardiovascular:      Rate and Rhythm: Normal rate and regular rhythm  Heart sounds: Normal heart sounds   No murmur  No gallop  Pulmonary:      Effort: Pulmonary effort is normal  No respiratory distress  Breath sounds: No stridor  No wheezing or rales  Abdominal:      General: There is no distension  Palpations: Abdomen is soft  There is no mass  Tenderness: There is no abdominal tenderness  There is no guarding  Comments: gravid   Musculoskeletal: Normal range of motion  General: No swelling, tenderness, deformity or signs of injury  Right lower leg: No edema  Left lower leg: No edema  Skin:     General: Skin is warm and dry  Neurological:      General: No focal deficit present  Mental Status: She is alert and oriented to person, place, and time  Psychiatric:         Mood and Affect: Mood normal          Behavior: Behavior normal          Thought Content:  Thought content normal          Judgment: Judgment normal        Fetal Assessment:  FHT: 145 / Moderate 6 - 25 bpm /no accelerations, variable decelerations  [place]: none    Lab Results   Component Value Date    WBC 10 84 (H) 09/13/2020    HGB 12 2 09/13/2020    HCT 36 6 09/13/2020    MCV 91 09/13/2020     09/13/2020       Lab Results   Component Value Date    CALCIUM 7 6 (L) 09/12/2020    K 4 2 09/12/2020    CO2 23 09/12/2020     09/12/2020    BUN 14 09/12/2020    CREATININE 0 90 09/12/2020       Lab Results   Component Value Date    ALT 31 09/12/2020    AST 28 09/12/2020    ALKPHOS 172 (H) 09/12/2020       Zara Waterman MD  OBGYN, PGY-3  9/14/2020  6:48 AM

## 2020-09-14 NOTE — PLAN OF CARE
Problem: ANTEPARTUM  Goal: Maintain pregnancy as long as maternal and/or fetal condition is stable  Description: INTERVENTIONS:  - Maternal surveillance  - Fetal surveillance  - Monitor uterine activity  - Medications as ordered  - Bedrest  Outcome: Progressing     Problem: NEUROSENSORY - ADULT  Goal: Achieves stable or improved neurological status  Description: INTERVENTIONS  - Monitor and report changes in neurological status  - Monitor vital signs such as temperature, blood pressure, glucose, and any other labs ordered   - Initiate measures to prevent increased intracranial pressure  - Monitor for seizure activity and implement precautions if appropriate      Outcome: Progressing  Goal: Remains free of injury related to seizures activity  Description: INTERVENTIONS  - Maintain airway, patient safety  and administer oxygen as ordered  - Monitor patient for seizure activity, document and report duration and description of seizure to physician/advanced practitioner  - If seizure occurs,  ensure patient safety during seizure  - Reorient patient post seizure  - Seizure pads on all 4 side rails  - Instruct patient/family to notify RN of any seizure activity including if an aura is experienced  - Instruct patient/family to call for assistance with activity based on nursing assessment  - Administer anti-seizure medications if ordered    Outcome: Progressing     Problem: CARDIOVASCULAR - ADULT  Goal: Maintains optimal cardiac output and hemodynamic stability  Description: INTERVENTIONS:  - Monitor I/O, vital signs and rhythm  - Monitor for S/S and trends of decreased cardiac output  - Administer and titrate ordered vasoactive medications to optimize hemodynamic stability  - Assess quality of pulses, skin color and temperature  - Assess for signs of decreased coronary artery perfusion  - Instruct patient to report change in severity of symptoms  Outcome: Progressing     Problem: PAIN - ADULT  Goal: Verbalizes/displays adequate comfort level or baseline comfort level  Description: Interventions:  - Encourage patient to monitor pain and request assistance  - Assess pain using appropriate pain scale  - Administer analgesics based on type and severity of pain and evaluate response  - Implement non-pharmacological measures as appropriate and evaluate response  - Consider cultural and social influences on pain and pain management  - Notify physician/advanced practitioner if interventions unsuccessful or patient reports new pain  Outcome: Progressing     Problem: INFECTION - ADULT  Goal: Absence or prevention of progression during hospitalization  Description: INTERVENTIONS:  - Assess and monitor for signs and symptoms of infection  - Monitor lab/diagnostic results  - Monitor all insertion sites, i e  indwelling lines, tubes, and drains  - Monitor endotracheal if appropriate and nasal secretions for changes in amount and color  - [place] appropriate cooling/warming therapies per order  - Administer medications as ordered  - Instruct and encourage patient and family to use good hand hygiene technique  - Identify and instruct in appropriate isolation precautions for identified infection/condition  Outcome: Progressing  Goal: Absence of fever/infection during neutropenic period  Description: INTERVENTIONS:  - Monitor WBC    Outcome: Progressing     Problem: SAFETY ADULT  Goal: Patient will remain free of falls  Description: INTERVENTIONS:  - Assess patient frequently for physical needs  -  Identify cognitive and physical deficits and behaviors that affect risk of falls    -  [place] fall precautions as indicated by assessment   - Educate patient/family on patient safety including physical limitations  - Instruct patient to call for assistance with activity based on assessment  - Modify environment to reduce risk of injury  - Consider OT/PT consult to assist with strengthening/mobility  Outcome: Progressing  Goal: Maintain or return to baseline ADL function  Description: INTERVENTIONS:  -  Assess patient's ability to carry out ADLs; assess patient's baseline for ADL function and identify physical deficits which impact ability to perform ADLs (bathing, care of mouth/teeth, toileting, grooming, dressing, etc )  - Assess/evaluate cause of self-care deficits   - Assess range of motion  - Assess patient's mobility; develop plan if impaired  - Assess patient's need for assistive devices and provide as appropriate  - Encourage maximum independence but intervene and supervise when necessary  - Involve family in performance of ADLs  - Assess for home care needs following discharge   - Consider OT consult to assist with ADL evaluation and planning for discharge  - Provide patient education as appropriate  Outcome: Progressing  Goal: Maintain or return mobility status to optimal level  Description: INTERVENTIONS:  - Assess patient's baseline mobility status (ambulation, transfers, stairs, etc )    - Identify cognitive and physical deficits and behaviors that affect mobility  - Identify mobility aids required to assist with transfers and/or ambulation (gait belt, sit-to-stand, lift, walker, cane, etc )  - [place] fall precautions as indicated by assessment  - Record patient progress and toleration of activity level on Mobility SBAR; progress patient to next Phase/Stage  - Instruct patient to call for assistance with activity based on assessment  - Consider rehabilitation consult to assist with strengthening/weightbearing, etc   Outcome: Progressing     Problem: Knowledge Deficit  Goal: Patient/family/caregiver demonstrates understanding of disease process, treatment plan, medications, and discharge instructions  Description: Complete learning assessment and assess knowledge base    Interventions:  - Provide teaching at level of understanding  - Provide teaching via preferred learning methods  Outcome: Progressing     Problem: DISCHARGE PLANNING  Goal: Discharge to home or other facility with appropriate resources  Description: INTERVENTIONS:  - Identify barriers to discharge w/patient and caregiver  - Arrange for needed discharge resources and transportation as appropriate  - Identify discharge learning needs (meds, wound care, etc )  - Arrange for interpretive services to assist at discharge as needed  - Refer to Case Management Department for coordinating discharge planning if the patient needs post-hospital services based on physician/advanced practitioner order or complex needs related to functional status, cognitive ability, or social support system  Outcome: Progressing     Problem: Potential for Falls  Goal: Patient will remain free of falls  Description: INTERVENTIONS:  - Assess patient frequently for physical needs  -  Identify cognitive and physical deficits and behaviors that affect risk of falls    -  [place] fall precautions as indicated by assessment   - Educate patient/family on patient safety including physical limitations  - Instruct patient to call for assistance with activity based on assessment  - Modify environment to reduce risk of injury  - Consider OT/PT consult to assist with strengthening/mobility  Outcome: Progressing

## 2020-09-15 ENCOUNTER — ANESTHESIA (INPATIENT)
Dept: LABOR AND DELIVERY | Facility: HOSPITAL | Age: 30
End: 2020-09-15
Payer: COMMERCIAL

## 2020-09-15 VITALS — HEART RATE: 64 BPM

## 2020-09-15 LAB
ABO GROUP BLD: NORMAL
ALBUMIN SERPL BCP-MCNC: 1.5 G/DL (ref 3.5–5)
ALBUMIN SERPL BCP-MCNC: 1.9 G/DL (ref 3.5–5)
ALP SERPL-CCNC: 214 U/L (ref 46–116)
ALP SERPL-CCNC: 292 U/L (ref 46–116)
ALT SERPL W P-5'-P-CCNC: 108 U/L (ref 12–78)
ALT SERPL W P-5'-P-CCNC: 87 U/L (ref 12–78)
ANION GAP SERPL CALCULATED.3IONS-SCNC: 8 MMOL/L (ref 4–13)
ANION GAP SERPL CALCULATED.3IONS-SCNC: 9 MMOL/L (ref 4–13)
APTT PPP: 39 SECONDS (ref 23–37)
AST SERPL W P-5'-P-CCNC: 66 U/L (ref 5–45)
AST SERPL W P-5'-P-CCNC: 94 U/L (ref 5–45)
BASE EXCESS BLDCOA CALC-SCNC: -5.1 MMOL/L (ref 3–11)
BASE EXCESS BLDCOV CALC-SCNC: -6.3 MMOL/L (ref 1–9)
BASOPHILS # BLD AUTO: 0.02 THOUSANDS/ΜL (ref 0–0.1)
BASOPHILS # BLD AUTO: 0.07 THOUSANDS/ΜL (ref 0–0.1)
BASOPHILS NFR BLD AUTO: 0 % (ref 0–1)
BASOPHILS NFR BLD AUTO: 0 % (ref 0–1)
BILIRUB SERPL-MCNC: 0.19 MG/DL (ref 0.2–1)
BILIRUB SERPL-MCNC: 0.29 MG/DL (ref 0.2–1)
BLD GP AB SCN SERPL QL: NEGATIVE
BUN SERPL-MCNC: 16 MG/DL (ref 5–25)
BUN SERPL-MCNC: 17 MG/DL (ref 5–25)
CALCIUM SERPL-MCNC: 7.4 MG/DL (ref 8.3–10.1)
CALCIUM SERPL-MCNC: 8.3 MG/DL (ref 8.3–10.1)
CHLORIDE SERPL-SCNC: 101 MMOL/L (ref 100–108)
CHLORIDE SERPL-SCNC: 99 MMOL/L (ref 100–108)
CO2 SERPL-SCNC: 22 MMOL/L (ref 21–32)
CO2 SERPL-SCNC: 23 MMOL/L (ref 21–32)
CREAT SERPL-MCNC: 1.22 MG/DL (ref 0.6–1.3)
CREAT SERPL-MCNC: 1.34 MG/DL (ref 0.6–1.3)
EOSINOPHIL # BLD AUTO: 0 THOUSAND/ΜL (ref 0–0.61)
EOSINOPHIL # BLD AUTO: 0 THOUSAND/ΜL (ref 0–0.61)
EOSINOPHIL NFR BLD AUTO: 0 % (ref 0–6)
EOSINOPHIL NFR BLD AUTO: 0 % (ref 0–6)
ERYTHROCYTE [DISTWIDTH] IN BLOOD BY AUTOMATED COUNT: 12.1 % (ref 11.6–15.1)
ERYTHROCYTE [DISTWIDTH] IN BLOOD BY AUTOMATED COUNT: 12.4 % (ref 11.6–15.1)
GFR SERPL CREATININE-BSD FRML MDRD: 62 ML/MIN/1.73SQ M
GFR SERPL CREATININE-BSD FRML MDRD: 69 ML/MIN/1.73SQ M
GLUCOSE SERPL-MCNC: 115 MG/DL (ref 65–140)
GLUCOSE SERPL-MCNC: 133 MG/DL (ref 65–140)
HCO3 BLDCOA-SCNC: 24.5 MMOL/L (ref 17.3–27.3)
HCO3 BLDCOV-SCNC: 23.1 MMOL/L (ref 12.2–28.6)
HCT VFR BLD AUTO: 38.7 % (ref 34.8–46.1)
HCT VFR BLD AUTO: 43.4 % (ref 34.8–46.1)
HGB BLD-MCNC: 12.7 G/DL (ref 11.5–15.4)
HGB BLD-MCNC: 14.7 G/DL (ref 11.5–15.4)
IMM GRANULOCYTES # BLD AUTO: 0.11 THOUSAND/UL (ref 0–0.2)
IMM GRANULOCYTES # BLD AUTO: 0.28 THOUSAND/UL (ref 0–0.2)
IMM GRANULOCYTES NFR BLD AUTO: 1 % (ref 0–2)
IMM GRANULOCYTES NFR BLD AUTO: 1 % (ref 0–2)
INR PPP: 0.94 (ref 0.84–1.19)
LDH SERPL-CCNC: 415 U/L (ref 81–234)
LYMPHOCYTES # BLD AUTO: 1.6 THOUSANDS/ΜL (ref 0.6–4.47)
LYMPHOCYTES # BLD AUTO: 1.71 THOUSANDS/ΜL (ref 0.6–4.47)
LYMPHOCYTES NFR BLD AUTO: 10 % (ref 14–44)
LYMPHOCYTES NFR BLD AUTO: 6 % (ref 14–44)
MCH RBC QN AUTO: 30.2 PG (ref 26.8–34.3)
MCH RBC QN AUTO: 30.6 PG (ref 26.8–34.3)
MCHC RBC AUTO-ENTMCNC: 32.8 G/DL (ref 31.4–37.4)
MCHC RBC AUTO-ENTMCNC: 33.9 G/DL (ref 31.4–37.4)
MCV RBC AUTO: 90 FL (ref 82–98)
MCV RBC AUTO: 92 FL (ref 82–98)
MONOCYTES # BLD AUTO: 0.24 THOUSAND/ΜL (ref 0.17–1.22)
MONOCYTES # BLD AUTO: 1.41 THOUSAND/ΜL (ref 0.17–1.22)
MONOCYTES NFR BLD AUTO: 1 % (ref 4–12)
MONOCYTES NFR BLD AUTO: 5 % (ref 4–12)
NEUTROPHILS # BLD AUTO: 14.67 THOUSANDS/ΜL (ref 1.85–7.62)
NEUTROPHILS # BLD AUTO: 23.09 THOUSANDS/ΜL (ref 1.85–7.62)
NEUTS SEG NFR BLD AUTO: 88 % (ref 43–75)
NEUTS SEG NFR BLD AUTO: 88 % (ref 43–75)
NRBC BLD AUTO-RTO: 0 /100 WBCS
NRBC BLD AUTO-RTO: 0 /100 WBCS
OXYHGB MFR BLDCOA: 9.3 %
OXYHGB MFR BLDCOV: 14.9 %
PCO2 BLDCOA: 65.9 MM[HG] (ref 30–60)
PCO2 BLDCOV: 62.4 MM HG (ref 27–43)
PH BLDCOA: 7.19 [PH] (ref 7.23–7.43)
PH BLDCOV: 7.19 [PH] (ref 7.19–7.49)
PLATELET # BLD AUTO: 340 THOUSANDS/UL (ref 149–390)
PLATELET # BLD AUTO: 345 THOUSANDS/UL (ref 149–390)
PMV BLD AUTO: 10.7 FL (ref 8.9–12.7)
PMV BLD AUTO: 10.9 FL (ref 8.9–12.7)
PO2 BLDCOA: <10 MM HG (ref 5–25)
PO2 BLDCOV: 13 MM HG (ref 15–45)
POTASSIUM SERPL-SCNC: 4.7 MMOL/L (ref 3.5–5.3)
POTASSIUM SERPL-SCNC: 4.8 MMOL/L (ref 3.5–5.3)
PROT SERPL-MCNC: 5.8 G/DL (ref 6.4–8.2)
PROT SERPL-MCNC: 7.4 G/DL (ref 6.4–8.2)
PROTHROMBIN TIME: 12.7 SECONDS (ref 11.6–14.5)
RBC # BLD AUTO: 4.21 MILLION/UL (ref 3.81–5.12)
RBC # BLD AUTO: 4.81 MILLION/UL (ref 3.81–5.12)
RH BLD: POSITIVE
SAO2 % BLDCOV: 3.1 ML/DL
SODIUM SERPL-SCNC: 130 MMOL/L (ref 136–145)
SODIUM SERPL-SCNC: 132 MMOL/L (ref 136–145)
SPECIMEN EXPIRATION DATE: NORMAL
WBC # BLD AUTO: 16.75 THOUSAND/UL (ref 4.31–10.16)
WBC # BLD AUTO: 26.45 THOUSAND/UL (ref 4.31–10.16)

## 2020-09-15 PROCEDURE — 80053 COMPREHEN METABOLIC PANEL: CPT | Performed by: OBSTETRICS & GYNECOLOGY

## 2020-09-15 PROCEDURE — 85025 COMPLETE CBC W/AUTO DIFF WBC: CPT | Performed by: OBSTETRICS & GYNECOLOGY

## 2020-09-15 PROCEDURE — 83615 LACTATE (LD) (LDH) ENZYME: CPT | Performed by: OBSTETRICS & GYNECOLOGY

## 2020-09-15 PROCEDURE — 86850 RBC ANTIBODY SCREEN: CPT | Performed by: OBSTETRICS & GYNECOLOGY

## 2020-09-15 PROCEDURE — 59510 CESAREAN DELIVERY: CPT | Performed by: OBSTETRICS & GYNECOLOGY

## 2020-09-15 PROCEDURE — 82805 BLOOD GASES W/O2 SATURATION: CPT | Performed by: OBSTETRICS & GYNECOLOGY

## 2020-09-15 PROCEDURE — 86900 BLOOD TYPING SEROLOGIC ABO: CPT | Performed by: OBSTETRICS & GYNECOLOGY

## 2020-09-15 PROCEDURE — 85610 PROTHROMBIN TIME: CPT | Performed by: OBSTETRICS & GYNECOLOGY

## 2020-09-15 PROCEDURE — 88307 TISSUE EXAM BY PATHOLOGIST: CPT | Performed by: PATHOLOGY

## 2020-09-15 PROCEDURE — 85730 THROMBOPLASTIN TIME PARTIAL: CPT | Performed by: OBSTETRICS & GYNECOLOGY

## 2020-09-15 PROCEDURE — 86901 BLOOD TYPING SEROLOGIC RH(D): CPT | Performed by: OBSTETRICS & GYNECOLOGY

## 2020-09-15 RX ORDER — METOCLOPRAMIDE HYDROCHLORIDE 5 MG/ML
10 INJECTION INTRAMUSCULAR; INTRAVENOUS ONCE AS NEEDED
Status: DISCONTINUED | OUTPATIENT
Start: 2020-09-15 | End: 2020-09-16

## 2020-09-15 RX ORDER — BUPIVACAINE HYDROCHLORIDE 7.5 MG/ML
INJECTION, SOLUTION INTRASPINAL AS NEEDED
Status: DISCONTINUED | OUTPATIENT
Start: 2020-09-15 | End: 2020-09-15

## 2020-09-15 RX ORDER — METOCLOPRAMIDE HYDROCHLORIDE 5 MG/ML
5 INJECTION INTRAMUSCULAR; INTRAVENOUS EVERY 6 HOURS PRN
Status: ACTIVE | OUTPATIENT
Start: 2020-09-15 | End: 2020-09-16

## 2020-09-15 RX ORDER — OXYCODONE HYDROCHLORIDE 5 MG/1
5 TABLET ORAL EVERY 4 HOURS PRN
Status: DISCONTINUED | OUTPATIENT
Start: 2020-09-16 | End: 2020-09-16

## 2020-09-15 RX ORDER — SODIUM CHLORIDE, SODIUM LACTATE, POTASSIUM CHLORIDE, CALCIUM CHLORIDE 600; 310; 30; 20 MG/100ML; MG/100ML; MG/100ML; MG/100ML
75 INJECTION, SOLUTION INTRAVENOUS CONTINUOUS
Status: DISCONTINUED | OUTPATIENT
Start: 2020-09-15 | End: 2020-09-16

## 2020-09-15 RX ORDER — DIPHENHYDRAMINE HYDROCHLORIDE 50 MG/ML
12.5 INJECTION INTRAMUSCULAR; INTRAVENOUS ONCE AS NEEDED
Status: DISCONTINUED | OUTPATIENT
Start: 2020-09-15 | End: 2020-09-16

## 2020-09-15 RX ORDER — OXYTOCIN/RINGER'S LACTATE 30/500 ML
PLASTIC BAG, INJECTION (ML) INTRAVENOUS CONTINUOUS PRN
Status: DISCONTINUED | OUTPATIENT
Start: 2020-09-15 | End: 2020-09-15

## 2020-09-15 RX ORDER — OXYTOCIN/RINGER'S LACTATE 30/500 ML
62.5 PLASTIC BAG, INJECTION (ML) INTRAVENOUS CONTINUOUS
Status: DISPENSED | OUTPATIENT
Start: 2020-09-15 | End: 2020-09-15

## 2020-09-15 RX ORDER — CALCIUM CARBONATE 200(500)MG
1000 TABLET,CHEWABLE ORAL DAILY PRN
Status: DISCONTINUED | OUTPATIENT
Start: 2020-09-15 | End: 2020-09-20 | Stop reason: HOSPADM

## 2020-09-15 RX ORDER — SODIUM CHLORIDE, SODIUM LACTATE, POTASSIUM CHLORIDE, CALCIUM CHLORIDE 600; 310; 30; 20 MG/100ML; MG/100ML; MG/100ML; MG/100ML
INJECTION, SOLUTION INTRAVENOUS CONTINUOUS PRN
Status: DISCONTINUED | OUTPATIENT
Start: 2020-09-15 | End: 2020-09-15

## 2020-09-15 RX ORDER — MORPHINE SULFATE 0.5 MG/ML
INJECTION, SOLUTION EPIDURAL; INTRATHECAL; INTRAVENOUS AS NEEDED
Status: DISCONTINUED | OUTPATIENT
Start: 2020-09-15 | End: 2020-09-15

## 2020-09-15 RX ORDER — HEPARIN SODIUM 5000 [USP'U]/ML
10000 INJECTION, SOLUTION INTRAVENOUS; SUBCUTANEOUS EVERY 12 HOURS SCHEDULED
Status: DISCONTINUED | OUTPATIENT
Start: 2020-09-15 | End: 2020-09-20 | Stop reason: HOSPADM

## 2020-09-15 RX ORDER — HYDROMORPHONE HCL/PF 1 MG/ML
1 SYRINGE (ML) INJECTION EVERY 2 HOUR PRN
Status: DISCONTINUED | OUTPATIENT
Start: 2020-09-16 | End: 2020-09-16

## 2020-09-15 RX ORDER — ACETAMINOPHEN 325 MG/1
650 TABLET ORAL EVERY 4 HOURS PRN
Status: DISCONTINUED | OUTPATIENT
Start: 2020-09-15 | End: 2020-09-16

## 2020-09-15 RX ORDER — ONDANSETRON 2 MG/ML
4 INJECTION INTRAMUSCULAR; INTRAVENOUS EVERY 4 HOURS PRN
Status: DISCONTINUED | OUTPATIENT
Start: 2020-09-15 | End: 2020-09-15 | Stop reason: SDUPTHER

## 2020-09-15 RX ORDER — FENTANYL CITRATE 50 UG/ML
INJECTION, SOLUTION INTRAMUSCULAR; INTRAVENOUS AS NEEDED
Status: DISCONTINUED | OUTPATIENT
Start: 2020-09-15 | End: 2020-09-15

## 2020-09-15 RX ORDER — DIPHENHYDRAMINE HYDROCHLORIDE 50 MG/ML
25 INJECTION INTRAMUSCULAR; INTRAVENOUS EVERY 6 HOURS PRN
Status: DISCONTINUED | OUTPATIENT
Start: 2020-09-15 | End: 2020-09-20 | Stop reason: HOSPADM

## 2020-09-15 RX ORDER — ONDANSETRON 2 MG/ML
4 INJECTION INTRAMUSCULAR; INTRAVENOUS EVERY 4 HOURS PRN
Status: ACTIVE | OUTPATIENT
Start: 2020-09-15 | End: 2020-09-16

## 2020-09-15 RX ORDER — HYDROMORPHONE HCL/PF 1 MG/ML
0.5 SYRINGE (ML) INJECTION
Status: DISCONTINUED | OUTPATIENT
Start: 2020-09-15 | End: 2020-09-16

## 2020-09-15 RX ORDER — SIMETHICONE 80 MG
80 TABLET,CHEWABLE ORAL 4 TIMES DAILY PRN
Status: DISCONTINUED | OUTPATIENT
Start: 2020-09-15 | End: 2020-09-20 | Stop reason: HOSPADM

## 2020-09-15 RX ORDER — HYDROMORPHONE HCL/PF 1 MG/ML
0.2 SYRINGE (ML) INJECTION
Status: DISCONTINUED | OUTPATIENT
Start: 2020-09-15 | End: 2020-09-16

## 2020-09-15 RX ORDER — CEFAZOLIN SODIUM 2 G/50ML
2000 SOLUTION INTRAVENOUS ONCE
Status: COMPLETED | OUTPATIENT
Start: 2020-09-15 | End: 2020-09-15

## 2020-09-15 RX ORDER — NALOXONE HYDROCHLORIDE 0.4 MG/ML
0.1 INJECTION, SOLUTION INTRAMUSCULAR; INTRAVENOUS; SUBCUTANEOUS
Status: DISCONTINUED | OUTPATIENT
Start: 2020-09-15 | End: 2020-09-15 | Stop reason: SDUPTHER

## 2020-09-15 RX ORDER — ONDANSETRON 2 MG/ML
4 INJECTION INTRAMUSCULAR; INTRAVENOUS EVERY 8 HOURS PRN
Status: DISCONTINUED | OUTPATIENT
Start: 2020-09-16 | End: 2020-09-20 | Stop reason: HOSPADM

## 2020-09-15 RX ORDER — DEXAMETHASONE SODIUM PHOSPHATE 4 MG/ML
8 INJECTION, SOLUTION INTRA-ARTICULAR; INTRALESIONAL; INTRAMUSCULAR; INTRAVENOUS; SOFT TISSUE ONCE AS NEEDED
Status: ACTIVE | OUTPATIENT
Start: 2020-09-15 | End: 2020-09-16

## 2020-09-15 RX ORDER — DOCUSATE SODIUM 100 MG/1
100 CAPSULE, LIQUID FILLED ORAL DAILY PRN
Status: DISCONTINUED | OUTPATIENT
Start: 2020-09-15 | End: 2020-09-20 | Stop reason: HOSPADM

## 2020-09-15 RX ORDER — FENTANYL CITRATE/PF 50 MCG/ML
50 SYRINGE (ML) INJECTION
Status: DISCONTINUED | OUTPATIENT
Start: 2020-09-15 | End: 2020-09-16

## 2020-09-15 RX ORDER — NALOXONE HYDROCHLORIDE 0.4 MG/ML
0.1 INJECTION, SOLUTION INTRAMUSCULAR; INTRAVENOUS; SUBCUTANEOUS
Status: ACTIVE | OUTPATIENT
Start: 2020-09-15 | End: 2020-09-16

## 2020-09-15 RX ORDER — DEXAMETHASONE SODIUM PHOSPHATE 4 MG/ML
8 INJECTION, SOLUTION INTRA-ARTICULAR; INTRALESIONAL; INTRAMUSCULAR; INTRAVENOUS; SOFT TISSUE ONCE AS NEEDED
Status: DISCONTINUED | OUTPATIENT
Start: 2020-09-15 | End: 2020-09-15 | Stop reason: SDUPTHER

## 2020-09-15 RX ORDER — ONDANSETRON 2 MG/ML
4 INJECTION INTRAMUSCULAR; INTRAVENOUS ONCE AS NEEDED
Status: DISCONTINUED | OUTPATIENT
Start: 2020-09-15 | End: 2020-09-16

## 2020-09-15 RX ADMIN — FENTANYL CITRATE 15 MCG: 50 INJECTION INTRAMUSCULAR; INTRAVENOUS at 08:40

## 2020-09-15 RX ADMIN — BETAMETHASONE SODIUM PHOSPHATE AND BETAMETHASONE ACETATE 12 MG: 3; 3 INJECTION, SUSPENSION INTRA-ARTICULAR; INTRALESIONAL; INTRAMUSCULAR at 00:24

## 2020-09-15 RX ADMIN — PHENYLEPHRINE HYDROCHLORIDE 50 MCG/MIN: 10 INJECTION INTRAVENOUS at 08:41

## 2020-09-15 RX ADMIN — ACETAMINOPHEN 650 MG: 325 TABLET, FILM COATED ORAL at 20:10

## 2020-09-15 RX ADMIN — Medication 62.5 MILLI-UNITS/MIN: at 11:08

## 2020-09-15 RX ADMIN — TRIAMCINOLONE ACETONIDE 1 APPLICATION: 1 CREAM TOPICAL at 18:04

## 2020-09-15 RX ADMIN — MUPIROCIN 1 APPLICATION: 20 OINTMENT TOPICAL at 20:21

## 2020-09-15 RX ADMIN — HEPARIN SODIUM 10000 UNITS: 5000 INJECTION INTRAVENOUS; SUBCUTANEOUS at 20:12

## 2020-09-15 RX ADMIN — Medication 250 MILLI-UNITS/MIN: at 08:51

## 2020-09-15 RX ADMIN — SODIUM CHLORIDE, SODIUM LACTATE, POTASSIUM CHLORIDE, AND CALCIUM CHLORIDE 75 ML/HR: .6; .31; .03; .02 INJECTION, SOLUTION INTRAVENOUS at 23:58

## 2020-09-15 RX ADMIN — SODIUM CHLORIDE 75 ML/HR: 0.9 INJECTION, SOLUTION INTRAVENOUS at 02:18

## 2020-09-15 RX ADMIN — SODIUM CHLORIDE, SODIUM LACTATE, POTASSIUM CHLORIDE, AND CALCIUM CHLORIDE 500 ML: .6; .31; .03; .02 INJECTION, SOLUTION INTRAVENOUS at 15:59

## 2020-09-15 RX ADMIN — MORPHINE SULFATE 0.1 MG: 0.5 INJECTION, SOLUTION EPIDURAL; INTRATHECAL; INTRAVENOUS at 08:40

## 2020-09-15 RX ADMIN — MAGNESIUM SULFATE IN WATER 1 G/HR: 40 INJECTION, SOLUTION INTRAVENOUS at 15:43

## 2020-09-15 RX ADMIN — SODIUM CHLORIDE, SODIUM LACTATE, POTASSIUM CHLORIDE, AND CALCIUM CHLORIDE: .6; .31; .03; .02 INJECTION, SOLUTION INTRAVENOUS at 08:38

## 2020-09-15 RX ADMIN — MAGNESIUM SULFATE IN WATER 2 G/HR: 40 INJECTION, SOLUTION INTRAVENOUS at 02:18

## 2020-09-15 RX ADMIN — NIFEDIPINE 30 MG: 30 TABLET, FILM COATED, EXTENDED RELEASE ORAL at 07:40

## 2020-09-15 RX ADMIN — BUPIVACAINE HYDROCHLORIDE IN DEXTROSE 1.6 ML: 7.5 INJECTION, SOLUTION SUBARACHNOID at 08:40

## 2020-09-15 RX ADMIN — CEFAZOLIN SODIUM 2000 MG: 2 SOLUTION INTRAVENOUS at 08:35

## 2020-09-15 RX ADMIN — SODIUM CHLORIDE, SODIUM LACTATE, POTASSIUM CHLORIDE, AND CALCIUM CHLORIDE 75 ML/HR: .6; .31; .03; .02 INJECTION, SOLUTION INTRAVENOUS at 11:09

## 2020-09-15 NOTE — ANESTHESIA PREPROCEDURE EVALUATION
Procedure:   SECTION () (N/A Uterus)    Relevant Problems   ANESTHESIA (within normal limits)      CARDIO   (+) Pre-eclampsia in third trimester   (+) Severe preeclampsia, third trimester      ENDO (within normal limits)      GI/HEPATIC   (+) Bariatric surgery status complicating pregnancy, second trimester      /RENAL (within normal limits)      GYN   (+) Supervision of normal pregnancy (, PEC with severe features  Currently on heaprin 10,000 units q12h, last dose  9:43am)      HEMATOLOGY (within normal limits)      MUSCULOSKELETAL   (+) Acute bilateral thoracic back pain   (+) Neck muscle strain      NEURO/PSYCH (within normal limits)      PULMONARY   (+) Mild intermittent asthma without complication      Other   (+) Breech presentation   (+) Cervical spinal stenosis   (+) Intrauterine growth restriction (IUGR) affecting care of mother, second trimester, single gestation   (+) Maternal obesity, antepartum, second trimester   (+) Pregnancy affected by fetal growth restriction        Physical Exam    Airway    Mallampati score: II  TM Distance: >3 FB  Neck ROM: full     Dental   No notable dental hx     Cardiovascular  Rhythm: regular, Rate: normal, Cardiovascular exam normal    Pulmonary  Pulmonary exam normal Breath sounds clear to auscultation,     Other Findings        Anesthesia Plan  ASA Score- 3     Anesthesia Type- spinal with ASA Monitors  Additional Monitors:   Airway Plan:           Plan Factors-    Chart reviewed  EKG reviewed  Existing labs reviewed  Patient summary reviewed  Patient is not a current smoker  Induction-     Postoperative Plan- Plan for postoperative opioid use  Informed Consent- Anesthetic plan and risks discussed with patient  I personally reviewed this patient with the CRNA  Discussed and agreed on the Anesthesia Plan with the CRNA  Charlie Vaca           Recent labs personally reviewed:  Lab Results   Component Value Date    WBC 10 84 (H) 09/13/2020    HGB 12 2 09/13/2020     09/13/2020     Lab Results   Component Value Date    K 4 2 09/12/2020    BUN 14 09/12/2020    CREATININE 0 90 09/12/2020     Lab Results   Component Value Date    PTT 29 08/19/2020      Lab Results   Component Value Date    INR 1 08 08/19/2020       Blood type B    Lab Results   Component Value Date    HGBA1C 5 1 08/28/2018       I, Radu Manriquez MD, have personally seen and evaluated the patient prior to anesthetic care  I have reviewed the pre-anesthetic record, and other medical records if appropriate to the anesthetic care  If a CRNA is involved in the case, I have reviewed the CRNA assessment, if present, and agree  Risks/benefits and alternatives discussed with patient including possible PONV, sore throat, and possibility of rare anesthetic and surgical emergencies

## 2020-09-15 NOTE — PLAN OF CARE
Problem: ANTEPARTUM  Goal: Maintain pregnancy as long as maternal and/or fetal condition is stable  Description: INTERVENTIONS:  - Maternal surveillance  - Fetal surveillance  - Monitor uterine activity  - Medications as ordered  - Bedrest  Outcome: Completed     Problem: NEUROSENSORY - ADULT  Goal: Achieves stable or improved neurological status  Description: INTERVENTIONS  - Monitor and report changes in neurological status  - Monitor vital signs such as temperature, blood pressure, glucose, and any other labs ordered   - Initiate measures to prevent increased intracranial pressure  - Monitor for seizure activity and implement precautions if appropriate      Outcome: Progressing  Goal: Remains free of injury related to seizures activity  Description: INTERVENTIONS  - Maintain airway, patient safety  and administer oxygen as ordered  - Monitor patient for seizure activity, document and report duration and description of seizure to physician/advanced practitioner  - If seizure occurs,  ensure patient safety during seizure  - Reorient patient post seizure  - Seizure pads on all 4 side rails  - Instruct patient/family to notify RN of any seizure activity including if an aura is experienced  - Instruct patient/family to call for assistance with activity based on nursing assessment  - Administer anti-seizure medications if ordered    Outcome: Progressing     Problem: CARDIOVASCULAR - ADULT  Goal: Maintains optimal cardiac output and hemodynamic stability  Description: INTERVENTIONS:  - Monitor I/O, vital signs and rhythm  - Monitor for S/S and trends of decreased cardiac output  - Administer and titrate ordered vasoactive medications to optimize hemodynamic stability  - Assess quality of pulses, skin color and temperature  - Assess for signs of decreased coronary artery perfusion  - Instruct patient to report change in severity of symptoms  Outcome: Progressing

## 2020-09-15 NOTE — LACTATION NOTE
This note was copied from a baby's chart  CONSULT - LACTATION  Baby Girl Fartun You 0 days female MRN: [id number]    [address]  [facility] Room / Bed: NICU 10/NICU 10 Encounter: [id number]    Maternal Information     MOTHER:  Bello Hawk  Maternal Age: 35 y o    OB History: # 1 - Date: None, Sex: None, Weight: None, GA: None, Delivery: None, Apgar1: None, Apgar5: None, Living: None, Birth Comments: None    # 2 - Date: None, Sex: None, Weight: None, GA: None, Delivery: None, Apgar1: None, Apgar5: None, Living: None, Birth Comments: None    # 3 - Date: 09/15/20, Sex: Female, Weight: 770 g (1 lb 11 2 oz), GA: 30w2d, Delivery: , Low Transverse, Apgar1: 7, Apgar5: 8, Living: Living, Birth Comments: None   Previouse breast reduction surgery? No    Lactation history:   Has patient previously breast fed: How long had patient previously breast fed:     Previous breast feeding complications:       Past Surgical History:   Procedure Laterality Date    BARIATRIC SURGERY  2019    EXPLORATORY LAPAROTOMY      2016, no findings     GASTRECTOMY SLEEVE LAPAROSCOPIC  2019        Birth information:  YOB: 2019   Time of birth: 8:51 AM   Sex: female   Delivery type: , Low Transverse   Birth Weight: 770 g (1 lb 11 2 oz)   Percent of Weight Change: 0%     Gestational Age: 27w4d   [unfilled]    Assessment     Breast and nipple assessment: Breast asymmetry L>R  Underdeveloped breast tissue at medial quadrants   Assessment: Not assessed, infant in NICU    Feeding assessment: Not assessed, infant in NICU  LATCH:  Latch: Audible Swallowing:     Type of Nipple:     Comfort (Breast/Nipple):     Hold (Positioning):     LATCH Score:            Feeding recommendations:  pump every 2-3 hours     Mariam has a history of gastric surgery (sleeve) and is being treated with MagSulfate IV  She delivered at 30 weeks and 2 days  Breast asymmetry L>R  Underdeveloped breast tissue at medial quadrants  Breast pump set up done at this time  Discussed and demonstrated hand expression  Infant in NICU for  delivery  Plan: pump every 2-3 hours  Given education on Increasing Breast Milk Supply  Demonstrated how to use the pumping log to accommodate expectations on production of breast milk  Instructions given on pumping  Discussed when to start, frequency, different pumps available versus manual expression  Discussed hygiene of hands and supplies as well as assembly, placement of flanges, size of flanged, preparing the breast and cycles and suction settings on pump  Demonstrated use of hand pump  Discussed labeling of milk, storage, and preparation of stored milk  Encouraged pumping every 2-3 hours while awake  Encouraged setting an alarm to remember when to pump to accomplish 8-10 pumping sessions in a 24 hour time period  Encouraged hand expression  Encouraged parents to call for assistance, questions, and concerns about breastfeeding  Extension provided          Wes Mackey RN 9/15/2020 6:06 PM

## 2020-09-15 NOTE — PLAN OF CARE
Problem: NEUROSENSORY - ADULT  Goal: Achieves stable or improved neurological status  Description: INTERVENTIONS  - Monitor and report changes in neurological status  - Monitor vital signs such as temperature, blood pressure, glucose, and any other labs ordered   - Initiate measures to prevent increased intracranial pressure  - Monitor for seizure activity and implement precautions if appropriate      Outcome: Progressing  Goal: Remains free of injury related to seizures activity  Description: INTERVENTIONS  - Maintain airway, patient safety  and administer oxygen as ordered  - Monitor patient for seizure activity, document and report duration and description of seizure to physician/advanced practitioner  - If seizure occurs,  ensure patient safety during seizure  - Reorient patient post seizure  - Seizure pads on all 4 side rails  - Instruct patient/family to notify RN of any seizure activity including if an aura is experienced  - Instruct patient/family to call for assistance with activity based on nursing assessment  - Administer anti-seizure medications if ordered    Outcome: Progressing     Problem: CARDIOVASCULAR - ADULT  Goal: Maintains optimal cardiac output and hemodynamic stability  Description: INTERVENTIONS:  - Monitor I/O, vital signs and rhythm  - Monitor for S/S and trends of decreased cardiac output  - Administer and titrate ordered vasoactive medications to optimize hemodynamic stability  - Assess quality of pulses, skin color and temperature  - Assess for signs of decreased coronary artery perfusion  - Instruct patient to report change in severity of symptoms  Outcome: Progressing     Problem: PAIN - ADULT  Goal: Verbalizes/displays adequate comfort level or baseline comfort level  Description: Interventions:  - Encourage patient to monitor pain and request assistance  - Assess pain using appropriate pain scale  - Administer analgesics based on type and severity of pain and evaluate response  - Implement non-pharmacological measures as appropriate and evaluate response  - Consider cultural and social influences on pain and pain management  - Notify physician/advanced practitioner if interventions unsuccessful or patient reports new pain  Outcome: Progressing     Problem: INFECTION - ADULT  Goal: Absence or prevention of progression during hospitalization  Description: INTERVENTIONS:  - Assess and monitor for signs and symptoms of infection  - Monitor lab/diagnostic results  - Monitor all insertion sites, i e  indwelling lines, tubes, and drains  - Monitor endotracheal if appropriate and nasal secretions for changes in amount and color  - [place] appropriate cooling/warming therapies per order  - Administer medications as ordered  - Instruct and encourage patient and family to use good hand hygiene technique  - Identify and instruct in appropriate isolation precautions for identified infection/condition  Outcome: Progressing  Goal: Absence of fever/infection during neutropenic period  Description: INTERVENTIONS:  - Monitor WBC    Outcome: Progressing     Problem: SAFETY ADULT  Goal: Patient will remain free of falls  Description: INTERVENTIONS:  - Assess patient frequently for physical needs  -  Identify cognitive and physical deficits and behaviors that affect risk of falls    -  [place] fall precautions as indicated by assessment   - Educate patient/family on patient safety including physical limitations  - Instruct patient to call for assistance with activity based on assessment  - Modify environment to reduce risk of injury  - Consider OT/PT consult to assist with strengthening/mobility  Outcome: Progressing  Goal: Maintain or return to baseline ADL function  Description: INTERVENTIONS:  -  Assess patient's ability to carry out ADLs; assess patient's baseline for ADL function and identify physical deficits which impact ability to perform ADLs (bathing, care of mouth/teeth, toileting, grooming, dressing, etc )  - Assess/evaluate cause of self-care deficits   - Assess range of motion  - Assess patient's mobility; develop plan if impaired  - Assess patient's need for assistive devices and provide as appropriate  - Encourage maximum independence but intervene and supervise when necessary  - Involve family in performance of ADLs  - Assess for home care needs following discharge   - Consider OT consult to assist with ADL evaluation and planning for discharge  - Provide patient education as appropriate  Outcome: Progressing  Goal: Maintain or return mobility status to optimal level  Description: INTERVENTIONS:  - Assess patient's baseline mobility status (ambulation, transfers, stairs, etc )    - Identify cognitive and physical deficits and behaviors that affect mobility  - Identify mobility aids required to assist with transfers and/or ambulation (gait belt, sit-to-stand, lift, walker, cane, etc )  - [place] fall precautions as indicated by assessment  - Record patient progress and toleration of activity level on Mobility SBAR; progress patient to next Phase/Stage  - Instruct patient to call for assistance with activity based on assessment  - Consider rehabilitation consult to assist with strengthening/weightbearing, etc   Outcome: Progressing     Problem: Knowledge Deficit  Goal: Patient/family/caregiver demonstrates understanding of disease process, treatment plan, medications, and discharge instructions  Description: Complete learning assessment and assess knowledge base    Interventions:  - Provide teaching at level of understanding  - Provide teaching via preferred learning methods  Outcome: Progressing     Problem: DISCHARGE PLANNING  Goal: Discharge to home or other facility with appropriate resources  Description: INTERVENTIONS:  - Identify barriers to discharge w/patient and caregiver  - Arrange for needed discharge resources and transportation as appropriate  - Identify discharge learning needs (meds, wound care, etc )  - Arrange for interpretive services to assist at discharge as needed  - Refer to Case Management Department for coordinating discharge planning if the patient needs post-hospital services based on physician/advanced practitioner order or complex needs related to functional status, cognitive ability, or social support system  Outcome: Progressing     Problem: Potential for Falls  Goal: Patient will remain free of falls  Description: INTERVENTIONS:  - Assess patient frequently for physical needs  -  Identify cognitive and physical deficits and behaviors that affect risk of falls    -  [place] fall precautions as indicated by assessment   - Educate patient/family on patient safety including physical limitations  - Instruct patient to call for assistance with activity based on assessment  - Modify environment to reduce risk of injury  - Consider OT/PT consult to assist with strengthening/mobility  Outcome: Progressing     Problem: POSTPARTUM  Goal: Experiences normal postpartum course  Description: INTERVENTIONS:  - Monitor maternal vital signs  - Assess uterine involution and lochia  Outcome: Progressing  Goal: Appropriate maternal -  bonding  Description: INTERVENTIONS:  - Identify family support  - Assess for appropriate maternal/infant bonding   -Encourage maternal/infant bonding opportunities  - Referral to  or  as needed  Outcome: Progressing  Goal: Establishment of infant feeding pattern  Description: INTERVENTIONS:  - Assess breast pumping q2-3 hrs  - Refer to lactation as needed  Outcome: Progressing  Goal: Incision(s), wounds(s) or drain site(s) healing without S/S of infection  Description: INTERVENTIONS  - Assess and document risk factors for skin impairment   - Assess and document dressing, incision and surrounding tissue  - Consider nutrition services referral as needed  - Oral mucous membranes remain intact  - Provide patient/ family education  Outcome: Progressing

## 2020-09-15 NOTE — ANESTHESIA POSTPROCEDURE EVALUATION
Post-Op Assessment Note    CV Status:  Stable  Pain Score: 0    Pain management: adequate     Mental Status:  Alert and awake   Hydration Status:  Euvolemic   PONV Controlled:  Controlled   Airway Patency:  Patent      Post Op Vitals Reviewed: Yes            No complications documented      BP      Temp      Pulse     Resp      SpO2

## 2020-09-15 NOTE — OP NOTE
OPERATIVE REPORT  PATIENT NAME: Aleksandra Draper    :  1990  MRN: [id number]  Pt Location: [facility] L&D OR ROOM 02     Section Procedure Note    Indications:   Pre-eclampsia with severe features    Pre-operative Diagnosis:   30w2d pregnancy  Poor fetal growth affecting fetus in 3rd trimester  Pre-eclampsia with severe features  Obesity    Post-operative Diagnosis:   1LTCS     Attending: Dr Vikas Peng  Resident: Александр Buchanan DO    Maternal Findings:  Smaller uterus for gestational age, right fundal 2x2a0qa fibroid  Normal tubes and ovaries bilaterally  No adhesions  No difficulty noted from skin to delivery     Findings:  Viable female weighing 770g;  Apgar scores of 7 at one minute and 8 at five minutes  Clear amniotic fluid  Small "tea cup sized" placenta with a normal appearing 3-vessel cord    Arterial and Venous Gases:  Umbilical Cord Venous Blood Gas:  Results from last 7 days   Lab Units 09/15/20  0852   PH COV  7 186*   PCO2 COV mm HG 62 4*   HCO3 COV mmol/L 23 1   BASE EXC COV mmol/L -6 3*   O2 CT CD VB mL/dL 3 1   O2 HGB, VENOUS CORD % 77 2     Umbilical Cord Arterial Blood Gas:  Results from last 7 days   Lab Units 09/15/20  0852   PH COA  7 188*   PCO2 COA  65 9*   PO2 COA mm HG <10 0   HCO3 COA mmol/L 24 5   BASE EXC COA mmol/L -5 1*   O2 HGB, ARTERIAL CORD % 9 3       Specimens: Arterial and venous cord gases, cord blood, segment of umbilical cord, placenta to pathology    Quantitative Blood Loss: 368 mL    Drains: Barber catheter           Complications:  None; patient tolerated the procedure well  Disposition: PACU            Condition: stable    Procedure Details   The patient was seen prior to the procedure  Risks, benefits, possible complications, alternate treatment options, and expected outcomes were discussed with the patient    The patient agreed with the proposed plan and gave informed consent for a primary  section due to evolving pre-eclampsia with severe features  Her creatinine increased overnight from 0 9 to 1 22  Liver enzymes had also increased from baseline  She had received Magnesium for 12h overnight and status post betamethasone second full course in preparation for caesarean section  The patient was taken to the [facility] Operating Room where she received spinal anesthesia  For infection prophylaxis, she received 2g ancef preoperatively  Fetal heart tones in the OR were assessed and noted to be in the 100s with moderate variability  A Barber catheter and SCDs were placed  The abdomen was quickly prepped with Betadine, the vagina was prepped with Betadine, and the patient was draped in the usual sterile manner  A Time Out was held and the above information confirmed  The patient was identified as Bassam Garcia and the procedure verified as a  Delivery for evolving pre-eclampsia with severe features  A Pfannenstiel incision was made and carried down through the underlying subcutaneous tissue to the fascia using a scalpel  The rectus fascia was then nicked in the midline and dissected bluntly  The rectus muscles were  and the peritoneum was identified, entered, and extended longitudinally with blunt dissection  The bladder blade was inserted  A low transverse uterine incision was made with the scalpel and extended cephalocaudad with blunt dissection  The amnion was entered bluntly for clear fluid  The fetal head was palpated, elevated, and delivered through the uterine incision followed by the body without difficulty  Time of birth was noted at 2638  There was noted to be spontaneous cry and good tone  There was no apparent injury to the   The umbilical cord was doubly clamped and cut after 30 seconds to allow for delayed cord clamping  The infant was handed off to the  providers  Arterial and venous cord gases, cord blood, and a segment of umbilical cord were obtained for evaluation    The placenta delivered spontaneously at 5230 with uterine fundal massage and appeared very small  The uterus was exteriorized and cleaned out with a moist lap sponge  The uterine incision was closed with a running locked suture of 0 Monocryl  A second layer of the same suture was used to imbricate the first   Hemostasis was noted to be excellent  The uterus was returned to the abdomen  The paracolic gutters were inspected and cleared of all clots and debris with moist lap sponges  The fascia was closed with a running suture of 1 PDS  Subcutaneous adipose tissue was closed with a running suture of 2-0 Plain gut  The skin was closed with a subcuticular running suture of 4-0 Monocryl  Exofin was applied  The patient appeared to tolerate the procedure very well  Lap sponge, needle, and instrument counts were correct x2  The patient's fundus was palpated and the uterus was expressed  She was then cleaned and transferred to her postpartum recovery room in stable condition and her infant went to the NICU  Attending Attestation: Dr Abdulaziz John was present for the entire procedure      Elinor Capellan, DO  PGY-4 OB/GYN   9/15/2020 10:09 AM

## 2020-09-15 NOTE — UTILIZATION REVIEW
Continued Stay Review    Date: 09-15-20                       Current Patient Class: inpatient Current Level of Care:m/s    HPI:30 y o  female initially admitted on 20   at 26w3d weeks gestation who is being admitted for preeclampsia with severe features      Assessment/Plan: no growth in the [facility] since 9/3/2020, and today has worsening preE labs        Pre-operative Diagnosis:   30w2d pregnancy  Poor fetal growth affecting fetus in 3rd trimester  Pre-eclampsia with severe features  Obesity  Female  Apgar  7/8  Wt  770 grams   taken  to NICU     Pertinent Labs/Diagnostic Results:       Results from last 7 days   Lab Units 09/15/20  1305 09/15/20  0649 20  0615 20  0525 09/10/20  0654   WBC Thousand/uL 26 45* 16 75* 10 84* 11 76* 12 09*   HEMOGLOBIN g/dL 12 7 14 7 12 2 11 8 12 4   HEMATOCRIT % 38 7 43 4 36 6 34 9 37 1   PLATELETS Thousands/uL 345 340 276 279 288   NEUTROS ABS Thousands/µL 23 09* 14 67* 7 35 8 09* 8 58*         Results from last 7 days   Lab Units 09/15/20  1305 09/15/20  0459 20  0525 09/10/20  0654   SODIUM mmol/L 132* 130* 136 138   POTASSIUM mmol/L 4 8 4 7 4 2 4 6   CHLORIDE mmol/L 101 99* 105 105   CO2 mmol/L 23 22 23 25   ANION GAP mmol/L 8 9 8 8   BUN mg/dL 16 17 14 14   CREATININE mg/dL 1 34* 1 22 0 90 0 94   EGFR ml/min/1 73sq m 62 69 100 95   CALCIUM mg/dL 7 4* 8 3 7 6* 7 8*     Results from last 7 days   Lab Units 09/15/20  1305 09/15/20  0459 20  0525 09/10/20  0654   AST U/L 66* 94* 28 39   ALT U/L 87* 108* 31 36   ALK PHOS U/L 214* 292* 172* 158*   TOTAL PROTEIN g/dL 5 8* 7 4 5 1* 5 5*   ALBUMIN g/dL 1 5* 1 9* 1 4* 1 6*   TOTAL BILIRUBIN mg/dL 0 19* 0 29 0 17* 0 13*         Results from last 7 days   Lab Units 09/15/20  1305 09/15/20  0459 20  0525 09/10/20  0654   GLUCOSE RANDOM mg/dL 133 115 135 82     Results from last 7 days   Lab Units 09/15/20  0649   PROTIME seconds 12 7   INR  0 94   PTT seconds 39*       Vital Signs:   Date/Time   Temp Pulse   Resp   BP   SpO2   O2 Device   Cardiac (WDL)   Patient Position - Orthostatic VS    09/15/20 1531   97 7 °F (36 5 °C)   86   18   122/72   99 %   None (Room air)   --   Lying    09/15/20 1430   97 9 °F (36 6 °C)   87   20   129/78   100 %   None (Room air)   --   --    09/15/20 1330   97 6 °F (36 4 °C)   85   18   125/76   100 %   --   --   --    09/15/20 1230   97 7 °F (36 5 °C)   84   18   138/90   98 %   None (Room air)   --   Lying    09/15/20 1130   97 9 °F (36 6 °C)    74   16   134/83   100 %   None (Room air)   --   Lying    Temp: 97 4 oral at 09/15/20 1130    09/15/20 1115   --   74   16   132/82   99 %   None (Room air)   --   Lying    09/15/20 1100   --   71   16   134/80   99 %   None (Room air)   --   Lying    09/15/20 1045   --   77   16   128/80   99 %   None (Room air)   --   Lying    09/15/20 1030   --   73   16   123/77   99 %   None (Room air)   --   Lying    09/15/20 1015   97 2 °F (36 2 °C)Abnormal     72   18   115/73   99 %   None (Room air)   WDL   Lying    09/15/20 1000   --   72   18   113/71   99 %   None (Room air)   WDL   Lying    09/15/20 0947   96 9 °F (36 1 °C)Abnormal     75   18   105/60   99 %   None (Room air)   WDL   Lying    09/15/20 0730   98 °F (36 7 °C)   94   18   143/81   98 %   --   --   --    09/15/20 0500   98 2 °F (36 8 °C)   96   16   158/96   98 %   --   --   Sitting    09/15/20 0200   98 °F (36 7 °C)   100   18   137/97   99 %   None (Room air)   --   Sitting    09/15/20 0015   97 7 °F (36 5 °C)   91   18   136/83   99 %   None (Room air)   --   Lying    09/14/20 2200   --   91   18   144/94   98 %   None (Room air)   --   --    09/14/20 2010   98 °F (36 7 °C)   97   18   136/91   99 %   None (Room air)   --   Sitting    09/14/20 2000   --   --   --   --   --   None (Room air)   WDL   --    09/14/20 1750   --   93   --   155/94   98 %   None (Room air)   --   --    09/14/20 1742   --   99   --   --   98 %   --   --   --    09/14/20 1738   --   97   18 160/101Abnormal     99 %   None (Room air)   --   --    09/14/20 1732   --   98   --   --   100 %   --   --   --    09/14/20 1730   --   97   16   134/86   98 %   None (Room air)   --   --    09/14/20 1724   --   94   --   --   --   --   --   --    09/14/20 1722   --   93   --   --   98 %   --   --   --    09/14/20 1616   98 6 °F (37 °C)   89   18   142/88   98 %   None (Room air)   --   Lying    09/14/20 1209   98 5 °F (36 9 °C)   78   18   144/93   100 %   None (Room air)   --   --    09/14/20 0805   --   --   --   --   --   --   WDL   --    Comment rows:    OBSERV: plan is for pt to have US this morning at 09/14/20 0805    09/14/20 0745   98 1 °F (36 7 °C)   90   18   137/91   98 %   None (Room air)   --   Lying        Medications:   Scheduled Medications:  dupilumab, 300 mg, Subcutaneous, Q14 Days  mupirocin, , Topical, TID  NIFEdipine, 30 mg, Oral, Daily  prenatal multivitamin, 1 tablet, Oral, Daily  triamcinolone, , Topical, BID      Continuous IV Infusions:  lactated ringers, 75 mL/hr, Intravenous, Continuous  magnesium sulfate, 1 g/hr, Intravenous, Continuous  oxytocin, 62 5 jose l-units/min, Intravenous, Continuous      PRN Meds:  acetaminophen, 650 mg, Oral, Q4H PRN  albuterol, 2 puff, Inhalation, Q4H PRN  calcium carbonate, 1,000 mg, Oral, Daily PRN  dexamethasone, 8 mg, Intravenous, Once PRN  diphenhydrAMINE, 12 5 mg, Intravenous, Once PRN  diphenhydrAMINE, 25 mg, Intravenous, Q6H PRN  docusate sodium, 100 mg, Oral, Daily PRN  fentaNYL, 50 mcg, Intravenous, Q3 min PRN  HYDROmorphone, 0 2 mg, Intravenous, Q5 Min PRN  HYDROmorphone, 0 5 mg, Intravenous, Q5 Min PRN  HYDROmorphone, 0 5 mg, Intravenous, Q1H PRN  [START ON 9/16/2020] HYDROmorphone, 1 mg, Intravenous, Q2H PRN  metoclopramide, 10 mg, Intravenous, Once PRN  metoclopramide, 5 mg, Intravenous, Q6H PRN  metoclopramide, 5 mg, Intravenous, Q6H PRN  naloxone, 0 1 mg, Intravenous, Q3 min PRN  ondansetron, 4 mg, Intravenous, Once PRN  ondansetron, 4 mg, Intravenous, Q4H PRN  [START ON 9/16/2020] ondansetron, 4 mg, Intravenous, Q8H PRN  ondansetron, 4 mg, Oral, Q6H PRN  [START ON 9/16/2020] oxyCODONE, 5 mg, Oral, Q4H PRN  polyethylene glycol, 17 g, Oral, Daily PRN  simethicone, 80 mg, Oral, Q6H PRN  simethicone, 80 mg, Oral, 4x Daily PRN        Discharge Plan: home      Network Utilization Review Department  [email or web address]  ATTENTION: Please call with any questions or concerns to [phone] and carefully listen to the prompts so that you are directed to the right person  All voicemails are confidential   Ysabel Ho all requests for admission clinical reviews, approved or denied determinations and any other requests to dedicated fax number below belonging to the campus where the patient is receiving treatment   List of dedicated fax numbers for the Facilities:  [address] DENIALS (Administrative/Medical Necessity) [phone]   [address] [place] [address] (Maternity/NICU/Pediatrics) [phone]   Quinten Cruz [phone]   Montrell Moles [phone]   Vinay Roy [phone]   Brandy Marcum [phone]   [address] [facility] [address] [facility] [phone]   Piotr Arias [phone]   [address] [facility] [address]  [address] [facility] [address] [facility] [phone]

## 2020-09-15 NOTE — QUICK NOTE
13V [id number] with preeclampsia with severe feature at 30 2 wga with IUGR  There has been no growth in the [facility] since 9/3/2020, and today has worsening preE labs  There are abnormal umbilical order dopplers  At this point it is recommended to deliver by   The risks and benefits of delivery were reviewed  All questions were answere  Pt and FOB agree with plan  Will proceed to the OR

## 2020-09-15 NOTE — ANESTHESIA PROCEDURE NOTES
Spinal Block    Patient location during procedure: OB  Start time: 9/15/2020 8:40 AM  Reason for block: procedure for pain, at surgeon's request and primary anesthetic  Staffing  Anesthesiologist: Mark Khan MD  Performed: anesthesiologist   Preanesthetic Checklist  Completed: patient identified, surgical consent, pre-op evaluation, timeout performed, IV checked, risks and benefits discussed and monitors and equipment checked  Spinal Block  Patient position: sitting  Prep: ChloraPrep  Patient monitoring: cardiac monitor and frequent blood pressure checks  Approach: midline  Location: L3-4  Injection technique: single-shot  Needle  Needle type: pencil-tip   Needle gauge: 25 G  Needle length: 10 cm  Assessment  Sensory level: T4  Injection Assessment:  negative aspiration for heme, no paresthesia on injection and positive aspiration for clear CSF    Post-procedure:  adhesive bandage applied, pressure dressing applied, secured with tape, site cleaned and sterile dressing applied  Additional Notes  Attempt x 2, successful with 25g Anali

## 2020-09-15 NOTE — QUICK NOTE
Scarlet Chowdary  [id number]  9/15/2020  4:38 PM    POST-OP CHECK     S:  Scarlet Chowdary doing well  Pain controlled  Denies nausea/vomiting  Denies chest pain, shortness of breath  No complaints at this time  Baby is in NICU and overall for size and GA doing well  O:  Vitals:    09/15/20 1531   BP: 122/72   Pulse: 86   Resp: 18   Temp: 97 7 °F (36 5 °C)   SpO2: 99%       Exam:  Gen: in NAD  CV: regular heart rate  Resp: no respiratory distress  Abd: c/d/i  Ext: SCDs b/l    A:  Mariam Zurita is s/p POD#0 1LTCS for pre-E w/ SF     P:  Routine postop check  IV/PO pain meds as needed  Clear diet, advance as tolerated  Antiemetics for nausea/vomiting prn  Follow up am labs  Adequate UOP, continue to monitor  SCDs for DVT ppx, encourage ambulation as tolerated    Plan to turn Mag off d/c  Start heparin 10,000u q12h SQ for DVT ppx darlin Mueller DO  PGY-4 OB/GYN   9/15/2020 4:38 PM

## 2020-09-15 NOTE — DISCHARGE INSTRUCTIONS
WHAT YOU NEED TO KNOW:   A , or  section, is abdominal surgery to deliver your baby  DISCHARGE INSTRUCTIONS:   Call 911 for any of the following:   · You feel lightheaded, short of breath, and have chest pain  · You cough up blood  Seek care immediately if:   · Blood soaks through your bandage  · Your stitches come apart  · Your arm or leg feels warm, tender, and painful  It may look swollen and red  Contact your OB if:   · You have heavy vaginal bleeding that fills 1 or more sanitary pads in 1 hour  · You have a fever  · Your incision is swollen, red, or draining pus  · You have questions or concerns about yourself or your baby  Medicines: You may  need any of the following:  · Prescription pain medicine  may be given  Ask how to take this medicine safely  · Acetaminophen  decreases pain and fever  It is available without a doctor's order  Ask how much to take and how often to take it  Follow directions  Acetaminophen can cause liver damage if not taken correctly  · NSAIDs , such as ibuprofen, help decrease swelling, pain, and fever  NSAIDs can cause stomach bleeding or kidney problems in certain people  If you take blood thinner medicine, always ask your healthcare provider if NSAIDs are safe for you  Always read the medicine label and follow directions  · Take your medicine as directed  Contact your healthcare provider if you think your medicine is not helping or if you have side effects  Tell him or her if you are allergic to any medicine  Keep a list of the medicines, vitamins, and herbs you take  Include the amounts, and when and why you take them  Bring the list or the pill bottles to follow-up visits  Carry your medicine list with you in case of an emergency  Wound care:  Carefully wash your wound with soap and water every day  Keep your wound clean and dry  Wear loose, comfortable clothes that do not rub against your wound   Ask your OB about bathing and showering  Limit activity as directed:   · Ask when it is safe for you to drive, walk up stairs, lift heavy objects, and have sex  · Ask when it is okay to exercise, and what types of exercise to do  Start slowly and do more as you get stronger  Drink liquids as directed:  Liquids help keep you hydrated after your procedure and decrease your risk for a blood clot  Ask how much liquid to drink each day and which liquids are best for you  Follow up with your OB as directed: You may need to return to have your stitches or staples removed  Write down your questions so you remember to ask them during your visits  © 2017 [employer] Information is for End User's use only and may not be sold, redistributed or otherwise used for commercial purposes  All illustrations and images included in CareNotes® are the copyrighted property of [employer]  or Arash Rice  The above information is an  only  It is not intended as medical advice for individual conditions or treatments  Talk to your doctor, nurse or pharmacist before following any medical regimen to see if it is safe and effective for you

## 2020-09-15 NOTE — PLAN OF CARE
Problem: ANTEPARTUM  Goal: Maintain pregnancy as long as maternal and/or fetal condition is stable  Description: INTERVENTIONS:  - Maternal surveillance  - Fetal surveillance  - Monitor uterine activity  - Medications as ordered  - Bedrest  Outcome: Progressing     Problem: NEUROSENSORY - ADULT  Goal: Achieves stable or improved neurological status  Description: INTERVENTIONS  - Monitor and report changes in neurological status  - Monitor vital signs such as temperature, blood pressure, glucose, and any other labs ordered   - Initiate measures to prevent increased intracranial pressure  - Monitor for seizure activity and implement precautions if appropriate      Outcome: Progressing  Goal: Remains free of injury related to seizures activity  Description: INTERVENTIONS  - Maintain airway, patient safety  and administer oxygen as ordered  - Monitor patient for seizure activity, document and report duration and description of seizure to physician/advanced practitioner  - If seizure occurs,  ensure patient safety during seizure  - Reorient patient post seizure  - Seizure pads on all 4 side rails  - Instruct patient/family to notify RN of any seizure activity including if an aura is experienced  - Instruct patient/family to call for assistance with activity based on nursing assessment  - Administer anti-seizure medications if ordered    Outcome: Progressing     Problem: CARDIOVASCULAR - ADULT  Goal: Maintains optimal cardiac output and hemodynamic stability  Description: INTERVENTIONS:  - Monitor I/O, vital signs and rhythm  - Monitor for S/S and trends of decreased cardiac output  - Administer and titrate ordered vasoactive medications to optimize hemodynamic stability  - Assess quality of pulses, skin color and temperature  - Assess for signs of decreased coronary artery perfusion  - Instruct patient to report change in severity of symptoms  Outcome: Progressing     Problem: PAIN - ADULT  Goal: Verbalizes/displays adequate comfort level or baseline comfort level  Description: Interventions:  - Encourage patient to monitor pain and request assistance  - Assess pain using appropriate pain scale  - Administer analgesics based on type and severity of pain and evaluate response  - Implement non-pharmacological measures as appropriate and evaluate response  - Consider cultural and social influences on pain and pain management  - Notify physician/advanced practitioner if interventions unsuccessful or patient reports new pain  Outcome: Progressing     Problem: INFECTION - ADULT  Goal: Absence or prevention of progression during hospitalization  Description: INTERVENTIONS:  - Assess and monitor for signs and symptoms of infection  - Monitor lab/diagnostic results  - Monitor all insertion sites, i e  indwelling lines, tubes, and drains  - Monitor endotracheal if appropriate and nasal secretions for changes in amount and color  - [place] appropriate cooling/warming therapies per order  - Administer medications as ordered  - Instruct and encourage patient and family to use good hand hygiene technique  - Identify and instruct in appropriate isolation precautions for identified infection/condition  Outcome: Progressing  Goal: Absence of fever/infection during neutropenic period  Description: INTERVENTIONS:  - Monitor WBC    Outcome: Progressing     Problem: SAFETY ADULT  Goal: Patient will remain free of falls  Description: INTERVENTIONS:  - Assess patient frequently for physical needs  -  Identify cognitive and physical deficits and behaviors that affect risk of falls    -  [place] fall precautions as indicated by assessment   - Educate patient/family on patient safety including physical limitations  - Instruct patient to call for assistance with activity based on assessment  - Modify environment to reduce risk of injury  - Consider OT/PT consult to assist with strengthening/mobility  Outcome: Progressing  Goal: Maintain or return to baseline ADL function  Description: INTERVENTIONS:  -  Assess patient's ability to carry out ADLs; assess patient's baseline for ADL function and identify physical deficits which impact ability to perform ADLs (bathing, care of mouth/teeth, toileting, grooming, dressing, etc )  - Assess/evaluate cause of self-care deficits   - Assess range of motion  - Assess patient's mobility; develop plan if impaired  - Assess patient's need for assistive devices and provide as appropriate  - Encourage maximum independence but intervene and supervise when necessary  - Involve family in performance of ADLs  - Assess for home care needs following discharge   - Consider OT consult to assist with ADL evaluation and planning for discharge  - Provide patient education as appropriate  Outcome: Progressing  Goal: Maintain or return mobility status to optimal level  Description: INTERVENTIONS:  - Assess patient's baseline mobility status (ambulation, transfers, stairs, etc )    - Identify cognitive and physical deficits and behaviors that affect mobility  - Identify mobility aids required to assist with transfers and/or ambulation (gait belt, sit-to-stand, lift, walker, cane, etc )  - [place] fall precautions as indicated by assessment  - Record patient progress and toleration of activity level on Mobility SBAR; progress patient to next Phase/Stage  - Instruct patient to call for assistance with activity based on assessment  - Consider rehabilitation consult to assist with strengthening/weightbearing, etc   Outcome: Progressing     Problem: Knowledge Deficit  Goal: Patient/family/caregiver demonstrates understanding of disease process, treatment plan, medications, and discharge instructions  Description: Complete learning assessment and assess knowledge base    Interventions:  - Provide teaching at level of understanding  - Provide teaching via preferred learning methods  Outcome: Progressing     Problem: DISCHARGE PLANNING  Goal: Discharge to home or other facility with appropriate resources  Description: INTERVENTIONS:  - Identify barriers to discharge w/patient and caregiver  - Arrange for needed discharge resources and transportation as appropriate  - Identify discharge learning needs (meds, wound care, etc )  - Arrange for interpretive services to assist at discharge as needed  - Refer to Case Management Department for coordinating discharge planning if the patient needs post-hospital services based on physician/advanced practitioner order or complex needs related to functional status, cognitive ability, or social support system  Outcome: Progressing     Problem: Potential for Falls  Goal: Patient will remain free of falls  Description: INTERVENTIONS:  - Assess patient frequently for physical needs  -  Identify cognitive and physical deficits and behaviors that affect risk of falls    -  [place] fall precautions as indicated by assessment   - Educate patient/family on patient safety including physical limitations  - Instruct patient to call for assistance with activity based on assessment  - Modify environment to reduce risk of injury  - Consider OT/PT consult to assist with strengthening/mobility  Outcome: Progressing

## 2020-09-16 ENCOUNTER — TELEPHONE (OUTPATIENT)
Dept: OBGYN CLINIC | Facility: CLINIC | Age: 30
End: 2020-09-16

## 2020-09-16 LAB
ALBUMIN SERPL BCP-MCNC: 1.3 G/DL (ref 3.5–5)
ALP SERPL-CCNC: 158 U/L (ref 46–116)
ALT SERPL W P-5'-P-CCNC: 68 U/L (ref 12–78)
ANION GAP SERPL CALCULATED.3IONS-SCNC: 6 MMOL/L (ref 4–13)
AST SERPL W P-5'-P-CCNC: 49 U/L (ref 5–45)
BASOPHILS # BLD AUTO: 0.03 THOUSANDS/ΜL (ref 0–0.1)
BASOPHILS NFR BLD AUTO: 0 % (ref 0–1)
BILIRUB SERPL-MCNC: 0.11 MG/DL (ref 0.2–1)
BUN SERPL-MCNC: 18 MG/DL (ref 5–25)
CALCIUM SERPL-MCNC: 6.7 MG/DL (ref 8.3–10.1)
CHLORIDE SERPL-SCNC: 105 MMOL/L (ref 100–108)
CO2 SERPL-SCNC: 24 MMOL/L (ref 21–32)
CREAT SERPL-MCNC: 1.09 MG/DL (ref 0.6–1.3)
EOSINOPHIL # BLD AUTO: 0 THOUSAND/ΜL (ref 0–0.61)
EOSINOPHIL NFR BLD AUTO: 0 % (ref 0–6)
ERYTHROCYTE [DISTWIDTH] IN BLOOD BY AUTOMATED COUNT: 12.6 % (ref 11.6–15.1)
GFR SERPL CREATININE-BSD FRML MDRD: 79 ML/MIN/1.73SQ M
GLUCOSE SERPL-MCNC: 102 MG/DL (ref 65–140)
HCT VFR BLD AUTO: 31 % (ref 34.8–46.1)
HGB BLD-MCNC: 10.2 G/DL (ref 11.5–15.4)
IMM GRANULOCYTES # BLD AUTO: 0.26 THOUSAND/UL (ref 0–0.2)
IMM GRANULOCYTES NFR BLD AUTO: 1 % (ref 0–2)
LYMPHOCYTES # BLD AUTO: 1.35 THOUSANDS/ΜL (ref 0.6–4.47)
LYMPHOCYTES NFR BLD AUTO: 7 % (ref 14–44)
MAGNESIUM SERPL-MCNC: 5.5 MG/DL (ref 1.6–2.6)
MCH RBC QN AUTO: 30.4 PG (ref 26.8–34.3)
MCHC RBC AUTO-ENTMCNC: 32.9 G/DL (ref 31.4–37.4)
MCV RBC AUTO: 93 FL (ref 82–98)
MONOCYTES # BLD AUTO: 1.49 THOUSAND/ΜL (ref 0.17–1.22)
MONOCYTES NFR BLD AUTO: 8 % (ref 4–12)
NEUTROPHILS # BLD AUTO: 16.59 THOUSANDS/ΜL (ref 1.85–7.62)
NEUTS SEG NFR BLD AUTO: 84 % (ref 43–75)
NRBC BLD AUTO-RTO: 0 /100 WBCS
PLATELET # BLD AUTO: 299 THOUSANDS/UL (ref 149–390)
PMV BLD AUTO: 10.6 FL (ref 8.9–12.7)
POTASSIUM SERPL-SCNC: 5.2 MMOL/L (ref 3.5–5.3)
PROT SERPL-MCNC: 4.9 G/DL (ref 6.4–8.2)
RBC # BLD AUTO: 3.35 MILLION/UL (ref 3.81–5.12)
SODIUM SERPL-SCNC: 135 MMOL/L (ref 136–145)
WBC # BLD AUTO: 19.72 THOUSAND/UL (ref 4.31–10.16)

## 2020-09-16 PROCEDURE — 85025 COMPLETE CBC W/AUTO DIFF WBC: CPT | Performed by: OBSTETRICS & GYNECOLOGY

## 2020-09-16 PROCEDURE — 99024 POSTOP FOLLOW-UP VISIT: CPT | Performed by: OBSTETRICS & GYNECOLOGY

## 2020-09-16 PROCEDURE — 80053 COMPREHEN METABOLIC PANEL: CPT | Performed by: OBSTETRICS & GYNECOLOGY

## 2020-09-16 PROCEDURE — 83735 ASSAY OF MAGNESIUM: CPT | Performed by: OBSTETRICS & GYNECOLOGY

## 2020-09-16 RX ORDER — ACETAMINOPHEN 325 MG/1
650 TABLET ORAL EVERY 6 HOURS SCHEDULED
Status: DISCONTINUED | OUTPATIENT
Start: 2020-09-16 | End: 2020-09-20 | Stop reason: HOSPADM

## 2020-09-16 RX ORDER — HYDROMORPHONE HCL/PF 1 MG/ML
1 SYRINGE (ML) INJECTION EVERY 4 HOURS PRN
Status: DISCONTINUED | OUTPATIENT
Start: 2020-09-16 | End: 2020-09-20 | Stop reason: HOSPADM

## 2020-09-16 RX ORDER — OXYCODONE HYDROCHLORIDE 10 MG/1
10 TABLET ORAL EVERY 4 HOURS PRN
Status: DISCONTINUED | OUTPATIENT
Start: 2020-09-16 | End: 2020-09-18

## 2020-09-16 RX ORDER — OXYCODONE HYDROCHLORIDE 5 MG/1
5 TABLET ORAL EVERY 4 HOURS PRN
Status: DISCONTINUED | OUTPATIENT
Start: 2020-09-16 | End: 2020-09-18

## 2020-09-16 RX ADMIN — ACETAMINOPHEN 650 MG: 325 TABLET, FILM COATED ORAL at 18:25

## 2020-09-16 RX ADMIN — TRIAMCINOLONE ACETONIDE: 1 CREAM TOPICAL at 11:12

## 2020-09-16 RX ADMIN — MUPIROCIN: 20 OINTMENT TOPICAL at 11:12

## 2020-09-16 RX ADMIN — OXYCODONE HYDROCHLORIDE 10 MG: 10 TABLET ORAL at 21:47

## 2020-09-16 RX ADMIN — HEPARIN SODIUM 10000 UNITS: 5000 INJECTION INTRAVENOUS; SUBCUTANEOUS at 11:10

## 2020-09-16 RX ADMIN — MUPIROCIN: 20 OINTMENT TOPICAL at 16:00

## 2020-09-16 RX ADMIN — DOCUSATE SODIUM 100 MG: 100 CAPSULE, LIQUID FILLED ORAL at 11:11

## 2020-09-16 RX ADMIN — OXYCODONE HYDROCHLORIDE 5 MG: 5 TABLET ORAL at 05:06

## 2020-09-16 RX ADMIN — OXYCODONE HYDROCHLORIDE 5 MG: 5 TABLET ORAL at 11:09

## 2020-09-16 RX ADMIN — Medication 1 TABLET: at 11:09

## 2020-09-16 RX ADMIN — MUPIROCIN: 20 OINTMENT TOPICAL at 20:17

## 2020-09-16 RX ADMIN — NIFEDIPINE 30 MG: 30 TABLET, FILM COATED, EXTENDED RELEASE ORAL at 11:10

## 2020-09-16 RX ADMIN — ACETAMINOPHEN 650 MG: 325 TABLET, FILM COATED ORAL at 23:49

## 2020-09-16 RX ADMIN — HEPARIN SODIUM 10000 UNITS: 5000 INJECTION INTRAVENOUS; SUBCUTANEOUS at 20:10

## 2020-09-16 RX ADMIN — OXYCODONE HYDROCHLORIDE 10 MG: 10 TABLET ORAL at 15:21

## 2020-09-16 RX ADMIN — TRIAMCINOLONE ACETONIDE: 1 CREAM TOPICAL at 16:00

## 2020-09-16 NOTE — PROGRESS NOTES
Progress Note - OB/GYN   Blaze Jimenez 35 y o  female MRN: [id number]  Unit/Bed#: -01 Encounter: [id number]    Assessment:  35 y o  [id number] s/p Primary low transverse  section post-operative day 1    Plan:  1  Routine post-partum care   - Encourage ambulation   - Pain control as needed   - Advance diet as tolerated   - Rhogam not indicated   - Barber removed this AM, void trial today   - Heparin 39524 units BID for DVT prophylaxis    2  Preeclampsia with severe features   - BP 110s - 130s / 60s - 70s overnight   - Magnesium discontinued 9/15/20   - Procardia 30 mg XL daily   - F/u AM labs    3  URSULA   - Creatinine 0 85 --> 1 22 --> 1 34 --> f/u AM labs   - LR at 75 ml/hr   - Avoid nephrotoxics    Subjective/Objective   Chief Complaint:       Subjective:  Blaze Jimenez is well appearing and has no complaints at this time  She denies any dizziness, nausea, vomiting, chest pain, shortness of breath, palpitations, or headaches  She reports that baby is doing well in the NICU    Pain: Well controlled with pain medication regimen  Tolerating PO: yes  Voiding: yes  Flatus: yes  BM: no  Ambulating: yes  Breastfeeding: plans to pump  Chest pain: no  Shortness of breath: no  Leg pain: no  Lochia: Decreasing    Objective:     Vitals: Blood pressure 121/64, pulse 84, temperature 98 2 °F (36 8 °C), temperature source Oral, resp  rate 18, height 5' 2" (1 575 m), weight 85 7 kg (189 lb), last menstrual period 2020, SpO2 99 %, currently breastfeeding      Intake/Output Summary (Last 24 hours) at 2020 0544  Last data filed at 2020 0502  Gross per 24 hour   Intake 2556 ml   Output 2118 ml   Net 438 ml       Physical Exam:     General: NAD  Cardiovascular: RRR, no murmur, nl S1/S2   Lungs: CTAB, non-labored breathing   Abdomen: Soft, no distension/rebound/guarding/tenderness   Fundus: Firm, non-tender, fundus: -1 cm below the umbilicus   Incision: C/D/I  Lower Extremities: Non-tender      Lab, Imaging and other studies:     Recent Results (from the past 72 hour(s))   CBC and differential    Collection Time: 09/13/20  6:15 AM   Result Value Ref Range    WBC 10 84 (H) 4 31 - 10 16 Thousand/uL    RBC 4 02 3 81 - 5 12 Million/uL    Hemoglobin 12 2 11 5 - 15 4 g/dL    Hematocrit 36 6 34 8 - 46 1 %    MCV 91 82 - 98 fL    MCH 30 3 26 8 - 34 3 pg    MCHC 33 3 31 4 - 37 4 g/dL    RDW 12 1 11 6 - 15 1 %    MPV 10 6 8 9 - 12 7 fL    Platelets [phone] - 489 Thousands/uL    nRBC 0 /100 WBCs    Neutrophils Relative 68 43 - 75 %    Immat GRANS % 1 0 - 2 %    Lymphocytes Relative 21 14 - 44 %    Monocytes Relative 8 4 - 12 %    Eosinophils Relative 2 0 - 6 %    Basophils Relative 0 0 - 1 %    Neutrophils Absolute 7 35 1 85 - 7 62 Thousands/µL    Immature Grans Absolute 0 05 0 00 - 0 20 Thousand/uL    Lymphocytes Absolute 2 29 0 60 - 4 47 Thousands/µL    Monocytes Absolute 0 89 0 17 - 1 22 Thousand/µL    Eosinophils Absolute 0 22 0 00 - 0 61 Thousand/µL    Basophils Absolute 0 04 0 00 - 0 10 Thousands/µL   Comprehensive metabolic panel    Collection Time: 09/15/20  4:59 AM   Result Value Ref Range    Sodium 130 (L) 136 - 145 mmol/L    Potassium 4 7 3 5 - 5 3 mmol/L    Chloride 99 (L) 100 - 108 mmol/L    CO2 22 21 - 32 mmol/L    ANION GAP 9 4 - 13 mmol/L    BUN 17 5 - 25 mg/dL    Creatinine 1 22 0 60 - 1 30 mg/dL    Glucose 115 65 - 140 mg/dL    Calcium 8 3 8 3 - 10 1 mg/dL    AST 94 (H) 5 - 45 U/L     (H) 12 - 78 U/L    Alkaline Phosphatase 292 (H) 46 - 116 U/L    Total Protein 7 4 6 4 - 8 2 g/dL    Albumin 1 9 (L) 3 5 - 5 0 g/dL    Total Bilirubin 0 29 0 20 - 1 00 mg/dL    eGFR 69 ml/min/1 73sq m   Type and screen    Collection Time: 09/15/20  4:59 AM   Result Value Ref Range    ABO Grouping B     Rh Factor Positive     Antibody Screen Negative     Specimen Expiration Date 20200918    LD,Blood    Collection Time: 09/15/20  5:20 AM   Result Value Ref Range     (H) 81 - 234 U/L   CBC and differential    Collection Time: 09/15/20  6:49 AM   Result Value Ref Range    WBC 16 75 (H) 4 31 - 10 16 Thousand/uL    RBC 4 81 3 81 - 5 12 Million/uL    Hemoglobin 14 7 11 5 - 15 4 g/dL    Hematocrit 43 4 34 8 - 46 1 %    MCV 90 82 - 98 fL    MCH 30 6 26 8 - 34 3 pg    MCHC 33 9 31 4 - 37 4 g/dL    RDW 12 1 11 6 - 15 1 %    MPV 10 9 8 9 - 12 7 fL    Platelets 940 068 - 535 Thousands/uL    nRBC 0 /100 WBCs    Neutrophils Relative 88 (H) 43 - 75 %    Immat GRANS % 1 0 - 2 %    Lymphocytes Relative 10 (L) 14 - 44 %    Monocytes Relative 1 (L) 4 - 12 %    Eosinophils Relative 0 0 - 6 %    Basophils Relative 0 0 - 1 %    Neutrophils Absolute 14 67 (H) 1 85 - 7 62 Thousands/µL    Immature Grans Absolute 0 11 0 00 - 0 20 Thousand/uL    Lymphocytes Absolute 1 71 0 60 - 4 47 Thousands/µL    Monocytes Absolute 0 24 0 17 - 1 22 Thousand/µL    Eosinophils Absolute 0 00 0 00 - 0 61 Thousand/µL    Basophils Absolute 0 02 0 00 - 0 10 Thousands/µL   Protime-INR    Collection Time: 09/15/20  6:49 AM   Result Value Ref Range    Protime 12 7 11 6 - 14 5 seconds    INR 0 94 0 84 - 1 19   APTT    Collection Time: 09/15/20  6:49 AM   Result Value Ref Range    PTT 39 (H) 23 - 37 seconds   Blood gas, venous, cord    Collection Time: 09/15/20  8:52 AM   Result Value Ref Range    pH, Cord Abhilash 7 186 (L) 7 190 - 7 490    pCO2, Cord Abhilash 62 4 (H) 27 0 - 43 0 mm HG    pO2, Cord Abhilash 13 0 (L) 15 0 - 45 0 mm HG    HCO3, Cord Abhilash 23 1 12 2 - 28 6 mmol/L    Base Exc, Cord Abhilash -6 3 (L) 1 0 - 9 0 mmol/L    O2 Cont, Cord Abhilash 3 1 mL/dL    O2 HGB,VENOUS CORD 14 9 %   Blood gas, arterial, cord    Collection Time: 09/15/20  8:52 AM   Result Value Ref Range    pH, Cord Art 7 188 (L) 7 230 - 7 430    pCO2, Cord Art 65 9 (H) 30 0 - 60 0    pO2, Cord Art <10 0 5 0 - 25 0 mm HG    HCO3, Cord Art 24 5 17 3 - 27 3 mmol/L    Base Exc, Cord Art -5 1 (L) 3 0 - 11 0 mmol/L    O2 Content, Cord Art      O2 Hgb, Arterial Cord 9 3 %   CBC and differential    Collection Time: 09/15/20  1:05 PM Result Value Ref Range    WBC 26 45 (H) 4 31 - 10 16 Thousand/uL    RBC 4 21 3 81 - 5 12 Million/uL    Hemoglobin 12 7 11 5 - 15 4 g/dL    Hematocrit 38 7 34 8 - 46 1 %    MCV 92 82 - 98 fL    MCH 30 2 26 8 - 34 3 pg    MCHC 32 8 31 4 - 37 4 g/dL    RDW 12 4 11 6 - 15 1 %    MPV 10 7 8 9 - 12 7 fL    Platelets 317 883 - 786 Thousands/uL    nRBC 0 /100 WBCs    Neutrophils Relative 88 (H) 43 - 75 %    Immat GRANS % 1 0 - 2 %    Lymphocytes Relative 6 (L) 14 - 44 %    Monocytes Relative 5 4 - 12 %    Eosinophils Relative 0 0 - 6 %    Basophils Relative 0 0 - 1 %    Neutrophils Absolute 23 09 (H) 1 85 - 7 62 Thousands/µL    Immature Grans Absolute 0 28 (H) 0 00 - 0 20 Thousand/uL    Lymphocytes Absolute 1 60 0 60 - 4 47 Thousands/µL    Monocytes Absolute 1 41 (H) 0 17 - 1 22 Thousand/µL    Eosinophils Absolute 0 00 0 00 - 0 61 Thousand/µL    Basophils Absolute 0 07 0 00 - 0 10 Thousands/µL   Comprehensive metabolic panel    Collection Time: 09/15/20  1:05 PM   Result Value Ref Range    Sodium 132 (L) 136 - 145 mmol/L    Potassium 4 8 3 5 - 5 3 mmol/L    Chloride 101 100 - 108 mmol/L    CO2 23 21 - 32 mmol/L    ANION GAP 8 4 - 13 mmol/L    BUN 16 5 - 25 mg/dL    Creatinine 1 34 (H) 0 60 - 1 30 mg/dL    Glucose 133 65 - 140 mg/dL    Calcium 7 4 (L) 8 3 - 10 1 mg/dL    AST 66 (H) 5 - 45 U/L    ALT 87 (H) 12 - 78 U/L    Alkaline Phosphatase 214 (H) 46 - 116 U/L    Total Protein 5 8 (L) 6 4 - 8 2 g/dL    Albumin 1 5 (L) 3 5 - 5 0 g/dL    Total Bilirubin 0 19 (L) 0 20 - 1 00 mg/dL    eGFR 62 ml/min/1 73sq m   CBC and differential    Collection Time: 09/16/20  5:15 AM   Result Value Ref Range    WBC 19 72 (H) 4 31 - 10 16 Thousand/uL    RBC 3 35 (L) 3 81 - 5 12 Million/uL    Hemoglobin 10 2 (L) 11 5 - 15 4 g/dL    Hematocrit 31 0 (L) 34 8 - 46 1 %    MCV 93 82 - 98 fL    MCH 30 4 26 8 - 34 3 pg    MCHC 32 9 31 4 - 37 4 g/dL    RDW 12 6 11 6 - 15 1 %    MPV 10 6 8 9 - 12 7 fL    Platelets 306 904 - 418 Thousands/uL    nRBC 0 /100 WBCs    Neutrophils Relative 84 (H) 43 - 75 %    Immat GRANS % 1 0 - 2 %    Lymphocytes Relative 7 (L) 14 - 44 %    Monocytes Relative 8 4 - 12 %    Eosinophils Relative 0 0 - 6 %    Basophils Relative 0 0 - 1 %    Neutrophils Absolute 16 59 (H) 1 85 - 7 62 Thousands/µL    Immature Grans Absolute 0 26 (H) 0 00 - 0 20 Thousand/uL    Lymphocytes Absolute 1 35 0 60 - 4 47 Thousands/µL    Monocytes Absolute 1 49 (H) 0 17 - 1 22 Thousand/µL    Eosinophils Absolute 0 00 0 00 - 0 61 Thousand/µL    Basophils Absolute 0 03 0 00 - 0 10 Thousands/µL     Meds:  dupilumab, 300 mg, Subcutaneous, Q14 Days  heparin (porcine), 10,000 Units, Subcutaneous, Q12H [place]  mupirocin, , Topical, TID  NIFEdipine, 30 mg, Oral, Daily  prenatal multivitamin, 1 tablet, Oral, Daily  triamcinolone, , Topical, BID      acetaminophen, 650 mg, Q4H PRN  albuterol, 2 puff, Q4H PRN  calcium carbonate, 1,000 mg, Daily PRN  dexamethasone, 8 mg, Once PRN  diphenhydrAMINE, 12 5 mg, Once PRN  diphenhydrAMINE, 25 mg, Q6H PRN  docusate sodium, 100 mg, Daily PRN  fentaNYL, 50 mcg, Q3 min PRN  HYDROmorphone, 0 2 mg, Q5 Min PRN  HYDROmorphone, 0 5 mg, Q5 Min PRN  HYDROmorphone, 0 5 mg, Q1H PRN  HYDROmorphone, 1 mg, Q2H PRN  metoclopramide, 10 mg, Once PRN  metoclopramide, 5 mg, Q6H PRN  metoclopramide, 5 mg, Q6H PRN  naloxone, 0 1 mg, Q3 min PRN  ondansetron, 4 mg, Once PRN  ondansetron, 4 mg, Q4H PRN  ondansetron, 4 mg, Q8H PRN  ondansetron, 4 mg, Q6H PRN  oxyCODONE, 5 mg, Q4H PRN  polyethylene glycol, 17 g, Daily PRN  simethicone, 80 mg, Q6H PRN  simethicone, 80 mg, 4x Daily PRN              Signature / Title: Marleen Newton MD, Ob/Gyn, PGY-2  Date: 9/16/2020  Time: 5:44 AM

## 2020-09-16 NOTE — PLAN OF CARE
Problem: NEUROSENSORY - ADULT  Goal: Achieves stable or improved neurological status  Description: INTERVENTIONS  - Monitor and report changes in neurological status  - Monitor vital signs such as temperature, blood pressure, glucose, and any other labs ordered   - Initiate measures to prevent increased intracranial pressure  - Monitor for seizure activity and implement precautions if appropriate      Outcome: Progressing  Goal: Remains free of injury related to seizures activity  Description: INTERVENTIONS  - Maintain airway, patient safety  and administer oxygen as ordered  - Monitor patient for seizure activity, document and report duration and description of seizure to physician/advanced practitioner  - If seizure occurs,  ensure patient safety during seizure  - Reorient patient post seizure  - Seizure pads on all 4 side rails  - Instruct patient/family to notify RN of any seizure activity including if an aura is experienced  - Instruct patient/family to call for assistance with activity based on nursing assessment  - Administer anti-seizure medications if ordered    Outcome: Progressing     Problem: CARDIOVASCULAR - ADULT  Goal: Maintains optimal cardiac output and hemodynamic stability  Description: INTERVENTIONS:  - Monitor I/O, vital signs and rhythm  - Monitor for S/S and trends of decreased cardiac output  - Administer and titrate ordered vasoactive medications to optimize hemodynamic stability  - Assess quality of pulses, skin color and temperature  - Assess for signs of decreased coronary artery perfusion  - Instruct patient to report change in severity of symptoms  Outcome: Progressing     Problem: PAIN - ADULT  Goal: Verbalizes/displays adequate comfort level or baseline comfort level  Description: Interventions:  - Encourage patient to monitor pain and request assistance  - Assess pain using appropriate pain scale  - Administer analgesics based on type and severity of pain and evaluate response  - Implement non-pharmacological measures as appropriate and evaluate response  - Consider cultural and social influences on pain and pain management  - Notify physician/advanced practitioner if interventions unsuccessful or patient reports new pain  Outcome: Progressing     Problem: INFECTION - ADULT  Goal: Absence or prevention of progression during hospitalization  Description: INTERVENTIONS:  - Assess and monitor for signs and symptoms of infection  - Monitor lab/diagnostic results  - Monitor all insertion sites, i e  indwelling lines, tubes, and drains  - Monitor endotracheal if appropriate and nasal secretions for changes in amount and color  - [place] appropriate cooling/warming therapies per order  - Administer medications as ordered  - Instruct and encourage patient and family to use good hand hygiene technique  - Identify and instruct in appropriate isolation precautions for identified infection/condition  Outcome: Progressing  Goal: Absence of fever/infection during neutropenic period  Description: INTERVENTIONS:  - Monitor WBC    Outcome: Progressing     Problem: SAFETY ADULT  Goal: Patient will remain free of falls  Description: INTERVENTIONS:  - Assess patient frequently for physical needs  -  Identify cognitive and physical deficits and behaviors that affect risk of falls    -  [place] fall precautions as indicated by assessment   - Educate patient/family on patient safety including physical limitations  - Instruct patient to call for assistance with activity based on assessment  - Modify environment to reduce risk of injury  - Consider OT/PT consult to assist with strengthening/mobility  Outcome: Progressing  Goal: Maintain or return to baseline ADL function  Description: INTERVENTIONS:  -  Assess patient's ability to carry out ADLs; assess patient's baseline for ADL function and identify physical deficits which impact ability to perform ADLs (bathing, care of mouth/teeth, toileting, grooming, dressing, etc )  - Assess/evaluate cause of self-care deficits   - Assess range of motion  - Assess patient's mobility; develop plan if impaired  - Assess patient's need for assistive devices and provide as appropriate  - Encourage maximum independence but intervene and supervise when necessary  - Involve family in performance of ADLs  - Assess for home care needs following discharge   - Consider OT consult to assist with ADL evaluation and planning for discharge  - Provide patient education as appropriate  Outcome: Progressing  Goal: Maintain or return mobility status to optimal level  Description: INTERVENTIONS:  - Assess patient's baseline mobility status (ambulation, transfers, stairs, etc )    - Identify cognitive and physical deficits and behaviors that affect mobility  - Identify mobility aids required to assist with transfers and/or ambulation (gait belt, sit-to-stand, lift, walker, cane, etc )  - [place] fall precautions as indicated by assessment  - Record patient progress and toleration of activity level on Mobility SBAR; progress patient to next Phase/Stage  - Instruct patient to call for assistance with activity based on assessment  - Consider rehabilitation consult to assist with strengthening/weightbearing, etc   Outcome: Progressing     Problem: Knowledge Deficit  Goal: Patient/family/caregiver demonstrates understanding of disease process, treatment plan, medications, and discharge instructions  Description: Complete learning assessment and assess knowledge base    Interventions:  - Provide teaching at level of understanding  - Provide teaching via preferred learning methods  Outcome: Progressing     Problem: DISCHARGE PLANNING  Goal: Discharge to home or other facility with appropriate resources  Description: INTERVENTIONS:  - Identify barriers to discharge w/patient and caregiver  - Arrange for needed discharge resources and transportation as appropriate  - Identify discharge learning needs (meds, wound care, etc )  - Arrange for interpretive services to assist at discharge as needed  - Refer to Case Management Department for coordinating discharge planning if the patient needs post-hospital services based on physician/advanced practitioner order or complex needs related to functional status, cognitive ability, or social support system  Outcome: Progressing     Problem: Potential for Falls  Goal: Patient will remain free of falls  Description: INTERVENTIONS:  - Assess patient frequently for physical needs  -  Identify cognitive and physical deficits and behaviors that affect risk of falls    -  [place] fall precautions as indicated by assessment   - Educate patient/family on patient safety including physical limitations  - Instruct patient to call for assistance with activity based on assessment  - Modify environment to reduce risk of injury  - Consider OT/PT consult to assist with strengthening/mobility  Outcome: Progressing     Problem: POSTPARTUM  Goal: Experiences normal postpartum course  Description: INTERVENTIONS:  - Monitor maternal vital signs  - Assess uterine involution and lochia  Outcome: Progressing  Goal: Appropriate maternal -  bonding  Description: INTERVENTIONS:  - Identify family support  - Assess for appropriate maternal/infant bonding   -Encourage maternal/infant bonding opportunities  - Referral to  or  as needed  Outcome: Progressing  Goal: Establishment of infant feeding pattern  Description: INTERVENTIONS:  - Assess breast pumping q2-3 hrs  - Refer to lactation as needed  Outcome: Progressing  Goal: Incision(s), wounds(s) or drain site(s) healing without S/S of infection  Description: INTERVENTIONS  - Assess and document risk factors for skin impairment   - Assess and document dressing, incision and surrounding tissue  - Consider nutrition services referral as needed  - Oral mucous membranes remain intact  - Provide patient/ family education  Outcome: Progressing

## 2020-09-16 NOTE — PLAN OF CARE
Problem: NEUROSENSORY - ADULT  Goal: Achieves stable or improved neurological status  Description: INTERVENTIONS  - Monitor and report changes in neurological status  - Monitor vital signs such as temperature, blood pressure, glucose, and any other labs ordered   - Initiate measures to prevent increased intracranial pressure  - Monitor for seizure activity and implement precautions if appropriate      Outcome: Progressing  Goal: Remains free of injury related to seizures activity  Description: INTERVENTIONS  - Maintain airway, patient safety  and administer oxygen as ordered  - Monitor patient for seizure activity, document and report duration and description of seizure to physician/advanced practitioner  - If seizure occurs,  ensure patient safety during seizure  - Reorient patient post seizure  - Seizure pads on all 4 side rails  - Instruct patient/family to notify RN of any seizure activity including if an aura is experienced  - Instruct patient/family to call for assistance with activity based on nursing assessment  - Administer anti-seizure medications if ordered    Outcome: Progressing     Problem: CARDIOVASCULAR - ADULT  Goal: Maintains optimal cardiac output and hemodynamic stability  Description: INTERVENTIONS:  - Monitor I/O, vital signs and rhythm  - Monitor for S/S and trends of decreased cardiac output  - Administer and titrate ordered vasoactive medications to optimize hemodynamic stability  - Assess quality of pulses, skin color and temperature  - Assess for signs of decreased coronary artery perfusion  - Instruct patient to report change in severity of symptoms  Outcome: Progressing     Problem: PAIN - ADULT  Goal: Verbalizes/displays adequate comfort level or baseline comfort level  Description: Interventions:  - Encourage patient to monitor pain and request assistance  - Assess pain using appropriate pain scale  - Administer analgesics based on type and severity of pain and evaluate response  - Implement non-pharmacological measures as appropriate and evaluate response  - Consider cultural and social influences on pain and pain management  - Notify physician/advanced practitioner if interventions unsuccessful or patient reports new pain  Outcome: Progressing     Problem: INFECTION - ADULT  Goal: Absence or prevention of progression during hospitalization  Description: INTERVENTIONS:  - Assess and monitor for signs and symptoms of infection  - Monitor lab/diagnostic results  - Monitor all insertion sites, i e  indwelling lines, tubes, and drains  - Monitor endotracheal if appropriate and nasal secretions for changes in amount and color  - [place] appropriate cooling/warming therapies per order  - Administer medications as ordered  - Instruct and encourage patient and family to use good hand hygiene technique  - Identify and instruct in appropriate isolation precautions for identified infection/condition  Outcome: Progressing  Goal: Absence of fever/infection during neutropenic period  Description: INTERVENTIONS:  - Monitor WBC    Outcome: Progressing     Problem: SAFETY ADULT  Goal: Patient will remain free of falls  Description: INTERVENTIONS:  - Assess patient frequently for physical needs  -  Identify cognitive and physical deficits and behaviors that affect risk of falls    -  [place] fall precautions as indicated by assessment   - Educate patient/family on patient safety including physical limitations  - Instruct patient to call for assistance with activity based on assessment  - Modify environment to reduce risk of injury  - Consider OT/PT consult to assist with strengthening/mobility  Outcome: Progressing  Goal: Maintain or return to baseline ADL function  Description: INTERVENTIONS:  -  Assess patient's ability to carry out ADLs; assess patient's baseline for ADL function and identify physical deficits which impact ability to perform ADLs (bathing, care of mouth/teeth, toileting, grooming, dressing, etc )  - Assess/evaluate cause of self-care deficits   - Assess range of motion  - Assess patient's mobility; develop plan if impaired  - Assess patient's need for assistive devices and provide as appropriate  - Encourage maximum independence but intervene and supervise when necessary  - Involve family in performance of ADLs  - Assess for home care needs following discharge   - Consider OT consult to assist with ADL evaluation and planning for discharge  - Provide patient education as appropriate  Outcome: Progressing  Goal: Maintain or return mobility status to optimal level  Description: INTERVENTIONS:  - Assess patient's baseline mobility status (ambulation, transfers, stairs, etc )    - Identify cognitive and physical deficits and behaviors that affect mobility  - Identify mobility aids required to assist with transfers and/or ambulation (gait belt, sit-to-stand, lift, walker, cane, etc )  - [place] fall precautions as indicated by assessment  - Record patient progress and toleration of activity level on Mobility SBAR; progress patient to next Phase/Stage  - Instruct patient to call for assistance with activity based on assessment  - Consider rehabilitation consult to assist with strengthening/weightbearing, etc   Outcome: Progressing     Problem: Knowledge Deficit  Goal: Patient/family/caregiver demonstrates understanding of disease process, treatment plan, medications, and discharge instructions  Description: Complete learning assessment and assess knowledge base    Interventions:  - Provide teaching at level of understanding  - Provide teaching via preferred learning methods  Outcome: Progressing     Problem: DISCHARGE PLANNING  Goal: Discharge to home or other facility with appropriate resources  Description: INTERVENTIONS:  - Identify barriers to discharge w/patient and caregiver  - Arrange for needed discharge resources and transportation as appropriate  - Identify discharge learning needs (meds, wound care, etc )  - Arrange for interpretive services to assist at discharge as needed  - Refer to Case Management Department for coordinating discharge planning if the patient needs post-hospital services based on physician/advanced practitioner order or complex needs related to functional status, cognitive ability, or social support system  Outcome: Progressing     Problem: Potential for Falls  Goal: Patient will remain free of falls  Description: INTERVENTIONS:  - Assess patient frequently for physical needs  -  Identify cognitive and physical deficits and behaviors that affect risk of falls    -  [place] fall precautions as indicated by assessment   - Educate patient/family on patient safety including physical limitations  - Instruct patient to call for assistance with activity based on assessment  - Modify environment to reduce risk of injury  - Consider OT/PT consult to assist with strengthening/mobility  Outcome: Progressing     Problem: POSTPARTUM  Goal: Experiences normal postpartum course  Description: INTERVENTIONS:  - Monitor maternal vital signs  - Assess uterine involution and lochia  Outcome: Progressing  Goal: Appropriate maternal -  bonding  Description: INTERVENTIONS:  - Identify family support  - Assess for appropriate maternal/infant bonding   -Encourage maternal/infant bonding opportunities  - Referral to  or  as needed  Outcome: Progressing  Goal: Establishment of infant feeding pattern  Description: INTERVENTIONS:  - Assess breast pumping q2-3 hrs  - Refer to lactation as needed  Outcome: Progressing  Goal: Incision(s), wounds(s) or drain site(s) healing without S/S of infection  Description: INTERVENTIONS  - Assess and document risk factors for skin impairment   - Assess and document dressing, incision and surrounding tissue  - Consider nutrition services referral as needed  - Oral mucous membranes remain intact  - Provide patient/ family education  Outcome: Progressing

## 2020-09-16 NOTE — SOCIAL WORK
Consult: Breast pump    Freedom of choice given  [facility] referral sent to [facility] for [employer] S2 pump with request for delivery to pt room on Thursday  Pt is SWCM to f/u with pt to complete NICU open

## 2020-09-16 NOTE — QUICK NOTE
This is a late entry  My assessment was completed at [address] on 9/16/20  Saw and evalauted Contreras Abdi for magnesium discontinuation  Patient had no complaints at time of my exam and specifically denied headaches, visual changes, chest pain, shortness of breath, RUQ/epigastric abdominal pain  Vitals: BP 110s-138/60s-90, O2 sat %, Pulse 70s-90s, Afebrile    Physical exam:   Cardiopulmonary: RRR, CTA bilaterally  Abdomen: soft, nontender  Extremities: 1+ reflexes bilaterally in upper extremities, no clonus, no calf tenderness    A/P:  POD#1 s/p LTCS for PreE with SF, poor fetal growth and absent UA flow  Stable  Magnesium discontinued at 2100 on 9/15/20    FU am CBC  Continue routine postop care  DVT ppx: SCDs on and pumping, heparin 10K U SubQ BID to start 9/16/20  FEN: LR 75cc/hr, regular diet      Zully Mauricio MD  PGY-1, OB/GYN  9/16/2020   12:31 AM

## 2020-09-16 NOTE — LACTATION NOTE
Assisted Mom with pumping at this time  Settings reviewed, milk storage reviewed  Enc pumping 8 times per day for 10-15 min each time, hand expression after pumping, and not going longer than 6 hours without pumping  Demonstrated pumping and hand expression at this time  2mL of colostrum collected and sent to NICU  Pumping handout provided  Enc mom to call for lactation support as needed throughout her stay

## 2020-09-17 LAB
ALBUMIN SERPL BCP-MCNC: 1.4 G/DL (ref 3.5–5)
ALP SERPL-CCNC: 133 U/L (ref 46–116)
ALT SERPL W P-5'-P-CCNC: 47 U/L (ref 12–78)
ANION GAP SERPL CALCULATED.3IONS-SCNC: 5 MMOL/L (ref 4–13)
AST SERPL W P-5'-P-CCNC: 46 U/L (ref 5–45)
BILIRUB SERPL-MCNC: 0.15 MG/DL (ref 0.2–1)
BUN SERPL-MCNC: 16 MG/DL (ref 5–25)
CALCIUM ALBUM COR SERPL-MCNC: 9.4 MG/DL (ref 8.3–10.1)
CALCIUM SERPL-MCNC: 7.3 MG/DL (ref 8.3–10.1)
CHLORIDE SERPL-SCNC: 106 MMOL/L (ref 100–108)
CO2 SERPL-SCNC: 26 MMOL/L (ref 21–32)
CREAT SERPL-MCNC: 0.82 MG/DL (ref 0.6–1.3)
GFR SERPL CREATININE-BSD FRML MDRD: 112 ML/MIN/1.73SQ M
GLUCOSE SERPL-MCNC: 78 MG/DL (ref 65–140)
POTASSIUM SERPL-SCNC: 4.9 MMOL/L (ref 3.5–5.3)
PROT SERPL-MCNC: 5 G/DL (ref 6.4–8.2)
SODIUM SERPL-SCNC: 137 MMOL/L (ref 136–145)

## 2020-09-17 PROCEDURE — 80053 COMPREHEN METABOLIC PANEL: CPT | Performed by: OBSTETRICS & GYNECOLOGY

## 2020-09-17 PROCEDURE — 99024 POSTOP FOLLOW-UP VISIT: CPT | Performed by: OBSTETRICS & GYNECOLOGY

## 2020-09-17 RX ORDER — HYDROMORPHONE HCL/PF 1 MG/ML
0.5 SYRINGE (ML) INJECTION EVERY 4 HOURS PRN
Status: DISCONTINUED | OUTPATIENT
Start: 2020-09-17 | End: 2020-09-17 | Stop reason: CLARIF

## 2020-09-17 RX ADMIN — Medication 1 TABLET: at 08:43

## 2020-09-17 RX ADMIN — MUPIROCIN: 20 OINTMENT TOPICAL at 16:10

## 2020-09-17 RX ADMIN — TRIAMCINOLONE ACETONIDE 1 APPLICATION: 1 CREAM TOPICAL at 18:15

## 2020-09-17 RX ADMIN — HEPARIN SODIUM 10000 UNITS: 5000 INJECTION INTRAVENOUS; SUBCUTANEOUS at 20:22

## 2020-09-17 RX ADMIN — ACETAMINOPHEN 650 MG: 325 TABLET, FILM COATED ORAL at 23:32

## 2020-09-17 RX ADMIN — NIFEDIPINE 30 MG: 30 TABLET, FILM COATED, EXTENDED RELEASE ORAL at 08:42

## 2020-09-17 RX ADMIN — ACETAMINOPHEN 650 MG: 325 TABLET, FILM COATED ORAL at 05:42

## 2020-09-17 RX ADMIN — MUPIROCIN 1 APPLICATION: 20 OINTMENT TOPICAL at 08:47

## 2020-09-17 RX ADMIN — OXYCODONE HYDROCHLORIDE 5 MG: 5 TABLET ORAL at 16:09

## 2020-09-17 RX ADMIN — TRIAMCINOLONE ACETONIDE 1 APPLICATION: 1 CREAM TOPICAL at 08:47

## 2020-09-17 RX ADMIN — OXYCODONE HYDROCHLORIDE 5 MG: 5 TABLET ORAL at 21:57

## 2020-09-17 RX ADMIN — OXYCODONE HYDROCHLORIDE 5 MG: 5 TABLET ORAL at 11:42

## 2020-09-17 RX ADMIN — ACETAMINOPHEN 650 MG: 325 TABLET, FILM COATED ORAL at 18:13

## 2020-09-17 RX ADMIN — HEPARIN SODIUM 10000 UNITS: 5000 INJECTION INTRAVENOUS; SUBCUTANEOUS at 08:42

## 2020-09-17 RX ADMIN — OXYCODONE HYDROCHLORIDE 5 MG: 5 TABLET ORAL at 05:42

## 2020-09-17 RX ADMIN — ACETAMINOPHEN 650 MG: 325 TABLET, FILM COATED ORAL at 11:42

## 2020-09-17 NOTE — PLAN OF CARE
Problem: NEUROSENSORY - ADULT  Goal: Achieves stable or improved neurological status  Description: INTERVENTIONS  - Monitor and report changes in neurological status  - Monitor vital signs such as temperature, blood pressure, glucose, and any other labs ordered   - Initiate measures to prevent increased intracranial pressure  - Monitor for seizure activity and implement precautions if appropriate      Outcome: Progressing  Goal: Remains free of injury related to seizures activity  Description: INTERVENTIONS  - Maintain airway, patient safety  and administer oxygen as ordered  - Monitor patient for seizure activity, document and report duration and description of seizure to physician/advanced practitioner  - If seizure occurs,  ensure patient safety during seizure  - Reorient patient post seizure  - Seizure pads on all 4 side rails  - Instruct patient/family to notify RN of any seizure activity including if an aura is experienced  - Instruct patient/family to call for assistance with activity based on nursing assessment  - Administer anti-seizure medications if ordered    Outcome: Progressing     Problem: CARDIOVASCULAR - ADULT  Goal: Maintains optimal cardiac output and hemodynamic stability  Description: INTERVENTIONS:  - Monitor I/O, vital signs and rhythm  - Monitor for S/S and trends of decreased cardiac output  - Administer and titrate ordered vasoactive medications to optimize hemodynamic stability  - Assess quality of pulses, skin color and temperature  - Assess for signs of decreased coronary artery perfusion  - Instruct patient to report change in severity of symptoms  Outcome: Progressing     Problem: PAIN - ADULT  Goal: Verbalizes/displays adequate comfort level or baseline comfort level  Description: Interventions:  - Encourage patient to monitor pain and request assistance  - Assess pain using appropriate pain scale  - Administer analgesics based on type and severity of pain and evaluate response  - Implement non-pharmacological measures as appropriate and evaluate response  - Consider cultural and social influences on pain and pain management  - Notify physician/advanced practitioner if interventions unsuccessful or patient reports new pain  Outcome: Progressing     Problem: INFECTION - ADULT  Goal: Absence or prevention of progression during hospitalization  Description: INTERVENTIONS:  - Assess and monitor for signs and symptoms of infection  - Monitor lab/diagnostic results  - Monitor all insertion sites, i e  indwelling lines, tubes, and drains  - Monitor endotracheal if appropriate and nasal secretions for changes in amount and color  - [place] appropriate cooling/warming therapies per order  - Administer medications as ordered  - Instruct and encourage patient and family to use good hand hygiene technique  - Identify and instruct in appropriate isolation precautions for identified infection/condition  Outcome: Progressing  Goal: Absence of fever/infection during neutropenic period  Description: INTERVENTIONS:  - Monitor WBC    Outcome: Progressing     Problem: SAFETY ADULT  Goal: Patient will remain free of falls  Description: INTERVENTIONS:  - Assess patient frequently for physical needs  -  Identify cognitive and physical deficits and behaviors that affect risk of falls    -  [place] fall precautions as indicated by assessment   - Educate patient/family on patient safety including physical limitations  - Instruct patient to call for assistance with activity based on assessment  - Modify environment to reduce risk of injury  - Consider OT/PT consult to assist with strengthening/mobility  Outcome: Progressing  Goal: Maintain or return to baseline ADL function  Description: INTERVENTIONS:  -  Assess patient's ability to carry out ADLs; assess patient's baseline for ADL function and identify physical deficits which impact ability to perform ADLs (bathing, care of mouth/teeth, toileting, grooming, dressing, etc )  - Assess/evaluate cause of self-care deficits   - Assess range of motion  - Assess patient's mobility; develop plan if impaired  - Assess patient's need for assistive devices and provide as appropriate  - Encourage maximum independence but intervene and supervise when necessary  - Involve family in performance of ADLs  - Assess for home care needs following discharge   - Consider OT consult to assist with ADL evaluation and planning for discharge  - Provide patient education as appropriate  Outcome: Progressing  Goal: Maintain or return mobility status to optimal level  Description: INTERVENTIONS:  - Assess patient's baseline mobility status (ambulation, transfers, stairs, etc )    - Identify cognitive and physical deficits and behaviors that affect mobility  - Identify mobility aids required to assist with transfers and/or ambulation (gait belt, sit-to-stand, lift, walker, cane, etc )  - [place] fall precautions as indicated by assessment  - Record patient progress and toleration of activity level on Mobility SBAR; progress patient to next Phase/Stage  - Instruct patient to call for assistance with activity based on assessment  - Consider rehabilitation consult to assist with strengthening/weightbearing, etc   Outcome: Progressing     Problem: Knowledge Deficit  Goal: Patient/family/caregiver demonstrates understanding of disease process, treatment plan, medications, and discharge instructions  Description: Complete learning assessment and assess knowledge base    Interventions:  - Provide teaching at level of understanding  - Provide teaching via preferred learning methods  Outcome: Progressing     Problem: DISCHARGE PLANNING  Goal: Discharge to home or other facility with appropriate resources  Description: INTERVENTIONS:  - Identify barriers to discharge w/patient and caregiver  - Arrange for needed discharge resources and transportation as appropriate  - Identify discharge learning needs (meds, wound care, etc )  - Arrange for interpretive services to assist at discharge as needed  - Refer to Case Management Department for coordinating discharge planning if the patient needs post-hospital services based on physician/advanced practitioner order or complex needs related to functional status, cognitive ability, or social support system  Outcome: Progressing     Problem: Potential for Falls  Goal: Patient will remain free of falls  Description: INTERVENTIONS:  - Assess patient frequently for physical needs  -  Identify cognitive and physical deficits and behaviors that affect risk of falls    -  [place] fall precautions as indicated by assessment   - Educate patient/family on patient safety including physical limitations  - Instruct patient to call for assistance with activity based on assessment  - Modify environment to reduce risk of injury  - Consider OT/PT consult to assist with strengthening/mobility  Outcome: Progressing     Problem: POSTPARTUM  Goal: Experiences normal postpartum course  Description: INTERVENTIONS:  - Monitor maternal vital signs  - Assess uterine involution and lochia  Outcome: Progressing  Goal: Appropriate maternal -  bonding  Description: INTERVENTIONS:  - Identify family support  - Assess for appropriate maternal/infant bonding   -Encourage maternal/infant bonding opportunities  - Referral to  or  as needed  Outcome: Progressing  Goal: Establishment of infant feeding pattern  Description: INTERVENTIONS:  - Assess breast pumping q2-3 hrs  - Refer to lactation as needed  Outcome: Progressing  Goal: Incision(s), wounds(s) or drain site(s) healing without S/S of infection  Description: INTERVENTIONS  - Assess and document risk factors for skin impairment   - Assess and document dressing, incision and surrounding tissue  - Consider nutrition services referral as needed  - Oral mucous membranes remain intact  - Provide patient/ family education  Outcome: Progressing

## 2020-09-17 NOTE — PROGRESS NOTES
Progress Note - OB/GYN   Charleen Chacon 35 y o  female MRN: [id number]  Unit/Bed#: -01 Encounter: [id number]    Assessment:  35 y o  [id number] s/p Primary low transverse  section post-operative day 1    Plan:  1  Routine post-partum care              - Encourage ambulation              - Pain control as needed              - Advance diet as tolerated              - Rhogam not indicated              - Voiding              - Heparin 73574 units BID for DVT prophylaxis     2  Preeclampsia with severe features              - BP 120s - 130s / 70s - 80s overnight              - Magnesium discontinued 9/15/20              - Procardia 30 mg XL daily              - Labs normalizing     3  URSULA              - Creatinine 0 85 --> 1 22 --> 1 34 --> 1 09 --> 0 82 today              - LR at 75 ml/hr              - Avoid nephrotoxics    Subjective/Objective   Chief Complaint:       Subjective:  Mariam You is well appearing and has no complaints at this time  She denies any dizziness, nausea, vomiting, chest pain, shortness of breath, palpitations, or headaches  Pain: Well controlled with pain medication regimen  Tolerating PO: yes  Voiding: yes  Flatus: yes  BM: no  Ambulating: yes  Breastfeeding: pumping  Chest pain: no  Shortness of breath: no  Leg pain: no  Lochia: Decreasing    Objective:     Vitals: Blood pressure 147/79, pulse 78, temperature 98 °F (36 7 °C), temperature source Oral, resp  rate 18, height 5' 2" (1 575 m), weight 85 7 kg (189 lb), last menstrual period 2020, SpO2 98 %, currently breastfeeding      Intake/Output Summary (Last 24 hours) at 2020 0656  Last data filed at 2020 0830  Gross per 24 hour   Intake --   Output 400 ml   Net -400 ml       Physical Exam:     General: NAD  Cardiovascular: RRR, no murmur, nl S1/S2   Lungs: CTAB, non-labored breathing   Abdomen: Soft, no distension/rebound/guarding/tenderness   Fundus: Firm, non-tender, fundus: -1 cm below the umbilicus Incision: C/D/I  Lower Extremities: Non-tender      Lab, Imaging and other studies:     Recent Results (from the past 72 hour(s))   Comprehensive metabolic panel    Collection Time: 09/15/20  4:59 AM   Result Value Ref Range    Sodium 130 (L) 136 - 145 mmol/L    Potassium 4 7 3 5 - 5 3 mmol/L    Chloride 99 (L) 100 - 108 mmol/L    CO2 22 21 - 32 mmol/L    ANION GAP 9 4 - 13 mmol/L    BUN 17 5 - 25 mg/dL    Creatinine 1 22 0 60 - 1 30 mg/dL    Glucose 115 65 - 140 mg/dL    Calcium 8 3 8 3 - 10 1 mg/dL    AST 94 (H) 5 - 45 U/L     (H) 12 - 78 U/L    Alkaline Phosphatase 292 (H) 46 - 116 U/L    Total Protein 7 4 6 4 - 8 2 g/dL    Albumin 1 9 (L) 3 5 - 5 0 g/dL    Total Bilirubin 0 29 0 20 - 1 00 mg/dL    eGFR 69 ml/min/1 73sq m   Type and screen    Collection Time: 09/15/20  4:59 AM   Result Value Ref Range    ABO Grouping B     Rh Factor Positive     Antibody Screen Negative     Specimen Expiration Date 20200918    LD,Blood    Collection Time: 09/15/20  5:20 AM   Result Value Ref Range     (H) 81 - 234 U/L   CBC and differential    Collection Time: 09/15/20  6:49 AM   Result Value Ref Range    WBC 16 75 (H) 4 31 - 10 16 Thousand/uL    RBC 4 81 3 81 - 5 12 Million/uL    Hemoglobin 14 7 11 5 - 15 4 g/dL    Hematocrit 43 4 34 8 - 46 1 %    MCV 90 82 - 98 fL    MCH 30 6 26 8 - 34 3 pg    MCHC 33 9 31 4 - 37 4 g/dL    RDW 12 1 11 6 - 15 1 %    MPV 10 9 8 9 - 12 7 fL    Platelets 426 932 - 152 Thousands/uL    nRBC 0 /100 WBCs    Neutrophils Relative 88 (H) 43 - 75 %    Immat GRANS % 1 0 - 2 %    Lymphocytes Relative 10 (L) 14 - 44 %    Monocytes Relative 1 (L) 4 - 12 %    Eosinophils Relative 0 0 - 6 %    Basophils Relative 0 0 - 1 %    Neutrophils Absolute 14 67 (H) 1 85 - 7 62 Thousands/µL    Immature Grans Absolute 0 11 0 00 - 0 20 Thousand/uL    Lymphocytes Absolute 1 71 0 60 - 4 47 Thousands/µL    Monocytes Absolute 0 24 0 17 - 1 22 Thousand/µL    Eosinophils Absolute 0 00 0 00 - 0 61 Thousand/µL Basophils Absolute 0 02 0 00 - 0 10 Thousands/µL   Protime-INR    Collection Time: 09/15/20  6:49 AM   Result Value Ref Range    Protime 12 7 11 6 - 14 5 seconds    INR 0 94 0 84 - 1 19   APTT    Collection Time: 09/15/20  6:49 AM   Result Value Ref Range    PTT 39 (H) 23 - 37 seconds   Blood gas, venous, cord    Collection Time: 09/15/20  8:52 AM   Result Value Ref Range    pH, Cord Abhilash 7 186 (L) 7 190 - 7 490    pCO2, Cord Abhilash 62 4 (H) 27 0 - 43 0 mm HG    pO2, Cord Abhilash 13 0 (L) 15 0 - 45 0 mm HG    HCO3, Cord Abhilash 23 1 12 2 - 28 6 mmol/L    Base Exc, Cord Abhilash -6 3 (L) 1 0 - 9 0 mmol/L    O2 Cont, Cord Abhilash 3 1 mL/dL    O2 HGB,VENOUS CORD 14 9 %   Blood gas, arterial, cord    Collection Time: 09/15/20  8:52 AM   Result Value Ref Range    pH, Cord Art 7 188 (L) 7 230 - 7 430    pCO2, Cord Art 65 9 (H) 30 0 - 60 0    pO2, Cord Art <10 0 5 0 - 25 0 mm HG    HCO3, Cord Art 24 5 17 3 - 27 3 mmol/L    Base Exc, Cord Art -5 1 (L) 3 0 - 11 0 mmol/L    O2 Content, Cord Art      O2 Hgb, Arterial Cord 9 3 %   Tissue Exam OB (Placenta) Only    Collection Time: 09/15/20  8:53 AM   Result Value Ref Range    Case Report       Surgical Pathology Report                         Case: [id number]                                   Authorizing Provider:  Nafisa Oswald MD     Collected:           09/15/2020 0853              Ordering Location:     [facility]        Received:            09/15/2020 [address] and                                                                                  Delivery                                                                     Pathologist:           Stephen Lee MD                                                              Specimen:    Placenta, placenta for exam                                                                Final Diagnosis       A   Placenta, placenta for exam ( 147 gm)  -Third-trimester placenta with paracentrically inserted three vessel umbilical cord  - No chorioamnionitis, no funisitis and no villitis seen  - Placental size and weight are less than 10th percentile for gestational age with accelerated villous maturation , increased syncytial knots and focal calcifications  -Placental infarct ( ~15% of the maternal surface)  -Loosely adherent clotted blood seen  - No malformative condition and no evidence of neoplasia          Note       Interpretation performed at [address] [place] [address], [address] [address]      Clinical data  Op notes   Pre-operative Diagnosis:   30w2d pregnancy  Poor fetal growth affecting fetus in 3rd trimester  Pre-eclampsia with severe features  Obesity           Additional Information       All reported additional testing was performed with appropriately reactive controls  These tests were developed and their performance characteristics determined by [facility] or appropriate performing facility, though some tests may be performed on tissues which have not been validated for performance characteristics (such as staining performed on alcohol exposed cell blocks and decalcified tissues)  Results should be interpreted with caution and in the context of the patients clinical condition  These tests may not be cleared or approved by the [place]  Food and Drug Administration, though the FDA has determined that such clearance or approval is not necessary  These tests are used for clinical purposes and they should not be regarded as investigational or for research  This laboratory has been approved by CLIA 88, designated as a high-complexity laboratory and is qualified to perform these tests  Mariella Sutherland Description        A  The specimen is received in formalin, labeled with the patient's name and hospital number, and is designated "placenta for exam  It consists of a ramirez, atrophied placenta with attached membranes and umbilical cord    The placental disc measures 12 8 x 10 0 x 2 1 cm  The umbilical cord is tan, edematous, trivascular, and demonstrates helical twisting  The umbilical cord measures 09 7 cm in length by 0 7 cm in average diameter  The umbilical cord appears to insert centrally and is 4 0 cm from the nearest placental edge  The membranes are tan-pink, thin, semi translucent and insert marginally  The point of rupture cannot be determined  After removal of the membranes and umbilical cord the placental disc weighs 147 0 g  The fetal surface is purple-red with a normal distribution of vessels and no distinct subchorionic fibrin deposition is identified  The maternal surface contains red-pink, torn cotyledons that contain loosely adherent clotted blood  The maternal surface displays multiple yellow, firm, well-circumscribed plaques that range from 0 5 x 0 4 cm to 0 9 x 0 8 cm  The plaques occupy 10% of the total maternal surface  The placental disc is serially sectioned to reveal red, and spongy parenchyma  Sectioning reveals the plaques from the maternal surface to extend down into the parenchyma to depths that range from 0 5 cm to 2 0 cm  The plaques occupy 15% of the total parenchyma  No distinct calcifications are grossly identified  Representative sections are submitted as follows:    A1: Umbilical cord  A2: Fetal membranes  A3-A4: Body of placenta, representative sections  A5-A6: Plaques to demonstrate their involvement on the maternal surface with extension down into the parenchyma    Note: The estimated total formalin fixation time based upon information provided by the submitting clinician and the standard processing schedule is under 72 hours      [facility]           Clinical Information       Gestational Age:30-2  Fetal /  Indications: IUGR  Maternal Indications: pre- eclamptic w/SF  Placental Indications:same   CBC and differential    Collection Time: 09/15/20  1:05 PM   Result Value Ref Range    WBC 26 45 (H) 4 31 - 10 16 Thousand/uL    RBC 4 21 3 81 - 5 12 Million/uL    Hemoglobin 12 7 11 5 - 15 4 g/dL    Hematocrit 38 7 34 8 - 46 1 %    MCV 92 82 - 98 fL    MCH 30 2 26 8 - 34 3 pg    MCHC 32 8 31 4 - 37 4 g/dL    RDW 12 4 11 6 - 15 1 %    MPV 10 7 8 9 - 12 7 fL    Platelets 069 961 - 748 Thousands/uL    nRBC 0 /100 WBCs    Neutrophils Relative 88 (H) 43 - 75 %    Immat GRANS % 1 0 - 2 %    Lymphocytes Relative 6 (L) 14 - 44 %    Monocytes Relative 5 4 - 12 %    Eosinophils Relative 0 0 - 6 %    Basophils Relative 0 0 - 1 %    Neutrophils Absolute 23 09 (H) 1 85 - 7 62 Thousands/µL    Immature Grans Absolute 0 28 (H) 0 00 - 0 20 Thousand/uL    Lymphocytes Absolute 1 60 0 60 - 4 47 Thousands/µL    Monocytes Absolute 1 41 (H) 0 17 - 1 22 Thousand/µL    Eosinophils Absolute 0 00 0 00 - 0 61 Thousand/µL    Basophils Absolute 0 07 0 00 - 0 10 Thousands/µL   Comprehensive metabolic panel    Collection Time: 09/15/20  1:05 PM   Result Value Ref Range    Sodium 132 (L) 136 - 145 mmol/L    Potassium 4 8 3 5 - 5 3 mmol/L    Chloride 101 100 - 108 mmol/L    CO2 23 21 - 32 mmol/L    ANION GAP 8 4 - 13 mmol/L    BUN 16 5 - 25 mg/dL    Creatinine 1 34 (H) 0 60 - 1 30 mg/dL    Glucose 133 65 - 140 mg/dL    Calcium 7 4 (L) 8 3 - 10 1 mg/dL    AST 66 (H) 5 - 45 U/L    ALT 87 (H) 12 - 78 U/L    Alkaline Phosphatase 214 (H) 46 - 116 U/L    Total Protein 5 8 (L) 6 4 - 8 2 g/dL    Albumin 1 5 (L) 3 5 - 5 0 g/dL    Total Bilirubin 0 19 (L) 0 20 - 1 00 mg/dL    eGFR 62 ml/min/1 73sq m   CBC and differential    Collection Time: 09/16/20  5:15 AM   Result Value Ref Range    WBC 19 72 (H) 4 31 - 10 16 Thousand/uL    RBC 3 35 (L) 3 81 - 5 12 Million/uL    Hemoglobin 10 2 (L) 11 5 - 15 4 g/dL    Hematocrit 31 0 (L) 34 8 - 46 1 %    MCV 93 82 - 98 fL    MCH 30 4 26 8 - 34 3 pg    MCHC 32 9 31 4 - 37 4 g/dL    RDW 12 6 11 6 - 15 1 %    MPV 10 6 8 9 - 12 7 fL    Platelets 454 029 - 293 Thousands/uL    nRBC 0 /100 WBCs    Neutrophils Relative 84 (H) 43 - 75 % Immat GRANS % 1 0 - 2 %    Lymphocytes Relative 7 (L) 14 - 44 %    Monocytes Relative 8 4 - 12 %    Eosinophils Relative 0 0 - 6 %    Basophils Relative 0 0 - 1 %    Neutrophils Absolute 16 59 (H) 1 85 - 7 62 Thousands/µL    Immature Grans Absolute 0 26 (H) 0 00 - 0 20 Thousand/uL    Lymphocytes Absolute 1 35 0 60 - 4 47 Thousands/µL    Monocytes Absolute 1 49 (H) 0 17 - 1 22 Thousand/µL    Eosinophils Absolute 0 00 0 00 - 0 61 Thousand/µL    Basophils Absolute 0 03 0 00 - 0 10 Thousands/µL   Comprehensive metabolic panel    Collection Time: 09/16/20  5:15 AM   Result Value Ref Range    Sodium 135 (L) 136 - 145 mmol/L    Potassium 5 2 3 5 - 5 3 mmol/L    Chloride 105 100 - 108 mmol/L    CO2 24 21 - 32 mmol/L    ANION GAP 6 4 - 13 mmol/L    BUN 18 5 - 25 mg/dL    Creatinine 1 09 0 60 - 1 30 mg/dL    Glucose 102 65 - 140 mg/dL    Calcium 6 7 (L) 8 3 - 10 1 mg/dL    AST 49 (H) 5 - 45 U/L    ALT 68 12 - 78 U/L    Alkaline Phosphatase 158 (H) 46 - 116 U/L    Total Protein 4 9 (L) 6 4 - 8 2 g/dL    Albumin 1 3 (L) 3 5 - 5 0 g/dL    Total Bilirubin 0 11 (L) 0 20 - 1 00 mg/dL    eGFR 79 ml/min/1 73sq m   Magnesium    Collection Time: 09/16/20  5:15 AM   Result Value Ref Range    Magnesium 5 5 (HH) 1 6 - 2 6 mg/dL   Comprehensive metabolic panel    Collection Time: 09/17/20  5:43 AM   Result Value Ref Range    Sodium 137 136 - 145 mmol/L    Potassium 4 9 3 5 - 5 3 mmol/L    Chloride 106 100 - 108 mmol/L    CO2 26 21 - 32 mmol/L    ANION GAP 5 4 - 13 mmol/L    BUN 16 5 - 25 mg/dL    Creatinine 0 82 0 60 - 1 30 mg/dL    Glucose 78 65 - 140 mg/dL    Calcium 7 3 (L) 8 3 - 10 1 mg/dL    Corrected Calcium 9 4 8 3 - 10 1 mg/dL    AST 46 (H) 5 - 45 U/L    ALT 47 12 - 78 U/L    Alkaline Phosphatase 133 (H) 46 - 116 U/L    Total Protein 5 0 (L) 6 4 - 8 2 g/dL    Albumin 1 4 (L) 3 5 - 5 0 g/dL    Total Bilirubin 0 15 (L) 0 20 - 1 00 mg/dL    eGFR 112 ml/min/1 73sq m     Meds:  acetaminophen, 650 mg, Oral, Q6H [place]  dupilumab, 300 mg, Subcutaneous, Q14 Days  heparin (porcine), 10,000 Units, Subcutaneous, Q12H [place]  mupirocin, , Topical, TID  NIFEdipine, 30 mg, Oral, Daily  prenatal multivitamin, 1 tablet, Oral, Daily  triamcinolone, , Topical, BID      albuterol, 2 puff, Q4H PRN  calcium carbonate, 1,000 mg, Daily PRN  diphenhydrAMINE, 25 mg, Q6H PRN  docusate sodium, 100 mg, Daily PRN  HYDROmorphone, 1 mg, Q4H PRN  ondansetron, 4 mg, Q8H PRN  ondansetron, 4 mg, Q6H PRN  oxyCODONE, 10 mg, Q4H PRN  oxyCODONE, 5 mg, Q4H PRN  polyethylene glycol, 17 g, Daily PRN  simethicone, 80 mg, 4x Daily PRN              Signature / Title: Sabra Neff MD, Ob/Gyn, PGY-2  Date: 9/17/2020  Time: 6:56 AM

## 2020-09-18 PROCEDURE — 99024 POSTOP FOLLOW-UP VISIT: CPT | Performed by: OBSTETRICS & GYNECOLOGY

## 2020-09-18 RX ORDER — OXYCODONE HYDROCHLORIDE 5 MG/1
5 TABLET ORAL EVERY 4 HOURS PRN
Status: DISCONTINUED | OUTPATIENT
Start: 2020-09-18 | End: 2020-09-20 | Stop reason: HOSPADM

## 2020-09-18 RX ORDER — NIFEDIPINE 30 MG/1
30 TABLET, EXTENDED RELEASE ORAL 2 TIMES DAILY
Status: DISCONTINUED | OUTPATIENT
Start: 2020-09-18 | End: 2020-09-18

## 2020-09-18 RX ORDER — NIFEDIPINE 30 MG/1
60 TABLET, EXTENDED RELEASE ORAL DAILY
Status: DISCONTINUED | OUTPATIENT
Start: 2020-09-18 | End: 2020-09-20 | Stop reason: HOSPADM

## 2020-09-18 RX ORDER — IBUPROFEN 600 MG/1
600 TABLET ORAL EVERY 6 HOURS PRN
Status: DISCONTINUED | OUTPATIENT
Start: 2020-09-18 | End: 2020-09-20 | Stop reason: HOSPADM

## 2020-09-18 RX ORDER — OXYCODONE HYDROCHLORIDE 10 MG/1
10 TABLET ORAL EVERY 4 HOURS PRN
Status: DISCONTINUED | OUTPATIENT
Start: 2020-09-18 | End: 2020-09-20 | Stop reason: HOSPADM

## 2020-09-18 RX ADMIN — OXYCODONE HYDROCHLORIDE 5 MG: 5 TABLET ORAL at 19:45

## 2020-09-18 RX ADMIN — IBUPROFEN 600 MG: 600 TABLET, FILM COATED ORAL at 14:32

## 2020-09-18 RX ADMIN — ACETAMINOPHEN 650 MG: 325 TABLET, FILM COATED ORAL at 23:41

## 2020-09-18 RX ADMIN — TRIAMCINOLONE ACETONIDE: 1 CREAM TOPICAL at 18:20

## 2020-09-18 RX ADMIN — NIFEDIPINE 60 MG: 30 TABLET, FILM COATED, EXTENDED RELEASE ORAL at 08:40

## 2020-09-18 RX ADMIN — MUPIROCIN: 20 OINTMENT TOPICAL at 08:41

## 2020-09-18 RX ADMIN — OXYCODONE HYDROCHLORIDE 5 MG: 5 TABLET ORAL at 13:09

## 2020-09-18 RX ADMIN — MUPIROCIN: 20 OINTMENT TOPICAL at 16:59

## 2020-09-18 RX ADMIN — Medication 1 TABLET: at 08:39

## 2020-09-18 RX ADMIN — TRIAMCINOLONE ACETONIDE: 1 CREAM TOPICAL at 08:41

## 2020-09-18 RX ADMIN — IBUPROFEN 600 MG: 600 TABLET, FILM COATED ORAL at 22:01

## 2020-09-18 RX ADMIN — HYDROMORPHONE HYDROCHLORIDE 1 MG: 1 INJECTION, SOLUTION INTRAMUSCULAR; INTRAVENOUS; SUBCUTANEOUS at 00:04

## 2020-09-18 RX ADMIN — IBUPROFEN 600 MG: 600 TABLET, FILM COATED ORAL at 08:21

## 2020-09-18 RX ADMIN — HEPARIN SODIUM 10000 UNITS: 5000 INJECTION INTRAVENOUS; SUBCUTANEOUS at 08:40

## 2020-09-18 RX ADMIN — OXYCODONE HYDROCHLORIDE 5 MG: 5 TABLET ORAL at 23:41

## 2020-09-18 RX ADMIN — HEPARIN SODIUM 10000 UNITS: 5000 INJECTION INTRAVENOUS; SUBCUTANEOUS at 21:52

## 2020-09-18 RX ADMIN — ACETAMINOPHEN 650 MG: 325 TABLET, FILM COATED ORAL at 05:07

## 2020-09-18 RX ADMIN — ACETAMINOPHEN 650 MG: 325 TABLET, FILM COATED ORAL at 18:20

## 2020-09-18 RX ADMIN — MUPIROCIN: 20 OINTMENT TOPICAL at 21:00

## 2020-09-18 RX ADMIN — ACETAMINOPHEN 650 MG: 325 TABLET, FILM COATED ORAL at 11:56

## 2020-09-18 RX ADMIN — OXYCODONE HYDROCHLORIDE 10 MG: 10 TABLET ORAL at 05:07

## 2020-09-18 NOTE — PROGRESS NOTES
Progress Note - OB/GYN   Lianna Villegas 35 y o  female MRN: [id number]  Unit/Bed#: -01 Encounter: [id number]    Assessment:  35 y o  [id number] s/p Primary low transverse  section post-operative day 3  Pregnancy c/b PEC w/ SF, IUGR, and absent umbilical artery flow, with delivery at 30w2d  Postpartum course c/b SOB  Patient recovering well, Stable    Plan:  1  Postpartum  Continue routine post partum care  Pain management PRN --> now scheduled  Added on ibuprofen 600mg q6 PRN for moderate pain  Encourage ambulation  Encourage breastfeeding  Heparin 10,000 U BID   - 85 7kg    2  PEC w/ SF  Urine P:C 3 5  Magnesium discontinued 9/15/20  Procardia XL 30 mg daily  Labs normalizing    3  URSULA  Creatinine 0 85 --> 1 22 (9/15)--> 1 34--> 1 09 ()--> 0 82 ()  LR at 75 ml/hr  No longer need to avoid nephrotoxic agents    4  Shortness of breath   Now resolved  CTA showing pleural effusions  S/p Lasix x1   --> echo WNL  H/o asthma    5  Discharge  Desires Nexplanon for PPBC; per notes, not eligible for IP insertion, will need interval placement  Anticipate d/c tomorrow      Subjective/Objective   Chief Complaint:    Postpartum state    Subjective:   Pt feels well overall, with moderate pain control  She has a complaint of bumps on her mons pubis for the last several weeks (first noticed during hospitalization), that are not improving with the ointment prescribed    Pain: yes, cramping, somewhat improved with meds  Tolerating PO: yes  Voiding: yes  Flatus: yes  BM: no  Ambulating: yes  Breastfeeding:  yes  Chest pain: no  Shortness of breath: no  Leg pain: no  Lochia: minimal    Objective:     Vitals: Temp:  [98 °F (36 7 °C)-98 5 °F (36 9 °C)] 98 °F (36 7 °C)  HR:  [] 85  Resp:  [18-20] 18  BP: (133-154)/(79-95) 154/93     No intake or output data in the 24 hours ending 20 0604      Physical Exam:   General: NAD, alert, oriented  Cardio: Regular rate and rhythm, no murmur  Resp: nonlabored breathing, clear to auscultation bilaterally  Abdomen: Soft, no distension/rebound/guarding/tenderness   Fundus: Firm, non-tender, fundus: 1cm below umbilicus  Incision: suture closure, slight serosanguinous drainage, no induration  G/U: minimal lochia noted on pad  Lower Extremities: Non-tender, no palpable cords    Medications:  Current Facility-Administered Medications   Medication Dose Route Frequency    acetaminophen (TYLENOL) tablet 650 mg  650 mg Oral Q6H [address]    albuterol (PROVENTIL HFA,VENTOLIN HFA) inhaler 2 puff  2 puff Inhalation Q4H PRN    calcium carbonate (TUMS) chewable tablet 1,000 mg  1,000 mg Oral Daily PRN    diphenhydrAMINE (BENADRYL) injection 25 mg  25 mg Intravenous Q6H PRN    docusate sodium (COLACE) capsule 100 mg  100 mg Oral Daily PRN    dupilumab (DUPIXENT) subcutaneous injection 300 mg  300 mg Subcutaneous Q14 Days    heparin (porcine) subcutaneous injection 10,000 Units  10,000 Units Subcutaneous Q12H [address]    HYDROmorphone (DILAUDID) injection 1 mg  1 mg Intravenous Q4H PRN    mupirocin (BACTROBAN) 2 % ointment   Topical TID    NIFEdipine (PROCARDIA XL) 24 hr tablet 30 mg  30 mg Oral BID    ondansetron (ZOFRAN) injection 4 mg  4 mg Intravenous Q8H PRN    ondansetron (ZOFRAN-ODT) dispersible tablet 4 mg  4 mg Oral Q6H PRN    oxyCODONE (ROXICODONE) immediate release tablet 10 mg  10 mg Oral Q4H PRN    oxyCODONE (ROXICODONE) IR tablet 5 mg  5 mg Oral Q4H PRN    polyethylene glycol (MIRALAX) packet 17 g  17 g Oral Daily PRN    prenatal multivitamin tablet 1 tablet  1 tablet Oral Daily    simethicone (MYLICON) chewable tablet 80 mg  80 mg Oral 4x Daily PRN    triamcinolone (KENALOG) 0 1 % cream   Topical BID       Labs:   No results found for this or any previous visit (from the past 24 hour(s))        Hector Gu  Ob/Gyn PGY-1  9/18/2020  6:04 AM

## 2020-09-18 NOTE — SOCIAL WORK
Consult(s):  NICU admission  · Delivery method/date:  C/S on 9/15/20   · Age: Gestational age 27w4d  · Admitted to NICU for prematurity and respiratory distress     · Baby's name/gender: Evelyn Ta (Otis)/female   · Mother of baby: Lila Bamberger [phone]  · Father of baby: : Kaylen Lakhani [phone]  · Other Legal Guardian(s) for baby: No  · Alternate emergency contact: MGM: Chente Terrell [phone]  · Other children: 1st baby MOB; 3rd baby FOB  · Lives with: MOB   · Support System: Family/friends  · Baby Supplies: Has some supplies, baby shower schedule, will purchase anything additional following shower  · Bottle or Breast Feeding: Breastfeeding  · Breast Pump if breast feeding: Spectra S2 pump delivered to MOB room  · Government Assistance Programs/WIC/EBT/SSI: No   · Work/School: Both parents work outside the home; MOB on maternity leave  · Transportation: Have transportation   · Pediatrician:  Unsure   · [address] Hx or Treatment: N/A  · Substance Abuse: N/A  · Community Referrals, C&Y, VNA: Denies any hx   · Insurance for baby: MOBs policy; MOB will call to add baby  · POA/LW: No  · NICU Resources and packet: NICU packet provided  · [facility]: Referral sent      CM reviewed discharge planning process including the following: identifying caregivers at home, preference for d/c planning needs, availability of [facility] to Bed program, availability of treatment team to discuss questions or concerns patient and/or family may have regarding diagnosis, plan of care, old or new medications and discharge planning   CM will continue to follow for care coordination and update assessment as appropriate  MOB denies any other CM needs at this time  Encouraged family to contact CM as needed  No other CM needs noted for d/c home when medically cleared   CM dept will follow infant in NICU through dc

## 2020-09-18 NOTE — PLAN OF CARE
Problem: NEUROSENSORY - ADULT  Goal: Achieves stable or improved neurological status  Description: INTERVENTIONS  - Monitor and report changes in neurological status  - Monitor vital signs such as temperature, blood pressure, glucose, and any other labs ordered   - Initiate measures to prevent increased intracranial pressure  - Monitor for seizure activity and implement precautions if appropriate      Outcome: Progressing  Goal: Remains free of injury related to seizures activity  Description: INTERVENTIONS  - Maintain airway, patient safety  and administer oxygen as ordered  - Monitor patient for seizure activity, document and report duration and description of seizure to physician/advanced practitioner  - If seizure occurs,  ensure patient safety during seizure  - Reorient patient post seizure  - Seizure pads on all 4 side rails  - Instruct patient/family to notify RN of any seizure activity including if an aura is experienced  - Instruct patient/family to call for assistance with activity based on nursing assessment  - Administer anti-seizure medications if ordered    Outcome: Progressing     Problem: CARDIOVASCULAR - ADULT  Goal: Maintains optimal cardiac output and hemodynamic stability  Description: INTERVENTIONS:  - Monitor I/O, vital signs and rhythm  - Monitor for S/S and trends of decreased cardiac output  - Administer and titrate ordered vasoactive medications to optimize hemodynamic stability  - Assess quality of pulses, skin color and temperature  - Assess for signs of decreased coronary artery perfusion  - Instruct patient to report change in severity of symptoms  Outcome: Progressing     Problem: PAIN - ADULT  Goal: Verbalizes/displays adequate comfort level or baseline comfort level  Description: Interventions:  - Encourage patient to monitor pain and request assistance  - Assess pain using appropriate pain scale  - Administer analgesics based on type and severity of pain and evaluate response  - Implement non-pharmacological measures as appropriate and evaluate response  - Consider cultural and social influences on pain and pain management  - Notify physician/advanced practitioner if interventions unsuccessful or patient reports new pain  Outcome: Progressing     Problem: INFECTION - ADULT  Goal: Absence or prevention of progression during hospitalization  Description: INTERVENTIONS:  - Assess and monitor for signs and symptoms of infection  - Monitor lab/diagnostic results  - Monitor all insertion sites, i e  indwelling lines, tubes, and drains  - Monitor endotracheal if appropriate and nasal secretions for changes in amount and color  - [place] appropriate cooling/warming therapies per order  - Administer medications as ordered  - Instruct and encourage patient and family to use good hand hygiene technique  - Identify and instruct in appropriate isolation precautions for identified infection/condition  Outcome: Progressing     Problem: SAFETY ADULT  Goal: Patient will remain free of falls  Description: INTERVENTIONS:  - Assess patient frequently for physical needs  -  Identify cognitive and physical deficits and behaviors that affect risk of falls    -  [place] fall precautions as indicated by assessment   - Educate patient/family on patient safety including physical limitations  - Instruct patient to call for assistance with activity based on assessment  - Modify environment to reduce risk of injury  - Consider OT/PT consult to assist with strengthening/mobility  Outcome: Progressing  Goal: Maintain or return to baseline ADL function  Description: INTERVENTIONS:  -  Assess patient's ability to carry out ADLs; assess patient's baseline for ADL function and identify physical deficits which impact ability to perform ADLs (bathing, care of mouth/teeth, toileting, grooming, dressing, etc )  - Assess/evaluate cause of self-care deficits   - Assess range of motion  - Assess patient's mobility; develop plan if impaired  - Assess patient's need for assistive devices and provide as appropriate  - Encourage maximum independence but intervene and supervise when necessary  - Involve family in performance of ADLs  - Assess for home care needs following discharge   - Consider OT consult to assist with ADL evaluation and planning for discharge  - Provide patient education as appropriate  Outcome: Progressing  Goal: Maintain or return mobility status to optimal level  Description: INTERVENTIONS:  - Assess patient's baseline mobility status (ambulation, transfers, stairs, etc )    - Identify cognitive and physical deficits and behaviors that affect mobility  - Identify mobility aids required to assist with transfers and/or ambulation (gait belt, sit-to-stand, lift, walker, cane, etc )  - [place] fall precautions as indicated by assessment  - Record patient progress and toleration of activity level on Mobility SBAR; progress patient to next Phase/Stage  - Instruct patient to call for assistance with activity based on assessment  - Consider rehabilitation consult to assist with strengthening/weightbearing, etc   Outcome: Progressing     Problem: Knowledge Deficit  Goal: Patient/family/caregiver demonstrates understanding of disease process, treatment plan, medications, and discharge instructions  Description: Complete learning assessment and assess knowledge base    Interventions:  - Provide teaching at level of understanding  - Provide teaching via preferred learning methods  Outcome: Progressing     Problem: DISCHARGE PLANNING  Goal: Discharge to home or other facility with appropriate resources  Description: INTERVENTIONS:  - Identify barriers to discharge w/patient and caregiver  - Arrange for needed discharge resources and transportation as appropriate  - Identify discharge learning needs (meds, wound care, etc )  - Arrange for interpretive services to assist at discharge as needed  - Refer to Case Management Department for coordinating discharge planning if the patient needs post-hospital services based on physician/advanced practitioner order or complex needs related to functional status, cognitive ability, or social support system  Outcome: Progressing     Problem: Potential for Falls  Goal: Patient will remain free of falls  Description: INTERVENTIONS:  - Assess patient frequently for physical needs  -  Identify cognitive and physical deficits and behaviors that affect risk of falls    -  [place] fall precautions as indicated by assessment   - Educate patient/family on patient safety including physical limitations  - Instruct patient to call for assistance with activity based on assessment  - Modify environment to reduce risk of injury  - Consider OT/PT consult to assist with strengthening/mobility  Outcome: Progressing     Problem: POSTPARTUM  Goal: Experiences normal postpartum course  Description: INTERVENTIONS:  - Monitor maternal vital signs  - Assess uterine involution and lochia  Outcome: Progressing  Goal: Appropriate maternal -  bonding  Description: INTERVENTIONS:  - Identify family support  - Assess for appropriate maternal/infant bonding   -Encourage maternal/infant bonding opportunities  - Referral to  or  as needed  Outcome: Progressing  Goal: Establishment of infant feeding pattern  Description: INTERVENTIONS:  - Assess breast pumping q2-3 hrs  - Refer to lactation as needed  Outcome: Progressing  Goal: Incision(s), wounds(s) or drain site(s) healing without S/S of infection  Description: INTERVENTIONS  - Assess and document risk factors for skin impairment   - Assess and document dressing, incision and surrounding tissue  - Consider nutrition services referral as needed  - Oral mucous membranes remain intact  - Provide patient/ family education  Outcome: Progressing

## 2020-09-19 LAB
BASOPHILS # BLD MANUAL: 0 THOUSAND/UL (ref 0–0.1)
BASOPHILS NFR MAR MANUAL: 0 % (ref 0–1)
EOSINOPHIL # BLD MANUAL: 0 THOUSAND/UL (ref 0–0.4)
EOSINOPHIL NFR BLD MANUAL: 0 % (ref 0–6)
ERYTHROCYTE [DISTWIDTH] IN BLOOD BY AUTOMATED COUNT: 12.9 % (ref 11.6–15.1)
GLUCOSE SERPL-MCNC: 111 MG/DL (ref 65–140)
HCT VFR BLD AUTO: 23.5 % (ref 34.8–46.1)
HGB BLD-MCNC: 7.4 G/DL (ref 11.5–15.4)
LYMPHOCYTES # BLD AUTO: 27 % (ref 14–44)
LYMPHOCYTES # BLD AUTO: 6.43 THOUSAND/UL (ref 0.6–4.47)
MCH RBC QN AUTO: 30.6 PG (ref 26.8–34.3)
MCHC RBC AUTO-ENTMCNC: 31.5 G/DL (ref 31.4–37.4)
MCV RBC AUTO: 97 FL (ref 82–98)
MONOCYTES # BLD AUTO: 1.43 THOUSAND/UL (ref 0–1.22)
MONOCYTES NFR BLD: 6 % (ref 4–12)
NEUTROPHILS # BLD MANUAL: 15.97 THOUSAND/UL (ref 1.85–7.62)
NEUTS BAND NFR BLD MANUAL: 2 % (ref 0–8)
NEUTS SEG NFR BLD AUTO: 65 % (ref 43–75)
NRBC BLD AUTO-RTO: 0 /100 WBCS
PLATELET # BLD AUTO: 353 THOUSANDS/UL (ref 149–390)
PLATELET BLD QL SMEAR: ADEQUATE
PMV BLD AUTO: 10.1 FL (ref 8.9–12.7)
POLYCHROMASIA BLD QL SMEAR: PRESENT
RBC # BLD AUTO: 2.42 MILLION/UL (ref 3.81–5.12)
TOTAL CELLS COUNTED SPEC: 100
WBC # BLD AUTO: 23.83 THOUSAND/UL (ref 4.31–10.16)

## 2020-09-19 PROCEDURE — 82948 REAGENT STRIP/BLOOD GLUCOSE: CPT

## 2020-09-19 PROCEDURE — 99024 POSTOP FOLLOW-UP VISIT: CPT | Performed by: OBSTETRICS & GYNECOLOGY

## 2020-09-19 PROCEDURE — 85007 BL SMEAR W/DIFF WBC COUNT: CPT | Performed by: OBSTETRICS & GYNECOLOGY

## 2020-09-19 PROCEDURE — 85027 COMPLETE CBC AUTOMATED: CPT | Performed by: OBSTETRICS & GYNECOLOGY

## 2020-09-19 RX ORDER — NIFEDIPINE 30 MG/1
60 TABLET, FILM COATED, EXTENDED RELEASE ORAL DAILY
Qty: 60 TABLET | Refills: 0 | Status: SHIPPED | OUTPATIENT
Start: 2020-09-20 | End: 2020-09-29

## 2020-09-19 RX ORDER — FERROUS SULFATE 325(65) MG
325 TABLET ORAL
Status: DISCONTINUED | OUTPATIENT
Start: 2020-09-20 | End: 2020-09-20 | Stop reason: HOSPADM

## 2020-09-19 RX ORDER — ACETAMINOPHEN 325 MG/1
650 TABLET ORAL EVERY 6 HOURS SCHEDULED
Qty: 30 TABLET | Refills: 0 | Status: SHIPPED | OUTPATIENT
Start: 2020-09-19 | End: 2020-09-22 | Stop reason: SDUPTHER

## 2020-09-19 RX ORDER — DOCUSATE SODIUM 100 MG/1
100 CAPSULE, LIQUID FILLED ORAL DAILY PRN
Qty: 10 CAPSULE | Refills: 0 | Status: SHIPPED | OUTPATIENT
Start: 2020-09-19 | End: 2020-10-13

## 2020-09-19 RX ORDER — OXYCODONE HYDROCHLORIDE 5 MG/1
5 TABLET ORAL EVERY 4 HOURS PRN
Qty: 10 TABLET | Refills: 0 | Status: SHIPPED | OUTPATIENT
Start: 2020-09-19 | End: 2020-09-22

## 2020-09-19 RX ADMIN — ACETAMINOPHEN 650 MG: 325 TABLET, FILM COATED ORAL at 23:28

## 2020-09-19 RX ADMIN — NIFEDIPINE 60 MG: 30 TABLET, FILM COATED, EXTENDED RELEASE ORAL at 08:49

## 2020-09-19 RX ADMIN — Medication 1 TABLET: at 08:48

## 2020-09-19 RX ADMIN — ACETAMINOPHEN 650 MG: 325 TABLET, FILM COATED ORAL at 12:00

## 2020-09-19 RX ADMIN — HEPARIN SODIUM 10000 UNITS: 5000 INJECTION INTRAVENOUS; SUBCUTANEOUS at 08:49

## 2020-09-19 RX ADMIN — OXYCODONE HYDROCHLORIDE 5 MG: 5 TABLET ORAL at 23:28

## 2020-09-19 RX ADMIN — HEPARIN SODIUM 10000 UNITS: 5000 INJECTION INTRAVENOUS; SUBCUTANEOUS at 21:22

## 2020-09-19 RX ADMIN — ACETAMINOPHEN 650 MG: 325 TABLET, FILM COATED ORAL at 05:53

## 2020-09-19 RX ADMIN — OXYCODONE HYDROCHLORIDE 5 MG: 5 TABLET ORAL at 06:34

## 2020-09-19 RX ADMIN — IBUPROFEN 600 MG: 600 TABLET, FILM COATED ORAL at 15:05

## 2020-09-19 RX ADMIN — MUPIROCIN: 20 OINTMENT TOPICAL at 08:48

## 2020-09-19 RX ADMIN — SIMETHICONE 80 MG: 80 TABLET, CHEWABLE ORAL at 21:17

## 2020-09-19 RX ADMIN — TRIAMCINOLONE ACETONIDE: 1 CREAM TOPICAL at 08:48

## 2020-09-19 RX ADMIN — OXYCODONE HYDROCHLORIDE 5 MG: 5 TABLET ORAL at 12:00

## 2020-09-19 RX ADMIN — ACETAMINOPHEN 650 MG: 325 TABLET, FILM COATED ORAL at 18:47

## 2020-09-19 RX ADMIN — MUPIROCIN: 20 OINTMENT TOPICAL at 15:05

## 2020-09-19 NOTE — PLAN OF CARE
Problem: NEUROSENSORY - ADULT  Goal: Achieves stable or improved neurological status  Description: INTERVENTIONS  - Monitor and report changes in neurological status  - Monitor vital signs such as temperature, blood pressure, glucose, and any other labs ordered   - Initiate measures to prevent increased intracranial pressure  - Monitor for seizure activity and implement precautions if appropriate      Outcome: Progressing  Goal: Remains free of injury related to seizures activity  Description: INTERVENTIONS  - Maintain airway, patient safety  and administer oxygen as ordered  - Monitor patient for seizure activity, document and report duration and description of seizure to physician/advanced practitioner  - If seizure occurs,  ensure patient safety during seizure  - Reorient patient post seizure  - Seizure pads on all 4 side rails  - Instruct patient/family to notify RN of any seizure activity including if an aura is experienced  - Instruct patient/family to call for assistance with activity based on nursing assessment  - Administer anti-seizure medications if ordered    Outcome: Progressing     Problem: CARDIOVASCULAR - ADULT  Goal: Maintains optimal cardiac output and hemodynamic stability  Description: INTERVENTIONS:  - Monitor I/O, vital signs and rhythm  - Monitor for S/S and trends of decreased cardiac output  - Administer and titrate ordered vasoactive medications to optimize hemodynamic stability  - Assess quality of pulses, skin color and temperature  - Assess for signs of decreased coronary artery perfusion  - Instruct patient to report change in severity of symptoms  Outcome: Progressing     Problem: PAIN - ADULT  Goal: Verbalizes/displays adequate comfort level or baseline comfort level  Description: Interventions:  - Encourage patient to monitor pain and request assistance  - Assess pain using appropriate pain scale  - Administer analgesics based on type and severity of pain and evaluate response  - Implement non-pharmacological measures as appropriate and evaluate response  - Consider cultural and social influences on pain and pain management  - Notify physician/advanced practitioner if interventions unsuccessful or patient reports new pain  Outcome: Progressing     Problem: INFECTION - ADULT  Goal: Absence or prevention of progression during hospitalization  Description: INTERVENTIONS:  - Assess and monitor for signs and symptoms of infection  - Monitor lab/diagnostic results  - Monitor all insertion sites, i e  indwelling lines, tubes, and drains  - Monitor endotracheal if appropriate and nasal secretions for changes in amount and color  - [place] appropriate cooling/warming therapies per order  - Administer medications as ordered  - Instruct and encourage patient and family to use good hand hygiene technique  - Identify and instruct in appropriate isolation precautions for identified infection/condition  Outcome: Progressing  Goal: Absence of fever/infection during neutropenic period  Description: INTERVENTIONS:  - Monitor WBC    Outcome: Progressing     Problem: SAFETY ADULT  Goal: Patient will remain free of falls  Description: INTERVENTIONS:  - Assess patient frequently for physical needs  -  Identify cognitive and physical deficits and behaviors that affect risk of falls    -  [place] fall precautions as indicated by assessment   - Educate patient/family on patient safety including physical limitations  - Instruct patient to call for assistance with activity based on assessment  - Modify environment to reduce risk of injury  - Consider OT/PT consult to assist with strengthening/mobility  Outcome: Progressing  Goal: Maintain or return to baseline ADL function  Description: INTERVENTIONS:  -  Assess patient's ability to carry out ADLs; assess patient's baseline for ADL function and identify physical deficits which impact ability to perform ADLs (bathing, care of mouth/teeth, toileting, grooming, dressing, etc )  - Assess/evaluate cause of self-care deficits   - Assess range of motion  - Assess patient's mobility; develop plan if impaired  - Assess patient's need for assistive devices and provide as appropriate  - Encourage maximum independence but intervene and supervise when necessary  - Involve family in performance of ADLs  - Assess for home care needs following discharge   - Consider OT consult to assist with ADL evaluation and planning for discharge  - Provide patient education as appropriate  Outcome: Progressing  Goal: Maintain or return mobility status to optimal level  Description: INTERVENTIONS:  - Assess patient's baseline mobility status (ambulation, transfers, stairs, etc )    - Identify cognitive and physical deficits and behaviors that affect mobility  - Identify mobility aids required to assist with transfers and/or ambulation (gait belt, sit-to-stand, lift, walker, cane, etc )  - [place] fall precautions as indicated by assessment  - Record patient progress and toleration of activity level on Mobility SBAR; progress patient to next Phase/Stage  - Instruct patient to call for assistance with activity based on assessment  - Consider rehabilitation consult to assist with strengthening/weightbearing, etc   Outcome: Progressing     Problem: Knowledge Deficit  Goal: Patient/family/caregiver demonstrates understanding of disease process, treatment plan, medications, and discharge instructions  Description: Complete learning assessment and assess knowledge base    Interventions:  - Provide teaching at level of understanding  - Provide teaching via preferred learning methods  Outcome: Progressing     Problem: DISCHARGE PLANNING  Goal: Discharge to home or other facility with appropriate resources  Description: INTERVENTIONS:  - Identify barriers to discharge w/patient and caregiver  - Arrange for needed discharge resources and transportation as appropriate  - Identify discharge learning needs (meds, wound care, etc )  - Arrange for interpretive services to assist at discharge as needed  - Refer to Case Management Department for coordinating discharge planning if the patient needs post-hospital services based on physician/advanced practitioner order or complex needs related to functional status, cognitive ability, or social support system  Outcome: Progressing     Problem: Potential for Falls  Goal: Patient will remain free of falls  Description: INTERVENTIONS:  - Assess patient frequently for physical needs  -  Identify cognitive and physical deficits and behaviors that affect risk of falls    -  [place] fall precautions as indicated by assessment   - Educate patient/family on patient safety including physical limitations  - Instruct patient to call for assistance with activity based on assessment  - Modify environment to reduce risk of injury  - Consider OT/PT consult to assist with strengthening/mobility  Outcome: Progressing     Problem: POSTPARTUM  Goal: Experiences normal postpartum course  Description: INTERVENTIONS:  - Monitor maternal vital signs  - Assess uterine involution and lochia  Outcome: Progressing  Goal: Appropriate maternal -  bonding  Description: INTERVENTIONS:  - Identify family support  - Assess for appropriate maternal/infant bonding   -Encourage maternal/infant bonding opportunities  - Referral to  or  as needed  Outcome: Progressing  Goal: Establishment of infant feeding pattern  Description: INTERVENTIONS:  - Assess breast pumping q2-3 hrs  - Refer to lactation as needed  Outcome: Progressing  Goal: Incision(s), wounds(s) or drain site(s) healing without S/S of infection  Description: INTERVENTIONS  - Assess and document risk factors for skin impairment   - Assess and document dressing, incision and surrounding tissue  - Consider nutrition services referral as needed  - Oral mucous membranes remain intact  - Provide patient/ family education  Outcome: Progressing

## 2020-09-19 NOTE — PROGRESS NOTES
Postpartum Progress Note - OB/GYN   Awanda Sensor 35 y o  female MRN: [id number]  Unit/Bed#: -01 Encounter: [id number]    Postpartum Day:4    Procedure: Primary low transverse  section    Subjective ROS  Pain: yes  Tolerating Oral Intake: yes   Voiding: yes  Flatus: yes  Bowel Movement: no  Ambulating: yes  Breastfeeding: yes  Chest Pain: no  Shortness of Breath: no  Leg Pain/Discomfort: no  Lochia: min    Vitals: /66 (BP Location: Left arm)   Pulse 101   Temp 98 2 °F (36 8 °C) (Oral)   Resp 18   Ht 5' 2" (1 575 m)   Wt 85 7 kg (189 lb)   LMP 2020 (Exact Date)     No intake or output data in the 24 hours ending 20 1100    Physical Exam  General: appears well, NAD, alert and oriented x 3, pleasant and cooperative   Cardiovascular: Regular, Rate and Rhythm, no murmur  Lungs: Clear to Auscultation Bilaterally, no wheezing, rhonchi or rales   Abdomen: Soft, non-distended, appropriately-tender, no rebound, guarding   Fundus: Firm & appropriately-Tender   Incision: clean, dry, and intact  Lower Extremities: Non-Tender, mild edema  Other: edema of mons pubis    Labs:   No results displayed because visit has over 200 results            Assessment:  35 y o  [id number] s/p , due to PreE with SF, IUGR post-operative day 4    Plan:  Continue routine care, Advance diet, Encourage ambulation and Discharge home today   Continue Procardia 60mg XL daily  F/u 1 week for BP check

## 2020-09-19 NOTE — QUICK NOTE
Patient evaluated s/p fall in shower  The patient states that she was sitting down in the shower letting the hot water run over her back when she felt lightheaded and leaned forward  She does not remember anything after that and her  says that he picked her up and carried her into the bed  They then called the nurse to came while she was sitting up in the bed  She states that she thinks she hit her mouth because she now has a small clot over the right lower canine  A set of vitals showed heart rate of 118, /81  Fingerstick glucose 111  As she was recently started on Procardia 60 XL, there was a concern for dropping blood pressure  Plan to repeat CBC as she is tachy to 118 and blood pressure checks q2hrs  Last Hgb 10 2 on 9/16/20  Discussed w/ Dr Escobar, DO  Obstetrics & Gynecology PGY-1  9/19/2020  4:45 PM

## 2020-09-19 NOTE — NURSING NOTE
Pt was found on side of bed after fainting in shower per her partner  He states that she fainted in the shower and bumped her mouth on the hand rail and was found sitting on the shower floor  Vitals taken and resident notified  Blood sugar 111  Blood pressure 140/81 and pulse 118  Cbc sent and blood pressures ordered every 2 hours  Discharge cancelled  Claudio Archer

## 2020-09-20 VITALS
RESPIRATION RATE: 18 BRPM | BODY MASS INDEX: 34.78 KG/M2 | OXYGEN SATURATION: 99 % | TEMPERATURE: 98.5 F | WEIGHT: 189 LBS | SYSTOLIC BLOOD PRESSURE: 118 MMHG | DIASTOLIC BLOOD PRESSURE: 64 MMHG | HEART RATE: 109 BPM | HEIGHT: 62 IN

## 2020-09-20 LAB
ANION GAP SERPL CALCULATED.3IONS-SCNC: 6 MMOL/L (ref 4–13)
BUN SERPL-MCNC: 11 MG/DL (ref 5–25)
CALCIUM SERPL-MCNC: 7.5 MG/DL (ref 8.3–10.1)
CHLORIDE SERPL-SCNC: 105 MMOL/L (ref 100–108)
CO2 SERPL-SCNC: 26 MMOL/L (ref 21–32)
CREAT SERPL-MCNC: 0.98 MG/DL (ref 0.6–1.3)
ERYTHROCYTE [DISTWIDTH] IN BLOOD BY AUTOMATED COUNT: 13 % (ref 11.6–15.1)
ERYTHROCYTE [DISTWIDTH] IN BLOOD BY AUTOMATED COUNT: 13.1 % (ref 11.6–15.1)
GFR SERPL CREATININE-BSD FRML MDRD: 90 ML/MIN/1.73SQ M
GLUCOSE SERPL-MCNC: 136 MG/DL (ref 65–140)
HCT VFR BLD AUTO: 20.4 % (ref 34.8–46.1)
HCT VFR BLD AUTO: 20.5 % (ref 34.8–46.1)
HGB BLD-MCNC: 6.6 G/DL (ref 11.5–15.4)
HGB BLD-MCNC: 6.7 G/DL (ref 11.5–15.4)
MCH RBC QN AUTO: 31.6 PG (ref 26.8–34.3)
MCH RBC QN AUTO: 31.6 PG (ref 26.8–34.3)
MCHC RBC AUTO-ENTMCNC: 32.4 G/DL (ref 31.4–37.4)
MCHC RBC AUTO-ENTMCNC: 32.7 G/DL (ref 31.4–37.4)
MCV RBC AUTO: 97 FL (ref 82–98)
MCV RBC AUTO: 98 FL (ref 82–98)
PLATELET # BLD AUTO: 314 THOUSANDS/UL (ref 149–390)
PLATELET # BLD AUTO: 314 THOUSANDS/UL (ref 149–390)
PMV BLD AUTO: 10.1 FL (ref 8.9–12.7)
PMV BLD AUTO: 9.8 FL (ref 8.9–12.7)
POTASSIUM SERPL-SCNC: 4.6 MMOL/L (ref 3.5–5.3)
RBC # BLD AUTO: 2.09 MILLION/UL (ref 3.81–5.12)
RBC # BLD AUTO: 2.12 MILLION/UL (ref 3.81–5.12)
SODIUM SERPL-SCNC: 137 MMOL/L (ref 136–145)
WBC # BLD AUTO: 22.87 THOUSAND/UL (ref 4.31–10.16)
WBC # BLD AUTO: 23.15 THOUSAND/UL (ref 4.31–10.16)

## 2020-09-20 PROCEDURE — 85027 COMPLETE CBC AUTOMATED: CPT | Performed by: OBSTETRICS & GYNECOLOGY

## 2020-09-20 PROCEDURE — 80048 BASIC METABOLIC PNL TOTAL CA: CPT | Performed by: STUDENT IN AN ORGANIZED HEALTH CARE EDUCATION/TRAINING PROGRAM

## 2020-09-20 PROCEDURE — 99024 POSTOP FOLLOW-UP VISIT: CPT | Performed by: OBSTETRICS & GYNECOLOGY

## 2020-09-20 RX ORDER — FERROUS SULFATE 325(65) MG
325 TABLET ORAL
Qty: 30 TABLET | Refills: 0 | Status: SHIPPED | OUTPATIENT
Start: 2020-09-21 | End: 2020-10-23

## 2020-09-20 RX ADMIN — DOCUSATE SODIUM 100 MG: 100 CAPSULE, LIQUID FILLED ORAL at 18:33

## 2020-09-20 RX ADMIN — OXYCODONE HYDROCHLORIDE 5 MG: 5 TABLET ORAL at 09:46

## 2020-09-20 RX ADMIN — FERROUS SULFATE TAB 325 MG (65 MG ELEMENTAL FE) 325 MG: 325 (65 FE) TAB at 08:26

## 2020-09-20 RX ADMIN — ACETAMINOPHEN 650 MG: 325 TABLET, FILM COATED ORAL at 12:31

## 2020-09-20 RX ADMIN — NIFEDIPINE 60 MG: 30 TABLET, FILM COATED, EXTENDED RELEASE ORAL at 09:45

## 2020-09-20 RX ADMIN — DOCUSATE SODIUM 100 MG: 100 CAPSULE, LIQUID FILLED ORAL at 09:47

## 2020-09-20 RX ADMIN — IBUPROFEN 600 MG: 600 TABLET, FILM COATED ORAL at 08:29

## 2020-09-20 RX ADMIN — ACETAMINOPHEN 650 MG: 325 TABLET, FILM COATED ORAL at 06:02

## 2020-09-20 RX ADMIN — MUPIROCIN: 20 OINTMENT TOPICAL at 09:47

## 2020-09-20 RX ADMIN — Medication 1 TABLET: at 09:45

## 2020-09-20 RX ADMIN — OXYCODONE HYDROCHLORIDE 5 MG: 5 TABLET ORAL at 04:40

## 2020-09-20 RX ADMIN — MUPIROCIN: 20 OINTMENT TOPICAL at 15:46

## 2020-09-20 RX ADMIN — TRIAMCINOLONE ACETONIDE: 1 CREAM TOPICAL at 18:32

## 2020-09-20 RX ADMIN — OXYCODONE HYDROCHLORIDE 5 MG: 5 TABLET ORAL at 15:46

## 2020-09-20 RX ADMIN — TRIAMCINOLONE ACETONIDE: 1 CREAM TOPICAL at 09:46

## 2020-09-20 RX ADMIN — ACETAMINOPHEN 650 MG: 325 TABLET, FILM COATED ORAL at 18:33

## 2020-09-20 NOTE — LACTATION NOTE
Discharge consult / Pump assessment:    Breast and nipple assessment: Breasts are small, widely spaced, tubular in shape with erect nipples bilaterally  Left breast is hot to the touch and has full mammary glands on the outer, upper quadrants         Feeding recommendations:  pump every 2-3 hours, Mariam states she missed a few pumping sessions yesterday and today her left breast is tender and hot to the touch  Upon assessment, Tova Harris has milk statis on the left outer quadrants with full milk ducts  Provided hands on demonstration to move milk with massage, hand expression, hands on pumping, and pumping varying pumping suctions  Mariam was able to express over 30 mls  Encouraged Mariam not to miss pumping sessions, use cold compress for inflammation of the breast tissue, hands on pumping as demonstrated in the room  Lactation's number was added to the board  Encouraged to call  Discussed s/s engorgement, blocked milk ducts, and mastitis  Discussed how to remedy at home and when to contact physician  Encouraged parents to call for assistance, questions, and concerns about breastfeeding  Extension provided  Met with mother to go over discharge breastfeeding booklet including the feeding log  Emphasized 8 or more (12) feedings in a 24 hour period, what to expect for the number of diapers per day of life and the progression of properties of the  stooling pattern  Reviewed breastfeeding and your lifestyle, storage and preparation of breast milk, how to keep you breast pump clean, the employed breastfeeding mother and paced bottle feeding handouts  Booklet included Breastfeeding Resources for after discharge including access to the number for the [address]  Encouraged parents to call for assistance, questions, and concerns about breastfeeding  Extension provided

## 2020-09-20 NOTE — DISCHARGE SUMMARY
CS Discharge Summary - Garrett Hopkins 35 y o  female MRN: [id number]    Unit/Bed#: -01 Encounter: [id number]    Admission Date: 2020     Discharge Date: 20      Admitting Diagnosis:   1  Pregnancy at 26w5d  2  Preeclampsia with severe features  3  History of bariatric surgery  4  Maternal obesity  5  Asthma    Discharge Diagnosis:   Same, Primary  section for evolving pre-eclampsia with severe features    Procedures:   primary  section, low transverse incision    Admitting Attending: Dr Diamante Bhatt  Delivery Attending: Dr Fernández Channel  Discharge Attending: Dr Vincent Stapleton physician: Rebecca Renee Course:     Garrett Hopkins is a 35 y o  Alveta Rajesh at 26w5d wks who was initially admitted for preeclampsia with severe features  She presented with shortness of breath and found to have severe range pressures and elevated protein to creatinine ratio diagnostic for preeclampsia with severe features  She was treated with IV hydralazine to achieve hypertensive control and started on Procardia XL 30 mg daily  She received a course of betamethasone on - as well as 24 hours of magnesium sulfate, and her labs were trended on a daily basis  Of note, on admission she had a pro BNP of 888, received an echo that showed an ejection fraction within normal limits and a small pericardial effusion  She also had a CTA to rule out pulmonary embolism, it showed bilateral pleural effusions but no signs of thrombosis with pulmonary edema        During her hospital course there was noted to be increased but stable umbilical artery dopplers  This later became absent on 20   Additionally, although clinically Mariam's initial presenting symptoms had resolved, the fetal growth had dramatically decreased, maternal liver enzymes were elevated and for this,  delivery was recommended by M after a 2nd full course dose of betamethasone and 12hr of Mag       She delivered a viable female  on 9/15/2020 at [phone]  Weight 770g via primary  section, low transverse incision  Apgars were 7 (1 min) and 8 (5 min)   was transferred to the NICU  Patient tolerated the procedure well and was transferred to recovery in stable condition  She was continued on Magnesium     Her post-partum course was uncomplicated  She did require an increase in Procardia to 60mgXL daily  Her post-partum pain was well controlled with oral analgesics  A 5 cm hematoma was noted to the right of the patient's incision which remained stable during the postoperative course      On day of discharge, she was ambulating and able to reasonably perform all ADLs  She was voiding and had appropriate bowel function  Pain was well controlled  She was discharged home on post-op day #5 without complications  Patient was instructed to follow up with her OB as an outpatient and was given appropriate warnings to call provider if she develops signs of infection or uncontrolled pain  Complications:   None apparent    Condition at discharge:   good     Provisions for Follow-Up Care:  See after visit summary for information related to follow-up care and any pertinent home health orders  Disposition:   See After Visit Summary for discharge disposition information  Planned Readmission:   No    Discharge Medications:   Prenatal vitamin daily for 6 months or the duration of nursing whichever is longer  Motrin 600 mg orally every 6 hours as needed for pain  Tylenol (over the counter) per bottle directions as needed for pain  Hydrocortisone cream 1% (over the counter) applied 1-2x daily to hemorrhoids as needed  Witch hazel pads for hemorrhoidal discomfort as needed    Discharge instructions :   -Do not place anything (no partner, tampons or douche) in your vagina for 6 weeks    -You may walk for exercise for the first 6 weeks then gradually return to your usual activities    -Please do not drive for 1 week if you have no stitches and for 2 weeks if you have stitches or underwent a  delivery     -You may take baths or shower per your preference    -Please look at your bust (breasts) in the mirror daily and call provider for redness or tenderness or increased warmth  - If you have had a  please look at your incision daily as well and call provider for increasing redness or steady drainage from the incision    -Please call your provider if temperature > 100 4*F or 38* C, worsening pain or a foul discharge

## 2020-09-20 NOTE — PROGRESS NOTES
Postpartum Progress Note - OB/GYN   Janus Seek 35 y o  female MRN: [id number]  Unit/Bed#: -01 Encounter: [id number]    Postpartum Day:5    Procedure: Primary low transverse  section    Subjective ROS  Pain: yes  Tolerating Oral Intake: yes   Voiding: yes  Flatus: yes  Bowel Movement: no  Ambulating: yes  Breastfeeding: yes  Chest Pain: no  Shortness of Breath: no  Leg Pain/Discomfort: no  Lochia: min    Vitals: /59 (BP Location: Left arm)   Pulse (!) 107   Temp 98 9 °F (37 2 °C) (Oral)   Resp 18   Ht 5' 2" (1 575 m)   Wt 85 7 kg (189 lb)   LMP 2020 (Exact Date)     No intake or output data in the 24 hours ending 20 0708    Physical Exam  General: appears well, NAD, alert and oriented x 3, pleasant and cooperative   Cardiovascular: Regular, Rate and Rhythm, no murmur  Lungs: Clear to Auscultation Bilaterally, no wheezing, rhonchi or rales   Abdomen: Soft, non-distended, appropriately-tender, no rebound, guarding  Incision has small breakdown midline, took the bandage off and was left to open air  Fundus: Firm & appropriately-Tender   Incision: clean, dry, and intact  Lower Extremities: Non-Tender, mild edema  Other: edema of mons pubis, possible ingrown hairs    Labs:   No results displayed because visit has over 200 results            Assessment:  35 y o  [id number] s/p , due to PreE with SF, IUGR post-operative day 4    Plan:  Continue routine care    Continue Procardia 60mg XL daily  F/u 1 week for BP check  Leave incision open to air as it is constantly moist when covered by her pannus and a bandage  F/u on signs and symptoms of anemia as Hgb is trending down to 7 4

## 2020-09-20 NOTE — PLAN OF CARE
Problem: NEUROSENSORY - ADULT  Goal: Achieves stable or improved neurological status  Description: INTERVENTIONS  - Monitor and report changes in neurological status  - Monitor vital signs such as temperature, blood pressure, glucose, and any other labs ordered   - Initiate measures to prevent increased intracranial pressure  - Monitor for seizure activity and implement precautions if appropriate      Outcome: Progressing  Goal: Remains free of injury related to seizures activity  Description: INTERVENTIONS  - Maintain airway, patient safety  and administer oxygen as ordered  - Monitor patient for seizure activity, document and report duration and description of seizure to physician/advanced practitioner  - If seizure occurs,  ensure patient safety during seizure  - Reorient patient post seizure  - Seizure pads on all 4 side rails  - Instruct patient/family to notify RN of any seizure activity including if an aura is experienced  - Instruct patient/family to call for assistance with activity based on nursing assessment  - Administer anti-seizure medications if ordered    Outcome: Progressing     Problem: CARDIOVASCULAR - ADULT  Goal: Maintains optimal cardiac output and hemodynamic stability  Description: INTERVENTIONS:  - Monitor I/O, vital signs and rhythm  - Monitor for S/S and trends of decreased cardiac output  - Administer and titrate ordered vasoactive medications to optimize hemodynamic stability  - Assess quality of pulses, skin color and temperature  - Assess for signs of decreased coronary artery perfusion  - Instruct patient to report change in severity of symptoms  Outcome: Progressing     Problem: PAIN - ADULT  Goal: Verbalizes/displays adequate comfort level or baseline comfort level  Description: Interventions:  - Encourage patient to monitor pain and request assistance  - Assess pain using appropriate pain scale  - Administer analgesics based on type and severity of pain and evaluate response  - Implement non-pharmacological measures as appropriate and evaluate response  - Consider cultural and social influences on pain and pain management  - Notify physician/advanced practitioner if interventions unsuccessful or patient reports new pain  Outcome: Progressing     Problem: INFECTION - ADULT  Goal: Absence or prevention of progression during hospitalization  Description: INTERVENTIONS:  - Assess and monitor for signs and symptoms of infection  - Monitor lab/diagnostic results  - Monitor all insertion sites, i e  indwelling lines, tubes, and drains  - Monitor endotracheal if appropriate and nasal secretions for changes in amount and color  - [place] appropriate cooling/warming therapies per order  - Administer medications as ordered  - Instruct and encourage patient and family to use good hand hygiene technique  - Identify and instruct in appropriate isolation precautions for identified infection/condition  Outcome: Progressing  Goal: Absence of fever/infection during neutropenic period  Description: INTERVENTIONS:  - Monitor WBC    Outcome: Progressing     Problem: SAFETY ADULT  Goal: Patient will remain free of falls  Description: INTERVENTIONS:  - Assess patient frequently for physical needs  -  Identify cognitive and physical deficits and behaviors that affect risk of falls    -  [place] fall precautions as indicated by assessment   - Educate patient/family on patient safety including physical limitations  - Instruct patient to call for assistance with activity based on assessment  - Modify environment to reduce risk of injury  - Consider OT/PT consult to assist with strengthening/mobility  Outcome: Progressing  Goal: Maintain or return to baseline ADL function  Description: INTERVENTIONS:  -  Assess patient's ability to carry out ADLs; assess patient's baseline for ADL function and identify physical deficits which impact ability to perform ADLs (bathing, care of mouth/teeth, toileting, grooming, dressing, etc )  - Assess/evaluate cause of self-care deficits   - Assess range of motion  - Assess patient's mobility; develop plan if impaired  - Assess patient's need for assistive devices and provide as appropriate  - Encourage maximum independence but intervene and supervise when necessary  - Involve family in performance of ADLs  - Assess for home care needs following discharge   - Consider OT consult to assist with ADL evaluation and planning for discharge  - Provide patient education as appropriate  Outcome: Progressing  Goal: Maintain or return mobility status to optimal level  Description: INTERVENTIONS:  - Assess patient's baseline mobility status (ambulation, transfers, stairs, etc )    - Identify cognitive and physical deficits and behaviors that affect mobility  - Identify mobility aids required to assist with transfers and/or ambulation (gait belt, sit-to-stand, lift, walker, cane, etc )  - [place] fall precautions as indicated by assessment  - Record patient progress and toleration of activity level on Mobility SBAR; progress patient to next Phase/Stage  - Instruct patient to call for assistance with activity based on assessment  - Consider rehabilitation consult to assist with strengthening/weightbearing, etc   Outcome: Progressing     Problem: Knowledge Deficit  Goal: Patient/family/caregiver demonstrates understanding of disease process, treatment plan, medications, and discharge instructions  Description: Complete learning assessment and assess knowledge base    Interventions:  - Provide teaching at level of understanding  - Provide teaching via preferred learning methods  Outcome: Progressing     Problem: DISCHARGE PLANNING  Goal: Discharge to home or other facility with appropriate resources  Description: INTERVENTIONS:  - Identify barriers to discharge w/patient and caregiver  - Arrange for needed discharge resources and transportation as appropriate  - Identify discharge learning needs (meds, wound care, etc )  - Arrange for interpretive services to assist at discharge as needed  - Refer to Case Management Department for coordinating discharge planning if the patient needs post-hospital services based on physician/advanced practitioner order or complex needs related to functional status, cognitive ability, or social support system  Outcome: Progressing     Problem: Potential for Falls  Goal: Patient will remain free of falls  Description: INTERVENTIONS:  - Assess patient frequently for physical needs  -  Identify cognitive and physical deficits and behaviors that affect risk of falls    -  [place] fall precautions as indicated by assessment   - Educate patient/family on patient safety including physical limitations  - Instruct patient to call for assistance with activity based on assessment  - Modify environment to reduce risk of injury  - Consider OT/PT consult to assist with strengthening/mobility  Outcome: Progressing     Problem: POSTPARTUM  Goal: Experiences normal postpartum course  Description: INTERVENTIONS:  - Monitor maternal vital signs  - Assess uterine involution and lochia  Outcome: Progressing  Goal: Appropriate maternal -  bonding  Description: INTERVENTIONS:  - Identify family support  - Assess for appropriate maternal/infant bonding   -Encourage maternal/infant bonding opportunities  - Referral to  or  as needed  Outcome: Progressing  Goal: Establishment of infant feeding pattern  Description: INTERVENTIONS:  - Assess breast pumping q2-3 hrs  - Refer to lactation as needed  Outcome: Progressing  Goal: Incision(s), wounds(s) or drain site(s) healing without S/S of infection  Description: INTERVENTIONS  - Assess and document risk factors for skin impairment   - Assess and document dressing, incision and surrounding tissue  - Consider nutrition services referral as needed  - Oral mucous membranes remain intact  - Provide patient/ family education  Outcome: Progressing

## 2020-09-21 NOTE — UTILIZATION REVIEW
Discharge Summary - OB/GYN   Davide Sánchez 35 y o  female MRN: [id number]  Unit/Bed#: -01 Encounter: [id number]      Admission Date: 2020     Discharge Date: 2020    Admitting Diagnosis:   1  Pregnancy at 26w5d  2  Preeclampsia with severe features  3  History of bariatric surgery  4  Maternal obesity  5  Asthma    Discharge Diagnosis:   Primary  section for evolving pre-eclampsia with severe features    Procedures: primary  section, low transverse incision    Admitting Attending: No att  providers found   Delivery Attending: Dr Rohith Meza physician:  Lacey Vasquez Course:     Davide Sánchez is a 35 y o  Chepe Mireya at 26w5d wks who was initially admitted for preeclampsia with severe features  She presented with shortness of breath and found to have severe range pressures and elevated protein to creatinine ratio diagnostic for preeclampsia with severe features  She was treated with IV hydralazine to achieve hypertensive control and started on Procardia XL 30 mg daily  She received a course of betamethasone on - as well as 24 hours of magnesium sulfate, and her labs were trended on a daily basis  Of note, on admission she had a pro BNP of 888, received an echo that showed an ejection fraction within normal limits and a small pericardial effusion  She also had a CTA to rule out pulmonary embolism, it showed bilateral pleural effusions but no signs of thrombosis with pulmonary edema  During her hospital course there was noted to be increased but stable umbilical artery dopplers  This later became absent on 20  Additionally, although clinically Mariam's initial presenting symptoms had resolved, the fetal growth had dramatically decreased, maternal liver enzymes were elevated and for this,  delivery was recommended by M after a 2nd full course dose of betamethasone and 12hr of Mag       She delivered a viable female  on 2020 at [facility]  Weight 770g via primary  section, low transverse incision  Apgars were 7 (1 min) and 8 (5 min)   was transferred to the NICU  Patient tolerated the procedure well and was transferred to recovery in stable condition  She was continued on Magnesium    Her post-partum course was uncomplicated  She did require an increase in Procardia to 60mgXL daily  Her post-partum pain was well controlled with oral analgesics  On day of discharge, she was ambulating and able to reasonably perform all ADLs  She was voiding and had appropriate bowel function  Pain was well controlled  She was discharged home on post-op day #4 without complications  Patient was instructed to follow up with her OB as an outpatient and was given appropriate warnings to call provider if she develops signs of infection or uncontrolled pain  Complications: none apparent    Condition at discharge: good     Discharge instructions/Information to patient and family:   See after visit summary for information provided to patient and family  Provisions for Follow-Up Care:  See after visit summary for information related to follow-up care and any pertinent home health orders  Disposition: Home    Planned Readmission: No    Discharge Medications: For a complete list of the patient's medications, please refer to her med rec

## 2020-09-22 ENCOUNTER — TELEPHONE (OUTPATIENT)
Dept: OBGYN CLINIC | Facility: CLINIC | Age: 30
End: 2020-09-22

## 2020-09-22 ENCOUNTER — APPOINTMENT (OUTPATIENT)
Dept: LAB | Facility: CLINIC | Age: 30
End: 2020-09-22
Payer: COMMERCIAL

## 2020-09-22 ENCOUNTER — OFFICE VISIT (OUTPATIENT)
Dept: OBGYN CLINIC | Facility: CLINIC | Age: 30
End: 2020-09-22

## 2020-09-22 DIAGNOSIS — R03.0 ELEVATED BLOOD PRESSURE READING: Primary | ICD-10-CM

## 2020-09-22 LAB
ERYTHROCYTE [DISTWIDTH] IN BLOOD BY AUTOMATED COUNT: 13.3 % (ref 11.6–15.1)
HCT VFR BLD AUTO: 24.7 % (ref 34.8–46.1)
HGB BLD-MCNC: 7.8 G/DL (ref 11.5–15.4)
MCH RBC QN AUTO: 30.4 PG (ref 26.8–34.3)
MCHC RBC AUTO-ENTMCNC: 31.6 G/DL (ref 31.4–37.4)
MCV RBC AUTO: 96 FL (ref 82–98)
PLATELET # BLD AUTO: 467 THOUSANDS/UL (ref 149–390)
PMV BLD AUTO: 9.7 FL (ref 8.9–12.7)
RBC # BLD AUTO: 2.57 MILLION/UL (ref 3.81–5.12)
WBC # BLD AUTO: 20.82 THOUSAND/UL (ref 4.31–10.16)

## 2020-09-22 PROCEDURE — 85027 COMPLETE CBC AUTOMATED: CPT

## 2020-09-22 PROCEDURE — 36415 COLL VENOUS BLD VENIPUNCTURE: CPT

## 2020-09-22 PROCEDURE — 99024 POSTOP FOLLOW-UP VISIT: CPT | Performed by: OBSTETRICS & GYNECOLOGY

## 2020-09-22 RX ORDER — OXYCODONE HYDROCHLORIDE 5 MG/1
5 TABLET ORAL EVERY 4 HOURS PRN
Qty: 15 TABLET | Refills: 0 | Status: SHIPPED | OUTPATIENT
Start: 2020-09-22 | End: 2020-09-29

## 2020-09-22 NOTE — TELEPHONE ENCOUNTER
PO7 C/S states right side of incision is swollen and looks like it is filled with fluid  She has an appt today at 1:15pm for BP check advised will have provider look at incision at that time  Verbalized understanding      Routing to provider to update

## 2020-09-22 NOTE — PROGRESS NOTES
PO7 C/S her for bp check and incision check  Denies any  blurred vision, epigastric pain,chest pain, SOB or palpations  States slight headaches at times along with +1 lower extremity pitting edema  /62  Reviewed with Dr Ellie Alvarado  Provider cleaned incision  Patient sent home with gauze, sterile saline, 4x4 pads for incision  Advised to follow up end of this week or early next week for incision check with provider along with having CBC drawn today  Refill for pain medications sent to pharmacy

## 2020-09-23 ENCOUNTER — TRANSITIONAL CARE MANAGEMENT (OUTPATIENT)
Dept: FAMILY MEDICINE CLINIC | Facility: CLINIC | Age: 30
End: 2020-09-23

## 2020-09-23 VITALS
HEART RATE: 105 BPM | WEIGHT: 200 LBS | SYSTOLIC BLOOD PRESSURE: 124 MMHG | TEMPERATURE: 97.7 F | RESPIRATION RATE: 20 BRPM | DIASTOLIC BLOOD PRESSURE: 62 MMHG | BODY MASS INDEX: 36.58 KG/M2

## 2020-09-24 ENCOUNTER — POSTPARTUM VISIT (OUTPATIENT)
Dept: OBGYN CLINIC | Facility: CLINIC | Age: 30
End: 2020-09-24

## 2020-09-24 VITALS
SYSTOLIC BLOOD PRESSURE: 136 MMHG | BODY MASS INDEX: 36.21 KG/M2 | DIASTOLIC BLOOD PRESSURE: 68 MMHG | WEIGHT: 198 LBS | TEMPERATURE: 98.6 F

## 2020-09-24 DIAGNOSIS — D62 ACUTE BLOOD LOSS AS CAUSE OF POSTOPERATIVE ANEMIA: ICD-10-CM

## 2020-09-24 DIAGNOSIS — Z48.89 POSTOPERATIVE VISIT: Primary | ICD-10-CM

## 2020-09-24 PROCEDURE — 99024 POSTOP FOLLOW-UP VISIT: CPT | Performed by: OBSTETRICS & GYNECOLOGY

## 2020-09-25 NOTE — PROGRESS NOTES
Assessment/Plan:    Postoperative visit   delivery delivered    Hematoma - continue expectant management, expect drainage to continue, continue tylenol ox roxycodone, return 1 week  PreE w SF -  Continue Procardia 60mgXL daily    Subjective:      Patient ID: Eufemia Brandt is a 35 y o  female  HPI  35y , 9 days postop for 1LTCS - PreE SF, IUGR - hematoma below the incision  Pain slowly improving  Incision continues to drain  No fevers or chills  No HA or vision changes  Continues to take Procardia XL 60mg qd    The following portions of the patient's history were reviewed and updated as appropriate: allergies, current medications, past family history, past medical history, past social history, past surgical history and problem list     Review of Systems   Constitutional: Positive for fatigue  HENT: Negative  Respiratory: Negative  Cardiovascular: Negative  Gastrointestinal: Positive for abdominal pain  Neurological: Negative  Objective:      /68   Temp 98 6 °F (37 °C)   Wt 89 8 kg (198 lb)   LMP 2020 (Exact Date)   Breastfeeding Yes   BMI 36 21 kg/m²          Physical Exam  Vitals signs and nursing note reviewed  Constitutional:       Appearance: Normal appearance  HENT:      Head: Normocephalic and atraumatic  Neurological:      General: No focal deficit present  Mental Status: She is alert  She is disoriented         Incision - intact, 2 areas of drainage - left and center,         Stable hematoma below incision 5x3cm

## 2020-09-29 ENCOUNTER — POSTPARTUM VISIT (OUTPATIENT)
Dept: OBGYN CLINIC | Facility: CLINIC | Age: 30
End: 2020-09-29

## 2020-09-29 VITALS
SYSTOLIC BLOOD PRESSURE: 110 MMHG | TEMPERATURE: 97.7 F | DIASTOLIC BLOOD PRESSURE: 76 MMHG | WEIGHT: 186.8 LBS | BODY MASS INDEX: 34.17 KG/M2

## 2020-09-29 PROCEDURE — 99024 POSTOP FOLLOW-UP VISIT: CPT | Performed by: OBSTETRICS & GYNECOLOGY

## 2020-09-29 RX ORDER — OXYCODONE HYDROCHLORIDE 5 MG/1
5 TABLET ORAL EVERY 4 HOURS PRN
Qty: 15 TABLET | Refills: 0 | Status: SHIPPED | OUTPATIENT
Start: 2020-09-29 | End: 2020-10-09

## 2020-09-29 RX ORDER — NIFEDIPINE 30 MG/1
30 TABLET, FILM COATED, EXTENDED RELEASE ORAL DAILY
Qty: 30 TABLET | Refills: 0 | Status: SHIPPED | OUTPATIENT
Start: 2020-09-29 | End: 2020-10-13

## 2020-09-29 NOTE — PROGRESS NOTES
Assessment/Plan:   delivery delivered  -     oxyCODONE (ROXICODONE) 5 mg immediate release tablet; Take 1 tablet (5 mg total) by mouth every 4 (four) hours as needed for severe pain for up to 10 daysMax Daily Amount: 30 mg    preeclampsia with severe features - blood pressure 110/76 - will decrease Procardia 30 mg XL daily  Patient to monitor blood pressure and call next week  If they continue to be low will discontinue the medication    Follow-up in 2 weeks for another incision check    Subjective:      Patient ID: Andree Botello is a 35 y o  female  HPI  58-year-old [id number] who is approximately 2 weeks postop from a primary  section  Patient is here for an incision check as she has a hematoma under the incision  She reports that it continues to drain however her pain is significantly improved  She takes 1 oxycodone at night when she is visiting the baby was in the NICU  She does report feeling tired  She is currently on Procardia 60 mg XL daily - preeclampsia with severe features with residual hypertension    The following portions of the patient's history were reviewed and updated as appropriate: allergies, current medications, past family history, past medical history, past social history, past surgical history and problem list     Review of Systems   Constitutional: Positive for fatigue  HENT: Negative  Respiratory: Negative  Cardiovascular: Negative  Gastrointestinal: Negative  Genitourinary: Negative  Objective:      /76   Temp 97 7 °F (36 5 °C)   Wt 84 7 kg (186 lb 12 8 oz)   LMP 2020 (Exact Date)   BMI 34 17 kg/m²          Physical Exam  Vitals signs and nursing note reviewed  Constitutional:       Appearance: Normal appearance  She is obese  HENT:      Head: Normocephalic and atraumatic  Pulmonary:      Effort: Pulmonary effort is normal    Neurological:      General: No focal deficit present        Mental Status: She is alert and oriented to person, place, and time           Pfannenstiel incision - intact, small amount of drainage, hematoma below incision on right side mildly tender an approximately 3 cm in size

## 2020-10-01 ENCOUNTER — TELEPHONE (OUTPATIENT)
Dept: OBGYN CLINIC | Facility: CLINIC | Age: 30
End: 2020-10-01

## 2020-10-07 ENCOUNTER — TELEPHONE (OUTPATIENT)
Dept: OBGYN CLINIC | Facility: CLINIC | Age: 30
End: 2020-10-07

## 2020-10-09 RX ORDER — OXYCODONE HYDROCHLORIDE 5 MG/1
5 TABLET ORAL EVERY 4 HOURS PRN
Qty: 15 TABLET | Refills: 0 | Status: SHIPPED | OUTPATIENT
Start: 2020-10-09 | End: 2020-10-19

## 2020-10-13 ENCOUNTER — POSTPARTUM VISIT (OUTPATIENT)
Dept: OBGYN CLINIC | Facility: CLINIC | Age: 30
End: 2020-10-13

## 2020-10-13 VITALS
BODY MASS INDEX: 33.68 KG/M2 | SYSTOLIC BLOOD PRESSURE: 98 MMHG | DIASTOLIC BLOOD PRESSURE: 60 MMHG | TEMPERATURE: 98.7 F | WEIGHT: 183 LBS | HEIGHT: 62 IN

## 2020-10-13 PROBLEM — Z34.90 SUPERVISION OF NORMAL PREGNANCY: Status: RESOLVED | Noted: 2020-04-23 | Resolved: 2020-10-13

## 2020-10-13 PROBLEM — O14.13 SEVERE PREECLAMPSIA, THIRD TRIMESTER: Status: RESOLVED | Noted: 2020-08-19 | Resolved: 2020-10-13

## 2020-10-13 PROBLEM — L20.9 ATOPIC DERMATITIS: Status: RESOLVED | Noted: 2018-05-30 | Resolved: 2020-10-13

## 2020-10-13 PROBLEM — O36.5990 PREGNANCY AFFECTED BY FETAL GROWTH RESTRICTION: Status: RESOLVED | Noted: 2020-08-20 | Resolved: 2020-10-13

## 2020-10-13 PROBLEM — L30.0 NUMMULAR ECZEMATOUS DERMATITIS: Status: RESOLVED | Noted: 2018-12-05 | Resolved: 2020-10-13

## 2020-10-13 PROBLEM — O99.212 MATERNAL OBESITY, ANTEPARTUM, SECOND TRIMESTER: Status: RESOLVED | Noted: 2020-07-23 | Resolved: 2020-10-13

## 2020-10-13 PROCEDURE — PNV: Performed by: OBSTETRICS & GYNECOLOGY

## 2020-10-13 RX ORDER — FERROUS SULFATE 325(65) MG
TABLET ORAL
Qty: 30 TABLET | Refills: 0 | OUTPATIENT
Start: 2020-10-13

## 2020-10-13 RX ORDER — METOCLOPRAMIDE 10 MG/1
10 TABLET ORAL 2 TIMES DAILY
Qty: 28 TABLET | Refills: 0 | Status: SHIPPED | OUTPATIENT
Start: 2020-10-13 | End: 2020-10-27

## 2020-10-23 RX ORDER — FERROUS SULFATE 325(65) MG
325 TABLET ORAL
Qty: 90 TABLET | Refills: 1 | Status: SHIPPED | OUTPATIENT
Start: 2020-10-23 | End: 2021-03-30 | Stop reason: ALTCHOICE

## 2020-10-27 ENCOUNTER — TELEPHONE (OUTPATIENT)
Dept: OBGYN CLINIC | Facility: CLINIC | Age: 30
End: 2020-10-27

## 2020-11-10 ENCOUNTER — TELEPHONE (OUTPATIENT)
Dept: OBGYN CLINIC | Facility: CLINIC | Age: 30
End: 2020-11-10

## 2020-11-10 ENCOUNTER — PROCEDURE VISIT (OUTPATIENT)
Dept: OBGYN CLINIC | Facility: CLINIC | Age: 30
End: 2020-11-10
Payer: COMMERCIAL

## 2020-11-10 VITALS
SYSTOLIC BLOOD PRESSURE: 122 MMHG | DIASTOLIC BLOOD PRESSURE: 74 MMHG | WEIGHT: 175 LBS | TEMPERATURE: 98.6 F | BODY MASS INDEX: 32.01 KG/M2

## 2020-11-10 DIAGNOSIS — T81.31XA DEHISCENCE OF OPERATIVE WOUND, INITIAL ENCOUNTER: ICD-10-CM

## 2020-11-10 DIAGNOSIS — Z30.017 INSERTION OF NEXPLANON: Primary | ICD-10-CM

## 2020-11-10 LAB — SL AMB POCT URINE HCG: NEGATIVE

## 2020-11-10 PROCEDURE — 11981 INSERTION DRUG DLVR IMPLANT: CPT | Performed by: OBSTETRICS & GYNECOLOGY

## 2020-11-10 PROCEDURE — 81025 URINE PREGNANCY TEST: CPT | Performed by: OBSTETRICS & GYNECOLOGY

## 2020-11-20 ENCOUNTER — OFFICE VISIT (OUTPATIENT)
Dept: OBGYN CLINIC | Facility: CLINIC | Age: 30
End: 2020-11-20

## 2020-11-20 VITALS — BODY MASS INDEX: 32.37 KG/M2 | WEIGHT: 177 LBS | DIASTOLIC BLOOD PRESSURE: 70 MMHG | SYSTOLIC BLOOD PRESSURE: 112 MMHG

## 2020-11-20 DIAGNOSIS — T81.31XD DEHISCENCE OF OPERATIVE WOUND, SUBSEQUENT ENCOUNTER: Primary | ICD-10-CM

## 2020-11-20 PROCEDURE — 99024 POSTOP FOLLOW-UP VISIT: CPT | Performed by: OBSTETRICS & GYNECOLOGY

## 2020-11-23 ENCOUNTER — SOCIAL WORK (OUTPATIENT)
Dept: BEHAVIORAL/MENTAL HEALTH CLINIC | Facility: CLINIC | Age: 30
End: 2020-11-23
Payer: COMMERCIAL

## 2020-11-23 PROCEDURE — 90834 PSYTX W PT 45 MINUTES: CPT | Performed by: SOCIAL WORKER

## 2020-12-01 ENCOUNTER — OFFICE VISIT (OUTPATIENT)
Dept: WOUND CARE | Facility: HOSPITAL | Age: 30
End: 2020-12-01
Payer: COMMERCIAL

## 2020-12-01 VITALS
DIASTOLIC BLOOD PRESSURE: 86 MMHG | HEART RATE: 83 BPM | SYSTOLIC BLOOD PRESSURE: 123 MMHG | TEMPERATURE: 96.8 F | RESPIRATION RATE: 16 BRPM

## 2020-12-01 DIAGNOSIS — T81.89XA NON-HEALING SURGICAL WOUND, INITIAL ENCOUNTER: Primary | ICD-10-CM

## 2020-12-01 PROCEDURE — 99203 OFFICE O/P NEW LOW 30 MIN: CPT | Performed by: NURSE PRACTITIONER

## 2020-12-01 PROCEDURE — 99213 OFFICE O/P EST LOW 20 MIN: CPT | Performed by: NURSE PRACTITIONER

## 2020-12-01 RX ORDER — LIDOCAINE HYDROCHLORIDE 40 MG/ML
5 SOLUTION TOPICAL ONCE
Status: COMPLETED | OUTPATIENT
Start: 2020-12-01 | End: 2020-12-01

## 2020-12-01 RX ADMIN — LIDOCAINE HYDROCHLORIDE 5 ML: 40 SOLUTION TOPICAL at 13:14

## 2020-12-11 ENCOUNTER — OFFICE VISIT (OUTPATIENT)
Dept: WOUND CARE | Facility: HOSPITAL | Age: 30
End: 2020-12-11
Payer: COMMERCIAL

## 2020-12-11 VITALS
SYSTOLIC BLOOD PRESSURE: 122 MMHG | TEMPERATURE: 98.7 F | HEART RATE: 82 BPM | RESPIRATION RATE: 16 BRPM | DIASTOLIC BLOOD PRESSURE: 79 MMHG

## 2020-12-11 DIAGNOSIS — T81.89XA NON-HEALING SURGICAL WOUND, INITIAL ENCOUNTER: Primary | ICD-10-CM

## 2020-12-11 PROCEDURE — 97597 DBRDMT OPN WND 1ST 20 CM/<: CPT | Performed by: NURSE PRACTITIONER

## 2020-12-11 RX ORDER — LIDOCAINE HYDROCHLORIDE 40 MG/ML
5 SOLUTION TOPICAL ONCE
Status: COMPLETED | OUTPATIENT
Start: 2020-12-11 | End: 2020-12-11

## 2020-12-11 RX ADMIN — LIDOCAINE HYDROCHLORIDE 5 ML: 40 SOLUTION TOPICAL at 10:18

## 2020-12-17 ENCOUNTER — TELEMEDICINE (OUTPATIENT)
Dept: BEHAVIORAL/MENTAL HEALTH CLINIC | Facility: CLINIC | Age: 30
End: 2020-12-17
Payer: COMMERCIAL

## 2020-12-17 PROCEDURE — 90834 PSYTX W PT 45 MINUTES: CPT | Performed by: SOCIAL WORKER

## 2020-12-21 ENCOUNTER — OFFICE VISIT (OUTPATIENT)
Dept: WOUND CARE | Facility: HOSPITAL | Age: 30
End: 2020-12-21
Payer: COMMERCIAL

## 2020-12-21 VITALS
HEART RATE: 79 BPM | TEMPERATURE: 97.3 F | DIASTOLIC BLOOD PRESSURE: 77 MMHG | SYSTOLIC BLOOD PRESSURE: 111 MMHG | RESPIRATION RATE: 15 BRPM

## 2020-12-21 DIAGNOSIS — T81.89XA NON-HEALING SURGICAL WOUND, INITIAL ENCOUNTER: Primary | ICD-10-CM

## 2020-12-21 PROCEDURE — 97597 DBRDMT OPN WND 1ST 20 CM/<: CPT | Performed by: NURSE PRACTITIONER

## 2020-12-21 RX ORDER — LIDOCAINE HYDROCHLORIDE 40 MG/ML
5 SOLUTION TOPICAL ONCE
Status: COMPLETED | OUTPATIENT
Start: 2020-12-21 | End: 2020-12-21

## 2020-12-21 RX ADMIN — LIDOCAINE HYDROCHLORIDE 5 ML: 40 SOLUTION TOPICAL at 10:12

## 2021-01-04 ENCOUNTER — OFFICE VISIT (OUTPATIENT)
Dept: WOUND CARE | Facility: HOSPITAL | Age: 31
End: 2021-01-04
Payer: COMMERCIAL

## 2021-01-04 VITALS
SYSTOLIC BLOOD PRESSURE: 109 MMHG | HEART RATE: 82 BPM | DIASTOLIC BLOOD PRESSURE: 77 MMHG | RESPIRATION RATE: 18 BRPM | TEMPERATURE: 96.5 F

## 2021-01-04 DIAGNOSIS — T81.89XA NON-HEALING SURGICAL WOUND, INITIAL ENCOUNTER: Primary | ICD-10-CM

## 2021-01-04 PROCEDURE — 99213 OFFICE O/P EST LOW 20 MIN: CPT | Performed by: NURSE PRACTITIONER

## 2021-01-04 RX ORDER — LIDOCAINE HYDROCHLORIDE 40 MG/ML
5 SOLUTION TOPICAL ONCE
Status: COMPLETED | OUTPATIENT
Start: 2021-01-04 | End: 2021-01-04

## 2021-01-04 RX ADMIN — LIDOCAINE HYDROCHLORIDE 5 ML: 40 SOLUTION TOPICAL at 11:13

## 2021-01-04 NOTE — PATIENT INSTRUCTIONS
Orders Placed This Encounter   Procedures    Wound cleansing and dressings     Abdominal wound  Wash your hands with soap and water  Remove old dressing, discard into plastic bag and place in trash  Cleanse the wound with soap and water prior to applying a clean dressing  Do not use tissue or cotton balls  Do not scrub the wound  Pat dry using gauze  Shower yes  Apply Puracol AG to the wound  Cover with ABD pad  Change dressing daily and as needed     This was done today     Standing Status:   Future     Standing Expiration Date:   1/4/2022
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
## 2021-01-07 ENCOUNTER — TELEMEDICINE (OUTPATIENT)
Dept: BEHAVIORAL/MENTAL HEALTH CLINIC | Facility: CLINIC | Age: 31
End: 2021-01-07
Payer: COMMERCIAL

## 2021-01-07 PROCEDURE — 90834 PSYTX W PT 45 MINUTES: CPT | Performed by: SOCIAL WORKER

## 2021-01-07 NOTE — PSYCH
Virtual Regular Visit      Assessment/Plan:    Problem List Items Addressed This Visit     None      Visit Diagnoses     Postpartum depression    -  Primary               Reason for visit is   Chief Complaint   Patient presents with    Virtual Regular Visit        Encounter provider Lynn Meyers    Provider located at [address]      Recent Visits  No visits were found meeting these conditions  Showing recent visits within past 7 days and meeting all other requirements     Today's Visits  Date Type Provider Dept   01/07/21 Telemedicine Lynn Meyers [facility]   Showing today's visits and meeting all other requirements     Future Appointments  No visits were found meeting these conditions  Showing future appointments within next 150 days and meeting all other requirements        The patient was identified by name and date of birth  Nelta Irvington was informed that this is a telemedicine visit and that the visit is being conducted through [employer] and patient was informed that this is a secure, HIPAA-compliant platform  She agrees to proceed     My office door was closed  No one else was in the room  She acknowledged consent and understanding of privacy and security of the video platform  The patient has agreed to participate and understands they can discontinue the visit at any time  Patient is aware this is a billable service  Kannan Sandra is a 35 y o  female was counseled for 45 minutes from 1:00 - 1:45pm - telehealth due to covid  Analy Galdame[employer]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 - was supposed to return on 1/18/21 but will let PTO run out and likely not return  Brendan Perez sleeping through the night now and getting better sleep and looking for jobs/looking at school options - [employer]    Still not getting unemployment and unhelpful phone call - called a senator who is trying tot help  Overeating at times and gaining weight - improved sleep  Nuvia Herndon is 4 months soon went to pediatrics - stopped iron vitamins and pooping regularly now  Minor gas at times  Gaining weight - went from 5lbs to 6 5lbs and feels like 8lb now - no preemie clothes and in size 1 diapers  Patient's mom is looking for house and hasn't found one - may have to move in with her for a while - 3 bedroom house - her mom/stepdad and brother and sister - would be a lot to have them - big dog  Mom is a big  - feels they will help financially and around house  Mariam reports feeling better than in past - minor moments of feeling overwhelemed at times  Talking to friends more about feelings - friend who had baby recently as well who went through PPD in past - feels encouraged  Accepted that she was depressed and came to terms with and take action: started visions board/goal setting, not dwelling on things, thinking about what can/can;t control  Evelyn turning 31 this month and her in May, creating things/planning for things she can do during covid vs feeling down  Evelyn's son slept over the house but hsi daughter didn't come - great visit  Feels closer to his children and they love the baby  Positive conversations with Christine's ex and talking about getting kids pictures done together for all the grandchildren  Still not taking Zoloft but feels like ok  Eczema acting up but otherwise doing well health wise  Saw some family for holidays - cousin's premature baby came home after being premature - have things in common - support each other  Depression prevention, self care, accepting support and utilizing coping skills reviewed        HPI     Past Medical History:   Diagnosis Date    Abnormal Pap smear of cervix     Anemia     Anxiety     Asthma     Bronchitis     past    Eczema     Hypertension     Migraine     Obesity     Pneumonia     in past    Varicella     immune per chart       Past Surgical History:   Procedure Laterality Date    BARIATRIC SURGERY  2019    EXPLORATORY LAPAROTOMY      2016, no findings     GASTRECTOMY SLEEVE LAPAROSCOPIC  2019    [place]  DELIVERY ONLY N/A 9/15/2020    Procedure:  SECTION (); Surgeon: Claudette Javed MD;  Location: [facility] ;  Service: Obstetrics       Current Outpatient Medications   Medication Sig Dispense Refill    acetaminophen (TYLENOL) 325 mg tablet Take 650 mg by mouth every 6 (six) hours as needed for mild pain      albuterol (PROVENTIL HFA,VENTOLIN HFA) 90 mcg/act inhaler       DUPIXENT subcutaneous injection Inject 300 mg under the skin every 14 (fourteen) days 300/2ml      etonogestrel (NEXPLANON) subdermal implant 68 mg by Subdermal route once      ferrous sulfate 325 (65 Fe) mg tablet Take 1 tablet (325 mg total) by mouth daily with breakfast 90 tablet 1    Prenatal Vit-DSS-Fe Fum-FA (PRENATAL 19) 29-1 MG TABS Take by mouth      sertraline (ZOLOFT) 50 mg tablet Take 1 tablet (50 mg total) by mouth daily For the first week, take 1/2 tab 90 tablet 0     No current facility-administered medications for this visit  Allergies   Allergen Reactions    Cat Hair Extract Hives    Dog Epithelium Hives    Penicillins Abdominal Pain     Had skin testing done and positive result       Review of Systems    Video Exam    There were no vitals filed for this visit  Physical Exam Oriented, engaged, happy/calm - major improvement in mood, full range affect, appropriate speech, denies suicidal/homicidal ideations/psychosis/desire to harm baby, good insight/judgement  Baby sleeping and snuggling with baby  I spent 45 minutes directly with the patient during this visit      Yash Nixon acknowledges that she has consented to an online visit or consultation   She understands that the online visit is based solely on information provided by her, and that, in the absence of a face-to-face physical evaluation by the physician, the diagnosis she receives is both limited and provisional in terms of accuracy and completeness  This is not intended to replace a full medical face-to-face evaluation by the physician  Colleen Lozano understands and accepts these terms

## 2021-01-18 ENCOUNTER — OFFICE VISIT (OUTPATIENT)
Dept: WOUND CARE | Facility: HOSPITAL | Age: 31
End: 2021-01-18
Payer: COMMERCIAL

## 2021-01-18 VITALS — SYSTOLIC BLOOD PRESSURE: 107 MMHG | TEMPERATURE: 97.4 F | DIASTOLIC BLOOD PRESSURE: 71 MMHG | HEART RATE: 85 BPM

## 2021-01-18 DIAGNOSIS — T81.89XA NON-HEALING SURGICAL WOUND, INITIAL ENCOUNTER: Primary | ICD-10-CM

## 2021-01-18 PROCEDURE — 99213 OFFICE O/P EST LOW 20 MIN: CPT | Performed by: NURSE PRACTITIONER

## 2021-01-18 RX ORDER — LIDOCAINE HYDROCHLORIDE 40 MG/ML
5 SOLUTION TOPICAL ONCE
Status: COMPLETED | OUTPATIENT
Start: 2021-01-18 | End: 2021-01-18

## 2021-01-18 RX ORDER — CETIRIZINE HYDROCHLORIDE 10 MG/1
CAPSULE, LIQUID FILLED ORAL DAILY
COMMUNITY

## 2021-01-18 RX ORDER — DIAPER,BRIEF,INFANT-TODD,DISP
EACH MISCELLANEOUS 2 TIMES DAILY
COMMUNITY

## 2021-01-18 RX ORDER — HYDROXYZINE HYDROCHLORIDE 10 MG/1
10 TABLET, FILM COATED ORAL DAILY PRN
COMMUNITY
End: 2021-07-26

## 2021-01-18 RX ADMIN — LIDOCAINE HYDROCHLORIDE 5 ML: 40 SOLUTION TOPICAL at 10:55

## 2021-01-18 NOTE — PATIENT INSTRUCTIONS
Orders Placed This Encounter   Procedures    Wound cleansing and dressings     Apply ABD pad to wound and change daily and as needed       Standing Status:   Future     Standing Expiration Date:   1/18/2022
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
## 2021-02-04 ENCOUNTER — TELEMEDICINE (OUTPATIENT)
Dept: BEHAVIORAL/MENTAL HEALTH CLINIC | Facility: CLINIC | Age: 31
End: 2021-02-04
Payer: COMMERCIAL

## 2021-02-04 PROCEDURE — 90834 PSYTX W PT 45 MINUTES: CPT | Performed by: SOCIAL WORKER

## 2021-02-04 NOTE — PSYCH
Virtual Regular Visit      Assessment/Plan:    Problem List Items Addressed This Visit     None      Visit Diagnoses     Postpartum depression    -  Primary               Reason for visit is   Chief Complaint   Patient presents with    Virtual Regular Visit        Encounter provider Dario Burdick    Provider located at [address]      Recent Visits  No visits were found meeting these conditions  Showing recent visits within past 7 days and meeting all other requirements     Today's Visits  Date Type Provider Dept   02/04/21 Telemedicine Dario Burdick [facility]   Showing today's visits and meeting all other requirements     Future Appointments  No visits were found meeting these conditions  Showing future appointments within next 150 days and meeting all other requirements        The patient was identified by name and date of birth  Luca Coy was informed that this is a telemedicine visit and that the visit is being conducted through Where Was it Filmed and patient was informed that this is a secure, HIPAA-compliant platform  She agrees to proceed     My office door was closed  No one else was in the room  She acknowledged consent and understanding of privacy and security of the video platform  The patient has agreed to participate and understands they can discontinue the visit at any time  Patient is aware this is a billable service  Koby Mendez is a 35 y o  female was counseled for 45 minutes from 1:00 - 1:45pm - telehealth due to covid  Parents due need to move in at end of month - will be crowded - siblings have to sare a room - 11yo bro and 23 sister  Will have  A lot of help ane enjoy family - they will help financially and with baby  This will allow her time to stay home from work longer as well and stay on top of bills    Still home from work and applying for jobs - still out on Coronavirus relief which works until March then has 105 days of leave  Searching or jobs and feels positive  Wound healing well - DC from wound care now - almost completely closed now  Physically feeling well  Emotionally up and down - takes care of Gloria Faster but poor self care - has to force self to get things done - lack of motivation when a home - if leaves house, will be out a long time  Rosa Franklin found another job - discussed financial challenges and 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     Starting soon and feeling positive  Feels some moments of depression - long journey with pregnancy/covid/NICU - explored challenges  Evelyn planning a 31th party for her and surprising her with plans  Difficulty letting go of control of letting him do things vs taking control  Thinking forward to [facility] 1st birthday and thinking about how far they've come  Thinking about positives/financial and financial support from family  Explored lack of control this yr contributing to stress and feelings/anxiety  Fearful of thinking about sending Gloria Faster to day care if starts a job - "can't go back to hospital "    Would be very stressed after long NICU stay - if can find daytime, Gloria Faster would be with family  Gloria Faster doing very well 4 5 months- eating well but waking a lot at night again and sleeping longer during day  Feels growing a lot but pediatrician would like her to gain weight faster  Suggested she wait til 6 months for food - some cereal in bottles now  Rolling both both ways and feels doing a lot  Things continue to positive with Evelyn's ex and the children  Feels very connected to baby      HPI     Past Medical History:   Diagnosis Date    Abnormal Pap smear of cervix     Anemia     Anxiety     Asthma     Bronchitis     past    Eczema     Hypertension     Migraine     Obesity     Pneumonia     in past    Varicella     immune per chart       Past Surgical History:   Procedure Laterality Date    BARIATRIC SURGERY  2019    EXPLORATORY LAPAROTOMY      2016, no findings     GASTRECTOMY SLEEVE LAPAROSCOPIC  2019    [place]  DELIVERY ONLY N/A 9/15/2020    Procedure:  SECTION (); Surgeon: Tatiana Dudley MD;  Location: [facility] ;  Service: Obstetrics       Current Outpatient Medications   Medication Sig Dispense Refill    acetaminophen (TYLENOL) 325 mg tablet Take 650 mg by mouth every 6 (six) hours as needed for mild pain      albuterol (PROVENTIL HFA,VENTOLIN HFA) 90 mcg/act inhaler       Cetirizine HCl (ZyrTEC Allergy) 10 MG CAPS Take by mouth daily      Clobetasol Prop Crea-Coal Tar (CLOBETAPLUS CREAM EX) Apply topically 2 (two) times a day      DUPIXENT subcutaneous injection Inject 300 mg under the skin every 14 (fourteen) days 300/2ml      etonogestrel (NEXPLANON) subdermal implant 68 mg by Subdermal route once      ferrous sulfate 325 (65 Fe) mg tablet Take 1 tablet (325 mg total) by mouth daily with breakfast 90 tablet 1    hydrocortisone 0 5 % cream Apply topically 2 (two) times a day      hydrOXYzine HCL (ATARAX) 10 mg tablet Take 10 mg by mouth daily      Prenatal Vit-DSS-Fe Fum-FA (PRENATAL 19) 29-1 MG TABS Take by mouth      sertraline (ZOLOFT) 50 mg tablet Take 1 tablet (50 mg total) by mouth daily For the first week, take 1/2 tab 90 tablet 0     No current facility-administered medications for this visit  Allergies   Allergen Reactions    Cat Hair Extract Hives    Dog Epithelium Hives    Penicillins Abdominal Pain     Had skin testing done and positive result       Review of Systems    Video Exam    There were no vitals filed for this visit      Physical Exam Oriented, engaged, happy/calm, full range affect, appropriate speech, denies suicidal/homicidal ideations/psychosis/desire to harm baby, fair insight/judgement    I spent 45 minutes directly with the patient during this visit      VIRTUAL VISIT DISCLAIMER    Amy Ward acknowledges that she has consented to an online visit or consultation  She understands that the online visit is based solely on information provided by her, and that, in the absence of a face-to-face physical evaluation by the physician, the diagnosis she receives is both limited and provisional in terms of accuracy and completeness  This is not intended to replace a full medical face-to-face evaluation by the physician  Amy Ward understands and accepts these terms

## 2021-02-26 ENCOUNTER — HOSPITAL ENCOUNTER (EMERGENCY)
Facility: HOSPITAL | Age: 31
Discharge: HOME | End: 2021-02-26
Attending: EMERGENCY MEDICINE | Admitting: EMERGENCY MEDICINE
Payer: COMMERCIAL

## 2021-02-26 VITALS
OXYGEN SATURATION: 100 % | DIASTOLIC BLOOD PRESSURE: 59 MMHG | TEMPERATURE: 98.9 F | HEART RATE: 87 BPM | SYSTOLIC BLOOD PRESSURE: 128 MMHG | RESPIRATION RATE: 18 BRPM

## 2021-02-26 DIAGNOSIS — K08.89 DENTALGIA: ICD-10-CM

## 2021-02-26 DIAGNOSIS — K04.7 DENTAL INFECTION: Primary | ICD-10-CM

## 2021-02-26 PROCEDURE — 99284 EMERGENCY DEPT VISIT MOD MDM: CPT | Performed by: EMERGENCY MEDICINE

## 2021-02-26 PROCEDURE — 99282 EMERGENCY DEPT VISIT SF MDM: CPT

## 2021-02-26 RX ORDER — NAPROXEN 250 MG/1
500 TABLET ORAL ONCE
Status: COMPLETED | OUTPATIENT
Start: 2021-02-26 | End: 2021-02-26

## 2021-02-26 RX ORDER — CLINDAMYCIN HYDROCHLORIDE 150 MG/1
300 CAPSULE ORAL EVERY 8 HOURS
Qty: 42 CAPSULE | Refills: 0 | Status: SHIPPED | OUTPATIENT
Start: 2021-02-26 | End: 2021-03-05

## 2021-02-26 RX ORDER — CLINDAMYCIN HYDROCHLORIDE 150 MG/1
300 CAPSULE ORAL ONCE
Status: COMPLETED | OUTPATIENT
Start: 2021-02-26 | End: 2021-02-26

## 2021-02-26 RX ORDER — NAPROXEN 500 MG/1
500 TABLET ORAL 2 TIMES DAILY WITH MEALS
Qty: 20 TABLET | Refills: 0 | Status: SHIPPED | OUTPATIENT
Start: 2021-02-26 | End: 2021-06-03

## 2021-02-26 RX ADMIN — CLINDAMYCIN HYDROCHLORIDE 300 MG: 150 CAPSULE ORAL at 17:18

## 2021-02-26 RX ADMIN — NAPROXEN 500 MG: 250 TABLET ORAL at 17:18

## 2021-02-26 NOTE — ED PROVIDER NOTES
History  Chief Complaint   Patient presents with    Dental Pain     pt c/o left sided dental pain where her wisdom teeth are  going on "for a while"       History provided by:  Medical records and patient   used: No    Dental Pain  Location:  Lower  Lower teeth location:  32/RL 3rd molar  Quality:  Aching  Severity:  Moderate  Onset quality:  Gradual  Duration:  1 month  Timing:  Intermittent  Progression:  Worsening  Chronicity:  New  Context: not dental fracture, not malocclusion, not recent dental surgery and not trauma    Relieved by:  Nothing  Worsened by:  Cold food/drink, touching, jaw movement, pressure and hot food/drink  Ineffective treatments:  Acetaminophen  Associated symptoms: no difficulty swallowing, no drooling, no facial swelling, no fever, no neck swelling, no oral lesions and no trismus    Risk factors: no diabetes and no immunosuppression        Prior to Admission Medications   Prescriptions Last Dose Informant Patient Reported? Taking?    Cetirizine HCl (ZyrTEC Allergy) 10 MG CAPS   Yes No   Sig: Take by mouth daily   Clobetasol Prop Crea-Coal Tar (CLOBETAPLUS CREAM EX)   Yes No   Sig: Apply topically 2 (two) times a day   DUPIXENT subcutaneous injection  Self Yes No   Sig: Inject 300 mg under the skin every 14 (fourteen) days 300/2ml   Prenatal Vit-DSS-Fe Fum-FA (PRENATAL 19) 29-1 MG TABS  Self Yes No   Sig: Take by mouth   acetaminophen (TYLENOL) 325 mg tablet   Yes No   Sig: Take 650 mg by mouth every 6 (six) hours as needed for mild pain   albuterol (PROVENTIL HFA,VENTOLIN HFA) 90 mcg/act inhaler   Yes No   etonogestrel (NEXPLANON) subdermal implant   Yes No   Si mg by Subdermal route once   ferrous sulfate 325 (65 Fe) mg tablet   No No   Sig: Take 1 tablet (325 mg total) by mouth daily with breakfast   hydrOXYzine HCL (ATARAX) 10 mg tablet   Yes No   Sig: Take 10 mg by mouth daily   hydrocortisone 0 5 % cream   Yes No   Sig: Apply topically 2 (two) times a day sertraline (ZOLOFT) 50 mg tablet   No No   Sig: Take 1 tablet (50 mg total) by mouth daily For the first week, take 1/2 tab      Facility-Administered Medications: None       Past Medical History:   Diagnosis Date    Abnormal Pap smear of cervix     Anemia     Anxiety     Asthma     Bronchitis     past    Eczema     Hypertension     Migraine     Obesity     Pneumonia     in past    Varicella     immune per chart       Past Surgical History:   Procedure Laterality Date    BARIATRIC SURGERY  2019    EXPLORATORY LAPAROTOMY      2016, no findings     GASTRECTOMY SLEEVE LAPAROSCOPIC  2019    [place]  DELIVERY ONLY N/A 9/15/2020    Procedure:  SECTION (); Surgeon: Anna Vaughn MD;  Location: [facility] ;  Service: Obstetrics       Family History   Problem Relation Age of Onset    Hypertension Mother     Diabetes Mother         gestastional   [place] Migraines Mother     Diabetes Maternal Grandmother     Heart disease Maternal Grandmother         failure    Diabetes Maternal Grandfather     Cancer Maternal Grandfather         liver    Asthma Father             Cancer Paternal Grandmother         lung    No Known Problems Sister     No Known Problems Brother     No Known Problems Sister     No Known Problems Sister      I have reviewed and agree with the history as documented  E-Cigarette/Vaping    E-Cigarette Use Never User      E-Cigarette/Vaping Substances    Nicotine No     THC No     CBD No     Flavoring No     Other No     Unknown No      Social History     Tobacco Use    Smoking status: Never Smoker    Smokeless tobacco: Never Used   Substance Use Topics    Alcohol use: Yes     Alcohol/week: 2 0 standard drinks     Types: 2 Glasses of wine per week     Frequency: 2-4 times a month     Comment: social    Drug use: Never       Review of Systems   Constitutional: Negative for fever  HENT: Negative for drooling, facial swelling and mouth sores  All other systems reviewed and are negative  Physical Exam  Physical Exam  Vitals signs and nursing note reviewed  Constitutional:       General: She is not in acute distress  Appearance: Normal appearance  She is not ill-appearing, toxic-appearing or diaphoretic  HENT:      Head: Normocephalic and atraumatic  Mouth/Throat:      Lips: Pink  No lesions  Mouth: Mucous membranes are moist  No oral lesions  Tongue: No lesions  Tongue does not deviate from midline  Palate: No mass and lesions  Pharynx: Oropharynx is clear  Uvula midline  No pharyngeal swelling, oropharyngeal exudate, posterior oropharyngeal erythema or uvula swelling  Tonsils: No tonsillar exudate or tonsillar abscesses  Comments: Floor of the mouth benign, no trismus  Eyes:      General: No scleral icterus  Neurological:      Mental Status: She is alert  Vital Signs  ED Triage Vitals [02/26/21 1647]   Temperature Pulse Respirations Blood Pressure SpO2   98 9 °F (37 2 °C) 87 18 128/59 100 %      Temp Source Heart Rate Source Patient Position - Orthostatic VS BP Location FiO2 (%)   Oral -- -- -- --      Pain Score       --           Vitals:    02/26/21 1647   BP: 128/59   Pulse: 87         Visual Acuity      ED Medications  Medications   naproxen (NAPROSYN) tablet 500 mg (500 mg Oral Given 2/26/21 1718)   clindamycin (CLEOCIN) capsule 300 mg (300 mg Oral Given 2/26/21 1718)       Diagnostic Studies  Results Reviewed     None                 No orders to display              Procedures  Procedures         ED Course                                           MDM  Number of Diagnoses or Management Options  Dental infection:   Dentalgia:   Diagnosis management comments: Possible early dental infection without drainable collection  Prescribed abx and referred to dentistry for definitive management  Return precautions discussed and documented on discharge paperwork  Pt endorsed good understanding  Disposition  Final diagnoses:   Dental infection   Dentalgia     Time reflects when diagnosis was documented in both MDM as applicable and the Disposition within this note     Time User Action Codes Description Comment    2/26/2021  5:15 PM Delmy Alston Add [K04 7] Dental infection     2/26/2021  5:15 PM Alecia Jelly Corbin Add [K08 [id number]] LOUISA FREDY  [facility]       ED Disposition     ED Disposition Condition Date/Time Comment    Discharge Stable Fri Feb 26, 2021  5:15 PM Verita Sessions discharge to home/self care              Follow-up Information     Follow up With Specialties Details Why Contact Info Additional Information    Haydee Villagran MD Family Medicine Schedule an appointment as soon as possible for a visit   [place] [phone]       [facility] Emergency Department Emergency Medicine  As needed, If symptoms worsen specifically high fever, swelling of the lips, tongue, face or difficulty breathing/swallowing or opening the mouth [address] [facility] [address] [facility] Emergency Department,  [address], [place], [place], [address] [facility]  Schedule an appointment as soon as possible for a visit   [address] [facility] [address]  [email or web address]  [phone]           Patient's Medications   Discharge Prescriptions    CLINDAMYCIN (CLEOCIN) 150 MG CAPSULE    Take 2 capsules (300 mg total) by mouth every 8 (eight) hours for 7 days       Start Date: 2/26/2021 End Date: 3/5/2021       Order Dose: 300 mg       Quantity: 42 capsule    Refills: 0    NAPROXEN (NAPROSYN) 500 MG TABLET    Take 1 tablet (500 mg total) by mouth 2 (two) times a day with meals for 20 doses       Start Date: 2/26/2021 End Date: 3/8/2021       Order Dose: 500 mg       Quantity: 20 tablet    Refills: 0         PDMP Review       Value Time User    PDMP Reviewed  Yes 10/9/2020  1:08 PM Dora GANDHI Mercedes Horne MD          ED Provider  Electronically Signed by           Laurel Yañez MD  02/26/21 0090

## 2021-02-26 NOTE — DISCHARGE INSTRUCTIONS
Please follow up with your dentist as soon as you can  Please take prescribed antibiotics until all gone  Take prescribed Naproxen for pain  Do not take with other NSAIDs  You can continue taking Tylenol however  If you feel like this is getting worse please return to the ER for any of the symptoms described above

## 2021-02-27 ENCOUNTER — TELEPHONE (OUTPATIENT)
Dept: FAMILY MEDICINE CLINIC | Facility: CLINIC | Age: 31
End: 2021-02-27

## 2021-02-27 ENCOUNTER — OFFICE VISIT (OUTPATIENT)
Dept: FAMILY MEDICINE CLINIC | Facility: CLINIC | Age: 31
End: 2021-02-27
Payer: COMMERCIAL

## 2021-02-27 VITALS
BODY MASS INDEX: 36.44 KG/M2 | OXYGEN SATURATION: 98 % | RESPIRATION RATE: 14 BRPM | HEIGHT: 62 IN | TEMPERATURE: 98.7 F | HEART RATE: 86 BPM | DIASTOLIC BLOOD PRESSURE: 74 MMHG | SYSTOLIC BLOOD PRESSURE: 118 MMHG | WEIGHT: 198 LBS

## 2021-02-27 DIAGNOSIS — F41.1 GAD (GENERALIZED ANXIETY DISORDER): Primary | ICD-10-CM

## 2021-02-27 DIAGNOSIS — E66.09 CLASS 2 OBESITY DUE TO EXCESS CALORIES WITHOUT SERIOUS COMORBIDITY WITH BODY MASS INDEX (BMI) OF 36.0 TO 36.9 IN ADULT: ICD-10-CM

## 2021-02-27 PROBLEM — M54.6 ACUTE BILATERAL THORACIC BACK PAIN: Status: RESOLVED | Noted: 2018-06-08 | Resolved: 2021-02-27

## 2021-02-27 PROCEDURE — 99214 OFFICE O/P EST MOD 30 MIN: CPT | Performed by: FAMILY MEDICINE

## 2021-02-27 PROCEDURE — 3725F SCREEN DEPRESSION PERFORMED: CPT | Performed by: FAMILY MEDICINE

## 2021-02-27 RX ORDER — ESCITALOPRAM OXALATE 10 MG/1
10 TABLET ORAL DAILY
Qty: 30 TABLET | Refills: 5 | Status: SHIPPED | OUTPATIENT
Start: 2021-02-27 | End: 2021-04-16 | Stop reason: SDUPTHER

## 2021-02-27 NOTE — ASSESSMENT & PLAN NOTE
Provided emotional support to patient  Recommended to practice relaxation techniques  Continue psychotherapy  Will start Lexapro 10 mg daily

## 2021-02-27 NOTE — TELEPHONE ENCOUNTER
Patient is requesting a work note to keep her out of work until her FMLA paperwork begins  Please send this to her in My Chart, per patient's request

## 2021-02-27 NOTE — ASSESSMENT & PLAN NOTE
PHQ-9 score 19  Discussed treatment options with patient  Will start on Lexapro 10 mg daily  Reviewed side effects  Continue psychotherapy  Will re-evaluate in 4 weeks

## 2021-02-27 NOTE — ASSESSMENT & PLAN NOTE
Recommended to follow a well-balanced diet  Avoid starchy, fatty foods  Encouraged regular exercise

## 2021-02-27 NOTE — PROGRESS NOTES
Chief Complaint   Patient presents with    Anxiety    Depression     Health Maintenance   Topic Date Due    Pneumococcal Vaccine: Pediatrics (0 to 5 Years) and At-Risk Patients (6 to 59 Years) (1 of 1 - PPSV23) 05/05/1997    BMI: Followup Plan  12/05/2019    Influenza Vaccine (1) 09/01/2020    Annual Physical  03/10/2021    BMI: Adult  02/27/2022    Cervical Cancer Screening  03/08/2022    DTaP,Tdap,and Td Vaccines (2 - Td) 09/01/2030    HIV Screening  Completed    Hepatitis C Screening  Completed    HIB Vaccine  Aged Out    Hepatitis B Vaccine  Aged Out    IPV Vaccine  Aged Out    Hepatitis A Vaccine  Aged Out    Meningococcal ACWY Vaccine  Aged Out    HPV Vaccine  Aged Out       BMI Counseling: Body mass index is 36 81 kg/m²  The BMI is above normal  Nutrition recommendations include decreasing portion sizes, encouraging healthy choices of fruits and vegetables, decreasing fast food intake, consuming healthier snacks, limiting drinks that contain sugar, moderation in carbohydrate intake, increasing intake of lean protein, reducing intake of saturated and trans fat and reducing intake of cholesterol  Exercise recommendations include exercising 3-5 times per week  No pharmacotherapy was ordered  Assessment/Plan:    NAYA (generalized anxiety disorder)  Provided emotional support to patient  Recommended to practice relaxation techniques  Continue psychotherapy  Will start Lexapro 10 mg daily  Postpartum depression  PHQ-9 score 19  Discussed treatment options with patient  Will start on Lexapro 10 mg daily  Reviewed side effects  Continue psychotherapy  Will re-evaluate in 4 weeks  Class 2 obesity due to excess calories without serious comorbidity in adult  Recommended to follow a well-balanced diet  Avoid starchy, fatty foods  Encouraged regular exercise        I have spent 30 minutes with Patient  today in which greater than 50% of this time was spent in counseling/coordination of care regarding Risks and benefits of tx options, Intructions for management, Patient and family education, Importance of tx compliance, Risk factor reductions and Impressions  Schedule follow-up visit in 4 weeks  Diagnoses and all orders for this visit:    NAYA (generalized anxiety disorder)  -     escitalopram (LEXAPRO) 10 mg tablet; Take 1 tablet (10 mg total) by mouth daily    Postpartum depression  -     escitalopram (LEXAPRO) 10 mg tablet; Take 1 tablet (10 mg total) by mouth daily    Class 2 obesity due to excess calories without serious comorbidity with body mass index (BMI) of 36 0 to 36 9 in adult          Subjective:      Patient ID: Isaiah Bueno is a 35 y o  female  HPI     Patient presents to the office c/o feeling anxious, depressed, having panic attacks  Patient states that her symptoms started in 2020 when she was pregnant, developed preeclampsia and was in the hospital for 1 month prior to  on 9/15/21  Her baby was born premature, was in NICU for 2 months  Patient states that her gynecologist started her on Zoloft 50 mg  which she took for 1 month and then stopped taking medication because it did not provide any relief in symptoms  Patient follows with psychotherapist once a month  Denies chest pain, shortness of breath  C/o headaches, difficulty sleeping at night  Denies SI  She has not been breastfeeding her baby  Patient was working at [employer] and was supposed to return back to work in January  She feels very anxious and stressed out regarding going back to work and taking care of her baby  She is very concerned being around people at work who might be sick with [place] -23 virus infection  Denies tobacco, alcohol, drug use  Patient was seen in [facility] ER yesterday for dental pain  C/o left upper wisdom tooth pain    She was prescribed Clindamycin for 7 days and referred to a dentist     The following portions of the patient's history were reviewed and updated as appropriate: allergies, current medications, past family history, past medical history, past surgical history and problem list     Review of Systems   Constitutional: Positive for fatigue  Negative for activity change, appetite change, chills and fever  HENT: Negative for congestion, sore throat and trouble swallowing  Eyes: Negative for pain, discharge, redness, itching and visual disturbance  Respiratory: Negative for cough, chest tightness, shortness of breath and wheezing  Cardiovascular: Negative for chest pain, palpitations and leg swelling  Gastrointestinal: Negative for abdominal pain, blood in stool, constipation, diarrhea, nausea and vomiting  Genitourinary: Negative for dysuria, flank pain, frequency, hematuria and pelvic pain  Musculoskeletal: Negative for arthralgias and myalgias  Skin: Negative for rash  Neurological: Positive for headaches  Negative for dizziness  Hematological: Negative  Psychiatric/Behavioral: Positive for sleep disturbance  Negative for suicidal ideas  The patient is nervous/anxious  Fells depressed         Objective:      /74 (BP Location: Left arm, Patient Position: Sitting, Cuff Size: Large)   Pulse 86   Temp 98 7 °F (37 1 °C) (Tympanic)   Resp 14   Ht 5' 1 5" (1 562 m)   Wt 89 8 kg (198 lb)   SpO2 98%   BMI 36 81 kg/m²          Physical Exam  Vitals signs and nursing note reviewed  Constitutional:       Appearance: Normal appearance  She is obese  HENT:      Head: Normocephalic and atraumatic  Eyes:      Conjunctiva/sclera: Conjunctivae normal       Pupils: Pupils are equal, round, and reactive to light  Neck:      Musculoskeletal: Normal range of motion and neck supple  No muscular tenderness  Cardiovascular:      Rate and Rhythm: Normal rate and regular rhythm  Heart sounds: No murmur     Pulmonary:      Effort: Pulmonary effort is normal       Breath sounds: Normal breath sounds  Abdominal:      General: There is no distension  Palpations: Abdomen is soft  Tenderness: There is no abdominal tenderness  Musculoskeletal: Normal range of motion  General: No swelling, tenderness or deformity  Right lower leg: No edema  Left lower leg: No edema  Lymphadenopathy:      Cervical: No cervical adenopathy  Skin:     General: Skin is warm and dry  Findings: No rash  Neurological:      Mental Status: She is alert  Psychiatric:      Comments: Feels anxious, depressed   Tearful during exam   No SI

## 2021-03-04 ENCOUNTER — TELEPHONE (OUTPATIENT)
Dept: FAMILY MEDICINE CLINIC | Facility: CLINIC | Age: 31
End: 2021-03-04

## 2021-03-04 NOTE — TELEPHONE ENCOUNTER
Patient is requesting an updated work note with the following changes:   Her employer wants the diagnoses deleted from the letter and also the date seen should be 2/27/21  Please email the updated letter to patient's My Chart

## 2021-03-04 NOTE — LETTER
March 4, 2021     Patient: Sharlotte Goodell   YOB: 1990   Date of Visit: 2/27/2021       To Whom it May Concern:    Erika Ceenaheedaysha is under my professional care  She was seen in my office on 2/27/2021  Please excuse her from work as [employer] paperwork is processing  We will provide an update on an expected return to work date as we will follow up with her on 3/29/2021  If you have any questions or concerns, please don't hesitate to call           Sincerely,          HIRAM Allan: No Recipients

## 2021-03-08 ENCOUNTER — TELEMEDICINE (OUTPATIENT)
Dept: BEHAVIORAL/MENTAL HEALTH CLINIC | Facility: CLINIC | Age: 31
End: 2021-03-08
Payer: COMMERCIAL

## 2021-03-08 DIAGNOSIS — F41.1 GAD (GENERALIZED ANXIETY DISORDER): Primary | ICD-10-CM

## 2021-03-08 PROCEDURE — 90834 PSYTX W PT 45 MINUTES: CPT | Performed by: SOCIAL WORKER

## 2021-03-08 NOTE — PSYCH
Virtual Regular Visit      Assessment/Plan:    Problem List Items Addressed This Visit        Other    NAYA (generalized anxiety disorder) - Primary               Reason for visit is   Chief Complaint   Patient presents with    Virtual Regular Visit        Encounter provider Tye Rios    Provider located at [facility] [id number]      Recent Visits  Date Type Provider Dept   03/08/21 7833 Dionte Estrada [facility], [facility]   Showing recent visits within past 7 days and meeting all other requirements     Future Appointments  No visits were found meeting these conditions  Showing future appointments within next 150 days and meeting all other requirements        The patient was identified by name and date of birth  Erica Martin was informed that this is a telemedicine visit and that the visit is being conducted through [employer] and patient was informed that this is a secure, HIPAA-compliant platform  She agrees to proceed     My office door was closed  No one else was in the room  She acknowledged consent and understanding of privacy and security of the video platform  The patient has agreed to participate and understands they can discontinue the visit at any time  Patient is aware this is a billable service  Chung Gage is a 35 y o  female was counseled for 40 minutes from 2:00 - 2:40pm - telehealth due to covid  Video disconnected at end of session - torres dpt and left VM - pt called office after session - video challenges at 40 minutes  Patient reports seeing PCP as having multiple panic attacks, high anxiety  Started Lexapro  Stress over baby going with Malek overnight with his mom's to his brother's 16th brunch  Thi Valerio was there and needed to give baby to sister and help her calm down  Thi Valerio started new job a month ago and likes it and doing well      Additional anxiety related to her parents moved in with her siblings and may be there for up to 6 months  Alejandro Pae and parents had issues and they had conflict - he did not want to share and she felt tension  Parents asked Lolis Bowles for help but poor communication and mom felt they should have asked  Poor communication - said, "You're not a man and that's why you have nothing "    Mom pointing out that they're using one of their cars  Mom typically says mean things when upset - difficult communication  Emotional, overwhelmed, crying, feels guilty that she allowed them come into her home  Family trying to keep issues from her due to her anxiety  Mom assertive and can be overwhelming - patient shutting down and withdrawing from family  Julian Ordaz is Cirilo Khanna now - weight gain improving - happy baby - next week is 6 month [facility]  Feels positive about baby - connected and excited  Looking for a job and unsure if going back to job - cause her to need to use mom for help - both work nights  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  Lolis Bowles doing mandated 12hr shifts  Would like to avoid using mom  Leave at work extended due to anxiety/starting meds - wound cleared but new meds extended time off  Explored CBT - thoughts surrounding family issues - ways to move forward and communicate better  Feels can get along but hurtful statements a challenge to overcome  Siblings not impacted with issues but younger sister is supportive  Communication, coping skills, family challenges, medication compliance and parenting reviewed        HPI     Past Medical History:   Diagnosis Date    Abnormal Pap smear of cervix     Anemia     Anxiety     Asthma     Bronchitis     past    Depression     Eczema     Hypertension     Migraine     Obesity     Pneumonia     in past    Varicella     immune per chart       Past Surgical History:   Procedure Laterality Date    BARIATRIC SURGERY  1/2019    EXPLORATORY LAPAROTOMY 2016, no findings     GASTRECTOMY SLEEVE LAPAROSCOPIC  2019    [place]  DELIVERY ONLY N/A 9/15/2020    Procedure:  SECTION (); Surgeon: Dudley Ceron MD;  Location: [facility] ;  Service: Obstetrics       Current Outpatient Medications   Medication Sig Dispense Refill    acetaminophen (TYLENOL) 325 mg tablet Take 650 mg by mouth every 6 (six) hours as needed for mild pain      albuterol (PROVENTIL HFA,VENTOLIN HFA) 90 mcg/act inhaler       Cetirizine HCl (ZyrTEC Allergy) 10 MG CAPS Take by mouth daily      Clobetasol Prop Crea-Coal Tar (CLOBETAPLUS CREAM EX) Apply topically 2 (two) times a day      DUPIXENT subcutaneous injection Inject 300 mg under the skin every 14 (fourteen) days 300/2ml      escitalopram (LEXAPRO) 10 mg tablet Take 1 tablet (10 mg total) by mouth daily 30 tablet 5    etonogestrel (NEXPLANON) subdermal implant 68 mg by Subdermal route once      ferrous sulfate 325 (65 Fe) mg tablet Take 1 tablet (325 mg total) by mouth daily with breakfast 90 tablet 1    hydrocortisone 0 5 % cream Apply topically 2 (two) times a day      hydrOXYzine HCL (ATARAX) 10 mg tablet Take 10 mg by mouth daily as needed       naproxen (NAPROSYN) 500 mg tablet Take 1 tablet (500 mg total) by mouth 2 (two) times a day with meals for 20 doses 20 tablet 0    Prenatal Vit-DSS-Fe Fum-FA (PRENATAL 19) 29-1 MG TABS Take by mouth       No current facility-administered medications for this visit  Allergies   Allergen Reactions    Cat Hair Extract Hives    Dog Epithelium Hives    Penicillins Abdominal Pain     Had skin testing done and positive result       Review of Systems    Video Exam    There were no vitals filed for this visit      Physical Exam Oriented, engaged, sad/anxious, full range affect, appropriate speech, denies suicidal/homicidal ideations/psychosis/desire to harm baby, fair to good insight/judgement    I spent 45 minutes directly with the patient during this visit      VIRTUAL VISIT DISCLAIMER    Mariam ABREU Katina West acknowledges that she has consented to an online visit or consultation  She understands that the online visit is based solely on information provided by her, and that, in the absence of a face-to-face physical evaluation by the physician, the diagnosis she receives is both limited and provisional in terms of accuracy and completeness  This is not intended to replace a full medical face-to-face evaluation by the physician  Mercy Lyman understands and accepts these terms

## 2021-03-24 ENCOUNTER — TELEMEDICINE (OUTPATIENT)
Dept: BEHAVIORAL/MENTAL HEALTH CLINIC | Facility: CLINIC | Age: 31
End: 2021-03-24
Payer: COMMERCIAL

## 2021-03-24 DIAGNOSIS — F41.1 GAD (GENERALIZED ANXIETY DISORDER): Primary | ICD-10-CM

## 2021-03-24 PROCEDURE — 90834 PSYTX W PT 45 MINUTES: CPT | Performed by: SOCIAL WORKER
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
## 2021-03-26 RX ORDER — CLINDAMYCIN PHOSPHATE 10 UG/ML
LOTION TOPICAL
COMMUNITY
Start: 2021-03-02 | End: 2022-03-04 | Stop reason: ALTCHOICE

## 2021-03-30 ENCOUNTER — ANNUAL EXAM (OUTPATIENT)
Dept: OBGYN CLINIC | Facility: CLINIC | Age: 31
End: 2021-03-30
Payer: COMMERCIAL

## 2021-03-30 VITALS
HEART RATE: 72 BPM | WEIGHT: 197 LBS | SYSTOLIC BLOOD PRESSURE: 124 MMHG | BODY MASS INDEX: 36.25 KG/M2 | DIASTOLIC BLOOD PRESSURE: 70 MMHG | HEIGHT: 62 IN

## 2021-03-30 DIAGNOSIS — N76.0 BV (BACTERIAL VAGINOSIS): ICD-10-CM

## 2021-03-30 DIAGNOSIS — B37.3 YEAST INFECTION OF THE VAGINA: Primary | ICD-10-CM

## 2021-03-30 DIAGNOSIS — B96.89 BV (BACTERIAL VAGINOSIS): ICD-10-CM

## 2021-03-30 LAB
BV WHIFF TEST VAG QL: ABNORMAL
CLUE CELLS SPEC QL WET PREP: ABNORMAL
PH SMN: ABNORMAL [PH]
SL AMB POCT WET MOUNT: ABNORMAL
T VAGINALIS VAG QL WET PREP: ABNORMAL
YEAST VAG QL WET PREP: ABNORMAL

## 2021-03-30 PROCEDURE — 87210 SMEAR WET MOUNT SALINE/INK: CPT | Performed by: NURSE PRACTITIONER

## 2021-03-30 PROCEDURE — 3008F BODY MASS INDEX DOCD: CPT | Performed by: NURSE PRACTITIONER

## 2021-03-30 PROCEDURE — 1036F TOBACCO NON-USER: CPT | Performed by: NURSE PRACTITIONER

## 2021-03-30 PROCEDURE — 99213 OFFICE O/P EST LOW 20 MIN: CPT | Performed by: NURSE PRACTITIONER

## 2021-03-30 RX ORDER — CLOTRIMAZOLE 1 %
1 CREAM WITH APPLICATOR VAGINAL
Qty: 45 G | Refills: 0 | Status: SHIPPED | OUTPATIENT
Start: 2021-03-30 | End: 2021-04-06

## 2021-03-30 RX ORDER — METRONIDAZOLE 500 MG/1
500 TABLET ORAL EVERY 12 HOURS SCHEDULED
Qty: 14 TABLET | Refills: 0 | Status: SHIPPED | OUTPATIENT
Start: 2021-03-30 | End: 2021-04-06

## 2021-03-30 NOTE — PROGRESS NOTES
Assessment/Plan:  Bacterial vaginosis and yeast  noted on wet mount  Rx sent to requested pharmacy  No sex during treatment  Complete all medication as directed  Consider daily probiotic  Call with any recurrence of symptoms  Return to office for annual exam          Diagnoses and all orders for this visit:    Yeast infection of the vagina  -     POCT wet mount  -     clotrimazole (GYNE-LOTRIMIN) 1 % vaginal cream; Insert 1 applicator into the vagina daily at bedtime for 7 days    BV (bacterial vaginosis)  -     POCT wet mount  -     metroNIDAZOLE (FLAGYL) 500 mg tablet; Take 1 tablet (500 mg total) by mouth every 12 (twelve) hours for 7 days          Subjective:      Patient ID: Monica Houston is a 35 y o  female  Monica Houston is a 35 y o  female who is here today for a problem visit  C/o vaginal itching x 1 week  Light yellow colored vaginal discharge  No malodor  Denies pelvic pain, fever or STI concerns  She was treated with antibiotics for her wisdom tooth extraction early March  No health concerns  Hx of gastric band  Irregular menses with nexplanon  Bleeds x 7 days with varied flow  Menses is acceptable  Nexplanon inserted 11/10/20  Monica Houston is sexually active with male partner of 2 years  Baby is doing well  The following portions of the patient's history were reviewed and updated as appropriate: allergies, current medications, past family history, past medical history, past social history, past surgical history and problem list     Review of Systems   Constitutional: Negative  Gastrointestinal: Negative for abdominal pain, constipation, diarrhea, nausea and vomiting  Genitourinary: Positive for vaginal discharge  Negative for decreased urine volume, dyspareunia, dysuria, genital sores, menstrual problem, pelvic pain, urgency, vaginal bleeding and vaginal pain  Musculoskeletal: Negative for arthralgias and myalgias  Skin: Negative      Hematological: Negative for adenopathy  Psychiatric/Behavioral: Negative  All other systems reviewed and are negative  Objective:      /70   Pulse 72   Ht 5' 2" (1 575 m)   Wt 89 4 kg (197 lb)   LMP 03/22/2021 (Exact Date)   Breastfeeding No   BMI 36 03 kg/m²          Physical Exam  Vitals signs and nursing note reviewed  Constitutional:       Appearance: She is well-developed  Abdominal:      Palpations: Abdomen is soft  Genitourinary:     Exam position: Lithotomy position  Labia:         Right: No rash, tenderness, lesion or injury  Left: No rash, tenderness, lesion or injury  Urethra: No prolapse, urethral pain, urethral swelling or urethral lesion  Vagina: No signs of injury and foreign body  Vaginal discharge (curd like discahrge coatign wall of vaginal, pooling thin white discharge in vagina) and erythema present  No tenderness, bleeding or lesions  Cervix: Normal       Uterus: Normal        Adnexa:         Right: No mass, tenderness or fullness  Left: No mass, tenderness or fullness  Rectum: No external hemorrhoid  Comments: Erythema noted within red on diagram  Lymphadenopathy:      Lower Body: No right inguinal adenopathy  No left inguinal adenopathy  Skin:     General: Skin is warm and dry  Neurological:      Mental Status: She is alert and oriented to person, place, and time  Psychiatric:         Mood and Affect: Mood normal          Behavior: Behavior normal          Thought Content:  Thought content normal          Judgment: Judgment normal

## 2021-03-30 NOTE — PATIENT INSTRUCTIONS
Bacterial vaginosis and yeast  noted on wet mount  Rx sent to requested pharmacy  No sex during treatment  Complete all medication as directed  Consider daily probiotic  Call with any recurrence of symptoms     Return to office for annual exam

## 2021-04-09 ENCOUNTER — TELEMEDICINE (OUTPATIENT)
Dept: BEHAVIORAL/MENTAL HEALTH CLINIC | Facility: CLINIC | Age: 31
End: 2021-04-09
Payer: COMMERCIAL

## 2021-04-09 DIAGNOSIS — F41.1 GAD (GENERALIZED ANXIETY DISORDER): Primary | ICD-10-CM

## 2021-04-09 PROCEDURE — 90834 PSYTX W PT 45 MINUTES: CPT | Performed by: SOCIAL WORKER
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
## 2021-04-13 ENCOUNTER — ANNUAL EXAM (OUTPATIENT)
Dept: OBGYN CLINIC | Facility: CLINIC | Age: 31
End: 2021-04-13
Payer: COMMERCIAL

## 2021-04-13 VITALS
DIASTOLIC BLOOD PRESSURE: 78 MMHG | BODY MASS INDEX: 35.47 KG/M2 | SYSTOLIC BLOOD PRESSURE: 118 MMHG | WEIGHT: 200.2 LBS | HEIGHT: 63 IN

## 2021-04-13 DIAGNOSIS — Z01.419 ENCNTR FOR GYN EXAM (GENERAL) (ROUTINE) W/O ABN FINDINGS: Primary | ICD-10-CM

## 2021-04-13 DIAGNOSIS — F41.1 GAD (GENERALIZED ANXIETY DISORDER): ICD-10-CM

## 2021-04-13 PROCEDURE — 99395 PREV VISIT EST AGE 18-39: CPT | Performed by: NURSE PRACTITIONER

## 2021-04-13 PROCEDURE — 0503F POSTPARTUM CARE VISIT: CPT | Performed by: NURSE PRACTITIONER

## 2021-04-13 RX ORDER — ESCITALOPRAM OXALATE 10 MG/1
10 TABLET ORAL DAILY
Qty: 30 TABLET | Refills: 5 | Status: CANCELLED | OUTPATIENT
Start: 2021-04-13

## 2021-04-13 NOTE — PROGRESS NOTES
Assessment/Plan:    Pap every 3 years if normal-due 2022, STI testing as indicated-declined  Exercise most days of week-minimum of 150 minutes per week  Strongly encouraged to set a goal to go for a walk most day  Discussed exercise being a mood elevator  Encouraged to call PCP to get on cancellation list for earlier appt or consider telemedicine visit  Obtain appropriate diet and hydration  Calcium 1000mg + 600 vit D daily  Birth control as directed (ACHES reviewed)  Benefits, risks and alternatives discussed/reviewed  Condom use when sexually active for sexually transmitted infection prevention  HPV 9 vaccine recommended through age 39  Check with your insurance for coverage  If covered, call office to schedule start of vaccine series  Annual mammogram starting at age 36, monthly breast self exam    Harley Burkitt 20 times twice daily  Diagnoses and all orders for this visit:    Encntr for gyn exam (general) (routine) w/o abn findings    NAYA (generalized anxiety disorder)    Postpartum depression    BMI 35 0-35 9,adult    Other orders  -     Cancel: escitalopram (LEXAPRO) 10 mg tablet; Take 1 tablet (10 mg total) by mouth daily          Subjective:      Patient ID: Latasha Corcoran is a 35 y o  female  Latasha Corcoran is a 35 y o  female who is here today for her annual visit  No health concerns  Her BV and VVC have resolved with treatment  Hx of gastric band  Irregular menses with nexplanon (inserted 11/2020)  Bleeds x 7 days with varied flow  Menses is acceptable  Not currently exercising  Latasha Corcoarn is sexually active with male partner of 2 years  Daughter is doing well and 7 months  Normal pap 3/19  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[employer] [address]  Admits to varying mood changes  She is aware she will isolate with her daughter when she is feeling down  Denies suicidal ideation  Follows with Gamal Helton twice monthly for CBT and is compliant with her medication   She does feel she needs an increase in her medication and has planned follow up with PCP(ordering provider)  The following portions of the patient's history were reviewed and updated as appropriate: allergies, current medications, past family history, past medical history, past social history, past surgical history and problem list     Review of Systems   Constitutional: Negative  Negative for activity change, appetite change, chills, diaphoresis, fatigue, fever and unexpected weight change  HENT: Negative for congestion, dental problem, sneezing, sore throat and trouble swallowing  Eyes: Negative for visual disturbance  Respiratory: Negative for chest tightness and shortness of breath  Cardiovascular: Negative for chest pain and leg swelling  Gastrointestinal: Negative for abdominal pain, constipation, diarrhea, nausea and vomiting  Genitourinary: Positive for menstrual problem  Negative for difficulty urinating, dyspareunia, dysuria, frequency, hematuria, pelvic pain, urgency, vaginal bleeding, vaginal discharge and vaginal pain  Musculoskeletal: Negative for back pain and neck pain  Skin: Negative  Allergic/Immunologic: Negative  Neurological: Negative for weakness and headaches  Hematological: Negative for adenopathy  Psychiatric/Behavioral: Negative  Objective:      /78 (BP Location: Left arm, Patient Position: Sitting, Cuff Size: Large)   Ht 5' 3" (1 6 m)   Wt 90 8 kg (200 lb 3 2 oz)   LMP 03/22/2021 (Exact Date)   BMI 35 46 kg/m²          Physical Exam  Vitals signs and nursing note reviewed  Constitutional:       Appearance: Normal appearance  She is well-developed  She is obese  HENT:      Head: Normocephalic and atraumatic  Eyes:      General:         Right eye: No discharge  Left eye: No discharge  Neck:      Musculoskeletal: Normal range of motion and neck supple  Thyroid: No thyromegaly        Trachea: Trachea normal    Cardiovascular:      Rate and Rhythm: Normal rate and regular rhythm  Heart sounds: Normal heart sounds  Pulmonary:      Effort: Pulmonary effort is normal       Breath sounds: Normal breath sounds  Chest:      Breasts: Breasts are symmetrical          Right: No inverted nipple, mass, nipple discharge, skin change or tenderness  Left: No inverted nipple, mass, nipple discharge, skin change or tenderness  Abdominal:      Palpations: Abdomen is soft  Genitourinary:     Exam position: Supine  Labia:         Right: No rash, tenderness, lesion or injury  Left: No rash, tenderness, lesion or injury  Vagina: Normal  No signs of injury and foreign body  No vaginal discharge, erythema, tenderness or bleeding  Cervix: No cervical motion tenderness, discharge or friability  Adnexa:         Right: No mass, tenderness or fullness  Left: No mass, tenderness or fullness  Rectum: No external hemorrhoid  Musculoskeletal: Normal range of motion  Lymphadenopathy:      Head:      Right side of head: No submental, submandibular or tonsillar adenopathy  Left side of head: No submental, submandibular or tonsillar adenopathy  Cervical: No cervical adenopathy  Skin:     General: Skin is warm and dry  Neurological:      Mental Status: She is alert and oriented to person, place, and time  Psychiatric:         Mood and Affect: Mood normal          Behavior: Behavior normal          Thought Content:  Thought content normal

## 2021-04-13 NOTE — PATIENT INSTRUCTIONS
Pap every 3 years if normal-due 2022, STI testing as indicated-declined  Exercise most days of week-minimum of 150 minutes per week  Strongly encouraged to set a goal to go for a walk most day  Discussed exercise being a mood elevator  Encouraged to call PCP to get on cancellation list for earlier appt or consider telemedicine visit  Obtain appropriate diet and hydration  Calcium 1000mg + 600 vit D daily  Birth control as directed (ACHES reviewed)  Benefits, risks and alternatives discussed/reviewed  Condom use when sexually active for sexually transmitted infection prevention  HPV 9 vaccine recommended through age 39  Check with your insurance for coverage  If covered, call office to schedule start of vaccine series  Annual mammogram starting at age 36, monthly breast self exam    Reinaldo Countess 20 times twice daily

## 2021-04-16 ENCOUNTER — OFFICE VISIT (OUTPATIENT)
Dept: INTERNAL MEDICINE CLINIC | Facility: CLINIC | Age: 31
End: 2021-04-16
Payer: COMMERCIAL

## 2021-04-16 VITALS
HEIGHT: 63 IN | WEIGHT: 200 LBS | OXYGEN SATURATION: 95 % | HEART RATE: 80 BPM | BODY MASS INDEX: 35.44 KG/M2 | DIASTOLIC BLOOD PRESSURE: 70 MMHG | SYSTOLIC BLOOD PRESSURE: 128 MMHG

## 2021-04-16 DIAGNOSIS — F41.1 GAD (GENERALIZED ANXIETY DISORDER): ICD-10-CM

## 2021-04-16 DIAGNOSIS — Z13.29 SCREENING FOR THYROID DISORDER: Primary | ICD-10-CM

## 2021-04-16 PROCEDURE — 1036F TOBACCO NON-USER: CPT | Performed by: INTERNAL MEDICINE

## 2021-04-16 PROCEDURE — 99213 OFFICE O/P EST LOW 20 MIN: CPT | Performed by: INTERNAL MEDICINE

## 2021-04-16 PROCEDURE — 3008F BODY MASS INDEX DOCD: CPT | Performed by: INTERNAL MEDICINE

## 2021-04-16 PROCEDURE — 3725F SCREEN DEPRESSION PERFORMED: CPT | Performed by: INTERNAL MEDICINE

## 2021-04-16 RX ORDER — ESCITALOPRAM OXALATE 5 MG/1
15 TABLET ORAL DAILY
Qty: 90 TABLET | Refills: 0 | Status: SHIPPED | OUTPATIENT
Start: 2021-04-16 | End: 2021-06-03

## 2021-04-16 RX ORDER — HYDROCODONE BITARTRATE AND ACETAMINOPHEN 5; 325 MG/1; MG/1
TABLET ORAL
COMMUNITY
Start: 2021-04-15 | End: 2021-07-26

## 2021-04-16 RX ORDER — IBUPROFEN 600 MG/1
TABLET ORAL
COMMUNITY
Start: 2021-04-15 | End: 2021-06-03

## 2021-04-16 NOTE — PROGRESS NOTES
Assessment/Plan:    #NAYA and Postpartum Depression  -patient had a complicated birth with daughter in September, daughter was in NICU and patient was in the hospital over a month  -reports feeling anxious and withdrawn at times  -denies SI/HI  -seeing therapist now  -reports she was uneasy at work at the [employer] where they did not implement COVID precautions and she had to resign, plans on starting new job in May at [employer]  -now on lexapro without improvement  -will increase dose to 15mg daily  -will screen for thyroid issues causing mood changes  -has good family support at home through boyfriend and parents    #Health Maintenance  -encouraged COVID vaccine but patient defers for now    No problem-specific Assessment & Plan notes found for this encounter  Diagnoses and all orders for this visit:    Screening for thyroid disorder  -     TSH, 3rd generation with Free T4 reflex    Postpartum depression  -     CBC and differential  -     Comprehensive metabolic panel  -     TSH, 3rd generation with Free T4 reflex  -     escitalopram (LEXAPRO) 5 mg tablet; Take 3 tablets (15 mg total) by mouth daily    NAYA (generalized anxiety disorder)  -     CBC and differential  -     Comprehensive metabolic panel  -     TSH, 3rd generation with Free T4 reflex  -     escitalopram (LEXAPRO) 5 mg tablet;  Take 3 tablets (15 mg total) by mouth daily    Other orders  -     HYDROcodone-acetaminophen (NORCO) 5-325 mg per tablet  -     ibuprofen (MOTRIN) 600 mg tablet            Current Outpatient Medications:     acetaminophen (TYLENOL) 325 mg tablet, Take 650 mg by mouth every 6 (six) hours as needed for mild pain, Disp: , Rfl:     albuterol (PROVENTIL HFA,VENTOLIN HFA) 90 mcg/act inhaler, , Disp: , Rfl:     Cetirizine HCl (ZyrTEC Allergy) 10 MG CAPS, Take by mouth daily, Disp: , Rfl:     clindamycin (CLEOCIN T) 1 % lotion, APPLY TO THE FACE TWICE A DAY, Disp: , Rfl:     Clobetasol Prop Crea-Coal Tar (CLOBETAPLUS CREAM EX), Apply topically 2 (two) times a day, Disp: , Rfl:     DUPIXENT subcutaneous injection, Inject 300 mg under the skin every 14 (fourteen) days 300/2ml, Disp: , Rfl:     escitalopram (LEXAPRO) 5 mg tablet, Take 3 tablets (15 mg total) by mouth daily, Disp: 90 tablet, Rfl: 0    etonogestrel (NEXPLANON) subdermal implant, 68 mg by Subdermal route once, Disp: , Rfl:     HYDROcodone-acetaminophen (NORCO) 5-325 mg per tablet, , Disp: , Rfl:     hydrocortisone 0 5 % cream, Apply topically 2 (two) times a day, Disp: , Rfl:     hydrOXYzine HCL (ATARAX) 10 mg tablet, Take 10 mg by mouth daily as needed , Disp: , Rfl:     ibuprofen (MOTRIN) 600 mg tablet, , Disp: , Rfl:     naproxen (NAPROSYN) 500 mg tablet, Take 1 tablet (500 mg total) by mouth 2 (two) times a day with meals for 20 doses, Disp: 20 tablet, Rfl: 0    Prenatal Vit-DSS-Fe Fum-FA (PRENATAL 19) 29-1 MG TABS, Take by mouth, Disp: , Rfl:     Subjective:      Patient ID: Forrest Costello is a 35 y o  female  HPI    Patient presents for an acute visit  Reports that she has been feeling generalized anxiety as well as depression postpartum feeling since August   States on September 15th her baby was born and she has been feeling anxious  She states baby was in the NICU and patient was in the hospital for over a month due to complications  Reports she has been feeling uneasy at work since they were not careful about COVID and she had to resign  She states she feels isolated  She does have good family support in her boyfriend as well as her parents  She denies any suicide or ideation  She is currently taking 10 mg of Lexapro daily for the past month  She reports she is not getting any improvement and is interested in increasing the dose  She continues to follow-up with psychology  We will increase this dose to 15 mg daily  Will also obtain routine including CBC CMP, TSH to evaluate for any signs thyroid disorder be contributing    Patient will return to care 1 month if symptoms persist or do not improve  The following portions of the patient's history were reviewed and updated as appropriate: allergies, current medications, past family history, past medical history, past social history, past surgical history and problem list     Review of Systems   Constitutional: Positive for fatigue and unexpected weight change (weight gain)  Negative for activity change, appetite change, chills and fever  HENT: Negative for congestion, ear pain, facial swelling, hearing loss, sore throat, tinnitus and trouble swallowing  Eyes: Negative for photophobia and visual disturbance  Respiratory: Negative for cough, shortness of breath and wheezing  Cardiovascular: Negative for chest pain and leg swelling  Gastrointestinal: Negative for abdominal distention, abdominal pain, blood in stool, nausea and vomiting  Genitourinary: Negative for difficulty urinating, dysuria and pelvic pain  Musculoskeletal: Negative for arthralgias, back pain, gait problem, joint swelling, myalgias, neck pain and neck stiffness  Skin: Negative for rash and wound  Neurological: Negative for dizziness, tremors, light-headedness and headaches  Psychiatric/Behavioral: Positive for decreased concentration, dysphoric mood and sleep disturbance  Negative for self-injury and suicidal ideas  The patient is nervous/anxious  Objective:      /70   Pulse 80   Ht 5' 3" (1 6 m)   Wt 90 7 kg (200 lb)   LMP 03/22/2021 (Exact Date)   SpO2 95%   BMI 35 43 kg/m²          Physical Exam  Constitutional:       General: She is not in acute distress  Appearance: She is well-developed  She is not diaphoretic  HENT:      Head: Normocephalic and atraumatic  Eyes:      Conjunctiva/sclera: Conjunctivae normal       Pupils: Pupils are equal, round, and reactive to light  Neck:      Musculoskeletal: Normal range of motion and neck supple  Vascular: No JVD        Trachea: No tracheal deviation  Cardiovascular:      Rate and Rhythm: Normal rate and regular rhythm  Pulses: Normal pulses  Pulmonary:      Effort: Pulmonary effort is normal  No respiratory distress  Breath sounds: Normal breath sounds  No stridor  No wheezing, rhonchi or rales  Musculoskeletal: Normal range of motion  General: No tenderness or deformity  Skin:     General: Skin is warm and dry  Findings: No erythema  Neurological:      Mental Status: She is alert and oriented to person, place, and time  Psychiatric:      Comments: Mild depressed mood           This note was completed in part utilizing [employer] direct voice recognition software  Grammatical errors, random word insertion, spelling mistakes, and incomplete sentences may be an occasional consequence of the system secondary to software limitations, ambient noise and hardware issues  At the time of dictation, efforts were made to edit, clarify and /or correct errors  Please read the chart carefully and recognize, using context, where substitutions have occurred  If you have any questions or concerns about the context, text or information contained within the body of this dictation, please contact myself, the provider, for further clarification

## 2021-04-21 ENCOUNTER — TRANSCRIBE ORDERS (OUTPATIENT)
Dept: LAB | Facility: CLINIC | Age: 31
End: 2021-04-21

## 2021-04-21 ENCOUNTER — APPOINTMENT (OUTPATIENT)
Dept: LAB | Facility: CLINIC | Age: 31
End: 2021-04-21
Payer: COMMERCIAL

## 2021-04-21 LAB
ALBUMIN SERPL BCP-MCNC: 3.1 G/DL (ref 3.5–5)
ALP SERPL-CCNC: 87 U/L (ref 46–116)
ALT SERPL W P-5'-P-CCNC: 52 U/L (ref 12–78)
ANION GAP SERPL CALCULATED.3IONS-SCNC: 2 MMOL/L (ref 4–13)
AST SERPL W P-5'-P-CCNC: 31 U/L (ref 5–45)
BASOPHILS # BLD AUTO: 0.03 THOUSANDS/ΜL (ref 0–0.1)
BASOPHILS NFR BLD AUTO: 0 % (ref 0–1)
BILIRUB SERPL-MCNC: 0.63 MG/DL (ref 0.2–1)
BUN SERPL-MCNC: 11 MG/DL (ref 5–25)
CALCIUM ALBUM COR SERPL-MCNC: 9 MG/DL (ref 8.3–10.1)
CALCIUM SERPL-MCNC: 8.3 MG/DL (ref 8.3–10.1)
CHLORIDE SERPL-SCNC: 113 MMOL/L (ref 100–108)
CO2 SERPL-SCNC: 28 MMOL/L (ref 21–32)
CREAT SERPL-MCNC: 0.87 MG/DL (ref 0.6–1.3)
EOSINOPHIL # BLD AUTO: 0.19 THOUSAND/ΜL (ref 0–0.61)
EOSINOPHIL NFR BLD AUTO: 2 % (ref 0–6)
ERYTHROCYTE [DISTWIDTH] IN BLOOD BY AUTOMATED COUNT: 12.6 % (ref 11.6–15.1)
GFR SERPL CREATININE-BSD FRML MDRD: 104 ML/MIN/1.73SQ M
GLUCOSE P FAST SERPL-MCNC: 85 MG/DL (ref 65–99)
HCT VFR BLD AUTO: 39.3 % (ref 34.8–46.1)
HGB BLD-MCNC: 12.2 G/DL (ref 11.5–15.4)
IMM GRANULOCYTES # BLD AUTO: 0.05 THOUSAND/UL (ref 0–0.2)
IMM GRANULOCYTES NFR BLD AUTO: 1 % (ref 0–2)
LYMPHOCYTES # BLD AUTO: 2.49 THOUSANDS/ΜL (ref 0.6–4.47)
LYMPHOCYTES NFR BLD AUTO: 25 % (ref 14–44)
MCH RBC QN AUTO: 29 PG (ref 26.8–34.3)
MCHC RBC AUTO-ENTMCNC: 31 G/DL (ref 31.4–37.4)
MCV RBC AUTO: 93 FL (ref 82–98)
MONOCYTES # BLD AUTO: 0.56 THOUSAND/ΜL (ref 0.17–1.22)
MONOCYTES NFR BLD AUTO: 6 % (ref 4–12)
NEUTROPHILS # BLD AUTO: 6.69 THOUSANDS/ΜL (ref 1.85–7.62)
NEUTS SEG NFR BLD AUTO: 66 % (ref 43–75)
NRBC BLD AUTO-RTO: 0 /100 WBCS
PLATELET # BLD AUTO: 383 THOUSANDS/UL (ref 149–390)
PMV BLD AUTO: 10.4 FL (ref 8.9–12.7)
POTASSIUM SERPL-SCNC: 4 MMOL/L (ref 3.5–5.3)
PROT SERPL-MCNC: 6.5 G/DL (ref 6.4–8.2)
RBC # BLD AUTO: 4.21 MILLION/UL (ref 3.81–5.12)
SODIUM SERPL-SCNC: 143 MMOL/L (ref 136–145)
T4 FREE SERPL-MCNC: 0.8 NG/DL (ref 0.76–1.46)
TSH SERPL DL<=0.05 MIU/L-ACNC: 4.05 UIU/ML (ref 0.36–3.74)
WBC # BLD AUTO: 10.01 THOUSAND/UL (ref 4.31–10.16)

## 2021-04-21 PROCEDURE — 84443 ASSAY THYROID STIM HORMONE: CPT | Performed by: INTERNAL MEDICINE

## 2021-04-21 PROCEDURE — 80053 COMPREHEN METABOLIC PANEL: CPT | Performed by: INTERNAL MEDICINE

## 2021-04-21 PROCEDURE — 85025 COMPLETE CBC W/AUTO DIFF WBC: CPT | Performed by: INTERNAL MEDICINE

## 2021-04-21 PROCEDURE — 84439 ASSAY OF FREE THYROXINE: CPT | Performed by: INTERNAL MEDICINE

## 2021-04-21 PROCEDURE — 36415 COLL VENOUS BLD VENIPUNCTURE: CPT | Performed by: INTERNAL MEDICINE

## 2021-04-22 ENCOUNTER — TELEPHONE (OUTPATIENT)
Dept: INTERNAL MEDICINE CLINIC | Facility: CLINIC | Age: 31
End: 2021-04-22

## 2021-04-22 DIAGNOSIS — E03.9 HYPOTHYROIDISM, UNSPECIFIED TYPE: Primary | ICD-10-CM

## 2021-04-22 RX ORDER — LEVOTHYROXINE SODIUM 0.03 MG/1
25 TABLET ORAL
Qty: 90 TABLET | Refills: 0 | Status: SHIPPED | OUTPATIENT
Start: 2021-04-22 | End: 2021-06-07

## 2021-04-22 NOTE — TELEPHONE ENCOUNTER
Patient reports that Lexapro increased dose is helping her  She will continue with this  Her TSH was elevated and as result she reports that she has been experiencing weight gain as well as mild hair loss and fatigue  We will start her on 25 mcg levothyroxine  We will repeat her TSH in 6 weeks

## 2021-04-22 NOTE — TELEPHONE ENCOUNTER
Voicemail message left for patient informing her of elevated TSH and to consider starting on  Thyroid supplementation  Will await call back

## 2021-05-07 RX ORDER — CHLORHEXIDINE GLUCONATE 0.12 MG/ML
RINSE ORAL
COMMUNITY
Start: 2021-04-19

## 2021-05-11 DIAGNOSIS — F41.1 GAD (GENERALIZED ANXIETY DISORDER): ICD-10-CM

## 2021-05-11 RX ORDER — ESCITALOPRAM OXALATE 5 MG/1
TABLET ORAL
Qty: 90 TABLET | Refills: 0 | OUTPATIENT
Start: 2021-05-11

## 2021-05-26 ENCOUNTER — TELEPHONE (OUTPATIENT)
Dept: FAMILY MEDICINE CLINIC | Facility: CLINIC | Age: 31
End: 2021-05-26

## 2021-05-26 NOTE — TELEPHONE ENCOUNTER
Patient is scheduled for a follow-up appointment with Dr Asha Alfonso 5/27/21 -patient was seen at [facility] while Dr Asim Garrison was out of the office  Should patient keep this appointment or reschedule with Dr Asim Garrison?

## 2021-05-26 NOTE — TELEPHONE ENCOUNTER
I called patient to ask her which physician she would like to follow up with, I had to leave a message as she did not answer  I asked her to call back to let us know as soon as possible

## 2021-06-03 ENCOUNTER — OFFICE VISIT (OUTPATIENT)
Dept: INTERNAL MEDICINE CLINIC | Facility: CLINIC | Age: 31
End: 2021-06-03
Payer: COMMERCIAL

## 2021-06-03 VITALS
BODY MASS INDEX: 37.1 KG/M2 | SYSTOLIC BLOOD PRESSURE: 126 MMHG | TEMPERATURE: 98.4 F | HEART RATE: 88 BPM | HEIGHT: 63 IN | DIASTOLIC BLOOD PRESSURE: 78 MMHG | RESPIRATION RATE: 16 BRPM | WEIGHT: 209.4 LBS

## 2021-06-03 DIAGNOSIS — E03.9 HYPOTHYROIDISM, UNSPECIFIED TYPE: ICD-10-CM

## 2021-06-03 DIAGNOSIS — Z13.220 SCREENING FOR HYPERLIPIDEMIA: ICD-10-CM

## 2021-06-03 DIAGNOSIS — F41.1 GAD (GENERALIZED ANXIETY DISORDER): Primary | ICD-10-CM

## 2021-06-03 DIAGNOSIS — Z01.00 ENCOUNTER FOR COMPLETE EYE EXAM: ICD-10-CM

## 2021-06-03 DIAGNOSIS — Z13.1 SCREENING FOR DIABETES MELLITUS: ICD-10-CM

## 2021-06-03 DIAGNOSIS — E55.9 VITAMIN D DEFICIENCY: ICD-10-CM

## 2021-06-03 PROCEDURE — 99214 OFFICE O/P EST MOD 30 MIN: CPT | Performed by: INTERNAL MEDICINE

## 2021-06-03 PROCEDURE — 1036F TOBACCO NON-USER: CPT | Performed by: INTERNAL MEDICINE

## 2021-06-03 PROCEDURE — 3008F BODY MASS INDEX DOCD: CPT | Performed by: INTERNAL MEDICINE

## 2021-06-03 RX ORDER — BUPROPION HYDROCHLORIDE 150 MG/1
150 TABLET ORAL EVERY MORNING
Qty: 90 TABLET | Refills: 3 | Status: SHIPPED | OUTPATIENT
Start: 2021-06-03 | End: 2021-07-26

## 2021-06-03 RX ORDER — CLOBETASOL PROPIONATE 0.5 MG/G
OINTMENT TOPICAL
COMMUNITY
Start: 2021-06-01

## 2021-06-03 NOTE — PROGRESS NOTES
Assessment/Plan:    #Hypothyroid  -TSH previously at 4 05 and started on medication  -thyroid antibodies pending  -remains on 25mcg levothyroxine daily    #Eczema  -remains on dupixant, chlorhexidine, clindamycin, clobetasol,    #Asthma  -stable on dupixent  -has not had to use albuterol inhaler    #Anxiety and Depression  -seeing therapist and encouraged her to resume  -post partum 9/2020  -started on lexapro without improvement  -reports she is very protective of baby and feels anxiety and down  -feels withdrawn because mother is living with her  -is not breast feeding  -will trial on wellbutrin    #Blurry Vision  -encouraged ophthalmology followup    #Gastric Sleeve  -previous surgery and reports increased weight gain  -encouraged diet and exercise  BMI Counseling: Body mass index is 37 09 kg/m²  Discussed the patient's BMI with her  The BMI is above normal  Nutrition recommendations include decreasing overall calorie intake, 3-5 servings of fruits/vegetables daily, consuming healthier snacks, decreasing soda and/or juice intake, moderation in carbohydrate intake, increasing intake of lean protein and reducing intake of saturated fat and trans fat  Exercise recommendations include moderate aerobic physical activity for 150 minutes/week, vigorous aerobic physical activity for 75 minutes/week and exercising 3-5 times per week  #Health Maintenance  -routine labs and followup 6 months  -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 at work and currently looking for work to keep busy  -encouraged covid vaccine    No problem-specific Assessment & Plan notes found for this encounter  Diagnoses and all orders for this visit:    NAYA (generalized anxiety disorder)  -     buPROPion (WELLBUTRIN XL) 150 mg 24 hr tablet; Take 1 tablet (150 mg total) by mouth every morning  -     Ambulatory referral to behavioral health therapists;  Future    Postpartum depression  -     buPROPion (WELLBUTRIN XL) 150 mg 24 hr tablet; Take 1 tablet (150 mg total) by mouth every morning  -     Ambulatory referral to behavioral health therapists; Future    Screening for diabetes mellitus  -     Hemoglobin A1C    Screening for hyperlipidemia  -     Lipid Panel With Direct LDL    Hypothyroidism, unspecified type  -     TSH, 3rd generation with Free T4 reflex  -     Thyroid Antibodies Panel; Future    Vitamin D deficiency  -     Vitamin D 25 hydroxy    Encounter for complete eye exam  -     Ambulatory Referral to Ophthalmology;  Future    Other orders  -     clobetasol (TEMOVATE) 0 05 % ointment            Current Outpatient Medications:     albuterol (PROVENTIL HFA,VENTOLIN HFA) 90 mcg/act inhaler, , Disp: , Rfl:     levothyroxine 25 mcg tablet, Take 1 tablet (25 mcg total) by mouth daily in the early morning, Disp: 90 tablet, Rfl: 0    buPROPion (WELLBUTRIN XL) 150 mg 24 hr tablet, Take 1 tablet (150 mg total) by mouth every morning, Disp: 90 tablet, Rfl: 3    Cetirizine HCl (ZyrTEC Allergy) 10 MG CAPS, Take by mouth daily, Disp: , Rfl:     chlorhexidine (PERIDEX) 0 12 % solution, , Disp: , Rfl:     clindamycin (CLEOCIN T) 1 % lotion, APPLY TO THE FACE TWICE A DAY, Disp: , Rfl:     clobetasol (TEMOVATE) 0 05 % ointment, , Disp: , Rfl:     Clobetasol Prop Crea-Coal Tar (CLOBETAPLUS CREAM EX), Apply topically 2 (two) times a day, Disp: , Rfl:     DUPIXENT subcutaneous injection, Inject 300 mg under the skin every 14 (fourteen) days 300/2ml, Disp: , Rfl:     etonogestrel (NEXPLANON) subdermal implant, 68 mg by Subdermal route once, Disp: , Rfl:     HYDROcodone-acetaminophen (NORCO) 5-325 mg per tablet, , Disp: , Rfl:     hydrocortisone 0 5 % cream, Apply topically 2 (two) times a day, Disp: , Rfl:     hydrOXYzine HCL (ATARAX) 10 mg tablet, Take 10 mg by mouth daily as needed , Disp: , Rfl:     Prenatal Vit-DSS-Fe Fum-FA (PRENATAL 19) 29-1 MG TABS, Take by mouth, Disp: , Rfl:     Subjective:      Patient ID: Scarletkirti castro a 28 y o  female  HPI     Patient presents for a new patient visit  Reports that she has been having persistent dots of anxiety and depression  She states that she delivered her child and spent a significant amount of time in the NICU  She states that she has over protective  She states that she does have good family support at home through her boyfriend  She states that her mother is currently living with them and that is causing her a significant amount of stress  We encouraged her to discuss this with behavioral therapy  She reports that she has been taking 20 mg of Lexapro daily  We will cut this down to 10 mg daily and then she will wean off this week  We will switch her to Wellbutrin at this time to see if this will provide her with any improvement  Patient has hypothyroidism and we will wait her thyroid antibodies  She remains on 25 mcg levothyroxine  We will repeat her TSH as well  She is due for lipids as well as A1c and we will obtain that  She has eczema and remains on duplex as well as various topical creams  She is currently seeing Dermatology  Patient has asthma however it is currently controlled and she is not using an albuterol inhaler  She denies any smoking or drug use  She drinks alcohol socially  Surgical history is positive for a gastric sleeve and an ex lap  Patient is due for the COVID vaccine however she defers it for now  Patient has a history of cancer in her grandmother who had liver cancer  Grandmother with congestive heart failure  Grandmother and uncle with diabetes in grandmother and uncle with asthma  She reports that her mother and grandmother have hypertension  Patient will return to care in 3 months      The following portions of the patient's history were reviewed and updated as appropriate: allergies, current medications, past family history, past medical history, past social history, past surgical history and problem list     Review of Systems Constitutional: Positive for activity change (decreased) and unexpected weight change (weight gain)  Negative for appetite change, chills, fatigue and fever  HENT: Negative for congestion, ear pain, facial swelling, hearing loss, sore throat, tinnitus and trouble swallowing  Eyes: Positive for visual disturbance (blurry at time)  Negative for photophobia  Respiratory: Negative for cough, shortness of breath and wheezing  Cardiovascular: Negative for chest pain and leg swelling  Gastrointestinal: Positive for constipation  Negative for abdominal distention, abdominal pain, blood in stool, diarrhea, nausea and vomiting  Genitourinary: Negative for difficulty urinating, dysuria and pelvic pain  Musculoskeletal: Negative for arthralgias, back pain, gait problem, joint swelling, myalgias, neck pain and neck stiffness  Skin: Negative for rash and wound  Neurological: Negative for dizziness, tremors, light-headedness and headaches  Psychiatric/Behavioral: Positive for decreased concentration and dysphoric mood  Negative for self-injury, sleep disturbance and suicidal ideas  The patient is nervous/anxious  Objective:      /78   Pulse 88   Temp 98 4 °F (36 9 °C)   Resp 16   Ht 5' 3" (1 6 m)   Wt 95 kg (209 lb 6 4 oz)   BMI 37 09 kg/m²          Physical Exam  Vitals signs reviewed  Constitutional:       Appearance: Normal appearance  She is well-developed  She is obese  HENT:      Head: Normocephalic and atraumatic  Right Ear: Tympanic membrane, ear canal and external ear normal  There is no impacted cerumen  Left Ear: Tympanic membrane, ear canal and external ear normal  There is no impacted cerumen  Nose: Nose normal    Eyes:      Conjunctiva/sclera: Conjunctivae normal       Pupils: Pupils are equal, round, and reactive to light  Neck:      Musculoskeletal: Normal range of motion and neck supple  Thyroid: No thyromegaly  Vascular: No JVD  Cardiovascular:      Rate and Rhythm: Normal rate and regular rhythm  Heart sounds: Normal heart sounds  No murmur  Pulmonary:      Effort: Pulmonary effort is normal  No respiratory distress  Breath sounds: Normal breath sounds  No stridor  No wheezing, rhonchi or rales  Abdominal:      General: Abdomen is flat  Bowel sounds are normal  There is no distension  Palpations: Abdomen is soft  There is no mass  Tenderness: There is no abdominal tenderness  There is no guarding or rebound  Musculoskeletal: Normal range of motion  General: No tenderness  Right lower leg: No edema  Left lower leg: No edema  Lymphadenopathy:      Cervical: No cervical adenopathy  Skin:     General: Skin is warm  Findings: No erythema or rash  Neurological:      Mental Status: She is alert and oriented to person, place, and time  Mental status is at baseline  Deep Tendon Reflexes: Reflexes are normal and symmetric  Psychiatric:         Behavior: Behavior normal          Thought Content: Thought content normal          Judgment: Judgment normal       Comments: Anxiety and depressed mood           This note was completed in part utilizing [employer] direct voice recognition software  Grammatical errors, random word insertion, spelling mistakes, and incomplete sentences may be an occasional consequence of the system secondary to software limitations, ambient noise and hardware issues  At the time of dictation, efforts were made to edit, clarify and /or correct errors  Please read the chart carefully and recognize, using context, where substitutions have occurred  If you have any questions or concerns about the context, text or information contained within the body of this dictation, please contact myself, the provider, for further clarification

## 2021-06-04 ENCOUNTER — APPOINTMENT (OUTPATIENT)
Dept: LAB | Facility: CLINIC | Age: 31
End: 2021-06-04
Payer: COMMERCIAL

## 2021-06-04 ENCOUNTER — TRANSCRIBE ORDERS (OUTPATIENT)
Dept: LAB | Facility: CLINIC | Age: 31
End: 2021-06-04

## 2021-06-04 DIAGNOSIS — Z13.220 SCREENING FOR HYPERLIPIDEMIA: Primary | ICD-10-CM

## 2021-06-04 DIAGNOSIS — Z13.220 SCREENING FOR HYPERLIPIDEMIA: ICD-10-CM

## 2021-06-04 DIAGNOSIS — E03.9 HYPOTHYROIDISM, UNSPECIFIED TYPE: ICD-10-CM

## 2021-06-04 LAB
25(OH)D3 SERPL-MCNC: 9.7 NG/ML (ref 30–100)
CHOLEST SERPL-MCNC: 189 MG/DL (ref 50–200)
EST. AVERAGE GLUCOSE BLD GHB EST-MCNC: 111 MG/DL
HBA1C MFR BLD: 5.5 %
HDLC SERPL-MCNC: 40 MG/DL
LDLC SERPL CALC-MCNC: 124 MG/DL (ref 0–100)
LDLC SERPL DIRECT ASSAY-MCNC: 133 MG/DL (ref 0–100)
NONHDLC SERPL-MCNC: 149 MG/DL
TRIGL SERPL-MCNC: 125 MG/DL
TSH SERPL DL<=0.05 MIU/L-ACNC: 1.56 UIU/ML (ref 0.36–3.74)

## 2021-06-04 PROCEDURE — 36415 COLL VENOUS BLD VENIPUNCTURE: CPT

## 2021-06-04 PROCEDURE — 83721 ASSAY OF BLOOD LIPOPROTEIN: CPT

## 2021-06-04 PROCEDURE — 83036 HEMOGLOBIN GLYCOSYLATED A1C: CPT | Performed by: INTERNAL MEDICINE

## 2021-06-04 PROCEDURE — 86800 THYROGLOBULIN ANTIBODY: CPT

## 2021-06-04 PROCEDURE — 80061 LIPID PANEL: CPT

## 2021-06-04 PROCEDURE — 82306 VITAMIN D 25 HYDROXY: CPT | Performed by: INTERNAL MEDICINE

## 2021-06-04 PROCEDURE — 86376 MICROSOMAL ANTIBODY EACH: CPT

## 2021-06-04 PROCEDURE — 84443 ASSAY THYROID STIM HORMONE: CPT | Performed by: INTERNAL MEDICINE

## 2021-06-05 LAB
THYROGLOB AB SERPL-ACNC: <1 IU/ML (ref 0–0.9)
THYROPEROXIDASE AB SERPL-ACNC: <9 IU/ML (ref 0–34)

## 2021-06-07 DIAGNOSIS — E55.9 VITAMIN D DEFICIENCY: ICD-10-CM

## 2021-06-07 DIAGNOSIS — F41.1 GAD (GENERALIZED ANXIETY DISORDER): Primary | ICD-10-CM

## 2021-06-07 DIAGNOSIS — E78.2 MODERATE MIXED HYPERLIPIDEMIA NOT REQUIRING STATIN THERAPY: ICD-10-CM

## 2021-06-07 DIAGNOSIS — E03.9 HYPOTHYROIDISM, UNSPECIFIED TYPE: ICD-10-CM

## 2021-06-07 NOTE — PROGRESS NOTES
Thyroid antibody panel was negative    Will discontinue levothyroxine for now and repeat TSH in September

## 2021-06-24 ENCOUNTER — SOCIAL WORK (OUTPATIENT)
Dept: BEHAVIORAL/MENTAL HEALTH CLINIC | Facility: CLINIC | Age: 31
End: 2021-06-24
Payer: COMMERCIAL

## 2021-06-24 DIAGNOSIS — F32.A ANXIETY AND DEPRESSION: Primary | ICD-10-CM

## 2021-06-24 DIAGNOSIS — F41.9 ANXIETY AND DEPRESSION: Primary | ICD-10-CM

## 2021-06-24 PROCEDURE — 90834 PSYTX W PT 45 MINUTES: CPT | Performed by: SOCIAL WORKER

## 2021-06-24 NOTE — PSYCH
Assessment/Plan:      Diagnoses and all orders for this visit:    Anxiety and depression          Subjective:     Patient ID: Scarlet Chowdary is a 28 y o  female presenting with ongoing depression and anxiety  Pt has been in therapy with Elly Hurtado LCSW over the last year and wanted to connect with a therapist who has better availability  Pt reports her recent bout of depression started with her first child's birth  Pt had preeclampsia and had a difficult time with the pregnancy  Pt's daughter was born 3 months premature and spent 54 days int he NICU  Pt reports this was a very hard process  Pt reports her recent stressors have been losing her job, her mother and step father and two siblings moving into her and her husbands house  Pt states her mother has been disrespectful to her  which has caused friction in the house  Pt reports she is not in a position to ask her mother to leave the house as she is trying to look for a new house to buy  Pt is hopeful they will find a place soon  Pt spent much of the session venting about the living situation and historic offenses and hurts  Pt reports she is dedicated to her mother, but she needs greater separation in her life  Apart from the stress of the family situation, pt is very dedicated to her   They have a great relationship apart from the increased strain of family moving in  Pt reports she is overly protective of her daughter due to the trauma they both went through  Pt states she has a hard time leaving her daughter with her in laws even though she knows they are good safe people  Pt also reports she is hoping to return to work in the near future  Pt is nervous about this as she will be leaving her baby for extended periods of time with other people  Pt states she is hopeful this will get easier with time  Pt is encouraged to work on her self care   Pt is encouraged to try and look at her thought process and identify when she is being overly protective or overly cautious/ negative regarding her child's welfare  Pt is encouraged to leave the house, gets some fresh air  Pt is also encouraged to get some time away with her  to ensure her relationship does not overly suffer during the process of change  Pt is encouraged to follow up in 2 weeks time  HPI    Review of Systems      Objective:     Physical Exam  This writer spent 45 minutes with pt from 9am to 9:45  Appearance: casually dressed good eye contact; speech: normal pitch and normal volume; behavior: normal, thought process: logical and goal directed, thought content: denies SI/HI, perceptions: no delusions of AH/VH, mood: slightly anxious and depressed, affect: congruent, orientated x4, insight/judgement: good

## 2021-07-26 ENCOUNTER — OFFICE VISIT (OUTPATIENT)
Dept: DENTISTRY | Facility: CLINIC | Age: 31
End: 2021-07-26

## 2021-07-26 VITALS — DIASTOLIC BLOOD PRESSURE: 75 MMHG | HEART RATE: 84 BPM | SYSTOLIC BLOOD PRESSURE: 121 MMHG | TEMPERATURE: 97.7 F

## 2021-07-26 DIAGNOSIS — Z01.20 ENCOUNTER FOR DENTAL EXAMINATION: Primary | ICD-10-CM

## 2021-07-26 DIAGNOSIS — K02.9 CHRONIC DENTAL CARIES EXTENDING TO DENTINE: ICD-10-CM

## 2021-07-26 DIAGNOSIS — K02.9 CARIES: ICD-10-CM

## 2021-07-26 PROCEDURE — D0150 COMPREHENSIVE ORAL EVALUATION - NEW OR ESTABLISHED PATIENT: HCPCS

## 2021-07-26 PROCEDURE — D0274 BITEWINGS - 4 RADIOGRAPHIC IMAGES: HCPCS

## 2021-07-26 NOTE — PATIENT INSTRUCTIONS
Mouth Care   WHAT YOU NEED TO KNOW:   Mouth care prevents infection, plaque, bleeding gums, mouth sores, and cavities  It also freshens breath and improves appetite  Do mouth care in the morning, after each meal, and before bed each night  You may need more frequent mouth care if your mouth is in poor condition  DISCHARGE INSTRUCTIONS:   Items used for mouth care:   · An electric or manual toothbrush    · Toothpaste, dental sticks, and floss    · A cup of water for rinsing    · Mouthwash     · Water-based lip balm or moisturizer    Brush your teeth:   · Wet the toothbrush and place a small amount of toothpaste on it  · Gently place the brush on each tooth, and move it in a [place]  Do not press too hard  This may injure your gums  · Clean the inner, outer, and top surfaces of your teeth  Brush your gums and the top of your tongue  · Swish the water in your mouth and spit it out  Repeat this step with mouthwash  · Dry around your mouth  Apply water-based lip balm or moisturizer to your lips to prevent cracking and dryness  Floss your teeth:  Thread the floss between each tooth, but do not push down on the gums too hard  This can cause gum damage  Make sure to floss on each side of every tooth  You may need to use waxed floss for easier movement between your teeth  Clean your dentures:  Remove the dentures from your mouth before you clean them  Moist dentures come out more easily  Drink a sip of water before you remove your dentures  Gently rock the dentures from side to side to loosen them  Then pull them out  · Brush the dentures with clean water and denture  or toothpaste  · Brush the dentures on all surfaces with cool water  Do not use hot water  Hot water could damage the dentures  Be careful not to bend any clasps on the dentures as you brush them  Rinse them  Place a thin layer of denture adhesive on the dentures and put them back in your mouth   Ask your healthcare provider which adhesive to use  · Soak the dentures in a denture solution each night after you brush them  Rinse them in cold water before you place them back in your mouth  Follow up with your healthcare provider or dentist every 6 months or as directed:  Write down your questions so you remember to ask them during your visits  Contact your healthcare provider or dentist if:   · You develop mouth sores  · Your dentures do not fit well  · You have pain with brushing  · You have questions or concerns about your condition or care  © Copyright [employer] 2021 Information is for End User's use only and may not be sold, redistributed or otherwise used for commercial purposes  All illustrations and images included in CareNotes® are the copyrighted property of A D A M , Inc  or Veronica Jennings  The above information is an  only  It is not intended as medical advice for individual conditions or treatments  Talk to your doctor, nurse or pharmacist before following any medical regimen to see if it is safe and effective for you

## 2021-07-26 NOTE — PROGRESS NOTES
New Patient Exam     Exams:  Comprehensive exam and spot probed - many years since last prophy  Xrays:    4BWX - Pt had Panorex in March 2021 - had 3rd molars extracted at [facility]  Type of Treatment:     Reviewed OHI  Brush:  2X/day and Floss 1X/day  EO/OCS Exams:  No significant findings  IO: No significant findings  Oral Hygiene:   Fair    Gingiva: Inflamed  Periocharting was not completed      Perio Findings:  Slight gingival inflammation  Caries Findings:  See Charting  Treatment Plan:  Updated   Dr  Exam:  Dr Delmar Wilson  Referral:  No referral given / OS  / Ortho  NV:  Adult Prophy w/FM Probing  NVV:  Rest

## 2021-08-09 ENCOUNTER — OFFICE VISIT (OUTPATIENT)
Dept: DENTISTRY | Facility: CLINIC | Age: 31
End: 2021-08-09

## 2021-08-09 VITALS — TEMPERATURE: 97.4 F | SYSTOLIC BLOOD PRESSURE: 119 MMHG | DIASTOLIC BLOOD PRESSURE: 64 MMHG | HEART RATE: 82 BPM

## 2021-08-09 DIAGNOSIS — K05.10 GINGIVITIS: ICD-10-CM

## 2021-08-09 DIAGNOSIS — Z01.21 ENCOUNTER FOR DENTAL EXAMINATION AND CLEANING WITH ABNORMAL FINDINGS: Primary | ICD-10-CM

## 2021-08-09 PROCEDURE — D4346 SCALING IN PRESENCE OF GENERALIZED MODERATE OR SEVERE GINGIVAL INFLAMMATION - FULL MOUTH, AFTER ORAL EVALUATION: HCPCS

## 2021-08-09 PROCEDURE — D0180 COMPREHENSIVE PERIODONTAL EVALUATION - NEW OR ESTABLISHED PATIENT: HCPCS

## 2021-08-09 NOTE — PROGRESS NOTES
Pt presents for Perio charting followed by Scaling in the presence of gingivitis Code  (reasoning below)  Reviewed hhx- no changes  Patient was scheduled 2 weeks ago for Comp exam and fmx, today needs to eval periodontal charting to determine if srp is needed  Perio charting- generalized 4mm pockets with moderate bleeding  Subcalc present and felt with instruments and seen radiographically between #20/21  Supracalc present with calc bridge from #22-27 linguals  Gums sensitive to probing and patient reports swollen gums  Patient reports a minimum of two years since last cleaning  Probe readings do not indicate need for scaling and root planing, but did proceed with code - scaling in the presence of moderate to severe gingivitis due to the above reasons  OH: poor- patient reports she is not flossing and only brushing 1x/day with charcoal toothpaste  Advised pt to check for fluoride in toothpaste and if not, recommended and fluoride tp 2x/day with daily flossing and mouthrinse daily  Ultrasonic, hand scaling, polish, flossed today using topical anesthetic benzocaine gel for pt's comfort  Advised patient to return every 6 months for prophy and exam and monitor gum tissue  As per last visit- patient needs fillings on #8, 15, 19, 31 and must return for restorative work         NV: Fillings #15, 19   75 mins with any resident  Nv2: fillings #31, 8     75 mins withy any resident  Nv3: 6 mrc, periodic exam with selina 60 mins

## 2021-08-20 ENCOUNTER — OFFICE VISIT (OUTPATIENT)
Dept: URGENT CARE | Facility: CLINIC | Age: 31
End: 2021-08-20
Payer: COMMERCIAL

## 2021-08-20 VITALS — OXYGEN SATURATION: 98 % | TEMPERATURE: 97.9 F | RESPIRATION RATE: 18 BRPM | HEART RATE: 86 BPM

## 2021-08-20 DIAGNOSIS — J02.9 SORE THROAT: Primary | ICD-10-CM

## 2021-08-20 LAB — S PYO AG THROAT QL: NEGATIVE

## 2021-08-20 PROCEDURE — 99213 OFFICE O/P EST LOW 20 MIN: CPT | Performed by: NURSE PRACTITIONER

## 2021-08-20 PROCEDURE — 87070 CULTURE OTHR SPECIMN AEROBIC: CPT | Performed by: NURSE PRACTITIONER

## 2021-08-20 PROCEDURE — 87147 CULTURE TYPE IMMUNOLOGIC: CPT | Performed by: NURSE PRACTITIONER

## 2021-08-20 NOTE — PROGRESS NOTES
[facility]        NAME: Biju Leahy is a 28 y o  female  : 1990    MRN: [id number]  DATE: 2021  TIME: 9:25 AM    Assessment and Plan   Sore throat [J02 9]  1  Sore throat  Throat culture    POCT rapid strepA         Patient Instructions   Rapid strep: negative  Tylenol/Motrin as needed for pain/fever   Increase fluid intake   Throat lozenges, honey, salt water gargles for throat discomfort   Follow up with your PCP for worsening or concerning symptoms      Follow up with PCP in 3-5 days  Proceed to  ER if symptoms worsen  Chief Complaint     Chief Complaint   Patient presents with    Sore Throat     Started yesterday, took musinex          History of Present Illness       Patient is a 28year old female presenting with sore throat that started yesterday morning  Denies fever, chills, cough, or ear pain  Denies SOB  She took OTC mucinex without relief  Review of Systems   Review of Systems   Constitutional: Negative for activity change, chills and fever  HENT: Positive for sore throat  Negative for congestion, rhinorrhea, sinus pressure and sinus pain  Respiratory: Negative for cough and shortness of breath  Gastrointestinal: Negative for diarrhea, nausea and vomiting  Neurological: Negative for headaches           Current Medications       Current Outpatient Medications:     albuterol (PROVENTIL HFA,VENTOLIN HFA) 90 mcg/act inhaler, , Disp: , Rfl:     Cetirizine HCl (ZyrTEC Allergy) 10 MG CAPS, Take by mouth daily, Disp: , Rfl:     chlorhexidine (PERIDEX) 0 12 % solution, , Disp: , Rfl:     Cholecalciferol (Vitamin D-3) 125 MCG (5000 UT) TABS, Take 5,000 Units by mouth daily, Disp: 90 tablet, Rfl: 1    clindamycin (CLEOCIN T) 1 % lotion, APPLY TO THE FACE TWICE A DAY, Disp: , Rfl:     clobetasol (TEMOVATE) 0 05 % ointment, , Disp: , Rfl:     Clobetasol Prop Crea-Coal Tar (CLOBETAPLUS CREAM EX), Apply topically 2 (two) times a day, Disp: , Rfl:    DUPIXENT subcutaneous injection, Inject 300 mg under the skin every 14 (fourteen) days 300/2ml, Disp: , Rfl:     etonogestrel (NEXPLANON) subdermal implant, 68 mg by Subdermal route once, Disp: , Rfl:     hydrocortisone 0 5 % cream, Apply topically 2 (two) times a day, Disp: , Rfl:     Current Allergies     Allergies as of 2021 - Reviewed 2021   Allergen Reaction Noted    Cat hair extract Hives 2018    Dog epithelium Hives 2018    Penicillins Abdominal Pain 2018            The following portions of the patient's history were reviewed and updated as appropriate: allergies, current medications, past family history, past medical history, past social history, past surgical history and problem list      Past Medical History:   Diagnosis Date    Abnormal Pap smear of cervix     Anemia     Anxiety     Asthma     Bronchitis     past    Depression     Eczema     Hypertension     Migraine     Obesity     Pneumonia     in past    Varicella     immune per chart       Past Surgical History:   Procedure Laterality Date    BARIATRIC SURGERY  2019    EXPLORATORY LAPAROTOMY      2016, no findings     GASTRECTOMY SLEEVE LAPAROSCOPIC  2019    OR  DELIVERY ONLY N/A 9/15/2020    Procedure:  SECTION (); Surgeon: Ashley Finch MD;  Location: [facility] ;  Service: Obstetrics       Family History   Problem Relation Age of Onset    Hypertension Mother    Soni Draper Migraines Mother     Gestational diabetes Mother     Diabetes Maternal Grandmother     Heart disease Maternal Grandmother     Heart failure Maternal Grandmother     Diabetes Maternal Grandfather     Liver cancer Maternal Grandfather     Asthma Father             Lung cancer Paternal Grandmother     No Known Problems Sister     No Known Problems Brother     No Known Problems Sister     No Known Problems Sister          Medications have been verified          Objective   Pulse 86   Temp 97 9 °F (36 6 °C)   Resp 18   SpO2 98%      Rapid strep: negative   Physical Exam     Physical Exam  Vitals reviewed  Constitutional:       General: She is awake  She is not in acute distress  Appearance: She is well-developed  HENT:      Head: Normocephalic  Right Ear: Hearing normal       Left Ear: Hearing normal       Nose: Nose normal       Mouth/Throat:      Lips: Pink  Pharynx: Oropharynx is clear  No posterior oropharyngeal erythema  Cardiovascular:      Rate and Rhythm: Normal rate and regular rhythm  Heart sounds: Normal heart sounds, S1 normal and S2 normal    Pulmonary:      Effort: Pulmonary effort is normal       Breath sounds: Normal breath sounds  No decreased breath sounds, wheezing, rhonchi or rales  Skin:     General: Skin is warm and moist    Neurological:      General: No focal deficit present  Mental Status: She is alert, oriented to person, place, and time and easily aroused  Psychiatric:         Behavior: Behavior is cooperative

## 2021-08-23 ENCOUNTER — TELEPHONE (OUTPATIENT)
Dept: INTERNAL MEDICINE CLINIC | Facility: CLINIC | Age: 31
End: 2021-08-23

## 2021-08-23 LAB — BACTERIA THROAT CULT: ABNORMAL

## 2021-08-23 NOTE — TELEPHONE ENCOUNTER
----- Message from Rahta Robles DO sent at 8/23/2021 10:01 AM EDT -----  Please check on patient to see how she is doing and if she still has discomfort, have her come in for an evaluation   ----- Message -----  From: MARY KAY Barr  Sent: 8/20/2021   9:25 AM EDT  To: Rahat Robles DO

## 2021-08-24 ENCOUNTER — OFFICE VISIT (OUTPATIENT)
Dept: INTERNAL MEDICINE CLINIC | Facility: CLINIC | Age: 31
End: 2021-08-24
Payer: COMMERCIAL

## 2021-08-24 VITALS — HEART RATE: 80 BPM | SYSTOLIC BLOOD PRESSURE: 130 MMHG | DIASTOLIC BLOOD PRESSURE: 70 MMHG

## 2021-08-24 DIAGNOSIS — J02.0 STREP SORE THROAT: Primary | ICD-10-CM

## 2021-08-24 DIAGNOSIS — Z20.822 ENCOUNTER FOR LABORATORY TESTING FOR COVID-19 VIRUS: ICD-10-CM

## 2021-08-24 PROCEDURE — U0005 INFEC AGEN DETEC AMPLI PROBE: HCPCS | Performed by: INTERNAL MEDICINE

## 2021-08-24 PROCEDURE — U0003 INFECTIOUS AGENT DETECTION BY NUCLEIC ACID (DNA OR RNA); SEVERE ACUTE RESPIRATORY SYNDROME CORONAVIRUS 2 (SARS-COV-2) (CORONAVIRUS DISEASE [COVID-19]), AMPLIFIED PROBE TECHNIQUE, MAKING USE OF HIGH THROUGHPUT TECHNOLOGIES AS DESCRIBED BY CMS-2020-01-R: HCPCS | Performed by: INTERNAL MEDICINE

## 2021-08-24 PROCEDURE — 99214 OFFICE O/P EST MOD 30 MIN: CPT | Performed by: INTERNAL MEDICINE

## 2021-08-24 RX ORDER — FLUTICASONE PROPIONATE 50 MCG
1 SPRAY, SUSPENSION (ML) NASAL DAILY
Qty: 15.8 ML | Refills: 0 | Status: SHIPPED | OUTPATIENT
Start: 2021-08-24 | End: 2021-09-15

## 2021-08-24 RX ORDER — CLINDAMYCIN HYDROCHLORIDE 300 MG/1
300 CAPSULE ORAL 2 TIMES DAILY
Qty: 14 CAPSULE | Refills: 0 | Status: SHIPPED | OUTPATIENT
Start: 2021-08-24 | End: 2021-08-31

## 2021-08-24 RX ORDER — PHENTERMINE HYDROCHLORIDE 30 MG/1
30 CAPSULE ORAL EVERY MORNING
Qty: 30 CAPSULE | Refills: 2 | Status: SHIPPED | OUTPATIENT
Start: 2021-08-24 | End: 2022-03-04 | Stop reason: ALTCHOICE

## 2021-08-24 RX ORDER — DEXTROMETHORPHAN HYDROBROMIDE AND PROMETHAZINE HYDROCHLORIDE 15; 6.25 MG/5ML; MG/5ML
5 SOLUTION ORAL 4 TIMES DAILY PRN
Qty: 180 ML | Refills: 0 | Status: SHIPPED | OUTPATIENT
Start: 2021-08-24 | End: 2021-12-28

## 2021-08-24 NOTE — PROGRESS NOTES
Assessment/Plan:    #Strep Throat  -saw urgent care and had rapid strep negative, strep culture positive for beta hemolytic strep  -will start on clindamycin  -denies sore throat however has lost voice and has a dry cough  -will obtain COVID swab  -has not obtained covid vaccine and encouraged her to reconsider  -will start on phenergen, flonase for symptom relief    #Weight Gain  -encouraged to diet and excise  -reports she was in phentermine in the past, encouraged her to avoid medication due to risk of cardiac issues and patient defers  -reports she will only use it for a few months  -will send in at this time    No problem-specific Assessment & Plan notes found for this encounter  Diagnoses and all orders for this visit:    Strep sore throat  -     clindamycin (CLEOCIN) 300 MG capsule; Take 1 capsule (300 mg total) by mouth 2 (two) times a day for 7 days  -     fluticasone (FLONASE) 50 mcg/act nasal spray; 1 spray into each nostril daily  -     Novel Coronavirus (COVID-19), PCR [facility] Collected in Office  -     Promethazine-DM (PHENERGAN-DM) 6 25-15 mg/5 mL oral syrup; Take 5 mL by mouth 4 (four) times a day as needed for cough    Encounter for laboratory testing for COVID-19 virus  -     Novel Coronavirus (COVID-19), PCR [facility] Collected in Office    BMI 37 0-37 9, adult  -     phentermine 30 MG capsule;  Take 1 capsule (30 mg total) by mouth every morning            Current Outpatient Medications:     albuterol (PROVENTIL HFA,VENTOLIN HFA) 90 mcg/act inhaler, , Disp: , Rfl:     Cetirizine HCl (ZyrTEC Allergy) 10 MG CAPS, Take by mouth daily, Disp: , Rfl:     chlorhexidine (PERIDEX) 0 12 % solution, , Disp: , Rfl:     Cholecalciferol (Vitamin D-3) 125 MCG (5000 UT) TABS, Take 5,000 Units by mouth daily, Disp: 90 tablet, Rfl: 1    clindamycin (CLEOCIN T) 1 % lotion, APPLY TO THE FACE TWICE A DAY, Disp: , Rfl:     clindamycin (CLEOCIN) 300 MG capsule, Take 1 capsule (300 mg total) by mouth 2 (two) times a day for 7 days, Disp: 14 capsule, Rfl: 0    clobetasol (TEMOVATE) 0 05 % ointment, , Disp: , Rfl:     Clobetasol Prop Crea-Coal Tar (CLOBETAPLUS CREAM EX), Apply topically 2 (two) times a day, Disp: , Rfl:     DUPIXENT subcutaneous injection, Inject 300 mg under the skin every 14 (fourteen) days 300/2ml, Disp: , Rfl:     etonogestrel (NEXPLANON) subdermal implant, 68 mg by Subdermal route once, Disp: , Rfl:     fluticasone (FLONASE) 50 mcg/act nasal spray, 1 spray into each nostril daily, Disp: 15 8 mL, Rfl: 0    hydrocortisone 0 5 % cream, Apply topically 2 (two) times a day, Disp: , Rfl:     phentermine 30 MG capsule, Take 1 capsule (30 mg total) by mouth every morning, Disp: 30 capsule, Rfl: 2    Promethazine-DM (PHENERGAN-DM) 6 25-15 mg/5 mL oral syrup, Take 5 mL by mouth 4 (four) times a day as needed for cough, Disp: 180 mL, Rfl: 0    Subjective:      Patient ID: Jitendra Bowers is a 28 y o  female  HPI       Patient presents for an acute visit  She reports that she developed a sore throat that made it difficult to swallow on 08/19/2021  She went to drink tea without any significant improvement and then lost her voice soon after  She went and was checked for strep throat at the urgent care on 08/20/2021  Strep culture reveals beta-hemolytic strep not a, C, G   Patient was not started on any antibiotics  She has been taking Mucinex with T without any significant improvement  She states that her throat is no longer hurting however she has a dry cough that is persistent especially at nighttime  She has some mild postnasal drip  We will start her on promethazine for cough control as well as Flonase  We will start her on clindamycin for presumed beta-hemolytic strep  Recommended that she continue to monitor her symptoms  We will check her for COVID as well  Patient is also interested in losing weight and we will start her on phentermine    She is aware of the cardiac risk with this medication  She reports that she had been on it in the past   She will only use this temporarily  The following portions of the patient's history were reviewed and updated as appropriate: allergies, current medications, past family history, past medical history, past social history, past surgical history and problem list     Review of Systems   Constitutional: Negative for activity change, appetite change, chills, diaphoresis, fatigue and fever  HENT: Positive for postnasal drip, trouble swallowing and voice change  Negative for congestion and sore throat  Respiratory: Positive for cough  Negative for shortness of breath  Cardiovascular: Negative for chest pain and palpitations  Gastrointestinal: Negative for abdominal pain, constipation, diarrhea, nausea and vomiting  Musculoskeletal: Negative for back pain and myalgias  Neurological: Negative for dizziness and headaches  Objective:      /70   Pulse 80          Physical Exam  Constitutional:       General: She is not in acute distress  Appearance: She is well-developed  She is not diaphoretic  HENT:      Head: Normocephalic and atraumatic  Nose: Congestion and rhinorrhea present  Mouth/Throat:      Mouth: Mucous membranes are dry  No oral lesions  Pharynx: Pharyngeal swelling (tonsil) and posterior oropharyngeal erythema (tonsil) present  No oropharyngeal exudate or uvula swelling  Eyes:      Conjunctiva/sclera: Conjunctivae normal       Pupils: Pupils are equal, round, and reactive to light  Neck:      Thyroid: No thyromegaly  Vascular: No JVD  Trachea: No tracheal deviation  Cardiovascular:      Rate and Rhythm: Normal rate and regular rhythm  Heart sounds: Normal heart sounds  No murmur heard  Pulmonary:      Effort: Pulmonary effort is normal  No respiratory distress  Breath sounds: Normal breath sounds  No stridor  No wheezing, rhonchi or rales     Musculoskeletal: General: No tenderness or deformity  Normal range of motion  Cervical back: Normal range of motion and neck supple  Lymphadenopathy:      Cervical: No cervical adenopathy  Skin:     General: Skin is warm and dry  Findings: No erythema  Neurological:      Mental Status: She is alert and oriented to person, place, and time  This note was completed in part utilizing [employer] direct voice recognition software  Grammatical errors, random word insertion, spelling mistakes, and incomplete sentences may be an occasional consequence of the system secondary to software limitations, ambient noise and hardware issues  At the time of dictation, efforts were made to edit, clarify and /or correct errors  Please read the chart carefully and recognize, using context, where substitutions have occurred  If you have any questions or concerns about the context, text or information contained within the body of this dictation, please contact myself, the provider, for further clarification

## 2021-08-25 ENCOUNTER — TELEPHONE (OUTPATIENT)
Dept: INTERNAL MEDICINE CLINIC | Facility: CLINIC | Age: 31
End: 2021-08-25

## 2021-08-25 LAB — SARS-COV-2 RNA RESP QL NAA+PROBE: NEGATIVE

## 2021-08-25 NOTE — TELEPHONE ENCOUNTER
----- Message from Paulina Mireles DO sent at 8/25/2021 11:17 AM EDT -----  Please let patient know her covid test came back negative  All good news  We will continue with the antibiotics for her strep throat

## 2021-08-26 DIAGNOSIS — F41.1 GAD (GENERALIZED ANXIETY DISORDER): ICD-10-CM

## 2021-08-26 RX ORDER — ESCITALOPRAM OXALATE 10 MG/1
TABLET ORAL
Qty: 90 TABLET | Refills: 1 | Status: SHIPPED | OUTPATIENT
Start: 2021-08-26 | End: 2022-02-21

## 2021-09-03 ENCOUNTER — OFFICE VISIT (OUTPATIENT)
Dept: DENTISTRY | Facility: CLINIC | Age: 31
End: 2021-09-03

## 2021-09-03 VITALS — SYSTOLIC BLOOD PRESSURE: 123 MMHG | HEART RATE: 81 BPM | TEMPERATURE: 97.7 F | DIASTOLIC BLOOD PRESSURE: 78 MMHG

## 2021-09-03 DIAGNOSIS — K02.9 CHRONIC DENTAL CARIES EXTENDING TO DENTINE: Primary | ICD-10-CM

## 2021-09-03 PROCEDURE — D2392 RESIN-BASED COMPOSITE - 2 SURFACES, POSTERIOR: HCPCS | Performed by: DENTIST

## 2021-09-03 NOTE — PROGRESS NOTES
Composite Filling    Mariam Barger Jeannine presents for composite filling  PMH reviewed, no changes  Pt confirmed that she is not pregnant  Discussed with patient need for RCT if pulp exposure occurs or in future if pulp is inflamed  Pt understands and consents  Applied topical benzocaine, administered 3 4 cc 2% lido 1:100k epi via and carps 4% articaine 1:100k epi via     Prepped tooth #31 ABBEY with 245 carbide on high speed  Caries removed with round carbide on slow speed  Isolation with cotton rolls and dri-angles    Placed Limelight on axial wall  Light cured for 20 seconds  Etch with 37% H2PO4, rinse, dry  Applied Adhese with 20 second scrub once, gentle air dry and light cured for 10s  Restored with Beautfil flowable A3 andTetric bulk justino shade A2 and light cured  Refined with finishing burs, polished with enhance point  Verified occlusion and contacts  Pt was informed that a crown may be needed in the future for tooth  Pt left satisfied and in stable condition      Dr Barry Gamboa

## 2021-09-14 ENCOUNTER — TELEPHONE (OUTPATIENT)
Dept: INTERNAL MEDICINE CLINIC | Facility: CLINIC | Age: 31
End: 2021-09-14

## 2021-09-29 DIAGNOSIS — J02.0 STREP SORE THROAT: ICD-10-CM

## 2021-09-30 RX ORDER — FLUTICASONE PROPIONATE 50 MCG
SPRAY, SUSPENSION (ML) NASAL
Qty: 48 ML | Refills: 1 | Status: SHIPPED | OUTPATIENT
Start: 2021-09-30

## 2021-10-29 ENCOUNTER — TELEPHONE (OUTPATIENT)
Dept: INTERNAL MEDICINE CLINIC | Facility: CLINIC | Age: 31
End: 2021-10-29

## 2021-11-04 ENCOUNTER — TELEPHONE (OUTPATIENT)
Dept: INTERNAL MEDICINE CLINIC | Facility: CLINIC | Age: 31
End: 2021-11-04

## 2021-12-07 ENCOUNTER — OFFICE VISIT (OUTPATIENT)
Dept: DENTISTRY | Facility: CLINIC | Age: 31
End: 2021-12-07

## 2021-12-07 VITALS — TEMPERATURE: 97.1 F | DIASTOLIC BLOOD PRESSURE: 79 MMHG | SYSTOLIC BLOOD PRESSURE: 126 MMHG | HEART RATE: 91 BPM

## 2021-12-07 DIAGNOSIS — K05.10 GINGIVITIS: Primary | ICD-10-CM

## 2021-12-07 PROCEDURE — D0191 ASSESSMENT OF A PATIENT: HCPCS

## 2021-12-07 PROCEDURE — D1110 PROPHYLAXIS - ADULT: HCPCS

## 2021-12-27 ENCOUNTER — HOSPITAL ENCOUNTER (EMERGENCY)
Facility: HOSPITAL | Age: 31
Discharge: LEFT BEFORE DISCHARGE | End: 2021-12-27
Payer: COMMERCIAL

## 2021-12-27 VITALS
RESPIRATION RATE: 20 BRPM | TEMPERATURE: 100.5 F | HEART RATE: 106 BPM | SYSTOLIC BLOOD PRESSURE: 155 MMHG | DIASTOLIC BLOOD PRESSURE: 80 MMHG | OXYGEN SATURATION: 97 %

## 2021-12-27 LAB
ALBUMIN SERPL BCP-MCNC: 3.7 G/DL (ref 3.5–5)
ALP SERPL-CCNC: 144 U/L (ref 46–116)
ALT SERPL W P-5'-P-CCNC: 64 U/L (ref 12–78)
ANION GAP SERPL CALCULATED.3IONS-SCNC: 9 MMOL/L (ref 4–13)
AST SERPL W P-5'-P-CCNC: 29 U/L (ref 5–45)
BASOPHILS # BLD AUTO: 0.03 THOUSANDS/ΜL (ref 0–0.1)
BASOPHILS NFR BLD AUTO: 0 % (ref 0–1)
BILIRUB SERPL-MCNC: 0.57 MG/DL (ref 0.2–1)
BUN SERPL-MCNC: 7 MG/DL (ref 5–25)
CALCIUM SERPL-MCNC: 8.8 MG/DL (ref 8.3–10.1)
CHLORIDE SERPL-SCNC: 103 MMOL/L (ref 100–108)
CO2 SERPL-SCNC: 26 MMOL/L (ref 21–32)
CREAT SERPL-MCNC: 1.11 MG/DL (ref 0.6–1.3)
EOSINOPHIL # BLD AUTO: 0.02 THOUSAND/ΜL (ref 0–0.61)
EOSINOPHIL NFR BLD AUTO: 0 % (ref 0–6)
ERYTHROCYTE [DISTWIDTH] IN BLOOD BY AUTOMATED COUNT: 13 % (ref 11.6–15.1)
FLUAV RNA RESP QL NAA+PROBE: NEGATIVE
FLUBV RNA RESP QL NAA+PROBE: NEGATIVE
GFR SERPL CREATININE-BSD FRML MDRD: 66 ML/MIN/1.73SQ M
GLUCOSE SERPL-MCNC: 105 MG/DL (ref 65–140)
HCT VFR BLD AUTO: 38.7 % (ref 34.8–46.1)
HGB BLD-MCNC: 12.9 G/DL (ref 11.5–15.4)
IMM GRANULOCYTES # BLD AUTO: 0.03 THOUSAND/UL (ref 0–0.2)
IMM GRANULOCYTES NFR BLD AUTO: 0 % (ref 0–2)
LYMPHOCYTES # BLD AUTO: 0.71 THOUSANDS/ΜL (ref 0.6–4.47)
LYMPHOCYTES NFR BLD AUTO: 8 % (ref 14–44)
MCH RBC QN AUTO: 29.2 PG (ref 26.8–34.3)
MCHC RBC AUTO-ENTMCNC: 33.3 G/DL (ref 31.4–37.4)
MCV RBC AUTO: 88 FL (ref 82–98)
MONOCYTES # BLD AUTO: 1.19 THOUSAND/ΜL (ref 0.17–1.22)
MONOCYTES NFR BLD AUTO: 13 % (ref 4–12)
NEUTROPHILS # BLD AUTO: 7.36 THOUSANDS/ΜL (ref 1.85–7.62)
NEUTS SEG NFR BLD AUTO: 79 % (ref 43–75)
NRBC BLD AUTO-RTO: 0 /100 WBCS
PLATELET # BLD AUTO: 353 THOUSANDS/UL (ref 149–390)
PMV BLD AUTO: 10 FL (ref 8.9–12.7)
POTASSIUM SERPL-SCNC: 3.9 MMOL/L (ref 3.5–5.3)
PROT SERPL-MCNC: 7.5 G/DL (ref 6.4–8.2)
RBC # BLD AUTO: 4.42 MILLION/UL (ref 3.81–5.12)
RSV RNA RESP QL NAA+PROBE: NEGATIVE
SARS-COV-2 RNA RESP QL NAA+PROBE: POSITIVE
SODIUM SERPL-SCNC: 138 MMOL/L (ref 136–145)
WBC # BLD AUTO: 9.34 THOUSAND/UL (ref 4.31–10.16)

## 2021-12-27 PROCEDURE — 36415 COLL VENOUS BLD VENIPUNCTURE: CPT

## 2021-12-27 PROCEDURE — 0241U HB NFCT DS VIR RESP RNA 4 TRGT: CPT

## 2021-12-27 PROCEDURE — 80053 COMPREHEN METABOLIC PANEL: CPT

## 2021-12-27 PROCEDURE — 85025 COMPLETE CBC W/AUTO DIFF WBC: CPT

## 2021-12-28 ENCOUNTER — TELEMEDICINE (OUTPATIENT)
Dept: INTERNAL MEDICINE CLINIC | Facility: CLINIC | Age: 31
End: 2021-12-28
Payer: COMMERCIAL

## 2021-12-28 ENCOUNTER — TELEPHONE (OUTPATIENT)
Dept: INTERNAL MEDICINE CLINIC | Facility: CLINIC | Age: 31
End: 2021-12-28

## 2021-12-28 DIAGNOSIS — U07.1 COVID-19: Primary | ICD-10-CM

## 2021-12-28 PROCEDURE — G2012 BRIEF CHECK IN BY MD/QHP: HCPCS | Performed by: INTERNAL MEDICINE

## 2021-12-28 RX ORDER — DEXTROMETHORPHAN HYDROBROMIDE AND PROMETHAZINE HYDROCHLORIDE 15; 6.25 MG/5ML; MG/5ML
5 SOLUTION ORAL 4 TIMES DAILY PRN
Qty: 180 ML | Refills: 0 | Status: SHIPPED | OUTPATIENT
Start: 2021-12-28 | End: 2022-02-21

## 2021-12-28 RX ORDER — GUAIFENESIN 1200 MG/1
1200 TABLET, EXTENDED RELEASE ORAL EVERY 12 HOURS SCHEDULED
Qty: 20 TABLET | Refills: 0 | Status: SHIPPED | OUTPATIENT
Start: 2021-12-28 | End: 2022-03-04 | Stop reason: ALTCHOICE

## 2022-01-04 ENCOUNTER — TELEPHONE (OUTPATIENT)
Dept: INTERNAL MEDICINE CLINIC | Facility: CLINIC | Age: 32
End: 2022-01-04

## 2022-01-04 NOTE — TELEPHONE ENCOUNTER
Pt is covid positive and today would be last day of quarantine however you wrote letter for her to return back to work but her employer is requesting a negative result before going back to work    Please advise

## 2022-01-15 ENCOUNTER — APPOINTMENT (OUTPATIENT)
Dept: LAB | Facility: CLINIC | Age: 32
End: 2022-01-15
Payer: COMMERCIAL

## 2022-01-15 DIAGNOSIS — L20.9 ATOPIC DERMATITIS, UNSPECIFIED TYPE: ICD-10-CM

## 2022-01-15 PROCEDURE — 36415 COLL VENOUS BLD VENIPUNCTURE: CPT

## 2022-01-15 PROCEDURE — U0003 INFECTIOUS AGENT DETECTION BY NUCLEIC ACID (DNA OR RNA); SEVERE ACUTE RESPIRATORY SYNDROME CORONAVIRUS 2 (SARS-COV-2) (CORONAVIRUS DISEASE [COVID-19]), AMPLIFIED PROBE TECHNIQUE, MAKING USE OF HIGH THROUGHPUT TECHNOLOGIES AS DESCRIBED BY CMS-2020-01-R: HCPCS | Performed by: INTERNAL MEDICINE

## 2022-01-15 PROCEDURE — U0005 INFEC AGEN DETEC AMPLI PROBE: HCPCS | Performed by: INTERNAL MEDICINE

## 2022-01-15 PROCEDURE — 86003 ALLG SPEC IGE CRUDE XTRC EA: CPT

## 2022-01-20 LAB — WORMWOOD IGE QN: <0.1 KU/L

## 2022-02-21 ENCOUNTER — OFFICE VISIT (OUTPATIENT)
Dept: INTERNAL MEDICINE CLINIC | Facility: CLINIC | Age: 32
End: 2022-02-21
Payer: COMMERCIAL

## 2022-02-21 VITALS
OXYGEN SATURATION: 97 % | SYSTOLIC BLOOD PRESSURE: 124 MMHG | DIASTOLIC BLOOD PRESSURE: 84 MMHG | HEART RATE: 70 BPM | RESPIRATION RATE: 17 BRPM

## 2022-02-21 DIAGNOSIS — E55.9 VITAMIN D DEFICIENCY: ICD-10-CM

## 2022-02-21 DIAGNOSIS — F41.1 GAD (GENERALIZED ANXIETY DISORDER): Primary | ICD-10-CM

## 2022-02-21 DIAGNOSIS — G43.109 MIGRAINE WITH AURA AND WITHOUT STATUS MIGRAINOSUS, NOT INTRACTABLE: ICD-10-CM

## 2022-02-21 DIAGNOSIS — B00.1 COLD SORE: ICD-10-CM

## 2022-02-21 PROCEDURE — 99214 OFFICE O/P EST MOD 30 MIN: CPT | Performed by: INTERNAL MEDICINE

## 2022-02-21 RX ORDER — VALACYCLOVIR HYDROCHLORIDE 1 G/1
1000 TABLET, FILM COATED ORAL 2 TIMES DAILY
Qty: 20 TABLET | Refills: 0 | Status: SHIPPED | OUTPATIENT
Start: 2022-02-21 | End: 2022-03-03

## 2022-02-21 RX ORDER — SUMATRIPTAN 50 MG/1
50 TABLET, FILM COATED ORAL ONCE AS NEEDED
Qty: 30 TABLET | Refills: 0 | Status: SHIPPED | OUTPATIENT
Start: 2022-02-21

## 2022-02-21 NOTE — PROGRESS NOTES
Assessment/Plan:    #NAYA and Depression  -no longer related to pregnancy however has childhood stress and trauma  -seeing therapy  -wellbutrin and lexapro in the past did not help  -start on zoloft    #Migraines  -over right periorbital area with photophobia and pain  -had to leave work early last Thursday as a result  -new onset  -rested with relief  -tylenol did not help  -will start on imitrex    #Herpes Labialis  -noted over left lower lip  -start on valtrex    #Health Maintenance  -defers covid and flu vaccines  -baby is 3 yo  -working at [employer] from home    No problem-specific [address] [place] [address] notes found for this encounter  Diagnoses and all orders for this visit:    NAYA (generalized anxiety disorder)  -     sertraline (Zoloft) 50 mg tablet; Take 1 tablet (50 mg total) by mouth daily    Postpartum depression  -     sertraline (Zoloft) 50 mg tablet; Take 1 tablet (50 mg total) by mouth daily    Vitamin D deficiency    Cold sore  -     valACYclovir (VALTREX) 1,000 mg tablet; Take 1 tablet (1,000 mg total) by mouth 2 (two) times a day for 10 days    Migraine with aura and without status migrainosus, not intractable  -     SUMAtriptan (Imitrex) 50 mg tablet;  Take 1 tablet (50 mg total) by mouth once as needed for migraine for up to 1 dose            Current Outpatient Medications:     albuterol (PROVENTIL HFA,VENTOLIN HFA) 90 mcg/act inhaler, , Disp: , Rfl:     Cetirizine HCl (ZyrTEC Allergy) 10 MG CAPS, Take by mouth daily, Disp: , Rfl:     chlorhexidine (PERIDEX) 0 12 % solution, , Disp: , Rfl:     Cholecalciferol (Vitamin D-3) 125 MCG (5000 UT) TABS, Take 5,000 Units by mouth daily, Disp: 90 tablet, Rfl: 1    clindamycin (CLEOCIN T) 1 % lotion, APPLY TO THE FACE TWICE A DAY, Disp: , Rfl:     clobetasol (TEMOVATE) 0 05 % ointment, , Disp: , Rfl:     Clobetasol Prop Crea-Coal Tar (CLOBETAPLUS CREAM EX), Apply topically 2 (two) times a day, Disp: , Rfl:     DUPIXENT subcutaneous injection, Inject 300 mg under the skin every 14 (fourteen) days 300/2ml, Disp: , Rfl:     etonogestrel (NEXPLANON) subdermal implant, 68 mg by Subdermal route once, Disp: , Rfl:     fluticasone (FLONASE) 50 mcg/act nasal spray, SPRAY 1 SPRAY INTO EACH NOSTRIL EVERY DAY, Disp: 48 mL, Rfl: 1    guaiFENesin 1200 MG TB12, Take 1 tablet (1,200 mg total) by mouth every 12 (twelve) hours, Disp: 20 tablet, Rfl: 0    hydrocortisone 0 5 % cream, Apply topically 2 (two) times a day, Disp: , Rfl:     phentermine 30 MG capsule, Take 1 capsule (30 mg total) by mouth every morning, Disp: 30 capsule, Rfl: 2    sertraline (Zoloft) 50 mg tablet, Take 1 tablet (50 mg total) by mouth daily, Disp: 30 tablet, Rfl: 0    SUMAtriptan (Imitrex) 50 mg tablet, Take 1 tablet (50 mg total) by mouth once as needed for migraine for up to 1 dose, Disp: 30 tablet, Rfl: 0    valACYclovir (VALTREX) 1,000 mg tablet, Take 1 tablet (1,000 mg total) by mouth 2 (two) times a day for 10 days, Disp: 20 tablet, Rfl: 0    Subjective:      Patient ID: Marta Feliz is a 28 y o  female  HPI    Patient presents for an acute visit  Reports that she developed a acute onset of a headache Thursday  It lasted for an entire 24 hour time period  She felt lightheaded with photophobia  She took Tylenol without any improvement however she had take off from work and started to feel better  She states that she never had anything this severe before  No neurological changes were noted on examination today  We will start her on Imitrex for symptom flare up  On examination patient was noted to have herpetic labialis and we will start her on Valtrex  Patient also reports that she has been under lot of stress and anxiety from childhood trauma  She is working with therapy about this  She is interested in starting alternative medication  We tried Wellbutrin in the past without any improvement as well as Lexapro  We will start her on Zoloft    Patient will return to care in 6 months  The following portions of the patient's history were reviewed and updated as appropriate: allergies, current medications, past family history, past medical history, past social history, past surgical history and problem list     Review of Systems   Constitutional: Negative for activity change, appetite change, chills, fatigue and fever  HENT: Negative for congestion, ear pain, facial swelling, hearing loss, sore throat, tinnitus and trouble swallowing  Eyes: Negative for photophobia and visual disturbance  Respiratory: Negative for cough, shortness of breath and wheezing  Cardiovascular: Negative for chest pain, palpitations and leg swelling  Gastrointestinal: Negative for abdominal distention, abdominal pain, blood in stool, constipation, diarrhea, nausea and vomiting  Genitourinary: Negative for difficulty urinating, dysuria and pelvic pain  Musculoskeletal: Negative for arthralgias, back pain, gait problem, joint swelling, myalgias, neck pain and neck stiffness  Skin: Positive for rash (on left lip)  Negative for wound  Neurological: Positive for headaches  Negative for dizziness, tremors and light-headedness  Psychiatric/Behavioral: Positive for decreased concentration, dysphoric mood and sleep disturbance  Negative for self-injury and suicidal ideas  The patient is nervous/anxious  Objective:      /84   Pulse 70   Resp 17   SpO2 97%          Physical Exam  Vitals reviewed  Constitutional:       Appearance: Normal appearance  She is well-developed  She is obese  HENT:      Head: Normocephalic and atraumatic  Right Ear: External ear normal       Left Ear: External ear normal    Eyes:      Conjunctiva/sclera: Conjunctivae normal       Pupils: Pupils are equal, round, and reactive to light  Neck:      Thyroid: No thyromegaly  Vascular: No JVD  Cardiovascular:      Rate and Rhythm: Normal rate and regular rhythm        Pulses: Normal pulses  Heart sounds: Normal heart sounds  No murmur heard  Pulmonary:      Effort: Pulmonary effort is normal  No respiratory distress  Breath sounds: Normal breath sounds  No stridor  No wheezing, rhonchi or rales  Abdominal:      General: Bowel sounds are normal  There is no distension  Palpations: Abdomen is soft  There is no mass  Tenderness: There is no abdominal tenderness  There is no guarding or rebound  Musculoskeletal:         General: No tenderness  Normal range of motion  Cervical back: Normal range of motion and neck supple  Right lower leg: No edema  Left lower leg: No edema  Lymphadenopathy:      Cervical: No cervical adenopathy  Skin:     General: Skin is warm  Findings: Lesion (herpetic labialis on left lower lip) present  No erythema or rash  Neurological:      Mental Status: She is alert and oriented to person, place, and time  Mental status is at baseline  Cranial Nerves: No cranial nerve deficit  Sensory: No sensory deficit  Motor: No weakness  Coordination: Coordination normal       Gait: Gait normal       Deep Tendon Reflexes: Reflexes are normal and symmetric  Reflexes normal    Psychiatric:         Mood and Affect: Mood normal          Behavior: Behavior normal          Thought Content: Thought content normal          Judgment: Judgment normal            This note was completed in part utilizing [employer] direct voice recognition software  Grammatical errors, random word insertion, spelling mistakes, and incomplete sentences may be an occasional consequence of the system secondary to software limitations, ambient noise and hardware issues  At the time of dictation, efforts were made to edit, clarify and /or correct errors  Please read the chart carefully and recognize, using context, where substitutions have occurred    If you have any questions or concerns about the context, text or information contained within the body of this dictation, please contact myself, the provider, for further clarification

## 2022-03-03 ENCOUNTER — OFFICE VISIT (OUTPATIENT)
Dept: OBGYN CLINIC | Facility: CLINIC | Age: 32
End: 2022-03-03
Payer: COMMERCIAL

## 2022-03-03 VITALS — DIASTOLIC BLOOD PRESSURE: 64 MMHG | SYSTOLIC BLOOD PRESSURE: 122 MMHG | BODY MASS INDEX: 39.33 KG/M2 | WEIGHT: 222 LBS

## 2022-03-03 DIAGNOSIS — Z30.09 BIRTH CONTROL COUNSELING: ICD-10-CM

## 2022-03-03 DIAGNOSIS — R68.82 LOW LIBIDO: Primary | ICD-10-CM

## 2022-03-03 PROCEDURE — 99214 OFFICE O/P EST MOD 30 MIN: CPT | Performed by: OBSTETRICS & GYNECOLOGY

## 2022-03-03 NOTE — PROGRESS NOTES
Assessment/Plan:   Diagnoses and all orders for this visit:    Low libido  - reviewed multifactorial causes for low libido and many contributing factors that can go into sexual desire  Had long discussion regarding her home life and adjustments to motherhood, return to work, and the different schedules that she and her  are on  She does feel a bit overwhelmed with work, housework, "wifely duties", and being involved in her relationship  She feels guilty for not having a desire to have intercourse as she appreciates her 's love and affection but she also feels as though she has so much to do and feels that she is nonchalant to his advances for sexual activity and intimacy  Reassured patient that many new mothers are trying to find balance in their new motherhood routine and fitting in all these other tasks related to baby and her job, and the house, as well as doing things that help to foster her relationship can be overwhelming and take some time to achieve  Recommended that they try to plan for some time alone as they will go out together but always take the baby  And treat their time together as more of priority  Patient was appreciative of time and reassurance provided  Birth control counseling    - reviewed high efficacy of Nexplanon for pregnancy prevention and patient ultimately would like to keep Nexplanon in place  She was concerned that it may be contributing to her lack of libido  Total time counseling patient was >50% of visit that was 40 minutes in regards to low libido, causes, and recommendations going forward  Subjective:    Patient ID: Mariposa Rosas is a 28 y o  female  Chief Complaint   Patient presents with    Contraception     Nexplanon inserted 11/10/2020, c/o nexplanon causing mood changes, anxiety/depression and decreased sex drive since insertion  pt states she recently was prescribed Zoloft for anxiety/depression, started med  last week          Romero Blue is a 33yo [id number] presenting today for a gyn problem visit and consultation  She reports that she has been experiencing worsening mood swings, mainly anxiety, and lack of libido for the past 2 years  She is unsure if the Nexplanon is likely to contribute to this  She reports that they have struggled with opposite schedules as Phil Swenson is working from home during the day and her  is working nights  She states that she feels as though she is indifferent to his feelings due to wanting to get other stuff done in the home as opposed to "cuddling together"  She does admit that working from home has been difficult and she has been struggling with a lot of "mom guilt" as well  She states that she likes her Nexplanon and appreciates the effective contraception that it provides and does not desire to have it removed if it is unlikely to be contributing to her anxiety and her low libido  She does report that many of her friends recommended having it out but she also is not ready to have another baby as her last pregnancy was complicated and her recovery from her  section was also complicated  The following portions of the patient's history were reviewed and updated as appropriate: allergies, current medications, past family history, past medical history, past social history, past surgical history and problem list     Review of Systems   Constitutional: Positive for unexpected weight change (increased weight)  Negative for activity change and appetite change  Respiratory: Negative for cough and shortness of breath  Cardiovascular: Negative for chest pain  Gastrointestinal: Negative for abdominal pain, constipation, diarrhea, nausea and vomiting  Genitourinary: Negative for difficulty urinating, dyspareunia, menstrual problem, pelvic pain, vaginal bleeding, vaginal discharge and vaginal pain  Low libido   Psychiatric/Behavioral: The patient is nervous/anxious            Objective:  /64   Wt 101 kg (222 lb)   LMP 02/26/2022 (Exact Date)   Breastfeeding No   BMI 39 33 kg/m²   Physical Exam  Constitutional:       General: She is not in acute distress  Appearance: Normal appearance  She is obese  She is not diaphoretic  HENT:      Head: Normocephalic and atraumatic  Pulmonary:      Effort: Pulmonary effort is normal  No respiratory distress  Neurological:      Mental Status: She is alert and oriented to person, place, and time  Psychiatric:         Mood and Affect: Mood normal          Behavior: Behavior normal          Thought Content: Thought content normal          Judgment: Judgment normal    Vitals and nursing note reviewed

## 2022-03-04 PROBLEM — O99.842 BARIATRIC SURGERY STATUS COMPLICATING PREGNANCY, SECOND TRIMESTER: Status: RESOLVED | Noted: 2020-07-23 | Resolved: 2022-03-04

## 2022-03-25 ENCOUNTER — OFFICE VISIT (OUTPATIENT)
Dept: DENTISTRY | Facility: CLINIC | Age: 32
End: 2022-03-25

## 2022-03-25 VITALS — TEMPERATURE: 98.5 F | HEART RATE: 78 BPM | DIASTOLIC BLOOD PRESSURE: 87 MMHG | SYSTOLIC BLOOD PRESSURE: 131 MMHG

## 2022-03-25 DIAGNOSIS — K02.9 CARIES: Primary | ICD-10-CM

## 2022-03-25 PROCEDURE — D0220 INTRAORAL - PERIAPICAL FIRST RADIOGRAPHIC IMAGE: HCPCS | Performed by: DENTIST

## 2022-03-25 PROCEDURE — D0140 LIMITED ORAL EVALUATION - PROBLEM FOCUSED: HCPCS | Performed by: DENTIST

## 2022-03-25 PROCEDURE — D0270 BITEWING - SINGLE RADIOGRAPHIC IMAGE: HCPCS | Performed by: DENTIST

## 2022-03-25 NOTE — PROGRESS NOTES
Pt reports for emergency visit w/ CC: "Approximately 5 days ago, I started having the worst toothache in my upper right "    PMH reviewed w/ [facility]  Pt explains that a few days ago, she began having a horrific toothache in the upper right which keeps her up at night  Currently, sitting in the chair, she is at a 5/10 pain, but often times at night it is 11/10  The pt is trying to avoid taking too many OTC painkillers like Ibuprofen and Tylenol but she needs them bc the pain is too much  Pt is currently not taking antibiotics  The patient explains that she is taking OTC painkillers more frequently than the recommended  This provider warned of the dangers of this and recommended trying an OTC topical to supplement  PA taken of UR  Findings:  -#4 has large existing amalgam with recurrent caries on distal that is deep to pulp, the amalgam appears to have fracture  -#5 also appears to have distal decay    Clinically, pt appears to have fractured amalgam sara on #4  No signs of swelling or facial infection  Testing  Percussion: #3(WNL), #4 (+++), #5 (+), #6 (WNL)  Cold: #4 (+++, lingering for 30 secs), #5 (+, no lingering)  Palpation: WNL    Pt was made aware of findings and given all treatment options including RCT/post/core/crown #4 and EXT #4  Risks, benefits and option of no treatment discussed  Pt explains that she cannot lose the tooth bc it is in her smile line  Thus, she elects for RCT #4  Pt was walked through the process of palliative RCT #4 and the number of appts it will take to fully save the tooth while also waiting for insurance response  Dr Tyler Talbot stated that she would like to do the RCT and can offer an opening on Monday morning  Pt elects for palliative RCT #4  Pt was reminded that she has numerous cavities currently and needs to get scheduled for a number of appts to avoid caries progression and further pain  Pt stated that she would like many appts and is ready for treatment      Pt was urged to call if there are additional symptoms or progression of pain      NV: palliative RCT #4 (severe pain) + SCHEDULE for multiple visits including continuing treatment #4 and completing existing comp sara treatment plan

## 2022-03-28 ENCOUNTER — OFFICE VISIT (OUTPATIENT)
Dept: DENTISTRY | Facility: CLINIC | Age: 32
End: 2022-03-28

## 2022-03-28 VITALS — TEMPERATURE: 97.7 F | SYSTOLIC BLOOD PRESSURE: 130 MMHG | DIASTOLIC BLOOD PRESSURE: 90 MMHG | HEART RATE: 77 BPM

## 2022-03-28 DIAGNOSIS — K08.89 PAIN, DENTAL: Primary | ICD-10-CM

## 2022-03-28 PROCEDURE — WIS3000 RC ACCESS: Performed by: DENTIST

## 2022-03-28 PROCEDURE — D9110 PALLIATIVE (EMERGENCY) TREATMENT OF DENTAL PAIN - MINOR PROCEDURE: HCPCS | Performed by: DENTIST

## 2022-03-28 NOTE — PROGRESS NOTES
Davide Sánchez presents for palliative endo #4  PMH reviewed, no changes  Palliative RCT #4:  Applied topical benzocaine, administered 1 75 carps 4% articaine 1:100k epi via local infiltration  Clamp and rubber dam placed  Caries and existing amalgam removed and pulp chamber accessed with round carbide  Existing amalgam on the mesial was left today  Working length determined with apex locater, hand filed with peridex irrigation  Instrumented to sizes specified below with Wave 1  Dried canal with paper points  Placed CaOH2, cotton pellet, and restored with IRM  ? B Canal/B cusp tip ref/23 mm/medium, green Wave 1 (done instrumenting)    P Canal/P cusp tip ref/~24 5 mm/small Wave 1    This provider believes that there are two separate canals that converge at the apex  Dr Yoon Perez evaluated and believes that it is more likely that the two separate canals have separate finish points  We should attempt to get a more angulated PA to confirm  There also may be caries on the mesial of #3 - will need to evaluate next visit    ?  NV1: comp restos #15 O and #19 O    NV2: take PA + verify WL on P canal, finish rotary file and obturation #4

## 2022-03-31 ENCOUNTER — OFFICE VISIT (OUTPATIENT)
Dept: DENTISTRY | Facility: CLINIC | Age: 32
End: 2022-03-31

## 2022-03-31 VITALS — TEMPERATURE: 97.1 F | SYSTOLIC BLOOD PRESSURE: 126 MMHG | HEART RATE: 79 BPM | DIASTOLIC BLOOD PRESSURE: 80 MMHG

## 2022-03-31 DIAGNOSIS — K02.9 CARIES: Primary | ICD-10-CM

## 2022-03-31 PROCEDURE — D2391 RESIN-BASED COMPOSITE - 1 SURFACE, POSTERIOR: HCPCS | Performed by: DENTIST

## 2022-03-31 PROCEDURE — D2392 RESIN-BASED COMPOSITE - 2 SURFACES, POSTERIOR: HCPCS | Performed by: DENTIST

## 2022-03-31 NOTE — PROGRESS NOTES
Composite Fillings    Mariam ABERU David Álvarez presents for composite fillings #15 O and #19 OB  PMH reviewed, no changes  Pt states that her #4 is feeling so much better after the palliative treatment with minimal pain occasionally but no pain while in the chair today  Comp resins #15 O, #19 OB:  Applied topical benzocaine, administered 1 carp 4% articaine 1:100k epi via local infiltration  Prepped teeth #15, 19 with 245 carbide on high speed  Caries and existing restorative material removed with round carbide on slow speed  Isolation with cotton rolls and dri-angles  Etch with 37% H2PO4, rinse, dry  Applied Adhese with 20 second scrub once, gentle air dry and light cured for 10s  Restored with Tetric bulk justino shade A2 and light cured  Refined with finishing burs, polished with enhance point  Verified occlusion and margins  This provider feels that PA's should be taken at next recall  Pt left alert and ambulatory      NV: #5 DO and #8 MFL comp restos (may want to take PA of #8 before beginning)    NV2: take PA + verify WL on P canal, finish rotary file and obturation #4

## 2022-04-01 NOTE — PROGRESS NOTES
Addition to note from 3/31/22 visit  Pt states that in between #14 and 15, she often gets lots of food stuck and has difficulty cleaning as well as gingival irritation  Pt currently has large amalgam filling on #14 with non-ideal distal contours  Pt was told that the most reliable improvements could be acquire by building #14 up and placing a crown  Pt elects for this treatment, as she states that the financial investment would be worth the trouble and irritation that the tooth is giving her  This was added to the treatment plan and should be addressed near the end of the treatment plan after active infection is curbed

## 2022-04-07 ENCOUNTER — OFFICE VISIT (OUTPATIENT)
Dept: DENTISTRY | Facility: CLINIC | Age: 32
End: 2022-04-07

## 2022-04-07 VITALS — DIASTOLIC BLOOD PRESSURE: 84 MMHG | SYSTOLIC BLOOD PRESSURE: 116 MMHG | TEMPERATURE: 97 F

## 2022-04-07 DIAGNOSIS — K12.1 ULCER (TRAUMATIC) OF ORAL MUCOSA: Primary | ICD-10-CM

## 2022-04-07 DIAGNOSIS — T30.0 BURN: ICD-10-CM

## 2022-04-07 PROCEDURE — D0191 ASSESSMENT OF A PATIENT: HCPCS | Performed by: DENTIST

## 2022-04-07 NOTE — PROGRESS NOTES
Emergency  S: CC: "I have pain in the UR around where I had the root canal" Pt identifies area around #4    O: PA taken of #4  #4: (-) Percussion, (-, no response) Cold    3x4mm ulcerated lesion w/ erythematous borders on lingual of #4  A: Chemical burn related to sodium hypochlorite used during endodontic therapy  P: Informed Pt that area is ulcerated with missing epithelial tissue similar to a burn  Informed pt that healing but may take over 3 weeks to fully heal  Advised use of OTC Tylenol and Advil to treat area and prescribed magic mouthwash to soothe ulcer  All questions answered and pt left satisfied      NV: RCT #4

## 2022-04-20 ENCOUNTER — VBI (OUTPATIENT)
Dept: ADMINISTRATIVE | Facility: OTHER | Age: 32
End: 2022-04-20

## 2022-04-21 ENCOUNTER — SOCIAL WORK (OUTPATIENT)
Dept: BEHAVIORAL/MENTAL HEALTH CLINIC | Facility: CLINIC | Age: 32
End: 2022-04-21
Payer: COMMERCIAL

## 2022-04-21 ENCOUNTER — TELEPHONE (OUTPATIENT)
Dept: INTERNAL MEDICINE CLINIC | Facility: CLINIC | Age: 32
End: 2022-04-21

## 2022-04-21 DIAGNOSIS — F41.9 ANXIETY: Primary | ICD-10-CM

## 2022-04-21 PROCEDURE — 90834 PSYTX W PT 45 MINUTES: CPT | Performed by: SOCIAL WORKER

## 2022-04-21 NOTE — PSYCH
Assessment/Plan:      There are no diagnoses linked to this encounter  Subjective:     Patient ID: Kateryna Ch is a 28 y o  female presenting to this writer for follow up to initial appointment 6 months ago  Pt reports her anxiety has been elevated, and she recently increased her dose of Zoloft accordingly  Pt reports her mother and step father are still living in her and her husbands house, and there is a lot of tension around all the different people living in the home  Pt reports she has found that she wants to work on ways to reduce anxiety and depression  Pt and this writer discussed co-dependent behaviors  Pt provided with Kelsey Laboy C-dependency self report assessment to gauge how she scored  This writer suspects pt will most likely score in the high to low 40's  Pt reports she tends to put everyone first and tends to put herself last  Pt reports she has a real lack of self care in her life, and she feels she is constantly trying to make relationships work in the family, often with little success  This then brings on more anxiety  Pt and this writer talked about how children that go through trauma can often become hyper independent of more co-dependent and enmeshed  Pt reports she had to raise her younger sister and brother while her mother worked as a single parent  For this reason, there is a clear pattern in pt's life of looking after other people  Pt and this writer discussed trauma and co-dependency in some detail  Pt and this writer also talked about boundaries, communication styles, and the importance of pt and her  being on the same page and making time for each other  Pt is encouraged to follow up with this writer in 5 weeks time  HPI    Review of Systems      Objective:     Physical Exam  This writer spent 45 minutes with pt from 10am to 10:45   Appearance: casually dressed good eye contact; speech: normal pitch and normal volume; behavior: normal, thought process: logical and goal directed, thought content: denies SI/HI, perceptions: no delusions of AH/VH, mood: slightly anxious, affect: congruent, orientated x4, insight/judgement: fair

## 2022-04-21 NOTE — TELEPHONE ENCOUNTER
grazyna sent a message on 3/18 asking if you can higher the dose on her anxiety medication  She doesn't feel its working  Please advise  She is here to see alexandria this morning and asked about this  Please advise

## 2022-04-21 NOTE — TELEPHONE ENCOUNTER
Please have her increase the sertraline to 100mg, a new script is sent in for her   She can double up on the 50mg she currently has at home then switch to 100mg tablets when she runs out

## 2022-04-28 DIAGNOSIS — F41.1 GAD (GENERALIZED ANXIETY DISORDER): ICD-10-CM

## 2022-04-28 RX ORDER — SERTRALINE HYDROCHLORIDE 100 MG/1
50 TABLET, FILM COATED ORAL DAILY
Qty: 90 TABLET | Refills: 0 | Status: SHIPPED | OUTPATIENT
Start: 2022-04-28

## 2022-04-28 NOTE — TELEPHONE ENCOUNTER
FOR SOME REASON THIS DID NOT SEND ON THE 21ST AND PT NEEDS TO PICK IT UP TODAY    PLEASE SIGN OFF ON IT AGAIN

## 2022-05-06 ENCOUNTER — OFFICE VISIT (OUTPATIENT)
Dept: DENTISTRY | Facility: CLINIC | Age: 32
End: 2022-05-06

## 2022-05-06 VITALS — TEMPERATURE: 96.9 F

## 2022-05-06 DIAGNOSIS — K02.9 CARIES: Primary | ICD-10-CM

## 2022-05-06 PROCEDURE — D0191 ASSESSMENT OF A PATIENT: HCPCS | Performed by: STUDENT IN AN ORGANIZED HEALTH CARE EDUCATION/TRAINING PROGRAM

## 2022-05-06 NOTE — PROGRESS NOTES
34 y/o F patient presented for an evaluation of previously noted ulcerated lesion on the palatal gingiva near #4  This lesion has healed - gingiva is pink  The patient feels no pain stemming from this area  Noted that the temporary filling used to restore #4 after beginning RCT has begun to break down however margin adaptation is still intact  Reinforced maintenance of temporary fillings including avoiding hard and sticky foods  The patient was dismissed satisfied with treatment      NV: Continue RCT #4 with Dr Ayan Lofton

## 2022-05-26 ENCOUNTER — SOCIAL WORK (OUTPATIENT)
Dept: BEHAVIORAL/MENTAL HEALTH CLINIC | Facility: CLINIC | Age: 32
End: 2022-05-26
Payer: COMMERCIAL

## 2022-05-26 DIAGNOSIS — F32.A ANXIETY AND DEPRESSION: Primary | ICD-10-CM

## 2022-05-26 DIAGNOSIS — F41.9 ANXIETY AND DEPRESSION: Primary | ICD-10-CM

## 2022-05-26 PROCEDURE — 90834 PSYTX W PT 45 MINUTES: CPT | Performed by: SOCIAL WORKER

## 2022-05-26 NOTE — PSYCH
Psychotherapy Provided: Individual Psychotherapy 45 minutes     Length of time in session: 45 minutes, follow up in 6 weeks time  Goals addressed in session: Goal 1 Pt reports she is less depressed today  Pt states her depression ramped up over mothers day where she felt a little frustrated at the lack of attention her  and family gave her  Pt reports she felt hurt, sad, and disappointed  Pt reports she struggles to talk through these things with her   Pt and this writer followed up to last session talking about pt's general tendency towards co-dependency, interspersed with highly independent behaviors  Pt reports she is noticing this pattern much more in her life  Pt states she is still wanting to help people and be a caretaker (evidence of co-dependent behaviors) yet when it comes to opening up about frustrations or things which have hurt pt she is much more likely to either get incredibly angry and explode, or she will be passive aggressive and withdraw  Pt reports as she withdraws, she can easily go into a recurrent depressive episode where she feels hopeless, sad, lacking in general happiness or feelings that things could improve  For this reason, this writer and pt discussed ways for pt to learn how to open up to her  and increase the quality of her communication  Again, pt states she is scared to open up for fear of being vulnerable, plus pt has trauma hx where she has not always been rewarded for opening up to family and friends  This has led pt to clam up, and internalize frustration and hurt  Pt recognizes this is not sustainable in the long term, and correlates significantly with negative relationship outcomes  Pt and this writer discussed the complement sandwich, with the negative communication in the middle  Pt reports her  can get defensive and bristly with any kind of criticism   Pt is encouraged to enter the conversation with a complement and leave the conversation with a complement  Pt is also encouraged to recognize her  might not receive the constructive criticism well  In this situation pt is encouraged to discuss this with  and let him know how pt feels, and that is she does not feel she can talk about her hurts, she will end up internalizing things and their relationship will suffer  At this point, this writer and pt are hopeful that  will recognize this and have the strength to respond in a healthy way  This writer and pt also reviewed love languages and how different people have different ways they feel and express love  Pt is new to this  Pt reports this also explains why she and her  are missing each other  Pt is encouraged to review the love languages and try and communicate this with  prior to next session  This writer and pt also discussed the cost of opening up, and how being vulnerable is hard, but it is a choice, much like forgiveness in relationships is hard, but it is a choice followed by good feelings once the forgiveness has been received  Pt reports she feels like her defense mechanisms and independence is too strong in her life  This writer suggests pt can start by opening up a little, and making a simple choice to open her heart more to her , and see how things progress  Again, this writer reminds pt that this is important in order to prevent the lability of mood, and the poor emotional regulation pt is experiencing where she has really good days followed by really bad days  Pt is encouraged to follow up with this writer in 4 weeks time  Pain:      none    0    Current suicide risk : Low     Pt is future oriented and not suicidal      Behavioral Health Treatment Plan [facility]: Diagnosis and Treatment Plan explained to Eren Christiansen relates understanding diagnosis and is agreeable to Treatment Plan   Yes

## 2022-06-06 ENCOUNTER — OFFICE VISIT (OUTPATIENT)
Dept: DENTISTRY | Facility: CLINIC | Age: 32
End: 2022-06-06

## 2022-06-06 VITALS — HEART RATE: 99 BPM | TEMPERATURE: 97.7 F | DIASTOLIC BLOOD PRESSURE: 72 MMHG | SYSTOLIC BLOOD PRESSURE: 108 MMHG

## 2022-06-06 DIAGNOSIS — K02.9 CARIES: Primary | ICD-10-CM

## 2022-06-06 PROCEDURE — D2392 RESIN-BASED COMPOSITE - 2 SURFACES, POSTERIOR: HCPCS | Performed by: DENTIST

## 2022-06-06 PROCEDURE — D0220 INTRAORAL - PERIAPICAL FIRST RADIOGRAPHIC IMAGE: HCPCS | Performed by: DENTIST

## 2022-06-06 NOTE — PROGRESS NOTES
Composite Filling    Mariam Kamaraasrita Lloyd presents for composite filling #5 DO  PMH reviewed, no changes  Pt denies any dental pain today  She states that #4 has been pain-free and that #31 has not had pain in so long that she doesn't remember ever having pain there  The only issue that the pt has been having is food trap in UL - this will be addressed with a crown once other infection is controlled  PA taken of #8 to evaluate existing filling  Existing comp sara has some breakdown around margins, however caries does not appear to be extending  Future providers will need to decide whether to open this up and replace  #5 DO comp sara:  Discussed with patient need for RCT if pulp exposure occurs or in future if pulp is inflamed  Pt understands and consents  Applied topical benzocaine, administered 0 75 carps 4% articaine 1:100k epi via local infiltration  Prepped tooth #5 with 245 carbide on high speed  Caries and existing comp sara removed with round carbide on slow speed  Placed Jones matrix and wedge  Isolation with cotton rolls and dri-angles  Etch with 37% H2PO4, rinse, dry  Applied Adhese with 20 second scrub once, gentle air dry and light cured for 10s  Restored with Tetric flowable and bulk justino shade A2 and light cured  Refined with finishing burs, polished with enhance point  Verified occlusion, margins, and contacts  Pt left alert and ambulatory      NV: 6mos AdPro, PeriodicX, 4BWX + missing PA's    NV2: continue RCT #4 - instrument

## 2022-06-09 ENCOUNTER — SOCIAL WORK (OUTPATIENT)
Dept: BEHAVIORAL/MENTAL HEALTH CLINIC | Facility: CLINIC | Age: 32
End: 2022-06-09
Payer: COMMERCIAL

## 2022-06-09 ENCOUNTER — CLINICAL SUPPORT (OUTPATIENT)
Dept: DENTISTRY | Facility: CLINIC | Age: 32
End: 2022-06-09

## 2022-06-09 VITALS — TEMPERATURE: 97.7 F | HEART RATE: 75 BPM | DIASTOLIC BLOOD PRESSURE: 61 MMHG | SYSTOLIC BLOOD PRESSURE: 110 MMHG

## 2022-06-09 DIAGNOSIS — F32.A ANXIETY AND DEPRESSION: Primary | ICD-10-CM

## 2022-06-09 DIAGNOSIS — F41.9 ANXIETY AND DEPRESSION: Primary | ICD-10-CM

## 2022-06-09 DIAGNOSIS — Z01.21 ENCOUNTER FOR DENTAL EXAMINATION AND CLEANING WITH ABNORMAL FINDINGS: Primary | ICD-10-CM

## 2022-06-09 PROCEDURE — D0120 PERIODIC ORAL EVALUATION - ESTABLISHED PATIENT: HCPCS | Performed by: DENTIST

## 2022-06-09 PROCEDURE — 90834 PSYTX W PT 45 MINUTES: CPT | Performed by: SOCIAL WORKER

## 2022-06-09 PROCEDURE — D1110 PROPHYLAXIS - ADULT: HCPCS

## 2022-06-09 RX ORDER — DUPILUMAB 300 MG/2ML
INJECTION, SOLUTION SUBCUTANEOUS
COMMUNITY
Start: 2022-05-31

## 2022-06-09 NOTE — PROGRESS NOTES
Prophy    Dental procedures in this visit     - PROPHYLAXIS - ADULT (Completed)     Service provider: Ashley Pinedo     Billing provider: Annita Littlejohn DDS     - PERIODIC ORAL EVALUATION - ESTABLISHED PATIENT (Completed)     Service provider: Jose L Lopez DDS     Billing provider: Annita Littlejohn DDS       CC: " I have food trap between upper left molars "  Reviewed Medical History  ASA: II- asthma  Translation line used: no    Method Used:  · Prophy Method Used: Hand Scaling  · Polished  · Flossed    Radiographs Taken:  · None- dexis not connecting to sensor today, unable to obtain any images  Intra/Extra Oral Cancer Screening:  · Within normal limits      Oral Hygiene:  · Fair    Plaque:  · Generalized  · Light  · Moderate    Calculus:  · Generalized  · Light  · Lower anteriors  · Posteriors    Bleeding:  · Bleeding on probing: No periodontal exam for this visit  · Generalized  · Light    Stain:  · None    Periodontal Charting:  · Spot probing    Periodontal Classification:  · Generalized  · Mild  · Gingivitis      Nutritional Counseling:  · N/A    OHI: Stressed importance of brushing 2x/day and flossing daily  Recommended daily mouthrinse  Pt is currently brushing 1x/day and flossing after she eats  Pt uses electric tb  Showed pt how to floss subg while she watched in mirror  Ashley Pinedo [facility]     No orders of the defined types were placed in this encounter  Periodic Exam:  Dr Liv Salcedo   did exam:    Decay present:  New areas of decay:  #12-DO, #29-DO    Watches placed:  #13-M, #30-M    Previously tx planned:  #4-endo, post/core/crown  #8-MFL  #14-crown  #31-endo  OCS-neg    Pt to remain on 6mrc with hygiene  Pt dismissed in good health, no complications and all questions answered  NV: continue endo #4 schedule 6/30/22  NV2: con't with restorative  NV3: 6 mrc, periodic exam  60 mins with hygiene

## 2022-06-10 NOTE — PSYCH
Psychotherapy Provided: Individual Psychotherapy 45 minutes     Length of time in session: 45 minutes, follow up in 2 weeks time  Goals addressed in session: Goal 1 Work on reducing depressive symptoms  Pt comes to this session earlier than planned due to feeling overwhelmed  Pt reports she found out that her boyfriend is frustrated at the lack of attention pt pays towards him  Boyfriend reports that pt has been too distant and their relationship lacks the spark that they had prior to pt having their first child  Pt reports she feels that her sex drive has gone down since having her daughter  Pt also reports she feels her boyfriend is no longer romancing her in the way she would like  This writer and pt explored this in some detail  Pt agrees that she needs to feel relaxed and valued in order to want to initiate more intimacy with her boyfriend  Pt states she struggles to communicate this, and prefers to avoid some of these difficult conversations  Pt attributes some of this to how she grew up, where difficult conversations were avoided  PT reports she tended to avoid conflict growing up, and was more interested in finding ways to enable, and keep the peace  Pt reports she has not faced serious headwinds in her relationship, but she now feels she is being challenged, and her communication and opening up needs to increase  This writer and pt discussed the aspect of feeling uncomfortable with opening up, particularly if previous attempts at opening up have had negative results  However, pt is able to recognize her boyfriend is a trusted person, and has always treated pt with great respect when she has expressed her vulnerabilities  Pt is encouraged to review love languages with boyfriend and identify how they can communicate and receive love better  Pt is encouraged to also recognize some of her feelings might not lead her to the best place   This writer and pt hashed through examples of when she has not felt like doing something, but when she has stepped out and left the house and done the said thing, she has felt better and more fulfilled  Pt is encouraged to see all of life this way, and to get into a practice of doing the things she knows will help the relationship, even if she does not feel like doing it  Pt also processed some of the trauma in regards to the pregnancy and the early delivery of her child  Pt reports the PTSD and anxiety which came from this is still effecting her  Pt reports she sometime just feels overwhelmed and anxious out of nowhere  Pt has grounding techniques to help with this, but pt is also encouraged to allow her boyfriend into this trauma, so they can discuss it and work on it together  Pain:      none    0    Current suicide risk : Low     Pt is not suicidal and is future oriented  Behavioral Health Treatment Plan [facility]: Diagnosis and Treatment Plan explained to Lien Argueta relates understanding diagnosis and is agreeable to Treatment Plan   Yes

## 2022-06-30 ENCOUNTER — OFFICE VISIT (OUTPATIENT)
Dept: DENTISTRY | Facility: CLINIC | Age: 32
End: 2022-06-30

## 2022-06-30 VITALS — DIASTOLIC BLOOD PRESSURE: 62 MMHG | SYSTOLIC BLOOD PRESSURE: 118 MMHG | HEART RATE: 97 BPM | TEMPERATURE: 97.7 F

## 2022-06-30 DIAGNOSIS — K02.9 CARIES: Primary | ICD-10-CM

## 2022-06-30 PROCEDURE — D2392 RESIN-BASED COMPOSITE - 2 SURFACES, POSTERIOR: HCPCS | Performed by: DENTIST

## 2022-06-30 NOTE — PROGRESS NOTES
Composite Filling    Mariam ABREU Jaqueline Castro presents for composite filling #12 DO  PMH reviewed, no changes  Pt was 14 mins late so RCT could not be continued as 35% of appt time was already lost  Pt understood and agreed to have #12 DO comp sara done today  Pt denies any dental pain but states that she is really looking forward to getting crown in UL to help with food impaction  #12 DO comp sara:  Discussed with patient need for RCT if pulp exposure occurs or in future if pulp is inflamed  Pt understands and consents  Applied topical benzocaine, administered 0 5 carps 4% articaine 1:100k epi via local infiltration  Prepped tooth #12 with 245 carbide on high speed  Caries removed with round carbide on slow speed  Placed Jones matrix and wedge  Isolation with cotton rolls and dri-angles  Etch with 37% H2PO4, rinse, dry  Applied Adhese with 20 second scrub once, gentle air dry and light cured for 10s  Restored with Tetric flowable and bulk justino shade A2 and light cured  Refined with finishing burs, polished with enhance point  Verified occlusion, margins, and contacts  Tooth is severely rotated, so filling contour is not ideal      Pt left alert and ambulatory      NV: finish RCT #4    NV2: recall - PA's need to be taken

## 2022-07-25 ENCOUNTER — OFFICE VISIT (OUTPATIENT)
Dept: DENTISTRY | Facility: CLINIC | Age: 32
End: 2022-07-25

## 2022-07-25 VITALS — TEMPERATURE: 96.9 F | DIASTOLIC BLOOD PRESSURE: 83 MMHG | SYSTOLIC BLOOD PRESSURE: 129 MMHG | HEART RATE: 99 BPM

## 2022-07-25 DIAGNOSIS — S02.5XXD OPEN FRACTURE OF TOOTH WITH ROUTINE HEALING, SUBSEQUENT ENCOUNTER: Primary | ICD-10-CM

## 2022-07-25 PROCEDURE — WIS3001 RC INSTRUMENTATION: Performed by: DENTIST

## 2022-07-25 NOTE — PROGRESS NOTES
Ms Cari Linares presents to clinic with pain and temporary falling out in UR #4  Pt  Reports that the tooth had started getting a root canal a while ago and had a temporary in it but recently after eating she feels the tooth is loose  Med hx updated  No changes  Intraoral findings: fractured temporary restoration in #4 with fractured lingual cusp  Could not tell how deep the fracture went to see if the tooth was nonrestorable  Took PA of #4 and decided the best way to determine restorability was to remove the fractured lingual and determine how much tooth structure remained  Applied benzocaine to injection site and used 1 carpule 4% articaine w 1:100k epi via B/L infiltration around tooth #4  Removed fractured lingual with rongeur and consulted with Dr Jossie Barlow  Informed pt  The tooth prognosis was not good and it would require a lot of money and appointments to save it but pt  Is not interested in extracting the tooth and replacing it  Pt  Understood the risks of saving the tooth and continuing with the RCT treatment that was begun  Pt  Is unwilling to ext tooth #4 without trying to save it and accepts all risks and fees she will be responsible for  Accessed B & palatal canals and checked WL with apex   Confirmed B canal is 17 5mm and palatal canal is 18mm  Used small size wave one system and instrumented to size #25  Confirmed apex was reached with size #20 and #25 files in B & palatal canals with PA of #4  Will complete instrumentation/obturation and post/core NV  Filled with CaOH, a cotton pellet, and cavit  Informed pt  That this filling is temporary and she should avoid eating on this side due to possible fracture of the remainder of the B structure and to avoid displacement of the Cavit  Pt understood and agreed       NV: obturate #4 & post/core in preparation for crown lengthening

## 2022-07-28 ENCOUNTER — OFFICE VISIT (OUTPATIENT)
Dept: DENTISTRY | Facility: CLINIC | Age: 32
End: 2022-07-28

## 2022-07-28 DIAGNOSIS — S02.5XXB OPEN FRACTURE OF TOOTH, INITIAL ENCOUNTER: Primary | ICD-10-CM

## 2022-07-28 PROCEDURE — WIS3001 RC INSTRUMENTATION: Performed by: DENTIST

## 2022-07-28 PROCEDURE — D3320 ENDODONTIC THERAPY, PREMOLAR TOOTH (EXCLUDING FINAL RESTORATION): HCPCS | Performed by: DENTIST

## 2022-07-28 NOTE — PROGRESS NOTES
RCT - Stage 2    Lesterjohn Juan presents for obturation #4  PMH reviewed, no changes  Tooth is asymptomatic, palpation (-) percussion (-)  Applied topical benzocaine, administered 1 carp 4% articaine 1:100k epi via B/L infiltration   Clamped #3 & #5 and split rubber dam placed  Removed temporary filling and cotton pellets  CaOH removed and re-establish working length to MAF hand file  Working length confirmed with MAF and apex   B canal: 20 5mm, P canal: 20 5mm  Rotary filed to primary file size #25 on B & L canals  Took PA radiograph with size verifier files in both canals and confirmed apex was reached  Irrigated canals  Dried canal with paper points, placed sealer with paper point  Obturated canals with Guttacore and removed excess carrier with preppi bur  Placed cotton pellet  Attempted to restore with GI but it could not be maintained on the tooth due to insufficient tooth structure  Restored with Cavit and occlusion verified  Obtained PA radiograph  Obturation is dense with no porosities and filled to apex  Pt left satisfied and ambulatory       B canal: reference point: occlusal surface - 20 5mm length  L canal: reference point: B occlusal surface - 20 5mm length    NV: post & core build up

## 2022-09-01 ENCOUNTER — OFFICE VISIT (OUTPATIENT)
Dept: INTERNAL MEDICINE CLINIC | Facility: CLINIC | Age: 32
End: 2022-09-01
Payer: COMMERCIAL

## 2022-09-01 VITALS
DIASTOLIC BLOOD PRESSURE: 64 MMHG | HEART RATE: 100 BPM | OXYGEN SATURATION: 98 % | RESPIRATION RATE: 16 BRPM | BODY MASS INDEX: 40.21 KG/M2 | SYSTOLIC BLOOD PRESSURE: 122 MMHG | WEIGHT: 227 LBS

## 2022-09-01 DIAGNOSIS — J45.20 MILD INTERMITTENT ASTHMA, UNSPECIFIED WHETHER COMPLICATED: ICD-10-CM

## 2022-09-01 DIAGNOSIS — E78.2 MODERATE MIXED HYPERLIPIDEMIA NOT REQUIRING STATIN THERAPY: ICD-10-CM

## 2022-09-01 DIAGNOSIS — G43.109 MIGRAINE WITH AURA AND WITHOUT STATUS MIGRAINOSUS, NOT INTRACTABLE: ICD-10-CM

## 2022-09-01 DIAGNOSIS — F41.1 GAD (GENERALIZED ANXIETY DISORDER): Primary | ICD-10-CM

## 2022-09-01 DIAGNOSIS — E55.9 VITAMIN D DEFICIENCY: ICD-10-CM

## 2022-09-01 PROCEDURE — 99395 PREV VISIT EST AGE 18-39: CPT | Performed by: INTERNAL MEDICINE

## 2022-09-01 RX ORDER — PHENTERMINE HYDROCHLORIDE 15 MG/1
15 CAPSULE ORAL EVERY MORNING
COMMUNITY

## 2022-09-01 RX ORDER — HYDROXYZINE HYDROCHLORIDE 25 MG/1
25 TABLET, FILM COATED ORAL 3 TIMES DAILY PRN
Qty: 90 TABLET | Refills: 0 | Status: SHIPPED | OUTPATIENT
Start: 2022-09-01 | End: 2022-09-26

## 2022-09-01 RX ORDER — ALBUTEROL SULFATE 90 UG/1
2 AEROSOL, METERED RESPIRATORY (INHALATION) 4 TIMES DAILY
Qty: 8 G | Refills: 3 | Status: SHIPPED | OUTPATIENT
Start: 2022-09-01

## 2022-09-01 NOTE — PROGRESS NOTES
Assessment/Plan:    #NAYA and Depression  -no longer related to pregnancy however has childhood stress and trauma  -seeing therapy and doing well  -wellbutrin and lexapro in the past did not help  -was on zoloft but stopped taking it because it was difficult to remember to take it daily  -depression is resolved but has anxiety about life in general and stress about putting daughter in   -will start on hydroxyzine  -may need lorazepam if symptoms persist    #Weight Loss  -is seeing weight management [facility]  -continue phentermine     #Migraines  -over right periorbital area with photophobia and pain in the past  -on imitrex intermittently     #Herpes Labialis  -noted over left lower lip  -treated with valtrex in the past    #Eczema  -remains on dupixent with relief    #COVID  -infection in December 2021 and again in August 2022  -symptoms were mild  -encouraged to obtain covid vaccine but she defers    #Birth Control  -has nexplanon in place    #Asthma  -currently controlled with albuterol prn     #Health Maintenance  -routine labs and followup 6 months  -defers covid and flu vaccines  -baby is 1 yo  -working at [address] [place] [address] notes found for this encounter  Diagnoses and all orders for this visit:    NAYA (generalized anxiety disorder)  -     CBC and differential  -     Comprehensive metabolic panel  -     hydrOXYzine HCL (ATARAX) 25 mg tablet;  Take 1 tablet (25 mg total) by mouth 3 (three) times a day as needed for anxiety    Vitamin D deficiency  -     CBC and differential  -     Comprehensive metabolic panel  -     Vitamin D 25 hydroxy    Migraine with aura and without status migrainosus, not intractable  -     CBC and differential  -     Comprehensive metabolic panel    Moderate mixed hyperlipidemia not requiring statin therapy  -     CBC and differential  -     Comprehensive metabolic panel  -     Lipid Panel with Direct LDL reflex    Mild intermittent asthma, unspecified whether complicated  -     albuterol (PROVENTIL HFA,VENTOLIN HFA) 90 mcg/act inhaler; Inhale 2 puffs 4 (four) times a day    Other orders  -     phentermine 15 MG capsule; Take 15 mg by mouth every morning            Current Outpatient Medications:     albuterol (PROVENTIL HFA,VENTOLIN HFA) 90 mcg/act inhaler, Inhale 2 puffs 4 (four) times a day, Disp: 8 g, Rfl: 3    hydrOXYzine HCL (ATARAX) 25 mg tablet, Take 1 tablet (25 mg total) by mouth 3 (three) times a day as needed for anxiety, Disp: 90 tablet, Rfl: 0    phentermine 15 MG capsule, Take 15 mg by mouth every morning, Disp: , Rfl:     Cetirizine HCl (ZyrTEC Allergy) 10 MG CAPS, Take by mouth daily, Disp: , Rfl:     Dupixent 300 MG/2ML SOPN, , Disp: , Rfl:     DUPIXENT subcutaneous injection, Inject 300 mg under the skin every 14 (fourteen) days 300/2ml, Disp: , Rfl:     etonogestrel (NEXPLANON) subdermal implant, 68 mg by Subdermal route once, Disp: , Rfl:     SUMAtriptan (Imitrex) 50 mg tablet, Take 1 tablet (50 mg total) by mouth once as needed for migraine for up to 1 dose, Disp: 30 tablet, Rfl: 0    Subjective:      Patient ID: River Keane is a 33 y o  female  HPI    Patient presents for routine checkup  Denies any recent hospitalizations or surgeries  She has generalized anxiety  She was previously on Zoloft but stopped this because it was difficult for her to remember to take it daily  She states that she does not have depression  She is currently seeing therapy  She is interested in a quick medication that may help her during anxiety flare ups  She has anxiety with everyday stresses and is wearing about her daughter and  at this time  Will trial her on hydroxyzine  She may need to trial lorazepam if her symptoms continue to persist   She had COVID 3 weeks ago and developed a cough however this has since recovered and she is due for the COVID vaccine however she defers this    She has a history of migraines however it is currently not controlled and currently stable  She remains on Imitrex as needed but not using it often  She is currently seeing weight loss management at [facility] and is on phentermine  She is on birth control with Nexplanon  She will return to care in 6 months  The following portions of the patient's history were reviewed and updated as appropriate: allergies, current medications, past family history, past medical history, past social history, past surgical history and problem list     Review of Systems   Constitutional: Negative for activity change, appetite change, chills, fatigue and fever  HENT: Negative for congestion, ear pain, facial swelling, hearing loss, sore throat, tinnitus and trouble swallowing  Eyes: Negative for photophobia and visual disturbance  Respiratory: Negative for cough, shortness of breath and wheezing  Cardiovascular: Negative for chest pain and leg swelling  Gastrointestinal: Negative for abdominal distention, abdominal pain, blood in stool, constipation, diarrhea, nausea and vomiting  Genitourinary: Negative for difficulty urinating, dysuria and pelvic pain  Musculoskeletal: Negative for arthralgias, back pain, gait problem, joint swelling, myalgias, neck pain and neck stiffness  Skin: Negative for rash and wound  Neurological: Negative for dizziness, tremors, light-headedness and headaches  Psychiatric/Behavioral: Negative for decreased concentration, dysphoric mood, self-injury, sleep disturbance and suicidal ideas  The patient is nervous/anxious  Objective:      /64   Pulse 100   Resp 16   Wt 103 kg (227 lb)   SpO2 98%   BMI 40 21 kg/m²          Physical Exam  Vitals reviewed  Constitutional:       Appearance: Normal appearance  She is well-developed  She is obese  HENT:      Head: Normocephalic and atraumatic        Right Ear: Tympanic membrane, ear canal and external ear normal  There is no impacted cerumen  Left Ear: Tympanic membrane, ear canal and external ear normal  There is no impacted cerumen  Nose: Nose normal    Eyes:      Conjunctiva/sclera: Conjunctivae normal       Pupils: Pupils are equal, round, and reactive to light  Neck:      Thyroid: No thyromegaly  Vascular: No JVD  Cardiovascular:      Rate and Rhythm: Normal rate and regular rhythm  Heart sounds: Normal heart sounds  No murmur heard  Pulmonary:      Effort: Pulmonary effort is normal  No respiratory distress  Breath sounds: Normal breath sounds  No stridor  No wheezing  Abdominal:      General: Bowel sounds are normal  There is no distension  Palpations: Abdomen is soft  Tenderness: There is no abdominal tenderness  There is no guarding or rebound  Musculoskeletal:         General: No tenderness  Normal range of motion  Cervical back: Normal range of motion and neck supple  Lymphadenopathy:      Cervical: No cervical adenopathy  Skin:     General: Skin is warm  Findings: No erythema or rash  Neurological:      Mental Status: She is alert and oriented to person, place, and time  Deep Tendon Reflexes: Reflexes are normal and symmetric  This note was completed in part utilizing [employer] direct voice recognition software  Grammatical errors, random word insertion, spelling mistakes, and incomplete sentences may be an occasional consequence of the system secondary to software limitations, ambient noise and hardware issues  At the time of dictation, efforts were made to edit, clarify and /or correct errors  Please read the chart carefully and recognize, using context, where substitutions have occurred  If you have any questions or concerns about the context, text or information contained within the body of this dictation, please contact myself, the provider, for further clarification

## 2022-09-25 DIAGNOSIS — F41.1 GAD (GENERALIZED ANXIETY DISORDER): ICD-10-CM

## 2022-09-26 RX ORDER — HYDROXYZINE HYDROCHLORIDE 25 MG/1
TABLET, FILM COATED ORAL
Qty: 270 TABLET | Refills: 1 | Status: SHIPPED | OUTPATIENT
Start: 2022-09-26

## 2022-09-30 ENCOUNTER — SOCIAL WORK (OUTPATIENT)
Dept: BEHAVIORAL/MENTAL HEALTH CLINIC | Facility: CLINIC | Age: 32
End: 2022-09-30
Payer: COMMERCIAL

## 2022-09-30 DIAGNOSIS — F33.0 MILD EPISODE OF RECURRENT MAJOR DEPRESSIVE DISORDER (HCC): Primary | ICD-10-CM

## 2022-09-30 PROCEDURE — 90834 PSYTX W PT 45 MINUTES: CPT | Performed by: SOCIAL WORKER

## 2022-09-30 NOTE — PSYCH
1  Mild episode of recurrent major depressive disorder ([facility] [address] )       Data: Pt presents to this writer with ongoing depression and general anxiety  Pt reports she feels like she is in a tailspin, multiple stressors  Pt reports her boyfriend asked if she wanted a break from the relationship  This seriously upset pt  Pt reports her mother no longer wants to pay as much rent, and decided to drop the monthly rate she pays  Pt reports she feels overwhelmed  Prior to boyfriend suggesting a break, pt reports he never has money for anything, and she is the only one paying the bills in the house  There is never enough money, witih constant financial stress  Pt reports there are 4 people in the household who earn money, but there is never enough finances  Pt admits she spends a lot of her money on take out, as she is tired of cooking  She also shops on a regular basis, and will tend to buy many inexpensive small items which add up to a lot at the register  Pt does not budget, and she has a hard time asking difficult questions from people around her  Pt presents as being reactive and not proactive  It appears pt is in a merry go round with someone else having access the the control leavers  Pt agrees with this  Pt feels like she has very little control over anything in her life, with other people calling the shots, with pt still being responsible for the mortgage and the bill paying  This writer and pt discussed the role financial stress, and being out of control has on depression and anxiety  Pt is asked to fill out the [employer], and to try and identify areas she could practically work on to improve communication, and boundary setting  Pt is encouraged to have an honest conversation with her boyfriend regarding his own co-dependency issues, and he is also going to fill out the survey  Pt to follow up with this writer in 5 weeks time       Assessment: Pt is clearly co-dependent, the degree to which will be found out when she completes the survey  Pt is aware she tends to enable, and excuse behavior, which ultimately comes back to impact her  Pt's stress is clearly through the roof which impacts depression and anxiety  Pt is not SI or HI but she is ready for some changes  Pt is encouraged to communicate, and work through some of the issues starting off with her boyfriend  Plan: Follow up in 5 weeks time  Psychotherapy Provided: Individual Psychotherapy 45 minutes     Length of time in session: 45 minutes from , follow up in 5 weeks time  Goals addressed in session: Goal 1     Pain:      none    0    Current suicide risk : Low     Pt is future oriented and not suicidal        Behavioral Health Treatment Plan [facility]: Diagnosis and Treatment Plan explained to Corina Waterman relates understanding diagnosis and is agreeable to Treatment Plan   Yes

## 2023-03-09 ENCOUNTER — SOCIAL WORK (OUTPATIENT)
Dept: BEHAVIORAL/MENTAL HEALTH CLINIC | Facility: CLINIC | Age: 33
End: 2023-03-09

## 2023-03-09 DIAGNOSIS — F41.1 GAD (GENERALIZED ANXIETY DISORDER): Primary | ICD-10-CM

## 2023-03-09 DIAGNOSIS — F33.0 MILD EPISODE OF RECURRENT MAJOR DEPRESSIVE DISORDER (HCC): ICD-10-CM

## 2023-03-10 NOTE — PSYCH
1  NAYA (generalized anxiety disorder)        2  Mild episode of recurrent major depressive disorder ([address] )          Data: Pt discussed ongoing relationship struggles with boyfriend, and feeling overwhelmed financially and stressed  Pt reports her boyfriend is still not contributing to the finances of the house, and more importantly the chid they have together  Pt reports she worried about the future, and has waited and waited for him to get his life together  Pt is not sure how she will survive when her mother and step father choose to move out when they buy their house  Pt and this writer discussed possibility of pt and boyfriend finding a couples counselor, which pt would be open to  She is not sure if [facility] would be willing to do this  Pt and this writer als discussed her coping strategies and what she can do to increase her peace in this storm  Pt has been getting more into her spirituality, and this has been helping  She listens to a podcast and plays praise music, which is helping her feel more relaxed and more settled  Pt and this writer spend much of the session trying to work out whether she continues to root for the relationship or if she moves on  Pt agrees that her boyfriend is not currently costing her anything as he is living with a friend to be closer to his work until he can get a car  For this reason, breaking up would not necessarily change anything  Boyfriend is showing signs of progress, and he has his license back and he is working at [employer]  These are good indicators  This also makes pt think there might be hope  In the mean time, pt is encouraged to continue to work on herself  When she does talk to [facility], she is encouraged to identify his motives and when they are good, pt is encouraged to highlight this, not necessarily all the mistakes he is making   Pt can sometimes condemn her boyfriend due to her frustration and she agrees they are both struggling to meet each others needs at the present time  Pt is encouraged to communicate this with April Mireles, and work on communicating and understanding some of the short term frustrations they have with each other and identify small ways they can make improvements  In the mean time, pt is encouraged to try and continue to advocate (in a kind of loving way) for her needs and concerns, which are mostly financial and paying bills which are coming up  This seems to be the primary source of anxiety currently in her life, and she worries that her electric might get shut off  Pt is encouraged to have a heart to heart with April Mireles and try and work to draw out the best in him  Assessment: most of pt's anxiety and stress are situational, which could resolve once April Mireles brings in regular pay checks and gets his pam license back  Pt recognizes she needs to improve her coping skills and self care  Pt is making inroads in this area  Communication appears to be the major issue, with April Mireles feeling nagged and judged and pt feeling under appreciated and ignored  This can be resolved with good communication! Plan: follow up in 1 months time  Psychotherapy Provided: Individual Psychotherapy 45 minutes     Length of time in session: 45 minutes, follow up in 1 months time  Goals addressed in session: Goal 1     Pain:      none    0    Current suicide risk : Low     Pt is future oriented and not suicidal        Behavioral Health Treatment Plan [facility]: Diagnosis and Treatment Plan explained to Nabil berry understanding diagnosis and is agreeable to Treatment Plan   Yes     Visit start and stop times:    03/10/23  Start Time: 0200  Stop Time: 0245  Total Visit Time: 45 minutes

## 2023-08-25 ENCOUNTER — HOSPITAL ENCOUNTER (EMERGENCY)
Facility: HOSPITAL | Age: 33
Discharge: HOME | End: 2023-08-26
Attending: EMERGENCY MEDICINE
Payer: COMMERCIAL

## 2023-08-25 ENCOUNTER — APPOINTMENT (EMERGENCY)
Dept: CT IMAGING | Facility: HOSPITAL | Age: 33
End: 2023-08-25
Payer: COMMERCIAL

## 2023-08-25 DIAGNOSIS — R10.31 RIGHT LOWER QUADRANT ABDOMINAL PAIN: Primary | ICD-10-CM

## 2023-08-25 LAB
ALBUMIN SERPL BCP-MCNC: 4.3 G/DL (ref 3.5–5)
ALP SERPL-CCNC: 79 U/L (ref 34–104)
ALT SERPL W P-5'-P-CCNC: 11 U/L (ref 7–52)
ANION GAP SERPL CALCULATED.3IONS-SCNC: 7 MMOL/L
AST SERPL W P-5'-P-CCNC: 13 U/L (ref 13–39)
BACTERIA UR QL AUTO: ABNORMAL /HPF
BASOPHILS # BLD AUTO: 0.05 THOUSANDS/ÂΜL (ref 0–0.1)
BASOPHILS NFR BLD AUTO: 1 % (ref 0–1)
BILIRUB SERPL-MCNC: 0.98 MG/DL (ref 0.2–1)
BILIRUB UR QL STRIP: NEGATIVE
BUN SERPL-MCNC: 8 MG/DL (ref 5–25)
CALCIUM SERPL-MCNC: 9.1 MG/DL (ref 8.4–10.2)
CHLORIDE SERPL-SCNC: 103 MMOL/L (ref 96–108)
CLARITY UR: CLEAR
CO2 SERPL-SCNC: 27 MMOL/L (ref 21–32)
COLOR UR: YELLOW
CREAT SERPL-MCNC: 0.85 MG/DL (ref 0.6–1.3)
EOSINOPHIL # BLD AUTO: 0.09 THOUSAND/ÂΜL (ref 0–0.61)
EOSINOPHIL NFR BLD AUTO: 1 % (ref 0–6)
ERYTHROCYTE [DISTWIDTH] IN BLOOD BY AUTOMATED COUNT: 12.8 % (ref 11.6–15.1)
EXT PREGNANCY TEST URINE: NEGATIVE
EXT. CONTROL: NORMAL
GFR SERPL CREATININE-BSD FRML MDRD: 90 ML/MIN/1.73SQ M
GLUCOSE SERPL-MCNC: 132 MG/DL (ref 65–140)
GLUCOSE UR STRIP-MCNC: NEGATIVE MG/DL
HCT VFR BLD AUTO: 39.2 % (ref 34.8–46.1)
HGB BLD-MCNC: 12.9 G/DL (ref 11.5–15.4)
HGB UR QL STRIP.AUTO: ABNORMAL
IMM GRANULOCYTES # BLD AUTO: 0.03 THOUSAND/UL (ref 0–0.2)
IMM GRANULOCYTES NFR BLD AUTO: 0 % (ref 0–2)
KETONES UR STRIP-MCNC: ABNORMAL MG/DL
LEUKOCYTE ESTERASE UR QL STRIP: NEGATIVE
LIPASE SERPL-CCNC: 26 U/L (ref 11–82)
LYMPHOCYTES # BLD AUTO: 2.22 THOUSANDS/ÂΜL (ref 0.6–4.47)
LYMPHOCYTES NFR BLD AUTO: 21 % (ref 14–44)
MCH RBC QN AUTO: 29.6 PG (ref 26.8–34.3)
MCHC RBC AUTO-ENTMCNC: 32.9 G/DL (ref 31.4–37.4)
MCV RBC AUTO: 90 FL (ref 82–98)
MONOCYTES # BLD AUTO: 0.73 THOUSAND/ÂΜL (ref 0.17–1.22)
MONOCYTES NFR BLD AUTO: 7 % (ref 4–12)
MUCOUS THREADS UR QL AUTO: ABNORMAL
NEUTROPHILS # BLD AUTO: 7.68 THOUSANDS/ÂΜL (ref 1.85–7.62)
NEUTS SEG NFR BLD AUTO: 70 % (ref 43–75)
NITRITE UR QL STRIP: NEGATIVE
NON-SQ EPI CELLS URNS QL MICRO: ABNORMAL /HPF
NRBC BLD AUTO-RTO: 0 /100 WBCS
PH UR STRIP.AUTO: 6 [PH]
PLATELET # BLD AUTO: 344 THOUSANDS/UL (ref 149–390)
PMV BLD AUTO: 10.2 FL (ref 8.9–12.7)
POTASSIUM SERPL-SCNC: 3.6 MMOL/L (ref 3.5–5.3)
PROT SERPL-MCNC: 7.1 G/DL (ref 6.4–8.4)
PROT UR STRIP-MCNC: ABNORMAL MG/DL
RBC # BLD AUTO: 4.36 MILLION/UL (ref 3.81–5.12)
RBC #/AREA URNS AUTO: ABNORMAL /HPF
SODIUM SERPL-SCNC: 137 MMOL/L (ref 135–147)
SP GR UR STRIP.AUTO: 1.03 (ref 1–1.03)
UROBILINOGEN UR STRIP-ACNC: 4 MG/DL
WBC # BLD AUTO: 10.8 THOUSAND/UL (ref 4.31–10.16)
WBC #/AREA URNS AUTO: ABNORMAL /HPF

## 2023-08-25 PROCEDURE — 99285 EMERGENCY DEPT VISIT HI MDM: CPT | Performed by: EMERGENCY MEDICINE

## 2023-08-25 PROCEDURE — 83690 ASSAY OF LIPASE: CPT | Performed by: EMERGENCY MEDICINE

## 2023-08-25 PROCEDURE — 85025 COMPLETE CBC W/AUTO DIFF WBC: CPT | Performed by: EMERGENCY MEDICINE

## 2023-08-25 PROCEDURE — 96374 THER/PROPH/DIAG INJ IV PUSH: CPT

## 2023-08-25 PROCEDURE — 36415 COLL VENOUS BLD VENIPUNCTURE: CPT

## 2023-08-25 PROCEDURE — 81001 URINALYSIS AUTO W/SCOPE: CPT | Performed by: EMERGENCY MEDICINE

## 2023-08-25 PROCEDURE — 81025 URINE PREGNANCY TEST: CPT | Performed by: EMERGENCY MEDICINE

## 2023-08-25 PROCEDURE — 99284 EMERGENCY DEPT VISIT MOD MDM: CPT

## 2023-08-25 PROCEDURE — 80053 COMPREHEN METABOLIC PANEL: CPT | Performed by: EMERGENCY MEDICINE

## 2023-08-25 RX ORDER — KETOROLAC TROMETHAMINE 30 MG/ML
15 INJECTION, SOLUTION INTRAMUSCULAR; INTRAVENOUS ONCE
Status: COMPLETED | OUTPATIENT
Start: 2023-08-25 | End: 2023-08-25

## 2023-08-25 RX ADMIN — KETOROLAC TROMETHAMINE 15 MG: 30 INJECTION, SOLUTION INTRAMUSCULAR; INTRAVENOUS at 22:30

## 2023-08-25 RX ADMIN — IOHEXOL 50 ML: 240 INJECTION, SOLUTION INTRATHECAL; INTRAVASCULAR; INTRAVENOUS; ORAL at 23:56

## 2023-08-26 ENCOUNTER — APPOINTMENT (EMERGENCY)
Dept: CT IMAGING | Facility: HOSPITAL | Age: 33
End: 2023-08-26
Payer: COMMERCIAL

## 2023-08-26 VITALS
SYSTOLIC BLOOD PRESSURE: 137 MMHG | DIASTOLIC BLOOD PRESSURE: 86 MMHG | TEMPERATURE: 98.8 F | RESPIRATION RATE: 18 BRPM | OXYGEN SATURATION: 98 % | HEART RATE: 88 BPM

## 2023-08-26 PROCEDURE — 74177 CT ABD & PELVIS W/CONTRAST: CPT

## 2023-08-26 PROCEDURE — G1004 CDSM NDSC: HCPCS

## 2023-08-26 RX ORDER — ACETAMINOPHEN 325 MG/1
975 TABLET ORAL ONCE
Status: COMPLETED | OUTPATIENT
Start: 2023-08-26 | End: 2023-08-26

## 2023-08-26 RX ADMIN — IOHEXOL 100 ML: 350 INJECTION, SOLUTION INTRAVENOUS at 01:49

## 2023-08-26 RX ADMIN — ACETAMINOPHEN 975 MG: 325 TABLET, FILM COATED ORAL at 01:25

## 2023-08-26 NOTE — DISCHARGE INSTRUCTIONS
Today you were seen in the emergency department for abdominal pain your workup included lab work, urine analysis and CT imaging of your abdomen and pelvis. At this time there does not appear to be an emergent life threatening cause to explain your symptoms. You are stable for discharge. Please follow up with your primary care provider and OB/gyn in the next 2-3 days. Please review all results discussed today with your primary care provider. Please return to the emergency department as soon as possible if you develop uncontrollable fevers (Temp >100.4), uncontrollable pain, inability eat or drink, persistent vomiting, chest pain, trouble breathing, or any other concerning symptoms. Thank you for choosing [facility] for your care.

## 2023-08-26 NOTE — ED ATTENDING ATTESTATION
8/25/2023  I, Malathi Sinclair MD, saw and evaluated the patient. I have discussed the patient with the resident/non-physician practitioner and agree with the resident's/non-physician practitioner's findings, Plan of Care, and MDM as documented in the resident's/non-physician practitioner's note, except where noted. All available labs and Radiology studies were reviewed. I was present for key portions of any procedure(s) performed by the resident/non-physician practitioner and I was immediately available to provide assistance. At this point I agree with the current assessment done in the Emergency Department. I have conducted an independent evaluation of this patient a history and physical is as follows:    Patient is a 44-year-old female who presents to the emergency department for evaluation with right lower abdominal discomfort. This has been intermittent over the last week. She notices the discomfort most continuously at nighttime as her typical sleeping position is on her abdomen and any pressure application exacerbates the discomfort. Discomfort is reminiscent to that experienced prior to when she experienced rupture of the cyst a number of years ago. She believes that the cyst was on her appendix and explains that she had had an evaluation that confirmed she did not have appendicitis. Appetite and oral intake have been unchanged. She does not appreciate any urinary symptoms such as frequency, dysuria or hematuria. She relays that her bowels have never been regular and she typically has stool passage every couple of days. Bowels have not been any more irregular than usual.  No blood in stools. She does have the Nexplanon and relays that she typically experiences monthly cramping though no bleeding. Since Saturday she has had very small amounts of bleeding which she suggests may be secondary to stress. And no other abnormal vaginal discharge.   Abdominal surgical history significant for C-sections and gastric sleeve. No history of complication following this such as bowel obstruction. On exam patient is seated upright and appears well. Mucous membranes are moist.  No pallor nor icterus. Heart sounds regular. Lungs clear to auscultation bilaterally. There is no CVA or lower lumbar tenderness. Abdomen is soft with normoactive bowel sounds. She is tender only in the right lower quadrant. Remainder of abdomen is nontender. Differential diagnosis includes but is not limited to ovarian cyst (doubt accompanying rupture and/or torsion), appendicitis, mesenteric adenitis, constipation, hernia, colitis, small bowel obstruction, ureterolithiasis.     ED Course         Critical Care Time  Procedures

## 2023-08-26 NOTE — ED PROVIDER NOTES
History  Chief Complaint   Patient presents with   • Abdominal Pain     Pt reports hx of cyst on appendix and pain feels the same, worsens at night, RLQ pain x1 week, -n/v/d     56-year-old female with past medical history of gastric bypass sleeve and  presenting to the ED complaining of intermittent 7/10 right lower quadrant abdominal pain for the past week. Patient states that pain is unchanged with eating, denies recent illness or consumption of raw or undercooked food. She states the pain is worse at night when laying on her right side, notes history of "cyst on appendix that ruptured" years ago and states that this pain feels similar to that. Patient is tolerating p.o., states that she has normal bowel movements, last bowel movement was 2 days ago and normal and nonbloody. States that she does do detox cleanses and her bowel movements are spread out over a few days. She states the pain sometimes radiates to her back, denies any dysuria, hematuria, vaginal bleeding, vaginal pain. Last known menstrual period was 6 days ago, patient states that she normally has heavy periods even while on Nexplanon for birth control. Denies recent sick contacts. Denies fevers, chills, headache, cough, congestion, sore throat, chest pain, shortness of breath, nausea, vomiting, diarrhea, constipation, bloody/tarry stools, leg pain, leg swelling. She reports eczema, denies any new rash. Prior to Admission Medications   Prescriptions Last Dose Informant Patient Reported? Taking?    Cetirizine HCl (ZyrTEC Allergy) 10 MG CAPS  Self Yes No   Sig: Take by mouth daily   DUPIXENT subcutaneous injection  Self Yes No   Sig: Inject 300 mg under the skin every 14 (fourteen) days 300/2ml   Dupixent 300 MG/2ML SOPN   Yes No   SUMAtriptan (Imitrex) 50 mg tablet   No No   Sig: Take 1 tablet (50 mg total) by mouth once as needed for migraine for up to 1 dose   albuterol (PROVENTIL HFA,VENTOLIN HFA) 90 mcg/act inhaler   No No   Sig: Inhale 2 puffs 4 (four) times a day   etonogestrel (NEXPLANON) subdermal implant  Self Yes No   Si mg by Subdermal route once   hydrOXYzine HCL (ATARAX) 25 mg tablet   No No   Sig: TAKE 1 TABLET BY MOUTH 3 TIMES A DAY AS NEEDED FOR ANXIETY. phentermine 15 MG capsule   Yes No   Sig: Take 15 mg by mouth every morning      Facility-Administered Medications: None       Past Medical History:   Diagnosis Date   • Abnormal Pap smear of cervix    • Anemia    • Anxiety    • Asthma    • Bronchitis     past   •  delivery delivered 9/15/2020   • Depression    • Eczema    • Hypertension    • Migraine    • Obesity    • Pneumonia     in past   • Varicella     immune per chart       Past Surgical History:   Procedure Laterality Date   • BARIATRIC SURGERY  2019   • EXPLORATORY LAPAROTOMY      2016, no findings    • GASTRECTOMY SLEEVE LAPAROSCOPIC  2019   • [facility] N/A 9/15/2020    Procedure:  SECTION (); Surgeon: Herbie Beatty MD;  Location: [facility];  Service: Obstetrics       Family History   Problem Relation Age of Onset   • Hypertension Mother    • Migraines Mother    • Gestational diabetes Mother    • Diabetes Maternal Grandmother    • Heart disease Maternal Grandmother    • Heart failure Maternal Grandmother    • Diabetes Maternal Grandfather    • Liver cancer Maternal Grandfather    • Asthma Father            • Lung cancer Paternal Grandmother    • No Known Problems Sister    • No Known Problems Brother    • No Known Problems Sister    • No Known Problems Sister      I have reviewed and agree with the history as documented.     E-Cigarette/Vaping   • E-Cigarette Use Never User      E-Cigarette/Vaping Substances   • Nicotine No    • THC No    • CBD No    • Flavoring No    • Other No    • Unknown No      Social History     Tobacco Use   • Smoking status: Never   • Smokeless tobacco: Never   Vaping Use   • Vaping Use: Never used   Substance Use Topics • Alcohol use: Yes     Alcohol/week: 2.0 standard drinks of alcohol     Types: 2 Glasses of wine per week     Comment: social   • Drug use: Never        Review of Systems   Constitutional: Negative for appetite change, chills and fever. HENT: Negative for congestion, ear pain and sore throat. Eyes: Negative for pain and visual disturbance. Respiratory: Negative for cough and shortness of breath. Cardiovascular: Negative for chest pain, palpitations and leg swelling. Gastrointestinal: Positive for abdominal pain. Negative for blood in stool, constipation, diarrhea, nausea and vomiting. Genitourinary: Negative for dysuria, hematuria, vaginal bleeding and vaginal discharge. Musculoskeletal: Positive for back pain. Negative for arthralgias and neck pain. Skin: Negative for color change and rash. Neurological: Negative for dizziness, seizures, syncope, light-headedness and headaches. All other systems reviewed and are negative. Physical Exam  ED Triage Vitals   Temperature Pulse Respirations Blood Pressure SpO2   08/25/23 1856 08/25/23 1856 08/25/23 1856 08/25/23 1856 08/25/23 1856   98.8 °F (37.1 °C) 90 18 136/68 100 %      Temp Source Heart Rate Source Patient Position - Orthostatic VS BP Location FiO2 (%)   08/25/23 1856 08/25/23 1856 08/25/23 1856 08/25/23 2055 --   Oral Monitor Sitting Right arm       Pain Score       08/25/23 1856       No Pain             Orthostatic Vital Signs  Vitals:    08/25/23 1856 08/25/23 2055 08/25/23 2130 08/26/23 0111   BP: 136/68 130/92 142/92 137/86   Pulse: 90 89 80 88   Patient Position - Orthostatic VS: Sitting  Lying        Physical Exam  Vitals and nursing note reviewed. Constitutional:       General: She is not in acute distress. Appearance: She is not ill-appearing or toxic-appearing. HENT:      Head: Normocephalic and atraumatic. Mouth/Throat:      Mouth: Mucous membranes are moist.      Pharynx: Oropharynx is clear.  No pharyngeal swelling. Eyes:      General: No scleral icterus. Extraocular Movements: Extraocular movements intact. Pupils: Pupils are equal, round, and reactive to light. Cardiovascular:      Rate and Rhythm: Normal rate and regular rhythm. Heart sounds: Normal heart sounds. No murmur heard. No friction rub. No gallop. Pulmonary:      Effort: Pulmonary effort is normal. No respiratory distress. Breath sounds: Normal breath sounds. No wheezing, rhonchi or rales. Abdominal:      General: Abdomen is flat. There is no distension. Palpations: Abdomen is soft. There is no hepatomegaly or mass. Tenderness: There is abdominal tenderness in the right lower quadrant. There is no right CVA tenderness, left CVA tenderness, guarding or rebound. Negative signs include Medina's sign and McBurney's sign. Skin:     General: Skin is warm and dry. Capillary Refill: Capillary refill takes less than 2 seconds. Neurological:      General: No focal deficit present. Mental Status: She is alert and oriented to person, place, and time. Cranial Nerves: No cranial nerve deficit. Psychiatric:         Mood and Affect: Mood normal. Mood is not anxious or depressed.          Behavior: Behavior normal.         ED Medications  Medications   ketorolac (TORADOL) injection 15 mg (15 mg Intravenous Given 8/25/23 2230)   iohexol (OMNIPAQUE) 240 MG/ML solution 50 mL (50 mL Oral Given 8/25/23 2356)   acetaminophen (TYLENOL) tablet 975 mg (975 mg Oral Given 8/26/23 0125)   iohexol (OMNIPAQUE) 350 MG/ML injection (MULTI-DOSE) 100 mL (100 mL Intravenous Given 8/26/23 0149)       Diagnostic Studies  Results Reviewed     Procedure Component Value Units Date/Time    Urine Microscopic [[id number]]  (Abnormal) Collected: 08/25/23 2225    Lab Status: Final result Specimen: Urine, Clean Catch Updated: 08/25/23 2252     RBC, UA None Seen /hpf      WBC, UA 1-2 /hpf      Epithelial Cells Occasional /hpf      Bacteria, UA Occasional /hpf      MUCUS THREADS Occasional    UA w Reflex to Microscopic w Reflex to Culture [[id number]]  (Abnormal) Collected: 08/25/23 2225    Lab Status: Final result Specimen: Urine, Clean Catch Updated: 08/25/23 2244     Color, UA Yellow     Clarity, UA Clear     Specific Gravity, UA 1.027     pH, UA 6.0     Leukocytes, UA Negative     Nitrite, UA Negative     Protein, UA Trace mg/dl      Glucose, UA Negative mg/dl      Ketones, UA 10 (1+) mg/dl      Urobilinogen, UA 4.0 mg/dl      Bilirubin, UA Negative     Occult Blood, UA Moderate    POCT pregnancy, urine [[id number]]  (Normal) Resulted: 08/25/23 2229    Lab Status: Final result Updated: 08/25/23 2229     EXT Preg Test, Ur Negative     Control Valid    Comprehensive metabolic panel [[id number]] Collected: 08/25/23 1859    Lab Status: Final result Specimen: Blood from Arm, Left Updated: 08/25/23 1927     Sodium 137 mmol/L      Potassium 3.6 mmol/L      Chloride 103 mmol/L      CO2 27 mmol/L      ANION GAP 7 mmol/L      BUN 8 mg/dL      Creatinine 0.85 mg/dL      Glucose 132 mg/dL      Calcium 9.1 mg/dL      AST 13 U/L      ALT 11 U/L      Alkaline Phosphatase 79 U/L      Total Protein 7.1 g/dL      Albumin 4.3 g/dL      Total Bilirubin 0.98 mg/dL      eGFR 90 ml/min/1.73sq m     Narrative:      [facility] guidelines for Chronic Kidney Disease (CKD):   •  Stage 1 with normal or high GFR (GFR > 90 mL/min/1.73 square meters)  •  Stage 2 Mild CKD (GFR = 60-89 mL/min/1.73 square meters)  •  Stage 3A Moderate CKD (GFR = 45-59 mL/min/1.73 square meters)  •  Stage 3B Moderate CKD (GFR = 30-44 mL/min/1.73 square meters)  •  Stage 4 Severe CKD (GFR = 15-29 mL/min/1.73 square meters)  •  Stage 5 End Stage CKD (GFR <15 mL/min/1.73 square meters)  Note: GFR calculation is accurate only with a steady state creatinine    Lipase [[id number]]  (Normal) Collected: 08/25/23 1859    Lab Status: Final result Specimen: Blood from Arm, Left Updated: 08/25/23 1927     Lipase 26 u/L     CBC and differential [[id number]]  (Abnormal) Collected: 08/25/23 1859    Lab Status: Final result Specimen: Blood from Arm, Left Updated: 08/25/23 1910     WBC 10.80 Thousand/uL      RBC 4.36 Million/uL      Hemoglobin 12.9 g/dL      Hematocrit 39.2 %      MCV 90 fL      MCH 29.6 pg      MCHC 32.9 g/dL      RDW 12.8 %      MPV 10.2 fL      Platelets 881 Thousands/uL      nRBC 0 /100 WBCs      Neutrophils Relative 70 %      Immat GRANS % 0 %      Lymphocytes Relative 21 %      Monocytes Relative 7 %      Eosinophils Relative 1 %      Basophils Relative 1 %      Neutrophils Absolute 7.68 Thousands/µL      Immature Grans Absolute 0.03 Thousand/uL      Lymphocytes Absolute 2.22 Thousands/µL      Monocytes Absolute 0.73 Thousand/µL      Eosinophils Absolute 0.09 Thousand/µL      Basophils Absolute 0.05 Thousands/µL                  CT abdomen pelvis w contrast   Final Result by Shanita Robles MD (08/26 0504)      No evidence of acute intra-abdominal or pelvic pathology            Workstation performed: [id number]               Procedures  Procedures      ED Course  ED Course as of 08/26/23 0649   Fri Aug 25, 2023   2320 Blood, UA(!): Moderate  Patient on her menstrual cycle, no microscopic hematuria. 2320 Bacteria, UA: Occasional  Leukocyte and nitrate positive urine. 2321 PREGNANCY TEST URINE: Negative  Neg     2335 WBC(!): 10.80  Very mild elevation in WBC. 2336 Comprehensive metabolic panel  Without acute electrolyte abnormality, normal renal function. Sat Aug 26, 2023   0226 Patient reports improvement in her pain after Tylenol. CT abdomen pelvis negative for acute intra-abdominal pathology. Medical Decision Making  Patient with history as above presented with Patient presents with:  Abdominal Pain: Pt reports hx of cyst on appendix and pain feels the same, worsens at night, RLQ pain x1 week, -n/v/d  .   Hx obtained from pt and cousin    Patient was nontoxic, stable. Ambulatory. Exam as above. 18-year-old female with history of  gastric bypass sleeve presenting w/ RLQ pain for 1 week. CT A/P ordered for further eval as pt has had multiple abd surgeries, and prior hx of ?ruptured cyst vs complicated appendicitis w/o appendectomy. On exam pt afebrile and non-toxic appearing, with very deep RLQ ttp. Pt also currently on menstraul cycle states that they have been more irregular than normal (on nexplanon). CBC, CMP and lipase unrermarkable. UA w/o acute UTI. Pts pain intermittent in nature. Improved with tylenol and toradol. CT a/p w/ IV and oral contrast neg for acute pathology. Pt advised to f/u with her PMD and OB/gyn, and advised on care instructions at home. Reviewed strict return precautions and pt comfortable with dc plan. I have independently ordered, reviewed and interpreted the following lab and/or imaging studies listed above. Reviewed external records including notes, and prior labs/imaging results. Differential diagnosis considered including but not limited to DDx including but not limited to: appendicitis, gastroenteritis, gastritis, PUD, GERD, gastroparesis, hepatitis, pancreatitis, colitis, enteritis, food poisoning, mesenteric adenitis, IBD, IBS, ileus, bowel obstruction, intussusception, volvulus, cholecystitis, biliary colic, choledocholithiasis, perforated viscus, splenic etiology, constipation, pelvic pathology (ruptured ovarian cyst, ovarian torsion), renal colic, pyelonephritis, UTI. Overall presentation is consistent with generalized low risk abdominal pain, possibly related to menstrual cycle. Patient was treated with improvement in symptoms from the following:  IV toradol and tylenol     Consideration was given for admission, but the patient was stable for outpatient management. Disposition: Discussed need to follow up diagnostics, including incidental findings.  Discharged with instructions to obtain outpatient follow up of patient’s symptoms and findings, with strict return precautions if patient develops new or worsening symptoms. Amount and/or Complexity of Data Reviewed  External Data Reviewed: labs and notes. Labs: ordered. Decision-making details documented in ED Course. Radiology: ordered. Decision-making details documented in ED Course. Risk  OTC drugs. Prescription drug management. Disposition  Final diagnoses:   Right lower quadrant abdominal pain     Time reflects when diagnosis was documented in both MDM as applicable and the Disposition within this note     Time User Action Codes Description Comment    8/26/2023  2:28 AM Regla Hawley Add [R10.31] Right lower quadrant abdominal pain       ED Disposition     ED Disposition   Discharge    Condition   Stable    Date/Time   Sat Aug 26, 2023  2:33 AM    Comment   Ivory Glassing discharge to home/self care. Follow-up Information     Follow up With Specialties Details Why [address], DO Internal Medicine  Please call tomorrow to schedule an appointment [phone]. [phone] MD Obstetrics and Gynecology  Please call tomorrow to schedule an appointment [address] [facility]  [address] [place] [phone]            Discharge Medication List as of 8/26/2023  2:33 AM      CONTINUE these medications which have NOT CHANGED    Details   albuterol (PROVENTIL HFA,VENTOLIN HFA) 90 mcg/act inhaler Inhale 2 puffs 4 (four) times a day, Starting Thu 9/1/2022, Normal      Cetirizine HCl (ZyrTEC Allergy) 10 MG CAPS Take by mouth daily, Historical Med      Dupixent 300 MG/2ML SOPN Starting Tue 5/31/2022, Historical Med      DUPIXENT subcutaneous injection Inject 300 mg under the skin every 14 (fourteen) days 300/2ml, Starting Wed 4/29/2020, Historical Med      etonogestrel (NEXPLANON) subdermal implant 68 mg by Subdermal route once, Historical Med      hydrOXYzine HCL (ATARAX) 25 mg tablet TAKE 1 TABLET BY MOUTH 3 TIMES A DAY AS NEEDED FOR ANXIETY., Normal      phentermine 15 MG capsule Take 15 mg by mouth every morning, Historical Med      SUMAtriptan (Imitrex) 50 mg tablet Take 1 tablet (50 mg total) by mouth once as needed for migraine for up to 1 dose, Starting Mon 2/21/2022, Normal           No discharge procedures on file. PDMP Review       Value Time User    PDMP Reviewed  Yes 8/24/2021  1:30 PM Nilay Richardson DO           ED Provider  Attending physically available and evaluated Bertram Joshua. I managed the patient along with the ED Attending.     Electronically Signed by         Amairani Estes DO  08/26/23 6219

## 2023-09-12 ENCOUNTER — TELEPHONE (OUTPATIENT)
Dept: INTERNAL MEDICINE CLINIC | Facility: CLINIC | Age: 33
End: 2023-09-12

## 2023-09-13 ENCOUNTER — TELEMEDICINE (OUTPATIENT)
Dept: INTERNAL MEDICINE CLINIC | Facility: CLINIC | Age: 33
End: 2023-09-13
Payer: COMMERCIAL

## 2023-09-13 DIAGNOSIS — Z63.8 STRESS DUE TO FAMILY TENSION: Primary | ICD-10-CM

## 2023-09-13 DIAGNOSIS — F43.0 STRESS DISORDER, ACUTE: ICD-10-CM

## 2023-09-13 PROCEDURE — G2012 BRIEF CHECK IN BY MD/QHP: HCPCS | Performed by: INTERNAL MEDICINE

## 2023-09-13 RX ORDER — LORAZEPAM 0.5 MG/1
0.5 TABLET ORAL 2 TIMES DAILY PRN
Qty: 60 TABLET | Refills: 0 | Status: SHIPPED | OUTPATIENT
Start: 2023-09-13

## 2023-09-13 SDOH — SOCIAL STABILITY - SOCIAL INSECURITY: OTHER SPECIFIED PROBLEMS RELATED TO PRIMARY SUPPORT GROUP: Z63.8

## 2023-09-13 NOTE — PROGRESS NOTES
Virtual Brief Visit    This Visit is being completed by telephone. The Patient is located at Home and in the following state in which I hold an active license [place]    The patient was identified by name and date of birth. Devon Walton was informed that this is a telemedicine visit and that the visit is being conducted through Telephone. My office door was closed. No one else was in the room. She acknowledged consent and understanding of privacy and security of the video platform. The patient has agreed to participate and understands they can discontinue the visit at any time. Patient is aware this is a billable service. Assessment/Plan:    Problem List Items Addressed This Visit    None  Visit Diagnoses     Stress due to family tension    -  Primary    Relevant Medications    LORazepam (Ativan) 0.5 mg tablet    Other Relevant Orders    Ambulatory Referral to [facility]    Stress disorder, acute        Relevant Medications    LORazepam (Ativan) 0.5 mg tablet    Other Relevant Orders    Ambulatory Referral to [facility]        Going through a bad breakup right now. Daughter's father cheated on her. Is trying to get through it. Defers medication. Trying to focus on exercising. Lost job in August. Home is getting fixed due to a pipe burst in the kitchen and does not have access to a kitchen for now. Awaiting insurance to get this covered. Is feeling drained, hurt and betrayed. Will see therapy. Will send in for lorazepam to use prn for stress.      Recent Visits  Date Type Provider Dept   09/12/23 Telephone Josy Alegria[address] Internal [facility]   Showing recent visits within past 7 days and meeting all other requirements  Today's Visits  Date Type Provider Dept   09/13/23 [address] Internal [facility]   Showing today's visits and meeting all other requirements  Future Appointments  No visits were found meeting these conditions.   Showing future appointments within next 150 days and meeting all other requirements         Visit Time  Total Visit Duration: 10 minutes

## 2023-09-25 ENCOUNTER — OFFICE VISIT (OUTPATIENT)
Dept: OBGYN CLINIC | Facility: CLINIC | Age: 33
End: 2023-09-25
Payer: COMMERCIAL

## 2023-09-25 VITALS
HEIGHT: 63 IN | WEIGHT: 206 LBS | DIASTOLIC BLOOD PRESSURE: 78 MMHG | BODY MASS INDEX: 36.5 KG/M2 | SYSTOLIC BLOOD PRESSURE: 120 MMHG

## 2023-09-25 DIAGNOSIS — R10.2 PELVIC PAIN: ICD-10-CM

## 2023-09-25 DIAGNOSIS — Z30.46 NEXPLANON REMOVAL: Primary | ICD-10-CM

## 2023-09-25 PROCEDURE — 11982 REMOVE DRUG IMPLANT DEVICE: CPT | Performed by: OBSTETRICS & GYNECOLOGY

## 2023-09-25 NOTE — PROGRESS NOTES
Universal Protocol:  Procedure performed by:  Consent: Verbal consent obtained.   Risks and benefits: risks, benefits and alternatives were discussed  Consent given by: patient  Patient understanding: patient states understanding of the procedure being performed  Required items: required blood products, implants, devices, and special equipment available  Patient identity confirmed: verbally with patient    Remove and insert drug implant    Date/Time: 9/25/2023 9:00 AM    Performed by: Fred Baker MD  Authorized by: Fred Baker MD    Indication:     Indication: Presence of non-biodegradable drug delivery implant    Pre-procedure:     Prepped with: alcohol 70% and povidone-iodine      Local anesthetic:  Lidocaine with epinephrine    The site was cleaned and prepped in a sterile fashion: yes    Procedure:     Procedure:  Removal    Small stab incision was made in arm: yes      Left/right:  Left    Visualization of implant was obtained: yes    Comments:      Nexplanon removed in its entirety and discarded    Pelvic ultrasound ordered for intermittent pelvic pain

## 2023-09-29 ENCOUNTER — HOSPITAL ENCOUNTER (OUTPATIENT)
Dept: ULTRASOUND IMAGING | Facility: HOSPITAL | Age: 33
Discharge: HOME | End: 2023-09-29
Attending: OBSTETRICS & GYNECOLOGY
Payer: COMMERCIAL

## 2023-09-29 DIAGNOSIS — R10.2 PELVIC PAIN: ICD-10-CM

## 2023-09-29 PROCEDURE — 76830 TRANSVAGINAL US NON-OB: CPT

## 2023-09-29 PROCEDURE — 76856 US EXAM PELVIC COMPLETE: CPT

## 2023-10-12 ENCOUNTER — SOCIAL WORK (OUTPATIENT)
Dept: BEHAVIORAL/MENTAL HEALTH CLINIC | Facility: CLINIC | Age: 33
End: 2023-10-12
Payer: COMMERCIAL

## 2023-10-12 DIAGNOSIS — F41.1 GAD (GENERALIZED ANXIETY DISORDER): Primary | ICD-10-CM

## 2023-10-12 PROCEDURE — 90834 PSYTX W PT 45 MINUTES: CPT | Performed by: SOCIAL WORKER

## 2023-10-12 NOTE — PSYCH
1. NAYA (generalized anxiety disorder)            Data: Pt and writer discussed ongoing general anxiety disorder. Pt reports she feels she is on the verge of crisis. Recent job loss in August. Break up of relationship with her daughters father. Pt reports she is behind on all of her bills. Pt report she is frustrated and very anxious. Pt and writer discussed how initial goal setting is more based on case management interventions. Pt is able to recognize she needs to find stable income. Pt needed some redirecting from talking about how frustrated that her parents are still living with her, and how she does not want to put her ex on child support. Pt was redirected to start thinking about how she is going to make ends meet. Pt's main stressor is money, and this is why she ends up in co-dependent situations with people around her. Pt reports she would like to make enough money where she does not have to rely on people any more. Pt admits with help from her ex, and with a job which pays her at least 20 dollars an hour, she ca make ends meet and live fairly comfortably. Pt is encouraged to focus on this goal.     Pt and writer also processed timing of when pt asks her mother and step father to leave the house. Pt is encouraged to only do this when she has more financial stability in her life, not when she has no work. Pt is encouraged to consider thinking about asking her ex for regular monthly support. Assessment: This writer is focused on case management interventions to help pt avert a major crisis in her life caused by limited finances. Plan: Follow up in 2 weeks time. Pt is encouraged to set goals of applying for jobs, and also finding a temporary job which can hold her over.        Psychotherapy Provided: Individual Psychotherapy 45 minutes     Length of time in session: 45 minutes, follow up in 2 week    Goals addressed in session: Goal 1     Pain:      none    0    Current suicide risk : Low     Pt is future oriented and not suicidal.       Behavioral Health Treatment Plan [facility]: Diagnosis and Treatment Plan explained to Mark Later relates understanding diagnosis and is agreeable to Treatment Plan.  Yes     Visit start and stop times:    10/12/23  Start Time: 1000  Stop Time: 1045  Total Visit Time: 45 minutes

## 2023-11-06 ENCOUNTER — ANNUAL EXAM (OUTPATIENT)
Dept: OBGYN CLINIC | Facility: CLINIC | Age: 33
End: 2023-11-06
Payer: COMMERCIAL

## 2023-11-06 VITALS
HEIGHT: 63 IN | WEIGHT: 196 LBS | DIASTOLIC BLOOD PRESSURE: 82 MMHG | SYSTOLIC BLOOD PRESSURE: 122 MMHG | BODY MASS INDEX: 34.73 KG/M2

## 2023-11-06 DIAGNOSIS — Z01.419 WELL WOMAN EXAM WITH ROUTINE GYNECOLOGICAL EXAM: Primary | ICD-10-CM

## 2023-11-06 DIAGNOSIS — Z12.4 SCREENING FOR MALIGNANT NEOPLASM OF CERVIX: ICD-10-CM

## 2023-11-06 DIAGNOSIS — Z11.51 SCREENING FOR HUMAN PAPILLOMAVIRUS (HPV): ICD-10-CM

## 2023-11-06 PROCEDURE — 99395 PREV VISIT EST AGE 18-39: CPT | Performed by: OBSTETRICS & GYNECOLOGY

## 2023-11-06 PROCEDURE — G0476 HPV COMBO ASSAY CA SCREEN: HCPCS | Performed by: OBSTETRICS & GYNECOLOGY

## 2023-11-06 PROCEDURE — G0145 SCR C/V CYTO,THINLAYER,RESCR: HCPCS | Performed by: OBSTETRICS & GYNECOLOGY

## 2023-11-06 NOTE — PROGRESS NOTES
ASSESSMENT & PLAN:   Diagnoses and all orders for this visit:    Well woman exam with routine gynecological exam  -     Liquid-based pap, screening    Screening for malignant neoplasm of cervix  -     Liquid-based pap, screening    Screening for human papillomavirus (HPV)  -     Liquid-based pap, screening          The following were reviewed in today's visit: ASCCP guidelines, Gardisil vaccination, STD testing breast self exam, family planning choices, exercise, and healthy diet. Patient to return to office in yearly for annual exam.     All questions have been answered to her satisfaction. CC:  Annual Gynecologic Examination  Chief Complaint   Patient presents with    Gynecologic Exam     Patient is here today for her yearly exam, pap due today(3/8/19-wnl). Patient is doing well, she has no concerns at this time. HPI: Sarah Beth Bocanegra is a 29 y.o. [id number] who presents for annual gynecologic examination. She has the following concerns:  none      Health Maintenance:    Exercise: intermittently  Breast exams/breast awareness: yes    Past Medical History:   Diagnosis Date    Abnormal Pap smear of cervix     Anemia     Anxiety     Asthma     Bronchitis     past     delivery delivered 9/15/2020    Depression     Eczema     Hypertension     Migraine     Obesity     Pneumonia     in past    Varicella     immune per chart       Past Surgical History:   Procedure Laterality Date    BARIATRIC SURGERY  2019    EXPLORATORY LAPAROTOMY      2016, no findings     GASTRECTOMY SLEEVE LAPAROSCOPIC  2019    ME  DELIVERY ONLY N/A 9/15/2020    Procedure:  SECTION (); Surgeon: Fadi Olivares MD;  Location: [facility] ;  Service: Obstetrics       Past OB/Gyn History:   Patient's last menstrual period was 10/20/2023 (exact date). Last Pap: 2019 : no abnormalities  History of abnormal Pap smear: yes  HPV vaccine completed: unknown    Patient is currently sexually active.    STD testing: no  Current contraception: condoms      Family History  Family History   Problem Relation Age of Onset    Hypertension Mother     Migraines Mother     Gestational diabetes Mother     Diabetes Maternal Grandmother     Heart disease Maternal Grandmother     Heart failure Maternal Grandmother     Diabetes Maternal Grandfather     Liver cancer Maternal Grandfather     Asthma Father             Lung cancer Paternal Grandmother     No Known Problems Sister     No Known Problems Brother     No Known Problems Sister     No Known Problems Sister        Family history of uterine or ovarian cancer: no  Family history of breast cancer: no  Family history of colon cancer: no    Social History:  Social History     Socioeconomic History    Marital status: Single     Spouse name: Not on file    Number of children: 1    Years of education: Not on file    Highest education level: Some college, no degree   Occupational History    Not on file   Tobacco Use    Smoking status: Never    Smokeless tobacco: Never   Vaping Use    Vaping Use: Never used   Substance and Sexual Activity    Alcohol use: Yes     Alcohol/week: 2.0 standard drinks of alcohol     Types: 2 Glasses of wine per week     Comment: social    Drug use: Never    Sexual activity: Yes     Partners: Male     Birth control/protection: None   Other Topics Concern    Not on file   Social History Narrative    Not on file     Social Determinants of Health     Financial Resource Strain: Not on file   Food Insecurity: Not on file   Transportation Needs: Not on file   Physical Activity: Not on file   Stress: Not on file   Social Connections: Not on file   Intimate Partner Violence: Not on file   Housing Stability: Not on file     Domestic violence screen: negative    Allergies:   Allergies   Allergen Reactions    Cat Hair Extract Hives    Dog Epithelium Hives    Penicillins Abdominal Pain     Had skin testing done and positive result       Medications:    Current Outpatient Medications:     albuterol (PROVENTIL HFA,VENTOLIN HFA) 90 mcg/act inhaler, Inhale 2 puffs 4 (four) times a day, Disp: 8 g, Rfl: 3    budesonide (PULMICORT) 180 MCG/ACT inhaler, , Disp: , Rfl:     Cetirizine HCl (ZyrTEC Allergy) 10 MG CAPS, Take by mouth daily, Disp: , Rfl:     Dupixent 300 MG/2ML SOPN, , Disp: , Rfl:     DUPIXENT subcutaneous injection, Inject 300 mg under the skin every 14 (fourteen) days 300/2ml, Disp: , Rfl:     hydrocortisone 2.5 % ointment, APPLY TO ECZEMA TWICE DAILY UNTIL CLEAR, THEN TWICE DAILY 1 - 2 DAYS A WEEK AS DIRECTED, Disp: , Rfl:     hydrOXYzine HCL (ATARAX) 25 mg tablet, TAKE 1 TABLET BY MOUTH 3 TIMES A DAY AS NEEDED FOR ANXIETY., Disp: 270 tablet, Rfl: 1    LORazepam (Ativan) 0.5 mg tablet, Take 1 tablet (0.5 mg total) by mouth 2 (two) times a day as needed for anxiety, Disp: 60 tablet, Rfl: 0    phentermine 15 MG capsule, Take 15 mg by mouth every morning, Disp: , Rfl:     SUMAtriptan (Imitrex) 50 mg tablet, Take 1 tablet (50 mg total) by mouth once as needed for migraine for up to 1 dose, Disp: 30 tablet, Rfl: 0    Review of Systems:  Review of Systems   Constitutional: Negative. HENT: Negative. Respiratory: Negative. Cardiovascular: Negative. Gastrointestinal: Negative. Genitourinary: Negative. Skin: Negative. Neurological: Negative. Psychiatric/Behavioral: Negative. Physical Exam:  /82 (BP Location: Left arm, Patient Position: Sitting, Cuff Size: Large)   Ht 5' 3" (1.6 m)   Wt 88.9 kg (196 lb)   LMP 10/20/2023 (Exact Date)   BMI 34.72 kg/m²    Physical Exam  Constitutional:       Appearance: Normal appearance. Genitourinary:      Bladder and urethral meatus normal.      No lesions in the vagina. Right Labia: No rash, tenderness, lesions, skin changes or Bartholin's cyst.     Left Labia: No tenderness, lesions, skin changes, Bartholin's cyst or rash. No vaginal erythema, tenderness or bleeding.         Right Adnexa: not tender, not full and no mass present. Left Adnexa: not tender, not full and no mass present. Cervix is parous. No cervical motion tenderness, discharge, lesion or polyp. Uterus is not enlarged, fixed or tender. No uterine mass detected. Urethral meatus caruncle not present. No urethral tenderness or mass present. Breasts:     Right: No swelling, bleeding, inverted nipple, mass, nipple discharge, skin change or tenderness. Left: No swelling, bleeding, inverted nipple, mass, nipple discharge, skin change or tenderness. HENT:      Head: Normocephalic and atraumatic. Eyes:      Extraocular Movements: Extraocular movements intact. Conjunctiva/sclera: Conjunctivae normal.      Pupils: Pupils are equal, round, and reactive to light. Cardiovascular:      Rate and Rhythm: Normal rate and regular rhythm. Heart sounds: Normal heart sounds. No murmur heard. Pulmonary:      Effort: Pulmonary effort is normal. No respiratory distress. Breath sounds: Normal breath sounds. No wheezing or rales. Abdominal:      General: There is no distension. Palpations: Abdomen is soft. Tenderness: There is no abdominal tenderness. There is no guarding. Neurological:      General: No focal deficit present. Mental Status: She is alert and oriented to person, place, and time. Skin:     General: Skin is warm and dry. Psychiatric:         Mood and Affect: Mood normal.         Behavior: Behavior normal.   Vitals and nursing note reviewed.

## 2023-11-07 LAB
HPV HR 12 DNA CVX QL NAA+PROBE: NEGATIVE
HPV16 DNA CVX QL NAA+PROBE: NEGATIVE
HPV18 DNA CVX QL NAA+PROBE: NEGATIVE

## 2023-11-14 LAB
LAB AP GYN PRIMARY INTERPRETATION: NORMAL
Lab: NORMAL

## 2024-02-08 NOTE — PLAN OF CARE
Problem: ANTEPARTUM  Goal: Maintain pregnancy as long as maternal and/or fetal condition is stable  Description: INTERVENTIONS:  - Maternal surveillance  - Fetal surveillance  - Monitor uterine activity  - Medications as ordered  - Bedrest  Outcome: Progressing     Problem: NEUROSENSORY - ADULT  Goal: Achieves stable or improved neurological status  Description: INTERVENTIONS  - Monitor and report changes in neurological status  - Monitor vital signs such as temperature, blood pressure, glucose, and any other labs ordered   - Initiate measures to prevent increased intracranial pressure  - Monitor for seizure activity and implement precautions if appropriate      Outcome: Progressing  Goal: Remains free of injury related to seizures activity  Description: INTERVENTIONS  - Maintain airway, patient safety  and administer oxygen as ordered  - Monitor patient for seizure activity, document and report duration and description of seizure to physician/advanced practitioner  - If seizure occurs,  ensure patient safety during seizure  - Reorient patient post seizure  - Seizure pads on all 4 side rails  - Instruct patient/family to notify RN of any seizure activity including if an aura is experienced  - Instruct patient/family to call for assistance with activity based on nursing assessment  - Administer anti-seizure medications if ordered    Outcome: Progressing     Problem: CARDIOVASCULAR - ADULT  Goal: Maintains optimal cardiac output and hemodynamic stability  Description: INTERVENTIONS:  - Monitor I/O, vital signs and rhythm  - Monitor for S/S and trends of decreased cardiac output  - Administer and titrate ordered vasoactive medications to optimize hemodynamic stability  - Assess quality of pulses, skin color and temperature  - Assess for signs of decreased coronary artery perfusion  - Instruct patient to report change in severity of symptoms  Outcome: Progressing     Problem: PAIN - ADULT  Goal: Verbalizes/displays adequate comfort level or baseline comfort level  Description: Interventions:  - Encourage patient to monitor pain and request assistance  - Assess pain using appropriate pain scale  - Administer analgesics based on type and severity of pain and evaluate response  - Implement non-pharmacological measures as appropriate and evaluate response  - Consider cultural and social influences on pain and pain management  - Notify physician/advanced practitioner if interventions unsuccessful or patient reports new pain  Outcome: Progressing     Problem: INFECTION - ADULT  Goal: Absence or prevention of progression during hospitalization  Description: INTERVENTIONS:  - Assess and monitor for signs and symptoms of infection  - Monitor lab/diagnostic results  - Monitor all insertion sites, i e  indwelling lines, tubes, and drains  - Monitor endotracheal if appropriate and nasal secretions for changes in amount and color  - [place] appropriate cooling/warming therapies per order  - Administer medications as ordered  - Instruct and encourage patient and family to use good hand hygiene technique  - Identify and instruct in appropriate isolation precautions for identified infection/condition  Outcome: Progressing  Goal: Absence of fever/infection during neutropenic period  Description: INTERVENTIONS:  - Monitor WBC    Outcome: Progressing     Problem: SAFETY ADULT  Goal: Patient will remain free of falls  Description: INTERVENTIONS:  - Assess patient frequently for physical needs  -  Identify cognitive and physical deficits and behaviors that affect risk of falls    -  [place] fall precautions as indicated by assessment   - Educate patient/family on patient safety including physical limitations  - Instruct patient to call for assistance with activity based on assessment  - Modify environment to reduce risk of injury  - Consider OT/PT consult to assist with strengthening/mobility  Outcome: Progressing  Goal: Maintain or return to baseline ADL function  Description: INTERVENTIONS:  -  Assess patient's ability to carry out ADLs; assess patient's baseline for ADL function and identify physical deficits which impact ability to perform ADLs (bathing, care of mouth/teeth, toileting, grooming, dressing, etc )  - Assess/evaluate cause of self-care deficits   - Assess range of motion  - Assess patient's mobility; develop plan if impaired  - Assess patient's need for assistive devices and provide as appropriate  - Encourage maximum independence but intervene and supervise when necessary  - Involve family in performance of ADLs  - Assess for home care needs following discharge   - Consider OT consult to assist with ADL evaluation and planning for discharge  - Provide patient education as appropriate  Outcome: Progressing  Goal: Maintain or return mobility status to optimal level  Description: INTERVENTIONS:  - Assess patient's baseline mobility status (ambulation, transfers, stairs, etc )    - Identify cognitive and physical deficits and behaviors that affect mobility  - Identify mobility aids required to assist with transfers and/or ambulation (gait belt, sit-to-stand, lift, walker, cane, etc )  - [place] fall precautions as indicated by assessment  - Record patient progress and toleration of activity level on Mobility SBAR; progress patient to next Phase/Stage  - Instruct patient to call for assistance with activity based on assessment  - Consider rehabilitation consult to assist with strengthening/weightbearing, etc   Outcome: Progressing     Problem: Knowledge Deficit  Goal: Patient/family/caregiver demonstrates understanding of disease process, treatment plan, medications, and discharge instructions  Description: Complete learning assessment and assess knowledge base    Interventions:  - Provide teaching at level of understanding  - Provide teaching via preferred learning methods  Outcome: Progressing     Problem: DISCHARGE PLANNING  Goal: Discharge to home or other facility with appropriate resources  Description: INTERVENTIONS:  - Identify barriers to discharge w/patient and caregiver  - Arrange for needed discharge resources and transportation as appropriate  - Identify discharge learning needs (meds, wound care, etc )  - Arrange for interpretive services to assist at discharge as needed  - Refer to Case Management Department for coordinating discharge planning if the patient needs post-hospital services based on physician/advanced practitioner order or complex needs related to functional status, cognitive ability, or social support system  Outcome: Progressing ROSSI
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
## 2024-04-04 ENCOUNTER — OFFICE VISIT (OUTPATIENT)
Dept: DENTISTRY | Facility: CLINIC | Age: 34
End: 2024-04-04

## 2024-04-04 VITALS — SYSTOLIC BLOOD PRESSURE: 115 MMHG | DIASTOLIC BLOOD PRESSURE: 77 MMHG | HEART RATE: 80 BPM

## 2024-04-04 DIAGNOSIS — K08.89 NONRESTORABLE TOOTH: Primary | ICD-10-CM

## 2024-04-04 PROCEDURE — WIS5000 PRELIMINARY IMPRESSIONS

## 2024-04-04 PROCEDURE — D0220 INTRAORAL - PERIAPICAL FIRST RADIOGRAPHIC IMAGE: HCPCS

## 2024-04-04 PROCEDURE — D0140 LIMITED ORAL EVALUATION - PROBLEM FOCUSED: HCPCS

## 2024-04-04 NOTE — DENTAL PROCEDURE DETAILS
"Limited Exam    Mariam Streeter 33 y.o. female presents with self to [facility] for Limited exam  PMH reviewed, no changes, ASA II.    Chief complaint: \"My tooth is loose\"    Consent:  Discussed that limited exam focuses on problem area, and same day tx is not guaranteed.  Patient explained to if they wish to have anything else evaluated, they need to return to the practice at which they are a patient of record or receive a comprehensive exam.  Patient understands and consents.    Subjective history:    Onset: 4 days ago   Provocation: Spontaneous   Quality: Achy   Region: UR pointed to #4   Severity: 6/10   Timing: comes and goes    Objective clinical findings:   Oral cancer screening: No abnormalities detected   Extraoral exam:   - Facial Swelling- NO Lymphadenopathy- No TMD symptoms- No  Intraoral exam:  - Caries- Yes #4 Fistula-No Abscess-No Pus-No Gingival inflammation- No   -Extremely mobile B cusp of #4, fracture subcrestal in center of tooth. Caries subcrestal      Radiographs: [facility] PA #4    Assessment:  #4 Nonrestorable due to depth of fracture and caries    Plan:   Discussed risks, benefits, alternatives to treatment. Extraction of tooth #4 recommended due to non restorability. Discussed replacement options of implant, bridge, removable, and no treatment.   Pt decided on implant but would like a tooth to replace gap in the meantime to prevent shifting and not have a missing tooth gap.   Discussed essix retainer and pt liked idea and accepted.  Informed pt that we do not have time to do extraction today- pt asked if she can go to oral surgery for extraction and come back for retainer.  Discussed need for bone graft for implant option, pt understood and agreed.     Impression of maxilla captured with Silgimix and sent to [facility].    Referrals: referral to [facility] given for extraction #4 and bone graft    Patient dismissed ambulatory and alert.    NV: return for essix delivery #4 after ext and graft at " [facility]    Attending: Cleopatra

## 2024-04-08 ENCOUNTER — OFFICE VISIT (OUTPATIENT)
Dept: DENTISTRY | Facility: CLINIC | Age: 34
End: 2024-04-08

## 2024-04-08 VITALS — HEART RATE: 85 BPM | DIASTOLIC BLOOD PRESSURE: 88 MMHG | SYSTOLIC BLOOD PRESSURE: 138 MMHG

## 2024-04-08 DIAGNOSIS — K08.89 NONRESTORABLE TOOTH: Primary | ICD-10-CM

## 2024-04-08 PROCEDURE — D7210 EXTRACTION, ERUPTED TOOTH REQUIRING REMOVAL OF BONE AND/OR SECTIONING OF TOOTH, AND INCLUDING ELEVATION OF MUCOPERIOSTEAL FLAP IF INDICATED: HCPCS

## 2024-04-08 NOTE — DENTAL PROCEDURE DETAILS
L[place] JAMAL Streeter presents to [facility] for planned extraction of tooth #4. Patient ASA class II. H&P reviewed with no changes.     Consent for extraction #4 signed.    Universal Protocol    Other Assisting Provider: Yes, Kylah (assistant)    Verbal consent obtained? YES  Written consent obtained?  YES    Risks, benefits and alternatives discussed?: YES    Consent given by: Mariam Streeter    Time Out  Immediately prior to the procedure a time out was called: YES    Time Out: 10:30    A time out verifies correct patient, procedure, equipment, support staff and site/side marked as required.    Patient states understanding of procedure being performed: YES    Patient's understanding of procedure matches consent: YES    Procedure consent matches procedure scheduled: YES    Test results available and properly labeled: N/A    Site  Verified with the patient  YES    Radiology Images displayed and confirmed.  If images not available, report reviewed:  YES    Required items - Required blood products, implants, devices and special equipment available: YES    Patient identity confirmed:  YES    Worked with Dr. Medina on this case.    Local anesthesia achieved buccal and palatal infiltration in relation to #4 using a total of 1.5 cartridges 4% articaine w/ 1:100,000 epinephrine.    Elevated full thickness flap around tooth #4 using periosteal elevator. Luxated tooth with straight elevator. Tooth fractured into several pieces and required the use of the high speed surgical handpiece to achieve a purchase on remaining tooth structure. All tooth structure was removed and 4 walls remained intact after extraction was completed. Dr. Medina assisted directly with the extraction. Curette. Irrigated with NS. Patient tolerated procedure well. No complications occurred during this procedure. Placed 1 cruciate suture over extraction socket using 4-0 chromic gut suture.     Hemostasis achieved. Patient given gauze to bite  down on.     POI given.     NV: After patient left, I made her an Essix retainer and left it in my cabinet in the workroom. It may be delivered to her ASAP. There is a question re whether this will require insurance coverage. Pre-auth for interim partial denture was sent to patient's dental insurance carrier.

## 2024-05-08 ENCOUNTER — HOSPITAL ENCOUNTER (EMERGENCY)
Facility: HOSPITAL | Age: 34
Discharge: HOME | DRG: 284 | End: 2024-05-08
Attending: EMERGENCY MEDICINE
Payer: COMMERCIAL

## 2024-05-08 ENCOUNTER — APPOINTMENT (EMERGENCY)
Dept: CT IMAGING | Facility: HOSPITAL | Age: 34
DRG: 284 | End: 2024-05-08
Payer: COMMERCIAL

## 2024-05-08 ENCOUNTER — APPOINTMENT (EMERGENCY)
Dept: RADIOLOGY | Facility: HOSPITAL | Age: 34
DRG: 284 | End: 2024-05-08
Payer: COMMERCIAL

## 2024-05-08 VITALS
TEMPERATURE: 98 F | RESPIRATION RATE: 18 BRPM | SYSTOLIC BLOOD PRESSURE: 127 MMHG | OXYGEN SATURATION: 99 % | HEART RATE: 81 BPM | DIASTOLIC BLOOD PRESSURE: 84 MMHG

## 2024-05-08 DIAGNOSIS — K59.00 CONSTIPATION: ICD-10-CM

## 2024-05-08 DIAGNOSIS — R10.9 ABDOMINAL PAIN: Primary | ICD-10-CM

## 2024-05-08 LAB
ALBUMIN SERPL BCP-MCNC: 4 G/DL (ref 3.5–5)
ALP SERPL-CCNC: 85 U/L (ref 34–104)
ALT SERPL W P-5'-P-CCNC: 26 U/L (ref 7–52)
ANION GAP SERPL CALCULATED.3IONS-SCNC: 6 MMOL/L (ref 4–13)
AST SERPL W P-5'-P-CCNC: 33 U/L (ref 13–39)
BASOPHILS # BLD AUTO: 0.03 THOUSANDS/ÂΜL (ref 0–0.1)
BASOPHILS NFR BLD AUTO: 0 % (ref 0–1)
BILIRUB SERPL-MCNC: 0.52 MG/DL (ref 0.2–1)
BILIRUB UR QL STRIP: NEGATIVE
BUN SERPL-MCNC: 13 MG/DL (ref 5–25)
CALCIUM SERPL-MCNC: 9.1 MG/DL (ref 8.4–10.2)
CARDIAC TROPONIN I PNL SERPL HS: <2 NG/L
CARDIAC TROPONIN I PNL SERPL HS: <2 NG/L
CHLORIDE SERPL-SCNC: 103 MMOL/L (ref 96–108)
CLARITY UR: CLEAR
CO2 SERPL-SCNC: 29 MMOL/L (ref 21–32)
COLOR UR: ABNORMAL
CREAT SERPL-MCNC: 0.83 MG/DL (ref 0.6–1.3)
EOSINOPHIL # BLD AUTO: 0.05 THOUSAND/ÂΜL (ref 0–0.61)
EOSINOPHIL NFR BLD AUTO: 0 % (ref 0–6)
ERYTHROCYTE [DISTWIDTH] IN BLOOD BY AUTOMATED COUNT: 12.6 % (ref 11.6–15.1)
EXT PREGNANCY TEST URINE: NEGATIVE
EXT. CONTROL: NORMAL
GFR SERPL CREATININE-BSD FRML MDRD: 92 ML/MIN/1.73SQ M
GLUCOSE SERPL-MCNC: 86 MG/DL (ref 65–140)
GLUCOSE UR STRIP-MCNC: NEGATIVE MG/DL
HCT VFR BLD AUTO: 38.4 % (ref 34.8–46.1)
HGB BLD-MCNC: 12.6 G/DL (ref 11.5–15.4)
HGB UR QL STRIP.AUTO: NEGATIVE
IMM GRANULOCYTES # BLD AUTO: 0.06 THOUSAND/UL (ref 0–0.2)
IMM GRANULOCYTES NFR BLD AUTO: 0 % (ref 0–2)
KETONES UR STRIP-MCNC: NEGATIVE MG/DL
LEUKOCYTE ESTERASE UR QL STRIP: NEGATIVE
LIPASE SERPL-CCNC: 18 U/L (ref 11–82)
LYMPHOCYTES # BLD AUTO: 1.56 THOUSANDS/ÂΜL (ref 0.6–4.47)
LYMPHOCYTES NFR BLD AUTO: 12 % (ref 14–44)
MCH RBC QN AUTO: 29.7 PG (ref 26.8–34.3)
MCHC RBC AUTO-ENTMCNC: 32.8 G/DL (ref 31.4–37.4)
MCV RBC AUTO: 91 FL (ref 82–98)
MONOCYTES # BLD AUTO: 0.79 THOUSAND/ÂΜL (ref 0.17–1.22)
MONOCYTES NFR BLD AUTO: 6 % (ref 4–12)
NEUTROPHILS # BLD AUTO: 11.06 THOUSANDS/ÂΜL (ref 1.85–7.62)
NEUTS SEG NFR BLD AUTO: 82 % (ref 43–75)
NITRITE UR QL STRIP: NEGATIVE
NRBC BLD AUTO-RTO: 0 /100 WBCS
PH UR STRIP.AUTO: 6.5 [PH]
PLATELET # BLD AUTO: 374 THOUSANDS/UL (ref 149–390)
PMV BLD AUTO: 9.9 FL (ref 8.9–12.7)
POTASSIUM SERPL-SCNC: 4.1 MMOL/L (ref 3.5–5.3)
PROT SERPL-MCNC: 7.2 G/DL (ref 6.4–8.4)
PROT UR STRIP-MCNC: NEGATIVE MG/DL
RBC # BLD AUTO: 4.24 MILLION/UL (ref 3.81–5.12)
SODIUM SERPL-SCNC: 138 MMOL/L (ref 135–147)
SP GR UR STRIP.AUTO: 1.02 (ref 1–1.03)
UROBILINOGEN UR STRIP-ACNC: 2 MG/DL
WBC # BLD AUTO: 13.55 THOUSAND/UL (ref 4.31–10.16)

## 2024-05-08 PROCEDURE — 81025 URINE PREGNANCY TEST: CPT

## 2024-05-08 PROCEDURE — 83690 ASSAY OF LIPASE: CPT

## 2024-05-08 PROCEDURE — 71046 X-RAY EXAM CHEST 2 VIEWS: CPT

## 2024-05-08 PROCEDURE — 74177 CT ABD & PELVIS W/CONTRAST: CPT

## 2024-05-08 PROCEDURE — 80053 COMPREHEN METABOLIC PANEL: CPT

## 2024-05-08 PROCEDURE — 96374 THER/PROPH/DIAG INJ IV PUSH: CPT

## 2024-05-08 PROCEDURE — 81003 URINALYSIS AUTO W/O SCOPE: CPT

## 2024-05-08 PROCEDURE — 36415 COLL VENOUS BLD VENIPUNCTURE: CPT

## 2024-05-08 PROCEDURE — 85025 COMPLETE CBC W/AUTO DIFF WBC: CPT

## 2024-05-08 PROCEDURE — 84484 ASSAY OF TROPONIN QUANT: CPT

## 2024-05-08 PROCEDURE — 99284 EMERGENCY DEPT VISIT MOD MDM: CPT

## 2024-05-08 PROCEDURE — 99285 EMERGENCY DEPT VISIT HI MDM: CPT | Performed by: EMERGENCY MEDICINE

## 2024-05-08 PROCEDURE — 93005 ELECTROCARDIOGRAM TRACING: CPT

## 2024-05-08 RX ORDER — ACETAMINOPHEN 325 MG/1
975 TABLET ORAL ONCE
Status: COMPLETED | OUTPATIENT
Start: 2024-05-08 | End: 2024-05-08

## 2024-05-08 RX ORDER — KETOROLAC TROMETHAMINE 30 MG/ML
15 INJECTION, SOLUTION INTRAMUSCULAR; INTRAVENOUS ONCE
Status: COMPLETED | OUTPATIENT
Start: 2024-05-08 | End: 2024-05-08

## 2024-05-08 RX ADMIN — KETOROLAC TROMETHAMINE 15 MG: 30 INJECTION, SOLUTION INTRAMUSCULAR; INTRAVENOUS at 19:10

## 2024-05-08 RX ADMIN — IOHEXOL 100 ML: 350 INJECTION, SOLUTION INTRAVENOUS at 21:36

## 2024-05-08 RX ADMIN — IOHEXOL 50 ML: 240 INJECTION, SOLUTION INTRATHECAL; INTRAVASCULAR; INTRAVENOUS; ORAL at 21:36

## 2024-05-08 RX ADMIN — ACETAMINOPHEN 975 MG: 325 TABLET, FILM COATED ORAL at 19:06

## 2024-05-08 NOTE — ED PROVIDER NOTES
History  Chief Complaint   Patient presents with    Abdominal Pain     Pt reports upper abd pain onset 30 min ago, states took a deep breath and stretched and felt a pain since that is in upper abd and in back/rib area     34-year-old female with gastric sleeve placed in 2019 presenting to ED for evaluation of epigastric abdominal pain wrapping around to the bilateral rib areas.  Symptoms started around 4:30 PM; she was sitting in a chair, went to extend her back to stretch while sitting on the chair and took a deep breath then immediately had the pain. Pain described as constant, tightness/cramping feeling. Pt then went to make herself throw up to feel better.  Pain has significantly improved compared to when it first started, however still present.  No hx of prior episodes.  LMP 1.5 weeks ago.  Denies nausea, fever, chills, shortness of breath, urinary symptoms, vaginal bleeding or discharge.        Prior to Admission Medications   Prescriptions Last Dose Informant Patient Reported? Taking?   Cetirizine HCl (ZyrTEC Allergy) 10 MG CAPS  Self Yes No   Sig: Take by mouth daily   DUPIXENT subcutaneous injection  Self Yes No   Sig: Inject 300 mg under the skin every 14 (fourteen) days 300/2ml   Dupixent 300 MG/2ML SOPN  Self Yes No   LORazepam (Ativan) 0.5 mg tablet  Self No No   Sig: Take 1 tablet (0.5 mg total) by mouth 2 (two) times a day as needed for anxiety   SUMAtriptan (Imitrex) 50 mg tablet  Self No No   Sig: Take 1 tablet (50 mg total) by mouth once as needed for migraine for up to 1 dose   albuterol (PROVENTIL HFA,VENTOLIN HFA) 90 mcg/act inhaler  Self No No   Sig: Inhale 2 puffs 4 (four) times a day   budesonide (PULMICORT) 180 MCG/ACT inhaler  Self Yes No   hydrOXYzine HCL (ATARAX) 25 mg tablet  Self No No   Sig: TAKE 1 TABLET BY MOUTH 3 TIMES A DAY AS NEEDED FOR ANXIETY.   hydrocortisone 2.5 % ointment  Self Yes No   Sig: APPLY TO ECZEMA TWICE DAILY UNTIL CLEAR, THEN TWICE DAILY 1 - 2 DAYS A WEEK AS  DIRECTED   phentermine 15 MG capsule  Self Yes No   Sig: Take 15 mg by mouth every morning      Facility-Administered Medications: None       Past Medical History:   Diagnosis Date    Abnormal Pap smear of cervix     Anemia     Anxiety     Asthma     Bronchitis     past     delivery delivered 9/15/2020    Depression     Eczema     Hypertension     Migraine     Obesity     Pneumonia     in past    Varicella     immune per chart       Past Surgical History:   Procedure Laterality Date    BARIATRIC SURGERY  2019    EXPLORATORY LAPAROTOMY      2016, no findings     GASTRECTOM[place] SLEEVE [place]OSCOPIC  2019    [place]  DELIVERY ONLY N/A 9/15/2020    Procedure:  SECTION ();  Surgeon: Dora Yeager MD;  Location: [facility] ;  Service: Obstetrics       Family History   Problem Relation Age of Onset    Hypertension Mother     Migraines Mother     Gestational diabetes Mother     Diabetes Maternal Grandmother     Heart disease Maternal Grandmother     Heart failure Maternal Grandmother     Diabetes Maternal Grandfather     Liver cancer Maternal Grandfather     Asthma Father             Lung cancer Paternal Grandmother     No Known Problems Sister     No Known Problems Brother     No Known Problems Sister     No Known Problems Sister      I have reviewed and agree with the history as documented.    E-Cigarette/Vaping    E-Cigarette Use Never User      E-Cigarette/Vaping Substances    Nicotine No     THC No     CBD No     Flavoring No     Other No     Unknown No      Social History     Tobacco Use    Smoking status: Never    Smokeless tobacco: Never   Vaping Use    Vaping status: Never Used   Substance Use Topics    Alcohol use: Yes     Alcohol/week: 2.0 standard drinks of alcohol     Types: 2 Glasses of wine per week     Comment: social    Drug use: Never        Review of Systems   Constitutional:  Negative for chills and fever.   HENT:  Negative for ear pain and sore throat.    Eyes:   Negative for pain and visual disturbance.   Respiratory:  Negative for cough and shortness of breath.    Cardiovascular:  Negative for chest pain and palpitations.   Gastrointestinal:  Positive for abdominal pain. Negative for vomiting.   Genitourinary:  Negative for dysuria and hematuria.   Musculoskeletal:  Negative for arthralgias and back pain.   Skin:  Negative for color change and rash.   Neurological:  Negative for seizures and syncope.   All other systems reviewed and are negative.      Physical Exam  ED Triage Vitals   Temperature Pulse Respirations Blood Pressure SpO2   05/08/24 1743 05/08/24 1743 05/08/24 1743 05/08/24 1743 05/08/24 1743   98 °F (36.7 °C) 81 18 127/84 99 %      Temp Source Heart Rate Source Patient Position - Orthostatic VS BP Location FiO2 (%)   05/08/24 1743 05/08/24 1743 05/08/24 1743 05/08/24 1743 --   Oral Monitor Sitting Right arm       Pain Score       05/08/24 1906       6             Orthostatic Vital Signs  Vitals:    05/08/24 1743   BP: 127/84   Pulse: 81   Patient Position - Orthostatic VS: Sitting       Physical Exam  Vitals and nursing note reviewed.   Constitutional:       General: She is not in acute distress.     Appearance: She is well-developed.   HENT:      Head: Normocephalic and atraumatic.   Eyes:      Conjunctiva/sclera: Conjunctivae normal.   Cardiovascular:      Rate and Rhythm: Normal rate and regular rhythm.      Pulses:           Radial pulses are 2+ on the right side and 2+ on the left side.      Heart sounds: Normal heart sounds, S1 normal and S2 normal. No murmur heard.  Pulmonary:      Effort: Pulmonary effort is normal. No respiratory distress.      Breath sounds: Normal breath sounds.   Abdominal:      Palpations: Abdomen is soft.      Tenderness: There is abdominal tenderness in the epigastric area.          Comments: Epigastric tenderness in area circled above. Nonperitoneal.   Musculoskeletal:         General: No swelling.      Cervical back: Neck  supple.      Right lower leg: No edema.      Left lower leg: No edema.   Skin:     General: Skin is warm and dry.      Capillary Refill: Capillary refill takes less than 2 seconds.   Neurological:      Mental Status: She is alert.   Psychiatric:         Mood and Affect: Mood normal.         ED Medications  Medications   acetaminophen (TYLENOL) tablet 975 mg (975 mg Oral Given 5/8/24 1906)   ketorolac (TORADOL) injection 15 mg (15 mg Intravenous Given 5/8/24 1910)   iohexol (OMNIPAQUE) 350 MG/ML injection (MULTI-DOSE) 100 mL (100 mL Intravenous Given 5/8/24 2136)   iohexol (OMNIPAQUE) 240 MG/ML solution 50 mL (50 mL Oral Given 5/8/24 2136)       Diagnostic Studies  Results Reviewed       Procedure Component Value Units Date/Time    HS Troponin I 2hr [[id number]] Collected: 05/08/24 2148    Lab Status: Final result Specimen: Blood from Arm, Left Updated: 05/08/24 2229     hs TnI 2hr <2 ng/L      Delta 2hr hsTnI --    POCT pregnancy, urine [[id number]]  (Normal) Resulted: 05/08/24 2016    Lab Status: Final result Updated: 05/08/24 2016     EXT Preg Test, Ur Negative     Control Valid    UA w Reflex to Microscopic w Reflex to Culture [[id number]]  (Abnormal) Collected: 05/08/24 1955    Lab Status: Final result Specimen: Urine, Clean Catch Updated: 05/08/24 2015     Color, UA Light Yellow     Clarity, UA Clear     Specific Gravity, UA 1.022     pH, UA 6.5     Leukocytes, UA Negative     Nitrite, UA Negative     Protein, UA Negative mg/dl      Glucose, UA Negative mg/dl      Ketones, UA Negative mg/dl      Urobilinogen, UA 2.0 mg/dl      Bilirubin, UA Negative     Occult Blood, UA Negative    HS Troponin 0hr (reflex protocol) [[id number]]  (Normal) Collected: 05/08/24 1911    Lab Status: Final result Specimen: Blood from Arm, Right Updated: 05/08/24 1940     hs TnI 0hr <2 ng/L     Comprehensive metabolic panel [[id number]] Collected: 05/08/24 1911    Lab Status: Final result Specimen: Blood from Arm, Right Updated: 05/08/24  1932     Sodium 138 mmol/L      Potassium 4.1 mmol/L      Chloride 103 mmol/L      CO2 29 mmol/L      ANION GAP 6 mmol/L      BUN 13 mg/dL      Creatinine 0.83 mg/dL      Glucose 86 mg/dL      Calcium 9.1 mg/dL      AST 33 U/L      ALT 26 U/L      Alkaline Phosphatase 85 U/L      Total Protein 7.2 g/dL      Albumin 4.0 g/dL      Total Bilirubin 0.52 mg/dL      eGFR 92 ml/min/1.73sq m     Narrative:      National Kid[employer] guidelines for Chronic Kidney Disease (CKD):     Stage 1 with normal or high GFR (GFR > 90 mL/min/1.73 square meters)    Stage 2 Mild CKD (GFR = 60-89 mL/min/1.73 square meters)    Stage 3A Moderate CKD (GFR = 45-59 mL/min/1.73 square meters)    Stage 3B Moderate CKD (GFR = 30-44 mL/min/1.73 square meters)    Stage 4 Severe CKD (GFR = 15-29 mL/min/1.73 square meters)    Stage 5 End Stage CKD (GFR <15 mL/min/1.73 square meters)  Note: GFR calculation is accurate only with a steady state creatinine    Lipase [[id number]]  (Normal) Collected: 05/08/24 1911    Lab Status: Final result Specimen: Blood from Arm, Right Updated: 05/08/24 1932     Lipase 18 u/L     CBC and differential [[id number]]  (Abnormal) Collected: 05/08/24 1911    Lab Status: Final result Specimen: Blood from Arm, Right Updated: 05/08/24 1923     WBC 13.55 Thousand/uL      RBC 4.24 Million/uL      Hemoglobin 12.6 g/dL      Hematocrit 38.4 %      MCV 91 fL      MCH 29.7 pg      MCHC 32.8 g/dL      RDW 12.6 %      MPV 9.9 fL      Platelets 374 Thousands/uL      nRBC 0 /100 WBCs      Segmented % 82 %      Immature Grans % 0 %      Lymphocytes % 12 %      Monocytes % 6 %      Eosinophils Relative 0 %      Basophils Relative 0 %      Absolute Neutrophils 11.06 Thousands/µL      Absolute Immature Grans 0.06 Thousand/uL      Absolute Lymphocytes 1.56 Thousands/µL      Absolute Monocytes 0.79 Thousand/µL      Eosinophils Absolute 0.05 Thousand/µL      Basophils Absolute 0.03 Thousands/µL                    CT abdomen pelvis  with contrast   Final Result by Zak Sinclair DO (05/08 2156)   No CT explanation for patient's symptoms.   The amount of formed stool in the right colon suggests mild constipation without obstruction.         Workstation performed: [id number]         XR chest 2 views   ED Interpretation by Vicki Wilkins MD (05/08 2209)   No acute cardiopulmonary disease            Procedures  ECG 12 Lead Documentation Only    Date/Time: 5/8/2024 9:02 PM    Performed by: Vicki Wilkins MD  Authorized by: Vicki Wilkins MD    Indications / Diagnosis:  Epigastric pain  Patient location:  ED  Interpretation:     Interpretation: normal    Rate:     ECG rate:  81    ECG rate assessment: normal    Rhythm:     Rhythm: sinus rhythm    Ectopy:     Ectopy: none    QRS:     QRS axis:  Normal    QRS intervals:  Normal  Conduction:     Conduction: normal    ST segments:     ST segments:  Normal  T waves:     T waves: normal          ED Course  ED Course as of 05/08/24 2240   Wed May 08, 2024   1953 ECG 12 lead  NSR, 81bpm, no PASCUAL or STD. Normal axis, normal intervals, no T wave inversions   2119 Nitrite, UA: Negative   2119 Leukocytes, UA: Negative   2119 Blood, UA: Negative   2119 PREGNANCY TEST URINE: Negative  neg   2120 hs TnI 0hr: <2   2200 CT abdomen pelvis with contrast    IMPRESSION:  No CT explanation for patient's symptoms.  The amount of formed stool in the right colon suggests mild constipation without obstruction.     2207 Patient reevaluation: Updated about all labs and imaging and EKG findings.  Symptoms feel improved.  Pending 2-hour troponin and likely discharge   2215 Bariatric surgery on call, Dr. Adal Allison texted for recommendations given patient's gastric sleeve history: States it does not sound like her symptoms are coming from the surgery.  No recommendations from a bariatric standpoint.   2232 hs TnI 2hr: <2             HEART Risk Score      Flowsheet Row Most Recent Value   Heart Score Risk Calculator     History 1 Filed at: 05/08/2024 2102   ECG 0 Filed at: 05/08/2024 2102   Age 0 Filed at: 05/08/2024 2102   Risk Factors 1 Filed at: 05/08/2024 2102   Troponin 0 Filed at: 05/08/2024 2102   HEART Score 2 Filed at: 05/08/2024 2102                PERC Rule for PE      Flowsheet Row Most Recent Value   PERC Rule for PE    Age >=50 0 Filed at: 05/08/2024 2036   HR >=100 0 Filed at: 05/08/2024 2036   O2 Sat on room air < 95% 0 Filed at: 05/08/2024 2036   History of PE or DVT 0 Filed at: 05/08/2024 2036   Recent trauma or surgery 0 Filed at: 05/08/2024 2036   Hemoptysis 0 Filed at: 05/08/2024 2036   Exogenous estrogen 0 Filed at: 05/08/2024 2036   Unilateral leg swelling 0 Filed at: 05/08/2024 2036   PERC Rule for PE Results 0 Filed at: 05/08/2024 2036                    Wells' Criteria for PE      Flowsheet Row Most Recent Value   Wells' Criteria for PE    Clinical signs and symptoms of DVT 0 Filed at: 05/08/2024 2036   PE is primary diagnosis or equally likely 0 Filed at: 05/08/2024 2036   HR >100 0 Filed at: 05/08/2024 2036   Immobilization at least 3 days or Surgery in the previous 4 weeks 0 Filed at: 05/08/2024 2036   Previous, objectively diagnosed PE or DVT 0 Filed at: 05/08/2024 2036   Hemoptysis 0 Filed at: 05/08/2024 2036   Malignancy with treatment within 6 months or palliative 0 Filed at: 05/08/2024 2036   Wells' Criteria Total 0 Filed at: 05/08/2024 2036              Medical Decision Making  34-year-old female presenting to ED for evaluation of epigastric Pain after stretching.  Has gastric sleeve since 2019.    Differentials including but not limited to: GERD, gastritis, PUD, cholecystitis, gallstones, gastric sleeve complication, ACS, arrhythmia, pancreatitis.  Doubt PE.    Will obtain labs, ECG, CXR, CT abdomen with oral and IV contrast.    Overall, labs reassuring.  CT with mild constipation without obstruction.  Case was discussed with bariatric surgery on-call for any additional recommendations, they  felt symptoms were not related to gastric sleeve and had no further recommendations.  Feel this may be muscular strain/muscular cramping from stretching.  Patient was discharged home with outpatient follow-up, strict return precautions.    Amount and/or Complexity of Data Reviewed  Labs: ordered. Decision-making details documented in ED Course.  Radiology: ordered and independent interpretation performed. Decision-making details documented in ED Course.  ECG/medicine tests: ordered and independent interpretation performed. Decision-making details documented in ED Course.    Risk  OTC drugs.  Prescription drug management.          Disposition  Final diagnoses:   Abdominal pain   Constipation     Time reflects when diagnosis was documented in both MDM as applicable and the Disposition within this note       Time User Action Codes Description Comment    5/8/2024 10:30 PM Vicki Wilkins [R10.9] Abdominal pain     5/8/2024 10:30 PM Vicki Wilkins [K59.00] Constipation           ED Disposition       ED Disposition   Discharge    Condition   Stable    Date/Time   Wed May 8, 2024 2230    Comment   Mariam Streeter discharge to home/self care.                   Follow-up Information       Follow up With Specialties Details Why Contact Info    Adal Dominguez DO Internal Medicine Schedule an appointment as soon as possible for a visit today  [address] [place] [id number]  [phone]              Patient's Medications   Discharge Prescriptions    No medications on file     No discharge procedures on file.    PDMP Review         Value Time User    PDMP Reviewed  Yes 9/13/2023  2:42 PM Adal Dominguez DO             ED Provider  Attending physically available and evaluated Mariam Streeter. I managed the patient along with the ED Attending.    Electronically Signed by           Vicki Wilkins MD  05/08/24 4557

## 2024-05-08 NOTE — Clinical Note
Mariam Streeter was seen and treated in our emergency department on 5/8/2024.                Diagnosis:     [place]  .    She may return on this date: 05/10/2024         If you have any questions or concerns, please don't hesitate to call.      Vicki Wilkins MD    ______________________________           _______________          _______________  Hospital Representative                              Date                                Time

## 2024-05-09 NOTE — DISCHARGE INSTRUCTIONS
Return to ED if any worsening symptoms   Follow-up with your primary care physician this week  For your constipation, recommend increasing water intake, fiber intake, can also take stool softeners.  For any recurrent abdominal pain, can take Tylenol 1000 mg every 6 hours, do not use more than 4000 mg in 1 day.

## 2024-05-10 ENCOUNTER — HOSPITAL ENCOUNTER (INPATIENT)
Facility: HOSPITAL | Age: 34
LOS: 1 days | Discharge: HOME | DRG: 284 | End: 2024-05-12
Attending: EMERGENCY MEDICINE | Admitting: SURGERY
Payer: COMMERCIAL

## 2024-05-10 ENCOUNTER — APPOINTMENT (EMERGENCY)
Dept: ULTRASOUND IMAGING | Facility: HOSPITAL | Age: 34
DRG: 284 | End: 2024-05-10
Payer: COMMERCIAL

## 2024-05-10 ENCOUNTER — NURSE TRIAGE (OUTPATIENT)
Age: 34
End: 2024-05-10

## 2024-05-10 ENCOUNTER — APPOINTMENT (EMERGENCY)
Dept: RADIOLOGY | Facility: HOSPITAL | Age: 34
DRG: 284 | End: 2024-05-10
Payer: COMMERCIAL

## 2024-05-10 ENCOUNTER — APPOINTMENT (EMERGENCY)
Dept: CT IMAGING | Facility: HOSPITAL | Age: 34
DRG: 284 | End: 2024-05-10
Payer: COMMERCIAL

## 2024-05-10 DIAGNOSIS — R79.89 ELEVATED LFTS: ICD-10-CM

## 2024-05-10 DIAGNOSIS — R10.11 RIGHT UPPER QUADRANT PAIN: ICD-10-CM

## 2024-05-10 DIAGNOSIS — E80.6: Primary | ICD-10-CM

## 2024-05-10 DIAGNOSIS — K80.20 CHOLELITHIASIS: ICD-10-CM

## 2024-05-10 DIAGNOSIS — R10.10 PAIN OF UPPER ABDOMEN: ICD-10-CM

## 2024-05-10 LAB
ALBUMIN SERPL BCP-MCNC: 3.9 G/DL (ref 3.5–5)
ALP SERPL-CCNC: 183 U/L (ref 34–104)
ALT SERPL W P-5'-P-CCNC: 649 U/L (ref 7–52)
ANION GAP SERPL CALCULATED.3IONS-SCNC: 7 MMOL/L (ref 4–13)
AST SERPL W P-5'-P-CCNC: 284 U/L (ref 13–39)
BASOPHILS # BLD AUTO: 0.04 THOUSANDS/ÂΜL (ref 0–0.1)
BASOPHILS NFR BLD AUTO: 0 % (ref 0–1)
BILIRUB SERPL-MCNC: 4.62 MG/DL (ref 0.2–1)
BUN SERPL-MCNC: 7 MG/DL (ref 5–25)
CALCIUM SERPL-MCNC: 8.8 MG/DL (ref 8.4–10.2)
CHLORIDE SERPL-SCNC: 105 MMOL/L (ref 96–108)
CO2 SERPL-SCNC: 26 MMOL/L (ref 21–32)
CREAT SERPL-MCNC: 0.75 MG/DL (ref 0.6–1.3)
EOSINOPHIL # BLD AUTO: 0.18 THOUSAND/ÂΜL (ref 0–0.61)
EOSINOPHIL NFR BLD AUTO: 2 % (ref 0–6)
ERYTHROCYTE [DISTWIDTH] IN BLOOD BY AUTOMATED COUNT: 13.1 % (ref 11.6–15.1)
GFR SERPL CREATININE-BSD FRML MDRD: 105 ML/MIN/1.73SQ M
GLUCOSE SERPL-MCNC: 104 MG/DL (ref 65–140)
HCT VFR BLD AUTO: 38.5 % (ref 34.8–46.1)
HGB BLD-MCNC: 12.7 G/DL (ref 11.5–15.4)
IMM GRANULOCYTES # BLD AUTO: 0.03 THOUSAND/UL (ref 0–0.2)
IMM GRANULOCYTES NFR BLD AUTO: 0 % (ref 0–2)
LIPASE SERPL-CCNC: 50 U/L (ref 11–82)
LYMPHOCYTES # BLD AUTO: 1.59 THOUSANDS/ÂΜL (ref 0.6–4.47)
LYMPHOCYTES NFR BLD AUTO: 17 % (ref 14–44)
MCH RBC QN AUTO: 30 PG (ref 26.8–34.3)
MCHC RBC AUTO-ENTMCNC: 33 G/DL (ref 31.4–37.4)
MCV RBC AUTO: 91 FL (ref 82–98)
MONOCYTES # BLD AUTO: 0.76 THOUSAND/ÂΜL (ref 0.17–1.22)
MONOCYTES NFR BLD AUTO: 8 % (ref 4–12)
NEUTROPHILS # BLD AUTO: 6.53 THOUSANDS/ÂΜL (ref 1.85–7.62)
NEUTS SEG NFR BLD AUTO: 73 % (ref 43–75)
NRBC BLD AUTO-RTO: 0 /100 WBCS
PLATELET # BLD AUTO: 330 THOUSANDS/UL (ref 149–390)
PMV BLD AUTO: 9.7 FL (ref 8.9–12.7)
POTASSIUM SERPL-SCNC: 4 MMOL/L (ref 3.5–5.3)
PROT SERPL-MCNC: 6.9 G/DL (ref 6.4–8.4)
RBC # BLD AUTO: 4.23 MILLION/UL (ref 3.81–5.12)
SODIUM SERPL-SCNC: 138 MMOL/L (ref 135–147)
WBC # BLD AUTO: 9.13 THOUSAND/UL (ref 4.31–10.16)

## 2024-05-10 PROCEDURE — 85025 COMPLETE CBC W/AUTO DIFF WBC: CPT

## 2024-05-10 PROCEDURE — 80053 COMPREHEN METABOLIC PANEL: CPT

## 2024-05-10 PROCEDURE — 74177 CT ABD & PELVIS W/CONTRAST: CPT

## 2024-05-10 PROCEDURE — 36415 COLL VENOUS BLD VENIPUNCTURE: CPT

## 2024-05-10 PROCEDURE — 82248 BILIRUBIN DIRECT: CPT | Performed by: SURGERY

## 2024-05-10 PROCEDURE — 83690 ASSAY OF LIPASE: CPT

## 2024-05-10 PROCEDURE — 99284 EMERGENCY DEPT VISIT MOD MDM: CPT

## 2024-05-10 PROCEDURE — 99285 EMERGENCY DEPT VISIT HI MDM: CPT | Performed by: EMERGENCY MEDICINE

## 2024-05-10 PROCEDURE — 76705 ECHO EXAM OF ABDOMEN: CPT

## 2024-05-10 PROCEDURE — 74018 RADEX ABDOMEN 1 VIEW: CPT

## 2024-05-10 RX ORDER — ONDANSETRON 4 MG/1
4 TABLET, ORALLY DISINTEGRATING ORAL ONCE
Status: COMPLETED | OUTPATIENT
Start: 2024-05-10 | End: 2024-05-10

## 2024-05-10 RX ADMIN — ONDANSETRON 4 MG: 4 TABLET, ORALLY DISINTEGRATING ORAL at 20:04

## 2024-05-10 RX ADMIN — IOHEXOL 100 ML: 350 INJECTION, SOLUTION INTRAVENOUS at 23:55

## 2024-05-10 NOTE — TELEPHONE ENCOUNTER
"Pt called, reports upper abdominal pain, radiates to back, Pt seen for similar in ED 5/8/24 d/c home with constipation. No BM with increased fluids, stool stimulants, and softeners. Pt reports pain is \"excruciating\" shortly after eating. No vomiting currently, able to keep fluids down. Denies  symptoms.     Triage completed. Pt instructed to go to ED now. Pt agreeable and reports that she will go to [facility], the closest to her. Pt reports that she has a 3 year old daughter, so she will need to wait for her partner to drive her. Pt will call 911 if symptoms worsen.      Pt will call office with additional questions.       Reason for Disposition   SEVERE abdominal pain (e.g., excruciating)    Answer Assessment - Initial Assessment Questions  1. LOCATION: \"Where does it hurt?\"       Upper abd, radiates to back  2. RADIATION: \"Does the pain shoot anywhere else?\" (e.g., chest, back)      denies  3. ONSET: \"When did the pain begin?\" (e.g., minutes, hours or days ago)       5/8/2024  5. PATTERN \"Does the pain come and go, or is it constant?\"     - If constant: \"Is it getting better, staying the same, or worsening?\"       (Note: Constant means the pain never goes away completely; most serious pain is constant and it progresses)      - If intermittent: \"How long does it last?\" \"Do you have pain now?\"      (Note: Intermittent means the pain goes away completely between bouts)      The pain is \"excruciating\" after eating while digesting the food  6. SEVERITY: \"How bad is the pain?\"  (e.g., Scale 1-10; mild, moderate, or severe)     - MILD (1-3): doesn't interfere with normal activities, abdomen soft and not tender to touch      - MODERATE (4-7): interferes with normal activities or awakens from sleep, tender to touch      - SEVERE (8-10): excruciating pain, doubled over, unable to do any normal activities        \"Excruciating\" after eating    8. AGGRAVATING FACTORS: \"Does anything seem to cause this pain?\" (e.g., " "foods, stress, alcohol)      Eating/digesting  10. OTHER SYMPTOMS: \"Do you have any other symptoms?\" (e.g., fever, vomiting, diarrhea)        Denies fever, denies vomiting today, constipation last BM 5/8/2024 small amount    Protocols used: Abdominal Pain - Upper-ADULT-[place]    "

## 2024-05-10 NOTE — ED PROVIDER NOTES
History  Chief Complaint   Patient presents with    Abdominal Pain     Pt dx with constipation Wednesday. Has been taking laxatives and stool softeners with no relief. Reports worsening abdominal and back pain.      HPI    34-year-old female presents for evaluation of abdominal pain.  She has a history of gastric sleeve, was seen 2 days ago for similar symptoms.  Imaging done at that visit showed moderate constipation and right colon, and collapsed left colon.  She reports taking multiple doses of MiraLAX, Dulcolax and increase fluids, but has not had a bowel movement since then.  She endorses nausea, abdominal pain.  Denies vomiting, urinary symptoms, SOB, chest pain.    Prior to Admission Medications   Prescriptions Last Dose Informant Patient Reported? Taking?   Cetirizine HCl (ZyrTEC Allergy) 10 MG CAPS Unknown Self Yes No   Sig: Take by mouth as needed   DUPIXENT subcutaneous injection Unknown Self Yes No   Sig: Inject 300 mg under the skin every 14 (fourteen) days 300/2ml--Most recent dose 5/10/24   LORazepam (Ativan) 0.5 mg tablet Unknown Self No No   Sig: Take 1 tablet (0.5 mg total) by mouth 2 (two) times a day as needed for anxiety   SUMAtriptan (Imitrex) 50 mg tablet Unknown Self No No   Sig: Take 1 tablet (50 mg total) by mouth once as needed for migraine for up to 1 dose   albuterol (PROVENTIL HFA,VENTOLIN HFA) 90 mcg/act inhaler Unknown Self No No   Sig: Inhale 2 puffs 4 (four) times a day   Patient taking differently: Inhale 2 puffs every 6 (six) hours as needed   hydrOXYzine HCL (ATARAX) 25 mg tablet Unknown Self No No   Sig: TAKE 1 TABLET BY MOUTH 3 TIMES A DAY AS NEEDED FOR ANXIETY.   hydrocortisone 2.5 % ointment Unknown Self Yes No   Sig: APPLY TO ECZEMA TWICE DAILY UNTIL CLEAR, THEN TWICE DAILY 1 - 2 DAYS A WEEK AS DIRECTED   phentermine 15 MG capsule Unknown Self Yes No   Sig: Take 15 mg by mouth every morning      Facility-Administered Medications: None       Past Medical History:   Diagnosis  "Date    Abnormal Pap smear of cervix     Allergy     cat and dog dander    Anemia     Anxiety     Asthma     \"bronchial \"    Bronchitis     past     delivery delivered 09/15/2020    Eczema     Family history of diabetes mellitus     \"Diabetes\"    Family history of high blood pressure     Gall stones     Hypertension     \"with pregnancy-mildly high\"    Migraine     Obesity     Pneumonia     in past    Pollen allergies     Post partum depression     Varicella     immune per chart    Wears contact lenses     will wear glasses DOS       Past Surgical History:   Procedure Laterality Date    BARIATRIC SURGERY  2019    EXPLORATORY LAPAROTOMY      2016, no findings     GASTRECTOMY SLEEVE LAPAROSCOPIC  2019    SD  DELIVERY ONLY N/A 09/15/2020    Procedure:  SECTION ();  Surgeon: Dora Yeager MD;  Location: [facility] ;  Service: Obstetrics    WISDOM TOOTH EXTRACTION         Family History   Problem Relation Age of Onset    Hypertension Mother     Migraines Mother     Gestational diabetes Mother     Diabetes Maternal Grandmother     Heart disease Maternal Grandmother     Heart failure Maternal Grandmother     Diabetes Maternal Grandfather     Liver cancer Maternal Grandfather     Asthma Father             Lung cancer Paternal Grandmother     No Known Problems Sister     No Known Problems Brother     No Known Problems Sister     No Known Problems Sister      I have reviewed and agree with the history as documented.    E-Cigarette/Vaping    E-Cigarette Use Never User      E-Cigarette/Vaping Substances    Nicotine No     THC No     CBD No     Flavoring No     Other No     Unknown No      Social History     Tobacco Use    Smoking status: Never    Smokeless tobacco: Never   Vaping Use    Vaping status: Never Used   Substance Use Topics    Alcohol use: Yes     Alcohol/week: 2.0 standard drinks of alcohol     Types: 2 Glasses of wine per week     Comment: social    Drug use: Never "        Review of Systems   Constitutional:  Negative for diaphoresis and fatigue.   Eyes:  Negative for visual disturbance.   Respiratory:  Negative for shortness of breath.    Cardiovascular:  Negative for chest pain.   Gastrointestinal:  Positive for abdominal pain, constipation and nausea. Negative for vomiting.   Genitourinary:  Negative for dysuria, frequency and urgency.   Musculoskeletal:  Positive for back pain. Negative for arthralgias.   Neurological:  Negative for dizziness and headaches.   All other systems reviewed and are negative.      Physical Exam  ED Triage Vitals   Temperature Pulse Respirations Blood Pressure SpO2   05/10/24 1634 05/10/24 1634 05/10/24 1634 05/10/24 1634 05/10/24 1634   98.2 °F (36.8 °C) 71 20 131/74 100 %      Temp Source Heart Rate Source Patient Position - Orthostatic VS BP Location FiO2 (%)   05/10/24 1634 05/10/24 1634 05/10/24 2154 05/10/24 1634 --   Oral Monitor Sitting Right arm       Pain Score       05/11/24 0120       7             Orthostatic Vital Signs  Vitals:    05/11/24 1342 05/11/24 1540 05/11/24 2221 05/12/24 0711   BP: 124/80 128/87 126/78 109/71   Pulse: 75 68 70 73   Patient Position - Orthostatic VS: Lying  Lying        Physical Exam  Vitals and nursing note reviewed.   Constitutional:       General: She is not in acute distress.  HENT:      Head: Normocephalic and atraumatic.   Eyes:      Conjunctiva/sclera: Conjunctivae normal.   Cardiovascular:      Rate and Rhythm: Regular rhythm.      Pulses: Normal pulses.      Heart sounds: Normal heart sounds. No murmur heard.  Pulmonary:      Effort: Pulmonary effort is normal. No respiratory distress.      Breath sounds: Normal breath sounds.   Abdominal:      General: Abdomen is flat. Bowel sounds are normal. There is no distension.      Palpations: Abdomen is soft.      Tenderness: There is abdominal tenderness in the right upper quadrant and left upper quadrant. There is no guarding.      Hernia: No hernia is  present.   Genitourinary:     Rectum: Normal. No tenderness or anal fissure.      Comments: No stool in rectal vault.   Musculoskeletal:         General: No swelling.      Cervical back: Neck supple.   Skin:     General: Skin is warm and dry.      Capillary Refill: Capillary refill takes less than 2 seconds.   Neurological:      General: No focal deficit present.      Mental Status: She is alert and oriented to person, place, and time.   Psychiatric:         Mood and Affect: Mood normal.         Behavior: Behavior normal.         ED Medications  Medications   ondansetron (ZOFRAN-ODT) dispersible tablet 4 mg (4 mg Oral Given 5/10/24 2004)   iohexol (OMNIPAQUE) 350 MG/ML injection (MULTI-DOSE) 100 mL (100 mL Intravenous Given 5/10/24 2355)   ibuprofen (MOTRIN) tablet 400 mg (400 mg Oral Given 5/11/24 0120)   potassium chloride (Klor-Con M20) CR tablet 40 mEq (40 mEq Oral Given 5/11/24 1800)       Diagnostic Studies  Results Reviewed       Procedure Component Value Units Date/Time    Magnesium [[id number]]  (Normal) Collected: 05/12/24 0553    Lab Status: Final result Specimen: Blood from Arm, Right Updated: 05/12/24 0819     Magnesium 1.9 mg/dL     Narrative:      verified by repeat analysis.    Phosphorus [[id number]]  (Normal) Collected: 05/12/24 0553    Lab Status: Final result Specimen: Blood from Arm, Right Updated: 05/12/24 0819     Phosphorus 3.5 mg/dL     Narrative:      verified by repeat analysis.    Basic metabolic panel [[id number]] Collected: 05/12/24 0553    Lab Status: Final result Specimen: Blood from Arm, Right Updated: 05/12/24 0819     Sodium 136 mmol/L      Potassium 4.0 mmol/L      Chloride 107 mmol/L      CO2 24 mmol/L      ANION GAP 5 mmol/L      BUN 5 mg/dL      Creatinine 0.65 mg/dL      Glucose 87 mg/dL      Calcium 8.4 mg/dL      eGFR 117 ml/min/1.73sq m     Narrative:      verified by repeat analysis.  National Kidney Disease Foundation guidelines for Chronic Kidney Disease (CKD):     Stage 1  with normal or high GFR (GFR > 90 mL/min/1.73 square meters)    Stage 2 Mild CKD (GFR = 60-89 mL/min/1.73 square meters)    Stage 3A Moderate CKD (GFR = 45-59 mL/min/1.73 square meters)    Stage 3B Moderate CKD (GFR = 30-44 mL/min/1.73 square meters)    Stage 4 Severe CKD (GFR = 15-29 mL/min/1.73 square meters)    Stage 5 End Stage CKD (GFR <15 mL/min/1.73 square meters)  Note: GFR calculation is accurate only with a steady state creatinine    Hepatic function panel [[id number]]  (Abnormal) Collected: 05/12/24 0553    Lab Status: Final result Specimen: Blood from Arm, Right Updated: 05/12/24 0819     Total Bilirubin 1.76 mg/dL      Bilirubin, Direct 0.61 mg/dL      Alkaline Phosphatase 172 U/L       U/L       U/L      Total Protein 6.2 g/dL      Albumin 3.5 g/dL     Narrative:      verified by repeat analysis.    CBC and differential [[id number]] Collected: 05/12/24 0553    Lab Status: Final result Specimen: Blood from Arm, Right Updated: 05/12/24 0644     WBC 7.84 Thousand/uL      RBC 4.22 Million/uL      Hemoglobin 12.6 g/dL      Hematocrit 38.4 %      MCV 91 fL      MCH 29.9 pg      MCHC 32.8 g/dL      RDW 13.2 %      MPV 10.1 fL      Platelets 333 Thousands/uL      nRBC 0 /100 WBCs      Segmented % 66 %      Immature Grans % 0 %      Lymphocytes % 22 %      Monocytes % 9 %      Eosinophils Relative 2 %      Basophils Relative 1 %      Absolute Neutrophils 5.18 Thousands/µL      Absolute Immature Grans 0.02 Thousand/uL      Absolute Lymphocytes 1.69 Thousands/µL      Absolute Monocytes 0.72 Thousand/µL      Eosinophils Absolute 0.19 Thousand/µL      Basophils Absolute 0.04 Thousands/µL     Basic metabolic panel [[id number]] Collected: 05/11/24 0548    Lab Status: Final result Specimen: Blood from Arm, Left Updated: 05/11/24 0627     Sodium 138 mmol/L      Potassium 3.6 mmol/L      Chloride 106 mmol/L      CO2 26 mmol/L      ANION GAP 6 mmol/L      BUN 6 mg/dL      Creatinine 0.73 mg/dL      Glucose 79  mg/dL      Calcium 8.4 mg/dL      eGFR 108 ml/min/1.73sq m     Narrative:      National Kidney Disease Foundation guidelines for Chronic Kidney Disease (CKD):     Stage 1 with normal or high GFR (GFR > 90 mL/min/1.73 square meters)    Stage 2 Mild CKD (GFR = 60-89 mL/min/1.73 square meters)    Stage 3A Moderate CKD (GFR = 45-59 mL/min/1.73 square meters)    Stage 3B Moderate CKD (GFR = 30-44 mL/min/1.73 square meters)    Stage 4 Severe CKD (GFR = 15-29 mL/min/1.73 square meters)    Stage 5 End Stage CKD (GFR <15 mL/min/1.73 square meters)  Note: GFR calculation is accurate only with a steady state creatinine    Hepatic function panel [[id number]]  (Abnormal) Collected: 05/11/24 0548    Lab Status: Final result Specimen: Blood from Arm, Left Updated: 05/11/24 0627     Total Bilirubin 4.18 mg/dL      Bilirubin, Direct 2.34 mg/dL      Alkaline Phosphatase 175 U/L       U/L       U/L      Total Protein 6.2 g/dL      Albumin 3.6 g/dL     CBC and differential [[id number]] Collected: 05/11/24 0548    Lab Status: Final result Specimen: Blood from Arm, Left Updated: 05/11/24 0559     WBC 6.29 Thousand/uL      RBC 4.10 Million/uL      Hemoglobin 12.2 g/dL      Hematocrit 37.4 %      MCV 91 fL      MCH 29.8 pg      MCHC 32.6 g/dL      RDW 13.2 %      MPV 9.8 fL      Platelets 302 Thousands/uL      nRBC 0 /100 WBCs      Segmented % 63 %      Immature Grans % 0 %      Lymphocytes % 25 %      Monocytes % 8 %      Eosinophils Relative 3 %      Basophils Relative 1 %      Absolute Neutrophils 3.96 Thousands/µL      Absolute Immature Grans 0.02 Thousand/uL      Absolute Lymphocytes 1.57 Thousands/µL      Absolute Monocytes 0.53 Thousand/µL      Eosinophils Absolute 0.18 Thousand/µL      Basophils Absolute 0.03 Thousands/µL     Bilirubin, direct [[id number]]  (Abnormal) Collected: 05/10/24 1956    Lab Status: Final result Specimen: Blood from Arm, Left Updated: 05/11/24 0018     Bilirubin, Direct 2.73 mg/dL      Comprehensive metabolic panel [[id number]]  (Abnormal) Collected: 05/10/24 1956    Lab Status: Final result Specimen: Blood from Arm, Left Updated: 05/10/24 2039     Sodium 138 mmol/L      Potassium 4.0 mmol/L      Chloride 105 mmol/L      CO2 26 mmol/L      ANION GAP 7 mmol/L      BUN 7 mg/dL      Creatinine 0.75 mg/dL      Glucose 104 mg/dL      Calcium 8.8 mg/dL       U/L       U/L      Alkaline Phosphatase 183 U/L      Total Protein 6.9 g/dL      Albumin 3.9 g/dL      Total Bilirubin 4.62 mg/dL      eGFR 105 ml/min/1.73sq m     Narrative:      National Kidney Disease Foundation guidelines for Chronic Kidney Disease (CKD):     Stage 1 with normal or high GFR (GFR > 90 mL/min/1.73 square meters)    Stage 2 Mild CKD (GFR = 60-89 mL/min/1.73 square meters)    Stage 3A Moderate CKD (GFR = 45-59 mL/min/1.73 square meters)    Stage 3B Moderate CKD (GFR = 30-44 mL/min/1.73 square meters)    Stage 4 Severe CKD (GFR = 15-29 mL/min/1.73 square meters)    Stage 5 End Stage CKD (GFR <15 mL/min/1.73 square meters)  Note: GFR calculation is accurate only with a steady state creatinine    Lipase [[id number]]  (Normal) Collected: 05/10/24 1956    Lab Status: Final result Specimen: Blood from Arm, Left Updated: 05/10/24 2039     Lipase 50 u/L     CBC and differential [[id number]] Collected: 05/10/24 1956    Lab Status: Final result Specimen: Blood from Arm, Left Updated: 05/10/24 2005     WBC 9.13 Thousand/uL      RBC 4.23 Million/uL      Hemoglobin 12.7 g/dL      Hematocrit 38.5 %      MCV 91 fL      MCH 30.0 pg      MCHC 33.0 g/dL      RDW 13.1 %      MPV 9.7 fL      Platelets 330 Thousands/uL      nRBC 0 /100 WBCs      Segmented % 73 %      Immature Grans % 0 %      Lymphocytes % 17 %      Monocytes % 8 %      Eosinophils Relative 2 %      Basophils Relative 0 %      Absolute Neutrophils 6.53 Thousands/µL      Absolute Immature Grans 0.03 Thousand/uL      Absolute Lymphocytes 1.59 Thousands/µL      Absolute  Monocytes 0.76 Thousand/µL      Eosinophils Absolute 0.18 Thousand/µL      Basophils Absolute 0.04 Thousands/µL                    MRI abdomen wo contrast and mrcp   Final Result by Johan Lepe MD (05/11 1900)      Gallstones. No surrounding inflammation.      No choledocholithiasis.         Workstation performed: [id number]         CT abdomen pelvis with contrast   Final Result by Brian Tom MD (05/11 0219)      No acute inflammatory findings identified in the abdomen or pelvis.   Cholelithiasis.         Workstation performed: [id number]         US right upper quadrant   Final Result by Jony Harrison DO (05/10 2236)      Cholelithiasis without discrete sonographic evidence of acute cholecystitis.      No discrete choledocholithiasis or biliary ductal dilatation identified.      Other findings as above. Clinical follow-up recommended.      Workstation performed: [id number]         XR abdomen 1 view kub   ED Interpretation by Ev Chairez DO (05/10 2107)   Moderate stool burden, nonobstructive bowel pattern      Final Result by Randell Kamara MD (05/12 0636)      Large amount of feces within the colon mixed with contrast material.               Workstation performed: [id number]               Procedures  Procedures      ED Course  ED Course as of 05/16/24 2345   Fri May 10, 2024   2124 Care assumed by Dr. Rueda                             SBIRT 22yo+      Flowsheet Row Most Recent Value   Initial Alcohol Screen: US AUDIT-C     1. How often do you have a drink containing alcohol? 0 Filed at: 05/10/2024 1911   2. How many drinks containing alcohol do you have on a typical day you are drinking?  0 Filed at: 05/10/2024 1911   3b. FEMALE Any Age, or MALE 65+: How often do you have 4 or more drinks on one occassion? 0 Filed at: 05/10/2024 1911   Audit-C Score 0 Filed at: 05/10/2024 1911   SHADE: How many times in the past year have you...    Used an illegal drug or used a prescription medication for  non-medical reasons? Never Filed at: 05/10/2024 1911                  Medical Decision Making  34-year-old female presents for evaluation of abdominal pain.    Differentials: constipation, gastroenteritis, colitis, cholecystitis, pyelonephritis, choledocholithiasis  Workup: Labs, KUB, CT abd/pel    On initial evaluation, patient resting comfortably in bed, VSS. Abdominal exam showed tenderness in upper quadrants. Mild scleral icterus noted on exam. Labs significant for elevated LFTs, not previously elevated on labs from 3 days ago. Concern for choledocholithiasis, so RUQ US ordered. Care was assumed by Dr Shayna Rueda at this time pending CT read and US.     Amount and/or Complexity of Data Reviewed  Labs: ordered.  Radiology: ordered and independent interpretation performed.    Risk  Prescription drug management.  Decision regarding hospitalization.          Disposition  Final diagnoses:   Pain of upper abdomen   Elevated LFTs   Cholelithiasis     Time reflects when diagnosis was documented in both MDM as applicable and the Disposition within this note       Time User Action Codes Description Comment    2024 12:46 AM Alfonso Arango Add [E80.6] Hyperbilirubinemia of non- patient     2024 10:29 AM Alfonso Arango Add [R10.11] Right upper quadrant pain     2024  8:40 PM Shayna Rueda Add [R10.10] Pain of upper abdomen     2024  8:40 PM Shayna Rueda Add [R79.89] Elevated LFTs     2024  8:40 PM Shayna Rueda Add [K81.9] Cholecystitis     2024  8:40 PM Shayna Rueda Remove [K81.9] Cholecystitis     2024  8:40 PM Shayna Rueda Add [K80.20] Cholelithiasis           ED Disposition       ED Disposition   Admit    Condition   Stable    Date/Time   Sat May 11, 2024 1152    Comment   Gen Surg               Follow-up Information       Follow up With Specialties Details Why Contact Info    Mj James MD General Surgery Follow up  [address]  [address]  [facility] [address]  [phone]              Discharge Medication List as of 5/12/2024 11:09 AM        CONTINUE these medications which have NOT CHANGED    Details   albuterol (PROVENTIL HFA,VENTOLIN HFA) 90 mcg/act inhaler Inhale 2 puffs 4 (four) times a day, Starting Thu 9/1/2022, Normal      Cetirizine HCl (ZyrTEC Allergy) 10 MG CAPS Take by mouth daily, Historical Med      DUPIXENT subcutaneous injection Inject 300 mg under the skin every 14 (fourteen) days 300/2ml, Starting Wed 4/29/2020, Historical Med      hydrocortisone 2.5 % ointment APPLY TO ECZEMA TWICE DAILY UNTIL CLEAR, THEN TWICE DAILY 1 - 2 DAYS A WEEK AS DIRECTED, Historical Med      hydrOXYzine HCL (ATARAX) 25 mg tablet TAKE 1 TABLET BY MOUTH 3 TIMES A DAY AS NEEDED FOR ANXIETY., Normal      LORazepam (Ativan) 0.5 mg tablet Take 1 tablet (0.5 mg total) by mouth 2 (two) times a day as needed for anxiety, Starting Wed 9/13/2023, Normal      phentermine 15 MG capsule Take 15 mg by mouth every morning, Historical Med      SUMAtriptan (Imitrex) 50 mg tablet Take 1 tablet (50 mg total) by mouth once as needed for migraine for up to 1 dose, Starting Mon 2/21/2022, Normal      budesonide (PULMICORT) 180 MCG/ACT inhaler Historical Med      Dupixent 300 MG/2ML SOPN Starting Tue 5/31/2022, Historical Med           Outpatient Discharge Orders   Hepatic function panel   Standing Status: Future Standing Exp. Date: 05/12/25     Discharge Diet     Activity as tolerated     Lifting restrictions     No driving until     Call provider for:  persistent nausea or vomiting     Call provider for:  severe uncontrolled pain     Call provider for:  redness, tenderness, or signs of infection (pain, swelling, redness, odor or green/yellow discharge around incision site)     Call provider for: active or persistent bleeding     Call provider for:  difficulty breathing, headache or visual disturbances     Call provider for:  hives     Call provider for:   persistent dizziness or light-headedness     Call provider for:  extreme fatigue       PDMP Review         Value Time User    PDMP Reviewed  Yes 9/13/2023  2:42 PM Adal Dominguez DO             ED Provider  Attending physically available and evaluated Mariam Streeter. I managed the patient along with the ED Attending.    Electronically Signed by           Erick Barbour MD  05/16/24 6061

## 2024-05-10 NOTE — ED ATTENDING ATTESTATION
5/10/2024  IEv DO, saw and evaluated the patient. I have discussed the patient with the resident/non-physician practitioner and agree with the resident's/non-physician practitioner's findings, Plan of Care, and MDM as documented in the resident's/non-physician practitioner's note, except where noted. All available labs and Radiology studies were reviewed.  I was present for key portions of any procedure(s) performed by the resident/non-physician practitioner and I was immediately available to provide assistance.       At this point I agree with the current assessment done in the Emergency Department.  I have conducted an independent evaluation of this patient a history and physical is as follows:    ED Course   34-year-old female presents to the emergency department for evaluation of ongoing abdominal pain.  Patient states that she has been experiencing epigastric abdominal pain that occurs approximately 45 minutes after eating.  Patient was evaluated in the department 2 days ago and had a CT scan that demonstrated normal postoperative findings of gastric sleeve.  She had moderate constipation.  She has been using a bowel regimen without relief of her symptoms.  She states that she is having nausea and is afraid to eat because food seems to really be triggering the pain.  She has not had fevers or chills.  Of note the patient does have slight scleral icterus today.      PmHX: Gastric sleeve    Physical exam: Patient is awake and alert, mild distress.  Resting comfortably.  Pupils are equal and reactive.  Neck is supple without JVD.  Heart is regular rate and rhythm without ectopy.  Lungs are clear to auscultation.  Chest wall is nontender to palpation.  Abdomen is soft, tenderness with palpation in the right upper quadrant and epigastric region.  Abdomen is nondistended with normoactive bowel sounds.  No CVA tenderness.  no lower extremity edema.  No focal motor or sensory deficits.  Speech is clear and  appropriate    Assessment/plan: 34-year-old female presents with persistent epigastric/right upper quadrant pain.  Differential diagnosis includes but is not limited to acute biliary colic, pancreatitis, constipation, peptic ulcer disease, right lower lobe pneumonia    Today patient does have mild scleral icterus, will check liver function tests and lipase to evaluate for possible change from 2 days ago.  Provide pain medication, reassess.    Critical Care Time  Procedures

## 2024-05-11 ENCOUNTER — APPOINTMENT (INPATIENT)
Dept: MRI IMAGING | Facility: HOSPITAL | Age: 34
DRG: 284 | End: 2024-05-11
Payer: COMMERCIAL

## 2024-05-11 LAB
ALBUMIN SERPL BCP-MCNC: 3.6 G/DL (ref 3.5–5)
ALP SERPL-CCNC: 175 U/L (ref 34–104)
ALT SERPL W P-5'-P-CCNC: 530 U/L (ref 7–52)
ANION GAP SERPL CALCULATED.3IONS-SCNC: 6 MMOL/L (ref 4–13)
AST SERPL W P-5'-P-CCNC: 194 U/L (ref 13–39)
ATRIAL RATE: 81 BPM
BASOPHILS # BLD AUTO: 0.03 THOUSANDS/ÂΜL (ref 0–0.1)
BASOPHILS NFR BLD AUTO: 1 % (ref 0–1)
BILIRUB DIRECT SERPL-MCNC: 2.34 MG/DL (ref 0–0.2)
BILIRUB DIRECT SERPL-MCNC: 2.73 MG/DL (ref 0–0.2)
BILIRUB SERPL-MCNC: 4.18 MG/DL (ref 0.2–1)
BUN SERPL-MCNC: 6 MG/DL (ref 5–25)
CALCIUM SERPL-MCNC: 8.4 MG/DL (ref 8.4–10.2)
CHLORIDE SERPL-SCNC: 106 MMOL/L (ref 96–108)
CO2 SERPL-SCNC: 26 MMOL/L (ref 21–32)
CREAT SERPL-MCNC: 0.73 MG/DL (ref 0.6–1.3)
EOSINOPHIL # BLD AUTO: 0.18 THOUSAND/ÂΜL (ref 0–0.61)
EOSINOPHIL NFR BLD AUTO: 3 % (ref 0–6)
ERYTHROCYTE [DISTWIDTH] IN BLOOD BY AUTOMATED COUNT: 13.2 % (ref 11.6–15.1)
GFR SERPL CREATININE-BSD FRML MDRD: 108 ML/MIN/1.73SQ M
GLUCOSE SERPL-MCNC: 79 MG/DL (ref 65–140)
HCT VFR BLD AUTO: 37.4 % (ref 34.8–46.1)
HGB BLD-MCNC: 12.2 G/DL (ref 11.5–15.4)
IMM GRANULOCYTES # BLD AUTO: 0.02 THOUSAND/UL (ref 0–0.2)
IMM GRANULOCYTES NFR BLD AUTO: 0 % (ref 0–2)
LYMPHOCYTES # BLD AUTO: 1.57 THOUSANDS/ÂΜL (ref 0.6–4.47)
LYMPHOCYTES NFR BLD AUTO: 25 % (ref 14–44)
MCH RBC QN AUTO: 29.8 PG (ref 26.8–34.3)
MCHC RBC AUTO-ENTMCNC: 32.6 G/DL (ref 31.4–37.4)
MCV RBC AUTO: 91 FL (ref 82–98)
MONOCYTES # BLD AUTO: 0.53 THOUSAND/ÂΜL (ref 0.17–1.22)
MONOCYTES NFR BLD AUTO: 8 % (ref 4–12)
NEUTROPHILS # BLD AUTO: 3.96 THOUSANDS/ÂΜL (ref 1.85–7.62)
NEUTS SEG NFR BLD AUTO: 63 % (ref 43–75)
NRBC BLD AUTO-RTO: 0 /100 WBCS
P AXIS: 70 DEGREES
PLATELET # BLD AUTO: 302 THOUSANDS/UL (ref 149–390)
PMV BLD AUTO: 9.8 FL (ref 8.9–12.7)
POTASSIUM SERPL-SCNC: 3.6 MMOL/L (ref 3.5–5.3)
PR INTERVAL: 170 MS
PROT SERPL-MCNC: 6.2 G/DL (ref 6.4–8.4)
QRS AXIS: 70 DEGREES
QRSD INTERVAL: 64 MS
QT INTERVAL: 390 MS
QTC INTERVAL: 453 MS
RBC # BLD AUTO: 4.1 MILLION/UL (ref 3.81–5.12)
SODIUM SERPL-SCNC: 138 MMOL/L (ref 135–147)
T WAVE AXIS: 59 DEGREES
VENTRICULAR RATE: 81 BPM
WBC # BLD AUTO: 6.29 THOUSAND/UL (ref 4.31–10.16)

## 2024-05-11 PROCEDURE — 74181 MRI ABDOMEN W/O CONTRAST: CPT

## 2024-05-11 PROCEDURE — 99222 1ST HOSP IP/OBS MODERATE 55: CPT | Performed by: SURGERY

## 2024-05-11 PROCEDURE — 36415 COLL VENOUS BLD VENIPUNCTURE: CPT | Performed by: SURGERY

## 2024-05-11 PROCEDURE — 99254 IP/OBS CNSLTJ NEW/EST MOD 60: CPT | Performed by: INTERNAL MEDICINE

## 2024-05-11 PROCEDURE — 80076 HEPATIC FUNCTION PANEL: CPT | Performed by: SURGERY

## 2024-05-11 PROCEDURE — 85025 COMPLETE CBC W/AUTO DIFF WBC: CPT | Performed by: SURGERY

## 2024-05-11 PROCEDURE — 93010 ELECTROCARDIOGRAM REPORT: CPT | Performed by: INTERNAL MEDICINE

## 2024-05-11 PROCEDURE — 80048 BASIC METABOLIC PNL TOTAL CA: CPT | Performed by: SURGERY

## 2024-05-11 RX ORDER — ONDANSETRON 2 MG/ML
4 INJECTION INTRAMUSCULAR; INTRAVENOUS EVERY 4 HOURS PRN
Status: DISCONTINUED | OUTPATIENT
Start: 2024-05-11 | End: 2024-05-12 | Stop reason: HOSPADM

## 2024-05-11 RX ORDER — OXYCODONE HYDROCHLORIDE 5 MG/1
5 TABLET ORAL EVERY 4 HOURS PRN
Status: DISCONTINUED | OUTPATIENT
Start: 2024-05-11 | End: 2024-05-12 | Stop reason: HOSPADM

## 2024-05-11 RX ORDER — OXYCODONE HYDROCHLORIDE 10 MG/1
10 TABLET ORAL EVERY 4 HOURS PRN
Status: DISCONTINUED | OUTPATIENT
Start: 2024-05-11 | End: 2024-05-12 | Stop reason: HOSPADM

## 2024-05-11 RX ORDER — ACETAMINOPHEN 325 MG/1
975 TABLET ORAL EVERY 6 HOURS PRN
Status: DISCONTINUED | OUTPATIENT
Start: 2024-05-11 | End: 2024-05-12 | Stop reason: HOSPADM

## 2024-05-11 RX ORDER — SUMATRIPTAN 25 MG/1
50 TABLET, FILM COATED ORAL ONCE AS NEEDED
Status: DISCONTINUED | OUTPATIENT
Start: 2024-05-11 | End: 2024-05-12 | Stop reason: HOSPADM

## 2024-05-11 RX ORDER — LORAZEPAM 0.5 MG/1
0.5 TABLET ORAL 2 TIMES DAILY PRN
Status: DISCONTINUED | OUTPATIENT
Start: 2024-05-11 | End: 2024-05-12 | Stop reason: HOSPADM

## 2024-05-11 RX ORDER — POTASSIUM CHLORIDE 20 MEQ/1
40 TABLET, EXTENDED RELEASE ORAL ONCE
Status: COMPLETED | OUTPATIENT
Start: 2024-05-11 | End: 2024-05-11

## 2024-05-11 RX ORDER — IBUPROFEN 400 MG/1
400 TABLET ORAL ONCE
Status: COMPLETED | OUTPATIENT
Start: 2024-05-11 | End: 2024-05-11

## 2024-05-11 RX ORDER — SODIUM CHLORIDE, SODIUM LACTATE, POTASSIUM CHLORIDE, CALCIUM CHLORIDE 600; 310; 30; 20 MG/100ML; MG/100ML; MG/100ML; MG/100ML
125 INJECTION, SOLUTION INTRAVENOUS CONTINUOUS
Status: DISCONTINUED | OUTPATIENT
Start: 2024-05-11 | End: 2024-05-12 | Stop reason: HOSPADM

## 2024-05-11 RX ORDER — HYDROXYZINE HYDROCHLORIDE 25 MG/1
25 TABLET, FILM COATED ORAL 3 TIMES DAILY PRN
Status: DISCONTINUED | OUTPATIENT
Start: 2024-05-11 | End: 2024-05-12 | Stop reason: HOSPADM

## 2024-05-11 RX ORDER — HEPARIN SODIUM 5000 [USP'U]/ML
5000 INJECTION, SOLUTION INTRAVENOUS; SUBCUTANEOUS EVERY 8 HOURS SCHEDULED
Status: DISCONTINUED | OUTPATIENT
Start: 2024-05-11 | End: 2024-05-12 | Stop reason: HOSPADM

## 2024-05-11 RX ORDER — HYDROMORPHONE HCL/PF 1 MG/ML
0.5 SYRINGE (ML) INJECTION
Status: DISCONTINUED | OUTPATIENT
Start: 2024-05-11 | End: 2024-05-12 | Stop reason: HOSPADM

## 2024-05-11 RX ADMIN — IBUPROFEN 400 MG: 400 TABLET, FILM COATED ORAL at 01:20

## 2024-05-11 RX ADMIN — SODIUM CHLORIDE, SODIUM LACTATE, POTASSIUM CHLORIDE, AND CALCIUM CHLORIDE 125 ML/HR: .6; .31; .03; .02 INJECTION, SOLUTION INTRAVENOUS at 02:50

## 2024-05-11 RX ADMIN — POTASSIUM CHLORIDE 40 MEQ: 1500 TABLET, EXTENDED RELEASE ORAL at 18:00

## 2024-05-11 NOTE — ED CARE HANDOFF
Emergency Department Sign Out Note        Sign out and transfer of care from Dr. Barbour. See Separate Emergency Department note.     The patient, Mariam Streeter, was evaluated by the previous provider for abd pain.    Workup Completed:  CMP, CBC, KUB    ED Course / Workup Pending (followup):  RUQ US results and disposition                                  ED Course as of 05/12/24 2044   Fri May 10, 2024   2114 AST(!): 284    ALT(!): 649    ALK PHOS(!): 183    Total Bilirubin(!): 4.62    SO received from Dr. Barbour, pending RUQ US and disposition    US right upper quadrant  IMPRESSION:     Cholelithiasis without discrete sonographic evidence of acute cholecystitis.     No discrete choledocholithiasis or biliary ductal dilatation identified.     Other findings as above. Clinical follow-up recommended.        2349 Spoke with surgery who recommended CT AP w/ IV contrast   Sat May 11, 2024   0020 BILIRUBIN DIRECT(!): 2.73   0208 Admit to gen surgery for further management     Procedures  Medical Decision Making  See ED course for more details on MDM    Amount and/or Complexity of Data Reviewed  Labs: ordered. Decision-making details documented in ED Course.  Radiology: ordered and independent interpretation performed. Decision-making details documented in ED Course.    Risk  Prescription drug management.  Decision regarding hospitalization.            Disposition  Final diagnoses:   Pain of upper abdomen   Elevated LFTs   Cholelithiasis     Time reflects when diagnosis was documented in both MDM as applicable and the Disposition within this note       Time User Action Codes Description Comment    2024 12:46 AM Alfonso Arango Add [E80.6] Hyperbilirubinemia of non- patient     2024 10:29 AM Alfonso Arango Add [R10.11] Right upper quadrant pain     2024  8:40 PM Shayna Rueda Add [R10.10] Pain of upper abdomen     2024  8:40 PM Shayna Rueda Add [R79.89]  Elevated LFTs     5/12/2024  8:40 PM Shayna Rueda Add [K81.9] Cholecystitis     5/12/2024  8:40 PM Shayna Rueda Remove [K81.9] Cholecystitis     5/12/2024  8:40 PM Shayna Rueda Add [K80.20] Cholelithiasis           ED Disposition       ED Disposition   Admit    Condition   Stable    Date/Time   Sat May 11, 2024 11:52 AM    Comment   Gen Surg               Follow-up Information       Follow up With Specialties Details Why Contact Info    Mj James MD General Surgery Follow up  [address] [facility]  [address] [facility]  [place] [address]  [phone]            Discharge Medication List as of 5/12/2024 11:09 AM        CONTINUE these medications which have NOT CHANGED    Details   albuterol (PROVENTIL HFA,VENTOLIN HFA) 90 mcg/act inhaler Inhale 2 puffs 4 (four) times a day, Starting Thu 9/1/2022, Normal      budesonide (PULMICORT) 180 MCG/ACT inhaler Historical Med      Cetirizine HCl (ZyrTEC Allergy) 10 MG CAPS Take by mouth daily, Historical Med      Dupixent 300 MG/2ML SOPN Starting Tue 5/31/2022, Historical Med      DUPIXENT subcutaneous injection Inject 300 mg under the skin every 14 (fourteen) days 300/2ml, Starting Wed 4/29/2020, Historical Med      hydrocortisone 2.5 % ointment APPLY TO ECZEMA TWICE DAILY UNTIL CLEAR, THEN TWICE DAILY 1 - 2 DAYS A WEEK AS DIRECTED, Historical Med      hydrOXYzine HCL (ATARAX) 25 mg tablet TAKE 1 TABLET BY MOUTH 3 TIMES A DAY AS NEEDED FOR ANXIETY., Normal      LORazepam (Ativan) 0.5 mg tablet Take 1 tablet (0.5 mg total) by mouth 2 (two) times a day as needed for anxiety, Starting Wed 9/13/2023, Normal      phentermine 15 MG capsule Take 15 mg by mouth every morning, Historical Med      SUMAtriptan (Imitrex) 50 mg tablet Take 1 tablet (50 mg total) by mouth once as needed for migraine for up to 1 dose, Starting Mon 2/21/2022, Normal           Outpatient Discharge Orders   Hepatic function panel   Standing Status: Future Standing Exp. Date: 05/12/25     Discharge  Diet     Activity as tolerated     Lifting restrictions     No driving until     Call provider for:  persistent nausea or vomiting     Call provider for:  severe uncontrolled pain     Call provider for:  redness, tenderness, or signs of infection (pain, swelling, redness, odor or green/yellow discharge around incision site)     Call provider for: active or persistent bleeding     Call provider for:  difficulty breathing, headache or visual disturbances     Call provider for:  hives     Call provider for:  persistent dizziness or light-headedness     Call provider for:  extreme fatigue          ED Provider  Electronically Signed by     Shayna Rueda DO  05/12/24 2044

## 2024-05-11 NOTE — CONSULTS
Consultation -  [facility]  Mariam Streeter 34 y.o. female MRN: [id number]  Unit/Bed#: ED-41 Encounter: [id number]    ASSESSMENT/PLAN:     #1.  Recurrent postprandial right upper quadrant and back pain, with gallstones, suspect symptomatic cholelithiasis, however also noted with elevated liver enzymes in mixed pattern concerning for choledocholithiasis.  No ductal dilation appreciated on her imaging thus far but MRI has been ordered for further delineation    -Follow-up on MRI findings    -Liver enzymes appear to be trending down and patient reports improvement in her pain, possible patient may have passed choledocholithiasis at this time; if MRI is negative for choledocholithiasis then should not require ERCP    -No signs of cholangitis at this time but monitor white blood cell count and temperature closely and consider IV antibiotics    -From GI standpoint could have clear liquids if tolerated but will defer to surgery pending plans for eventual cholecystectomy; otherwise continue IV hydration and pain control    Inpatient consult to gastroenterology  Consult performed by: Michaela Johnson PA-C  Consult ordered by: Alfonso Arango MD          Reason for Consult / Principal Problem: Abdominal pain, gallstones, elevated liver enzymes    HPI: Mariam Streeter is a 34 y.o. year old female with history of gastric sleeve (patient clarifies to me that this was not a Kelli-en-Y but a gastric sleeve specifically, EGD report from last year also delineates gastric sleeve anatomy, see below) who presented to the hospital with complaints of right upper quadrant pain involving pain in her back at the same time.  She says on Wednesday she had an instance of this pain after meal, went to the emergency room, liver enzymes appeared normal at that time, CT scan showed stool burden and she was treated for constipation and discharged, she had another meal after coming home and developed more pain again radiating  to the back and came back to the ER.  Liver enzymes in this instance appear elevated with bilirubin of 4.18, alk phos 175, , .  No fevers or chills.  Her CT and ultrasound here showed evidence of gallbladder stones and some gallbladder wall thickening, no biliary ductal dilatation was elicited but due to her elevation in liver enzymes an MRI has been ordered and has yet to be completed at this time.  She says she is having less pain at this time and is generally feeling better this morning.  Denies any rectal bleeding or melena, any hematemesis.  Denies any NSAID use.    Her last EGD in 2023 showed mildly dilated gastric sleeve and small medial fundus, no evidence of peptic ulcer disease.  She has never had a colonoscopy.  No known family history of inflammatory bowel disease or colon cancer.    REVIEW OF SYSTEMS:    CONSTITUTIONAL: Denies any fever, chills, or rigors. Good appetite, and no recent weight loss.  HEENT: No earache or tinnitus. Denies hearing loss or visual disturbances.  CARDIOVASCULAR: No chest pain or palpitations.   RESPIRATORY: Denies any cough, hemoptysis, shortness of breath or dyspnea on exertion.  GASTROINTESTINAL: As noted in the History of Present Illness.   GENITOURINARY: No problems with urination. Denies any hematuria or dysuria.  NEUROLOGIC: No dizziness or vertigo, denies headaches.   MUSCULOSKELETAL: Denies any muscle or joint pain.   SKIN: Denies skin rashes or itching.   ENDOCRINE: Denies excessive thirst. Denies intolerance to heat or cold.  PSYCHOSOCIAL: Denies depression or anxiety. Denies any recent memory loss.       Historical Information   Past Medical History:   Diagnosis Date    Abnormal Pap smear of cervix     Anemia     Anxiety     Asthma     Bronchitis     past     delivery delivered 9/15/2020    Depression     Eczema     Hypertension     Migraine     Obesity     Pneumonia     in past    Varicella     immune per chart     Past Surgical  History:   Procedure Laterality Date    BARIATRIC SURGERY  2019    EXPLORATORY LAPAROTOMY      2016, no findings     GASTRECTOMY SLEEVE LAPAROSCOPIC  2019    [place]  DELIVERY ONLY N/A 9/15/2020    Procedure:  SECTION ();  Surgeon: Dora Yeager MD;  Location: [facility] ;  Service: Obstetrics     Social History   Social History     Substance and Sexual Activity   Alcohol Use Yes    Alcohol/week: 2.0 standard drinks of alcohol    Types: 2 Glasses of wine per week    Comment: social     Social History     Substance and Sexual Activity   Drug Use Never     Social History     Tobacco Use   Smoking Status Never   Smokeless Tobacco Never     Family History   Problem Relation Age of Onset    Hypertension Mother     Migraines Mother     Gestational diabetes Mother     Diabetes Maternal Grandmother     Heart disease Maternal Grandmother     Heart failure Maternal Grandmother     Diabetes Maternal Grandfather     Liver cancer Maternal Grandfather     Asthma Father             Lung cancer Paternal Grandmother     No Known Problems Sister     No Known Problems Brother     No Known Problems Sister     No Known Problems Sister        Meds/Allergies     (Not in a hospital admission)    Current Facility-Administered Medications   Medication Dose Route Frequency    acetaminophen (TYLENOL) tablet 975 mg  975 mg Oral Q6H PRN    fluticasone (ARNUITY ELLIPTA) 100 MCG/ACT inhaler 1 puff  1 puff Inhalation Daily    heparin (porcine) subcutaneous injection 5,000 Units  5,000 Units Subcutaneous Q8H [place]    HYDROmorphone (DILAUDID) injection 0.5 mg  0.5 mg Intravenous Q3H PRN    hydrOXYzine HCL (ATARAX) tablet 25 mg  25 mg Oral TID PRN    lactated ringers infusion  125 mL/hr Intravenous Continuous    LORazepam (ATIVAN) tablet 0.5 mg  0.5 mg Oral BID PRN    ondansetron (ZOFRAN) injection 4 mg  4 mg Intravenous Q4H PRN    oxyCODONE (ROXICODONE) immediate release tablet 10 mg  10 mg Oral Q4H PRN    oxyCODONE  (ROXICODONE) IR tablet 5 mg  5 mg Oral Q4H PRN    SUMAtriptan (IMITREX) tablet 50 mg  50 mg Oral Once PRN       Allergies   Allergen Reactions    Cat Hair Extract Hives    Dog Epithelium Hives    Penicillins Abdominal Pain     Had skin testing done and positive result           Objective     Blood pressure 129/67, pulse 77, temperature 97.5 °F (36.4 °C), resp. rate 16, SpO2 98%, not currently breastfeeding.    No intake or output data in the 24 hours ending 05/11/24 0844      PHYSICAL EXAM     General Appearance:   Alert, cooperative, no distress, appears stated age    HEENT:   Normocephalic, atraumatic, anicteric.     Neck:  Supple, symmetrical, trachea midline, no adenopathy;    thyroid: no enlargement/tenderness/nodules; no carotid  bruit or JVD    Lungs:   Clear to auscultation bilaterally; no rales, rhonchi or wheezing; respirations unlabored    Heart::   S1 and S2 normal; regular rate and rhythm; no murmur, rub, or gallop.   Abdomen:   Soft, non-tender, non-distended; normal bowel sounds; no masses, no organomegaly    Genitalia:   Deferred    Rectal:   Deferred    Extremities:  No cyanosis, clubbing or edema    Pulses:  2+ and symmetric all extremities    Skin:  Skin color, texture, turgor normal, no rashes or lesions    Lymph nodes:  No palpable cervical, axillary or inguinal lymphadenopathy        Lab Results:   Admission on 05/10/2024   Component Date Value    Sodium 05/10/2024 138     Potassium 05/10/2024 4.0     Chloride 05/10/2024 105     CO2 05/10/2024 26     ANION GAP 05/10/2024 7     BUN 05/10/2024 7     Creatinine 05/10/2024 0.75     Glucose 05/10/2024 104     Calcium 05/10/2024 8.8     AST 05/10/2024 284 (H)     ALT 05/10/2024 649 (H)     Alkaline Phosphatase 05/10/2024 183 (H)     Total Protein 05/10/2024 6.9     Albumin 05/10/2024 3.9     Total Bilirubin 05/10/2024 4.62 (H)     eGFR 05/10/2024 105     WBC 05/10/2024 9.13     RBC 05/10/2024 4.23     Hemoglobin 05/10/2024 12.7     Hematocrit  05/10/2024 38.5     MCV 05/10/2024 91     MCH 05/10/2024 30.0     MCHC 05/10/2024 33.0     RDW 05/10/2024 13.1     MPV 05/10/2024 9.7     Platelets 05/10/2024 330     nRBC 05/10/2024 0     Segmented % 05/10/2024 73     Immature Grans % 05/10/2024 0     Lymphocytes % 05/10/2024 17     Monocytes % 05/10/2024 8     Eosinophils Relative 05/10/2024 2     Basophils Relative 05/10/2024 0     Absolute Neutrophils 05/10/2024 6.53     Absolute Immature Grans 05/10/2024 0.03     Absolute Lymphocytes 05/10/2024 1.59     Absolute Monocytes 05/10/2024 0.76     Eosinophils Absolute 05/10/2024 0.18     Basophils Absolute 05/10/2024 0.04     Lipase 05/10/2024 50     Bilirubin, Direct 05/10/2024 2.73 (H)     WBC 05/11/2024 6.29     RBC 05/11/2024 4.10     Hemoglobin 05/11/2024 12.2     Hematocrit 05/11/2024 37.4     MCV 05/11/2024 91     MCH 05/11/2024 29.8     MCHC 05/11/2024 32.6     RDW 05/11/2024 13.2     MPV 05/11/2024 9.8     Platelets 05/11/2024 302     nRBC 05/11/2024 0     Segmented % 05/11/2024 63     Immature Grans % 05/11/2024 0     Lymphocytes % 05/11/2024 25     Monocytes % 05/11/2024 8     Eosinophils Relative 05/11/2024 3     Basophils Relative 05/11/2024 1     Absolute Neutrophils 05/11/2024 3.96     Absolute Immature Grans 05/11/2024 0.02     Absolute Lymphocytes 05/11/2024 1.57     Absolute Monocytes 05/11/2024 0.53     Eosinophils Absolute 05/11/2024 0.18     Basophils Absolute 05/11/2024 0.03     Sodium 05/11/2024 138     Potassium 05/11/2024 3.6     Chloride 05/11/2024 106     CO2 05/11/2024 26     ANION GAP 05/11/2024 6     BUN 05/11/2024 6     Creatinine 05/11/2024 0.73     Glucose 05/11/2024 79     Calcium 05/11/2024 8.4     eGFR 05/11/2024 108     Total Bilirubin 05/11/2024 4.18 (H)     Bilirubin, Direct 05/11/2024 2.34 (H)     Alkaline Phosphatase 05/11/2024 175 (H)     AST 05/11/2024 194 (H)     ALT 05/11/2024 530 (H)     Total Protein 05/11/2024 6.2 (L)     Albumin 05/11/2024 3.6        Imaging Studies: I  have personally reviewed pertinent reports.      CT ABDOMEN AND PELVIS WITH IV CONTRAST     INDICATION: RUQ abd pain, elevated LFTs, US showed cholelithiasis no acute cholecystitis.     COMPARISON: 5/8/2024 and right upper quadrant ultrasound dated earlier same day.     TECHNIQUE: CT examination of the abdomen and pelvis was performed. Multiplanar 2D reformatted images were created from the source data.     This examination, like all CT scans performed in the [facility], was performed utilizing techniques to minimize radiation dose exposure, including the use of iterative reconstruction and automated exposure control. Radiation dose length   product (DLP) for this visit: 923 mGy-cm     IV Contrast: 100 mL of iohexol (OMNIPAQUE)  Enteric Contrast: Not administered.     FINDINGS:     ABDOMEN     LOWER CHEST: No clinically significant abnormality in the visualized lower chest.     LIVER/BILIARY TREE: Unremarkable.     GALLBLADDER: Somewhat contracted limiting evaluation. Cholelithiasis with mild wall thickening. No pericholecystic inflammatory change.     SPLEEN: Unremarkable.     PANCREAS: Unremarkable.     ADRENAL GLANDS: Unremarkable.     KIDNEYS/URETERS: Unremarkable. No hydronephrosis.     STOMACH AND BOWEL: Prior sleeve gastrectomy changes no small bowel obstruction or focal inflammatory changes. Dense residual contrast within the colon from prior recent CT.     APPENDIX: Normal.     ABDOMINOPELVIC CAVITY: No ascites. No pneumoperitoneum. No lymphadenopathy.     VESSELS: Unremarkable for patient's age.     PELVIS     REPRODUCTIVE ORGANS: Suspect uterine myoma. No suspicious adnexal mass.     URINARY BLADDER: Unremarkable.     ABDOMINAL WALL/INGUINAL REGIONS: Unremarkable.     BONES: No acute fracture or suspicious osseous lesion.     IMPRESSION:     No acute inflammatory findings identified in the abdomen or pelvis.  Cholelithiasis.       RIGHT UPPER QUADRANT ULTRASOUND     INDICATION: RUQ abd  pain, elevated LFTs and Bilirubin. Concern for choleodocholithiasis.     COMPARISON: CT abdomen and pelvis dated 5/8/2024     TECHNIQUE: Real-time ultrasound of the right upper quadrant was performed with a curvilinear transducer with both volumetric sweeps and still imaging techniques.     FINDINGS:     PANCREAS: Obscured by bowel gas.     AORTA AND IVC: Visualized portions are normal for patient age.     LIVER:  Size: Normal  Contour: Surface contour is smooth.  Parenchyma: Echogenicity and echotexture are within normal limits.  No liver mass identified.  Limited imaging of the main portal vein shows it to be patent and hepatopetal.     BILIARY:  The gallbladder is physiologically distended.  Mild wall thickening  There are multiple dependent calculi without sludge.  No sonographic Medina sign.  No intrahepatic biliary dilatation.  CBD measures 3.0 mm.  No choledocholithiasis.     KIDNEY:  Right kidney measures 9.7 x 4.7 x 4.2 cm. Volume 101.2 mL  Kidney within normal limits.     ASCITES: None.     IMPRESSION:     Cholelithiasis without discrete sonographic evidence of acute cholecystitis.     No discrete choledocholithiasis or biliary ductal dilatation identified.     Other findings as above. Clinical follow-up recommended.    Op Note - Madan Aceves MD - 01/27/2023 8:45 AM EST  Formatting of this note is different from the original.      EGD, 1/27/23  Pre-Operative Diagnosis: screening EGD, post op sleeve    Post-Operative Diagnosis: screening EGD post op sleeve    Surgeon Yola    Ebl MIN    Specimen antral biopsy    Procedures Performed  EGD with biopsy of antrum.    Performing Provider / Surgeon: Madan Aceves MD    Findings: mildly dilated sleeve, small neofundus  Condition on discharge: Patient tolerated procedure well.    Anesthesia: MAC    Plan: follow up 1 week    Brief dictation    The patient was taken to the operating room, placed in the supine position, the HOB was placed at 30 degrees,  MAC was administered. The scope was inserted into the mouth, down the esophagus. Into the stomach into the duodenum. The scope was withdrawn, minimal antral gastritis was noted. The scope wsa retroflexed, no significant hiatal hernia was noted. The scope was withdrawn, no erosive esophagitis was noted. Biopsy of the antrum was performed. The scope was removed.      The patient was seen and examined by Dr. Nova, all melo medical decisions were made with Dr. Nova.  Thank you for allowing us to participate in the care of this pleasant patient.  We will follow up with you closely.

## 2024-05-11 NOTE — ED NOTES
Pain free, resting comfortably in room. MRI screening completed.     Gregoria Rob RN  05/11/24 0948

## 2024-05-11 NOTE — PROGRESS NOTES
"Progress Note  Mariam Streeter 34 y.o. female MRN: [id number]  Unit/Bed#: W -01 Encounter: [id number]    Assessment  34F with hx of RnY gastric bypass, p/w hyperbilirubinemia, direct, c/f choledocho vs. passed stone vs. other etiology  5/11 MRCP: Gallstones. No inflamm. No choledocholithiasis    Plan  - f/u AM LFTs -> if downtrending, advance to lo-fat diet and outpt f/u for elective cholecystectomy  - appreciate GI input  - DVT ppx    Subjective/Objective   NAOE. Pain controlled. Tolerating CLD, no n/v. No BM, passing flatus.    VSS on RA.  /71   Pulse 73   Temp 97.6 °F (36.4 °C)   Resp 18   Ht 5' 3\" (1.6 m)   Wt 106 kg (234 lb 5.6 oz)   SpO2 97%   BMI 41.51 kg/m²     Physical Exam:  General: NAD  CV: nl rate  Lungs: nl wob. No resp distress.  ABD: Soft, ND, mild RUQ tenderness  Extrem: No CCE    05/12/24 labs pending  Recent Labs     05/10/24  1956 05/11/24  0548 05/12/24  0553   WBC 9.13 6.29 7.84   HGB 12.7 12.2 12.6    302 333   SODIUM 138 138  --    K 4.0 3.6  --     106  --    CO2 26 26  --    BUN 7 6  --    CREATININE 0.75 0.73  --    GLUC 104 79  --    CALCIUM 8.8 8.4  --    * 194*  --    * 530*  --    ALKPHOS 183* 175*  --    TBILI 4.62* 4.18*  --    LIPASE 50  --   --    EGFR 105 108  --        "

## 2024-05-11 NOTE — ED NOTES
Attempted to call report on hold pt via phone call and tigertext with no answer or response.  Handoff sheet completed and sent.      Gregoria Rob RN  05/11/24 8086

## 2024-05-11 NOTE — H&P
H&P - General Surgery  : [facility]  Mariam Streeter 34 y.o. female MRN: [id number]  Unit/Bed#: ED-41 Encounter: [id number]        Assessment:  34 y.o. year old female with hyperbilirubinemia, direct, concern for choledocho versus passed stone versus other etiology    Plan:  Admit to general surgery  Follow-up CT read  N.p.o. with IV fluids  Recheck LFT in the morning  Consult GI  As needed analgesia  DVT prophylaxis     If bilirubin downtrends, we will observe clinically for likely passed stone.  If T bilirubin is not significantly downtrending, will order MRCP to evaluate for choledocho versus other obstructive cause    HPI:  Mariam Streeter is a 34 y.o. female with a history of obesity status post Kelli-en-Y gastric bypass, hypertension, migraines, asthma.  Patient also has a documented history of exploratory laparotomy in 2016, no further details.    Patient has had pain since Wednesday the eighth.  She was seen in the emergency department on that day, noted to have leukocytosis of 13 but otherwise workup was negative.  No evidence of choledocho, no hyperbilirubinemia, no evidence of cholecystitis.  She was discharged home.  Her pain persisted.  She states that her pain is always there but is worse with eating.  She has had nausea but no emesis.  Last bowel movement was 2 days ago, states that she is chronically irregular and only moves her bowels about twice per week.    Upon reevaluation emergency department today, she was noted to have direct hyperbilirubinemia of 4.6, direct component 2.7.  Her leukocytosis has resolved, white blood cell count is now 9.  She does have some transaminitis and elevated alk phos, otherwise no significant lab abnormalities.    I did discuss with the patient that often cholecystectomy is recommended after an episode of choledocholithiasis, which she most likely has (or had, in the event of a passed stone).  Patient was extremely  apprehensive about the possibility of cholecystectomy.  I assured her that this was not an emergent decision  Regarding cholecystectomy.    Physical Exam  Constitutional:       General: She is not in acute distress.     Appearance: Normal appearance.   HENT:      Head: Normocephalic and atraumatic.      Right Ear: External ear normal.      Left Ear: External ear normal.      Nose: Nose normal.      Mouth/Throat:      Mouth: Mucous membranes are moist.      Pharynx: Oropharynx is clear.   Eyes:      General:         Right eye: No discharge.         Left eye: No discharge.      Extraocular Movements: Extraocular movements intact.      Conjunctiva/sclera: Conjunctivae normal.      Pupils: Pupils are equal, round, and reactive to light.   Cardiovascular:      Rate and Rhythm: Normal rate.      Pulses: Normal pulses.      Heart sounds: Normal heart sounds.   Pulmonary:      Effort: Pulmonary effort is normal. No respiratory distress.   Abdominal:      General: Abdomen is flat. There is no distension.      Palpations: Abdomen is soft.      Tenderness: There is abdominal tenderness (RUQ mild). There is no guarding or rebound.   Musculoskeletal:         General: No swelling, tenderness or signs of injury.      Cervical back: Normal range of motion and neck supple. No rigidity or tenderness.   Skin:     Coloration: Skin is not jaundiced.      Findings: No lesion.   Neurological:      General: No focal deficit present.      Mental Status: She is alert and oriented to person, place, and time. Mental status is at baseline.   Psychiatric:         Mood and Affect: Mood normal.         Behavior: Behavior normal.            Review of Systems   Constitutional:  Negative for chills and fever.   HENT:  Negative for ear pain and sore throat.    Eyes:  Negative for pain and visual disturbance.   Respiratory:  Negative for cough and shortness of breath.    Cardiovascular:  Negative for chest pain and palpitations.   Gastrointestinal:   Positive for abdominal pain, constipation and nausea. Negative for vomiting.   Genitourinary:  Negative for dysuria and hematuria.   Musculoskeletal:  Negative for arthralgias and back pain.   Skin:  Negative for color change and rash.   Neurological:  Negative for seizures and syncope.   All other systems reviewed and are negative.       Objective       No intake or output data in the 24 hours ending 24 0044    First Vitals:   Blood Pressure: 131/74 (05/10/24 1634)  Pulse: 71 (05/10/24 1634)  Temperature: 98.2 °F (36.8 °C) (05/10/24 163)  Temp Source: Oral (05/10/24 1634)  Respirations: 20 (05/10/24 163)  SpO2: 100 % (05/10/24 163)    Current Vitals:   Blood Pressure: 141/94 (05/10/24 2154)  Pulse: 82 (05/10/24 2154)  Temperature: 98.2 °F (36.8 °C) (05/10/24 163)  Temp Source: Oral (05/10/24 1634)  Respirations: 20 (05/10/24 2154)  SpO2: 99 % (05/10/24 2154)    Invasive Devices       Peripheral Intravenous Line  Duration             Peripheral IV 05/10/24 Left Antecubital <1 day                    Imaging: I have personally reviewed pertinent reports.      US right upper quadrant    Result Date: 5/10/2024  Impression: Cholelithiasis without discrete sonographic evidence of acute cholecystitis. No discrete choledocholithiasis or biliary ductal dilatation identified. Other findings as above. Clinical follow-up recommended. Workstation performed: [id number]       EKG, Pathology, and Other Studies: I have personally reviewed pertinent reports.    VTE Pharmacologic Prophylaxis: Heparin  VTE Mechanical Prophylaxis: sequential compression device    Historical Information   Past Medical History:   Diagnosis Date    Abnormal Pap smear of cervix     Anemia     Anxiety     Asthma     Bronchitis     past     delivery delivered 9/15/2020    Depression     Eczema     Hypertension     Migraine     Obesity     Pneumonia     in past    Varicella     immune per chart     Past Surgical History:   Procedure  Laterality Date    BARIATRIC SURGERY  2019    EXPLORATORY LAPAROTOMY      2016, no findings     GASTRECTOMY SLEEVE LAPAROSCOPIC  2019    [place]  DELIVERY ONLY N/A 9/15/2020    Procedure:  SECTION ();  Surgeon: Dora Yeager MD;  Location: [facility] ;  Service: Obstetrics     Social History   Social History     Substance and Sexual Activity   Alcohol Use Yes    Alcohol/week: 2.0 standard drinks of alcohol    Types: 2 Glasses of wine per week    Comment: social     Social History     Substance and Sexual Activity   Drug Use Never     Social History     Tobacco Use   Smoking Status Never   Smokeless Tobacco Never     Family History   Problem Relation Age of Onset    Hypertension Mother     Migraines Mother     Gestational diabetes Mother     Diabetes Maternal Grandmother     Heart disease Maternal Grandmother     Heart failure Maternal Grandmother     Diabetes Maternal Grandfather     Liver cancer Maternal Grandfather     Asthma Father             Lung cancer Paternal Grandmother     No Known Problems Sister     No Known Problems Brother     No Known Problems Sister     No Known Problems Sister        Meds/Allergies   all current active meds have been reviewed, current meds:   No current facility-administered medications for this encounter.    and PTA meds:   Prior to Admission Medications   Prescriptions Last Dose Informant Patient Reported? Taking?   Cetirizine HCl (ZyrTEC Allergy) 10 MG CAPS  Self Yes No   Sig: Take by mouth daily   DUPIXENT subcutaneous injection  Self Yes No   Sig: Inject 300 mg under the skin every 14 (fourteen) days 300/2ml   Dupixent 300 MG/2ML SOPN  Self Yes No   LORazepam (Ativan) 0.5 mg tablet  Self No No   Sig: Take 1 tablet (0.5 mg total) by mouth 2 (two) times a day as needed for anxiety   SUMAtriptan (Imitrex) 50 mg tablet  Self No No   Sig: Take 1 tablet (50 mg total) by mouth once as needed for migraine for up to 1 dose   albuterol (PROVENTIL  HFA,VENTOLIN HFA) 90 mcg/act inhaler  Self No No   Sig: Inhale 2 puffs 4 (four) times a day   budesonide (PULMICORT) 180 MCG/ACT inhaler  Self Yes No   hydrOXYzine HCL (ATARAX) 25 mg tablet  Self No No   Sig: TAKE 1 TABLET BY MOUTH 3 TIMES A DAY AS NEEDED FOR ANXIETY.   hydrocortisone 2.5 % ointment  Self Yes No   Sig: APPLY TO ECZEMA TWICE DAILY UNTIL CLEAR, THEN TWICE DAILY 1 - 2 DAYS A WEEK AS DIRECTED   phentermine 15 MG capsule  Self Yes No   Sig: Take 15 mg by mouth every morning      Facility-Administered Medications: None     Allergies   Allergen Reactions    Cat Hair Extract Hives    Dog Epithelium Hives    Penicillins Abdominal Pain     Had skin testing done and positive result       Lab Results: I have personally reviewed pertinent lab results.  , CBC:   Lab Results   Component Value Date    WBC 9.13 05/10/2024    HGB 12.7 05/10/2024    HCT 38.5 05/10/2024    MCV 91 05/10/2024     05/10/2024    RBC 4.23 05/10/2024    MCH 30.0 05/10/2024    MCHC 33.0 05/10/2024    RDW 13.1 05/10/2024    MPV 9.7 05/10/2024    NRBC 0 05/10/2024   , CMP:   Lab Results   Component Value Date    SODIUM 138 05/10/2024    K 4.0 05/10/2024     05/10/2024    CO2 26 05/10/2024    BUN 7 05/10/2024    CREATININE 0.75 05/10/2024    CALCIUM 8.8 05/10/2024     (H) 05/10/2024     (H) 05/10/2024    ALKPHOS 183 (H) 05/10/2024    EGFR 105 05/10/2024       Counseling / Coordination of Care  Total floor / unit time spent today 25 minutes.  Greater than 50% of total time was spent with the patient and / or family counseling and / or coordination of care.        Alfonso Arango MD  5/11/2024 12:44 AM

## 2024-05-11 NOTE — H&P (VIEW-ONLY)
"Progress Note  Mariam Streeter 34 y.o. female MRN: [id number]  Unit/Bed#: W -01 Encounter: [id number]    Assessment  34F with hx of RnY gastric bypass, p/w hyperbilirubinemia, direct, c/f choledocho vs. passed stone vs. other etiology  5/11 MRCP: Gallstones. No inflamm. No choledocholithiasis    Plan  - f/u AM LFTs -> if downtrending, advance to lo-fat diet and outpt f/u for elective cholecystectomy  - appreciate GI input  - DVT ppx    Subjective/Objective   NAOE. Pain controlled. Tolerating CLD, no n/v. No BM, passing flatus.    VSS on RA.  /71   Pulse 73   Temp 97.6 °F (36.4 °C)   Resp 18   Ht 5' 3\" (1.6 m)   Wt 106 kg (234 lb 5.6 oz)   SpO2 97%   BMI 41.51 kg/m²     Physical Exam:  General: NAD  CV: nl rate  Lungs: nl wob. No resp distress.  ABD: Soft, ND, mild RUQ tenderness  Extrem: No CCE    05/12/24 labs pending  Recent Labs     05/10/24  1956 05/11/24  0548 05/12/24  0553   WBC 9.13 6.29 7.84   HGB 12.7 12.2 12.6    302 333   SODIUM 138 138  --    K 4.0 3.6  --     106  --    CO2 26 26  --    BUN 7 6  --    CREATININE 0.75 0.73  --    GLUC 104 79  --    CALCIUM 8.8 8.4  --    * 194*  --    * 530*  --    ALKPHOS 183* 175*  --    TBILI 4.62* 4.18*  --    LIPASE 50  --   --    EGFR 105 108  --        "

## 2024-05-11 NOTE — ED ATTENDING ATTESTATION
5/10/2024  I, Eula Medina MD, saw and evaluated the patient. I have discussed the patient with the resident/non-physician practitioner and agree with the resident's/non-physician practitioner's findings, Plan of Care, and MDM as documented in the resident's/non-physician practitioner's note, except where noted. All available labs and Radiology studies were reviewed.  I was present for key portions of any procedure(s) performed by the resident/non-physician practitioner and I was immediately available to provide assistance.       At this point I agree with the current assessment done in the Emergency Department.  I have conducted an independent evaluation of this patient a history and physical is as follows:        ED Course  ED Course as of 05/11/24 0209   Fri May 10, 2024   2320 Right upper quadrant ultrasound without evidence of cholecystitis but patient does have cholelithiasis.  No biliary ductal dilatation.  Surgery consulted to evaluate the patient given transaminitis with elevated total bilirubin.   Sat May 11, 2024   0208 Patient admitted to general surgery for further management.          Critical Care Time  Procedures

## 2024-05-12 VITALS
OXYGEN SATURATION: 97 % | RESPIRATION RATE: 18 BRPM | BODY MASS INDEX: 41.52 KG/M2 | WEIGHT: 234.35 LBS | HEIGHT: 63 IN | HEART RATE: 73 BPM | SYSTOLIC BLOOD PRESSURE: 109 MMHG | TEMPERATURE: 98 F | DIASTOLIC BLOOD PRESSURE: 71 MMHG

## 2024-05-12 PROBLEM — R10.11 RIGHT UPPER QUADRANT PAIN: Status: ACTIVE | Noted: 2024-05-12

## 2024-05-12 LAB
ALBUMIN SERPL BCP-MCNC: 3.5 G/DL (ref 3.5–5)
ALP SERPL-CCNC: 172 U/L (ref 34–104)
ALT SERPL W P-5'-P-CCNC: 365 U/L (ref 7–52)
ANION GAP SERPL CALCULATED.3IONS-SCNC: 5 MMOL/L (ref 4–13)
AST SERPL W P-5'-P-CCNC: 102 U/L (ref 13–39)
BASOPHILS # BLD AUTO: 0.04 THOUSANDS/ÂΜL (ref 0–0.1)
BASOPHILS NFR BLD AUTO: 1 % (ref 0–1)
BILIRUB DIRECT SERPL-MCNC: 0.61 MG/DL (ref 0–0.2)
BILIRUB SERPL-MCNC: 1.76 MG/DL (ref 0.2–1)
BUN SERPL-MCNC: 5 MG/DL (ref 5–25)
CALCIUM SERPL-MCNC: 8.4 MG/DL (ref 8.4–10.2)
CHLORIDE SERPL-SCNC: 107 MMOL/L (ref 96–108)
CO2 SERPL-SCNC: 24 MMOL/L (ref 21–32)
CREAT SERPL-MCNC: 0.65 MG/DL (ref 0.6–1.3)
EOSINOPHIL # BLD AUTO: 0.19 THOUSAND/ÂΜL (ref 0–0.61)
EOSINOPHIL NFR BLD AUTO: 2 % (ref 0–6)
ERYTHROCYTE [DISTWIDTH] IN BLOOD BY AUTOMATED COUNT: 13.2 % (ref 11.6–15.1)
GFR SERPL CREATININE-BSD FRML MDRD: 117 ML/MIN/1.73SQ M
GLUCOSE SERPL-MCNC: 87 MG/DL (ref 65–140)
HCT VFR BLD AUTO: 38.4 % (ref 34.8–46.1)
HGB BLD-MCNC: 12.6 G/DL (ref 11.5–15.4)
IMM GRANULOCYTES # BLD AUTO: 0.02 THOUSAND/UL (ref 0–0.2)
IMM GRANULOCYTES NFR BLD AUTO: 0 % (ref 0–2)
LYMPHOCYTES # BLD AUTO: 1.69 THOUSANDS/ÂΜL (ref 0.6–4.47)
LYMPHOCYTES NFR BLD AUTO: 22 % (ref 14–44)
MAGNESIUM SERPL-MCNC: 1.9 MG/DL (ref 1.9–2.7)
MCH RBC QN AUTO: 29.9 PG (ref 26.8–34.3)
MCHC RBC AUTO-ENTMCNC: 32.8 G/DL (ref 31.4–37.4)
MCV RBC AUTO: 91 FL (ref 82–98)
MONOCYTES # BLD AUTO: 0.72 THOUSAND/ÂΜL (ref 0.17–1.22)
MONOCYTES NFR BLD AUTO: 9 % (ref 4–12)
NEUTROPHILS # BLD AUTO: 5.18 THOUSANDS/ÂΜL (ref 1.85–7.62)
NEUTS SEG NFR BLD AUTO: 66 % (ref 43–75)
NRBC BLD AUTO-RTO: 0 /100 WBCS
PHOSPHATE SERPL-MCNC: 3.5 MG/DL (ref 2.7–4.5)
PLATELET # BLD AUTO: 333 THOUSANDS/UL (ref 149–390)
PMV BLD AUTO: 10.1 FL (ref 8.9–12.7)
POTASSIUM SERPL-SCNC: 4 MMOL/L (ref 3.5–5.3)
PROT SERPL-MCNC: 6.2 G/DL (ref 6.4–8.4)
RBC # BLD AUTO: 4.22 MILLION/UL (ref 3.81–5.12)
SODIUM SERPL-SCNC: 136 MMOL/L (ref 135–147)
WBC # BLD AUTO: 7.84 THOUSAND/UL (ref 4.31–10.16)

## 2024-05-12 PROCEDURE — 99231 SBSQ HOSP IP/OBS SF/LOW 25: CPT | Performed by: PHYSICIAN ASSISTANT

## 2024-05-12 PROCEDURE — 85025 COMPLETE CBC W/AUTO DIFF WBC: CPT

## 2024-05-12 PROCEDURE — 84100 ASSAY OF PHOSPHORUS: CPT

## 2024-05-12 PROCEDURE — 80076 HEPATIC FUNCTION PANEL: CPT

## 2024-05-12 PROCEDURE — 99232 SBSQ HOSP IP/OBS MODERATE 35: CPT | Performed by: SURGERY

## 2024-05-12 PROCEDURE — 83735 ASSAY OF MAGNESIUM: CPT

## 2024-05-12 PROCEDURE — 80048 BASIC METABOLIC PNL TOTAL CA: CPT

## 2024-05-12 RX ORDER — OXYMETAZOLINE HYDROCHLORIDE 0.05 G/100ML
2 SPRAY NASAL EVERY 12 HOURS PRN
Status: DISCONTINUED | OUTPATIENT
Start: 2024-05-12 | End: 2024-05-12 | Stop reason: HOSPADM

## 2024-05-12 RX ORDER — FLUTICASONE PROPIONATE 50 MCG
1 SPRAY, SUSPENSION (ML) NASAL 2 TIMES DAILY
Status: DISCONTINUED | OUTPATIENT
Start: 2024-05-12 | End: 2024-05-12 | Stop reason: HOSPADM

## 2024-05-12 RX ADMIN — SODIUM CHLORIDE, SODIUM LACTATE, POTASSIUM CHLORIDE, AND CALCIUM CHLORIDE 125 ML/HR: .6; .31; .03; .02 INJECTION, SOLUTION INTRAVENOUS at 01:00

## 2024-05-12 NOTE — UTILIZATION REVIEW
"Initial Clinical Review    Admission: Date/Time/Statement:   Admission Orders (From admission, onward)       Ordered        05/11/24 0230  Inpatient Admission  Once                          Orders Placed This Encounter   Procedures    Inpatient Admission     Standing Status:   Standing     Number of Occurrences:   1     Order Specific Question:   Level of Care     Answer:   Med Surg [16]     Order Specific Question:   Estimated length of stay     Answer:   More than 2 Midnights     Order Specific Question:   Certification     Answer:   I certify that inpatient services are medically necessary for this patient for a duration of greater than two midnights. See H&P and MD Progress Notes for additional information about the patient's course of treatment.     ED Arrival Information       Expected   -    Arrival   5/10/2024 16:07    Acuity   Urgent              Means of arrival   Walk-In    Escorted by   Self    Service   Surgery-General    Admission type   Emergency              Arrival complaint   Upper Abdominal Pain, radiates to back, Pt seen for similar in ED 5/8/24 d/c home with constipation. No BM with increased fluids, stool stimulants, and softeners. Pt reports pain is \"excruciating\" shortly after eating. No vomiting currently, able to keep fluids down. Denies  symptoms.             Chief Complaint   Patient presents with    Abdominal Pain     Pt dx with constipation Wednesday. Has been taking laxatives and stool softeners with no relief. Reports worsening abdominal and back pain.        Initial Presentation: 34 y.o. female with hx HTN, migraines,  asthma, anemia, obesity , s/p gastric sleeve 2019 who presents to ED5/10  from home with persistent abdominal pain in  R upper quadrant . Pt seen in ED 5/8 for similar pain with neg workup and d/c'ed to home. Pt reports pain is always there but is worse with eating. Associated  nausea . Last bowel movement was 2 days ago, states that she is chronically irregular and " only moves her bowels about twice per week . On exam, RUQ abdominal tenderness. Abdomen soft . Labs direct hyperbilirubinemia of 4.6, direct component 2.7.  Elevated LFT's. ,  WBC 9 from 13 on 5/8 . US RUQ shows cholelithiasis. XR abdomen shows moderate stool burden . Pt given IVF, antiemetic in ED. Admitted as Inpatient 5/11 by general sx service with hyperbilirubinemia, direct, concern for choledocho versus passed stone . Plan- F/U CT A/P read . NPO. IVF. LFT's in am . GI consult. Pain control. If bilirubin downtrends, we will observe clinically for likely passed stone. If T bilirubin is not significantly downtrending, will order MRCP to evaluate for choledocho versus other obstructive cause .   GI consult- Patient with history of sleeve gastrectomy has had colicky abdominal pain with bump in LFTs suggesting possible biliary obstruction. CT and ultrasound did not show evidence of biliary ductal dilation. Plan for MRCP for further eval. If choledocholithiasis, will plan ERCP         Date: 5/12   Day 2:   MRCP 5/11 shows gallstones. No inflamm. No choledocholithiasis LFTs and T bili downtrending  . Pt  likely passed stone, still has cholelithiasis    .Pt albina CL diet w/o N/V .RUQ tenderness, mild .   No bm. Passing flatus .  Plan- advance to lo-fat diet and outpt f/u for elective cholecystectomy w/ [facility]IC .     ED Triage Vitals   Temperature Pulse Respirations Blood Pressure SpO2   05/10/24 1634 05/10/24 1634 05/10/24 1634 05/10/24 1634 05/10/24 1634   98.2 °F (36.8 °C) 71 20 131/74 100 %      Temp Source Heart Rate Source Patient Position - Orthostatic VS BP Location FiO2 (%)   05/10/24 1634 05/10/24 1634 05/10/24 2154 05/10/24 1634 --   Oral Monitor Sitting Right arm       Pain Score       05/11/24 0120       7          Wt Readings from Last 1 Encounters:   05/11/24 106 kg (234 lb 5.6 oz)     Additional Vital Signs:   Date/Time Temp Pulse Resp BP MAP (mmHg) SpO2   05/12/24 07:11:53 97.6 °F (36.4 °C) 73 18 109/71 84 97  %   05/11/24 2221 98 °F (36.7 °C) 70 16 126/78 -- 98 %   05/11/24 15:40:34 97.3 °F (36.3 °C) Abnormal  68 -- 128/87 101 99 %   05/11/24 1342 -- 75 16 124/80 -- 100 %   05/11/24 0757 97.5 °F (36.4 °C) 77 16 129/67 -- 98 %   05/10/24 2154 -- 82 20 141/94 113 99 %     Pertinent Labs/Diagnostic Test Results:   MRI abdomen wo contrast and mrcp   Final Result by Johan Lepe MD (05/11 1900)      Gallstones. No surrounding inflammation.      No choledocholithiasis.         Workstation performed: [id number]         CT abdomen pelvis with contrast   Final Result by Brian Tom MD (05/11 0219)      No acute inflammatory findings identified in the abdomen or pelvis.   Cholelithiasis.         Workstation performed: [id number]         US right upper quadrant   Final Result by Jony Harrison DO (05/10 2236)      Cholelithiasis without discrete sonographic evidence of acute cholecystitis.      No discrete choledocholithiasis or biliary ductal dilatation identified.      Other findings as above. Clinical follow-up recommended.      Workstation performed: [id number]         XR abdomen 1 view kub   ED Interpretation by Ev Chairez DO (05/10 2107)   Moderate stool burden, nonobstructive bowel pattern      Final Result by Randell Kamara MD (05/12 0636)      Large amount of feces within the colon mixed with contrast material.               Workstation performed: [id number]               Results from last 7 days   Lab Units 05/12/24  0553 05/11/24  0548 05/10/24  1956 05/08/24  1911   WBC Thousand/uL 7.84 6.29 9.13 13.55*   HEMOGLOBIN g/dL 12.6 12.2 12.7 12.6   HEMATOCRIT % 38.4 37.4 38.5 38.4   PLATELETS Thousands/uL 333 302 330 374   TOTAL NEUT ABS Thousands/µL 5.18 3.96 6.53 11.06*         Results from last 7 days   Lab Units 05/12/24  0553 05/11/24  0548 05/10/24  1956 05/08/24  1911   SODIUM mmol/L 136 138 138 138   POTASSIUM mmol/L 4.0 3.6 4.0 4.1   CHLORIDE mmol/L 107 106 105 103   CO2 mmol/L 24 26 26 29    ANION GAP mmol/L 5 6 7 6   BUN mg/dL 5 6 7 13   CREATININE mg/dL 0.65 0.73 0.75 0.83   EGFR ml/min/1.73sq m 117 108 105 92   CALCIUM mg/dL 8.4 8.4 8.8 9.1   MAGNESIUM mg/dL 1.9  --   --   --    PHOSPHORUS mg/dL 3.5  --   --   --      Results from last 7 days   Lab Units 05/12/24  0553 05/11/24  0548 05/10/24  1956 05/08/24  1911   AST U/L 102* 194* 284* 33   ALT U/L 365* 530* 649* 26   ALK PHOS U/L 172* 175* 183* 85   TOTAL PROTEIN g/dL 6.2* 6.2* 6.9 7.2   ALBUMIN g/dL 3.5 3.6 3.9 4.0   TOTAL BILIRUBIN mg/dL 1.76* 4.18* 4.62* 0.52   BILIRUBIN DIRECT mg/dL 0.61* 2.34* 2.73*  --          Results from last 7 days   Lab Units 05/12/24  0553 05/11/24  0548 05/10/24  1956 05/08/24  1911   GLUCOSE RANDOM mg/dL 87 79 104 86               Results from last 7 days   Lab Units 05/08/24 2148 05/08/24 1911   HS TNI 0HR ng/L  --  <2   HS TNI 2HR ng/L <2  --                Results from last 7 days   Lab Units 05/10/24  1956 05/08/24  1911   LIPASE u/L 50 18                 Results from last 7 days   Lab Units 05/08/24 1955   CLARITY UA  Clear   COLOR UA  Light Yellow   SPEC GRAV UA  1.022   PH UA  6.5   GLUCOSE UA mg/dl Negative   KETONES UA mg/dl Negative   BLOOD UA  Negative   PROTEIN UA mg/dl Negative   NITRITE UA  Negative   BILIRUBIN UA  Negative   UROBILINOGEN UA (BE) mg/dl 2.0*   LEUKOCYTES UA  Negative                     ED Treatment:   Medication Administration from 05/10/2024 1425 to 05/11/2024 1533         Date/Time Order Dose Route Action     05/10/2024 2004 EDT ondansetron (ZOFRAN-ODT) dispersible tablet 4 mg 4 mg Oral Given     05/10/2024 2355 EDT iohexol (OMNIPAQUE) 350 MG/ML injection (MULTI-DOSE) 100 mL 100 mL Intravenous Given     05/11/2024 0120 EDT ibuprofen (MOTRIN) tablet 400 mg 400 mg Oral Given     05/11/2024 0929 EDT fluticasone (ARNUITY ELLIPTA) 100 MCG/ACT inhaler 1 puff 0 puff Inhalation Hold     05/11/2024 0250 EDT lactated ringers infusion 125 mL/hr Intravenous New Bag     05/11/2024 1506 EDT  heparin (porcine) subcutaneous injection 5,000 Units 5,000 Units Subcutaneous Not Given     2024 0549 EDT heparin (porcine) subcutaneous injection 5,000 Units 5,000 Units Subcutaneous Not Given          Past Medical History:   Diagnosis Date    Abnormal Pap smear of cervix     Anemia     Anxiety     Asthma     Bronchitis     past     delivery delivered 9/15/2020    Depression     Eczema     Hypertension     Migraine     Obesity     Pneumonia     in past    Varicella     immune per chart     Present on Admission:   Right upper quadrant pain      Admitting Diagnosis: Abdominal pain [R10.9]  Hyperbilirubinemia of non- patient [E80.6]  Age/Sex: 34 y.o. female  Admission Orders:  Scheduled Medications:  fluticasone, 1 puff, Inhalation, Daily  fluticasone, 1 spray, Each Nare, BID  heparin (porcine), 5,000 Units, Subcutaneous, Q8H [place]    potassium chloride (Klor-Con M20) CR tablet 40 mEq  Dose: 40 mEq  Freq: Once Route: PO  x1  @ 1800  Continuous IV Infusions:  lactated ringers, 125 mL/hr, Intravenous, Continuous      PRN Meds:  acetaminophen, 975 mg, Oral, Q6H PRN  HYDROmorphone, 0.5 mg, Intravenous, Q3H PRN  hydrOXYzine HCL, 25 mg, Oral, TID PRN  LORazepam, 0.5 mg, Oral, BID PRN  ondansetron, 4 mg, Intravenous, Q4H PRN  oxyCODONE, 10 mg, Oral, Q4H PRN  oxyCODONE, 5 mg, Oral, Q4H PRN  oxymetazoline, 2 spray, Each Nare, Q12H PRN  SUMAtriptan, 50 mg, Oral, Once PRN    NPO--->CL diet  UP as albina   SCD    IP CONSULT TO GASTROENTEROLOGY    Network Utilization Review Department  ATTENTION: Please call with any questions or concerns to [phone] and carefully listen to the prompts so that you are directed to the right person. All voicemails are confidential.   For Discharge needs, contact Care Management DC Support Team at [phone] opt. 2  Send all requests for admission clinical reviews, approved or denied determinations and any other requests to dedicated fax number below belonging to the campus  where the patient is receiving treatment. List of dedicated fax numbers for the Facilities:  FACILITY NAME UR FAX NUMBER   ADMISSION DENIALS (Administrative/Medical Necessity) [phone]   DISCHARGE SUPPORT TEAM (NETWORK) [phone]   PARENT CHILD HEALTH (Maternity/NICU/Pediatrics) [phone]   [facility] [phone]   [facility] [phone]   [facility] [phone]   [facility] [phone]   [facility] [phone]   [facility] [phone]   [facility] [phone]   [facility] [phone]   [facility] [phone]   [facility] [phone]   [facility] [phone]   [facility] [phone]

## 2024-05-12 NOTE — PROGRESS NOTES
"Progress Note - Cliffordharmony Streeter 34 y.o. female MRN: [id number]    Unit/Bed#: W -01 Encounter: [id number]      Assessment/Plan:    #1.  Colicky postprandial epigastric/right upper quadrant pain with gallstones and elevated liver enzymes; liver enzymes now appear downtrending and MRI showed no evidence of choledocholithiasis, patient likely passed stone, still has cholelithiasis    -Follow-up with surgery regarding the prospect and timing of elective cholecystectomy    -Should not require ERCP at this time    -Monitor liver enzymes, advised patient to look out for icterus, persistent darkening of the urine which does not respond to hydration, or jaundice, also recommended to keep to a low-fat diet in the meantime at least until she undergoes gallbladder surgery      Subjective:   Patient reports to be feeling well, not having abdominal pain currently, denies any fevers or chills.    Objective:     Vitals: Blood pressure 109/71, pulse 73, temperature 97.6 °F (36.4 °C), resp. rate 18, height 5' 3\" (1.6 m), weight 106 kg (234 lb 5.6 oz), SpO2 97%, not currently breastfeeding.,Body mass index is 41.51 kg/m².      Intake/Output Summary (Last 24 hours) at 5/12/2024 0839  Last data filed at 5/11/2024 1835  Gross per 24 hour   Intake 240 ml   Output --   Net 240 ml       Physical Exam:   General appearance: alert, appears stated age and cooperative  Lungs: clear to auscultation bilaterally, no labored breathing/accessory muscle use  Heart: regular rate and rhythm, S1, S2 normal, no murmur, click, rub or gallop  Abdomen: soft, non-tender; bowel sounds normal; no masses,  no organomegaly  Extremities: no edema    Invasive Devices       Peripheral Intravenous Line  Duration             Peripheral IV 05/10/24 Left Antecubital 1 day                    Lab, Imaging and other studies: I have personally reviewed pertinent reports.    Admission on 05/10/2024   Component Date Value    Sodium 05/10/2024 138     Potassium " 05/10/2024 4.0     Chloride 05/10/2024 105     CO2 05/10/2024 26     ANION GAP 05/10/2024 7     BUN 05/10/2024 7     Creatinine 05/10/2024 0.75     Glucose 05/10/2024 104     Calcium 05/10/2024 8.8     AST 05/10/2024 284 (H)     ALT 05/10/2024 649 (H)     Alkaline Phosphatase 05/10/2024 183 (H)     Total Protein 05/10/2024 6.9     Albumin 05/10/2024 3.9     Total Bilirubin 05/10/2024 4.62 (H)     eGFR 05/10/2024 105     WBC 05/10/2024 9.13     RBC 05/10/2024 4.23     Hemoglobin 05/10/2024 12.7     Hematocrit 05/10/2024 38.5     MCV 05/10/2024 91     MCH 05/10/2024 30.0     MCHC 05/10/2024 33.0     RDW 05/10/2024 13.1     MPV 05/10/2024 9.7     Platelets 05/10/2024 330     nRBC 05/10/2024 0     Segmented % 05/10/2024 73     Immature Grans % 05/10/2024 0     Lymphocytes % 05/10/2024 17     Monocytes % 05/10/2024 8     Eosinophils Relative 05/10/2024 2     Basophils Relative 05/10/2024 0     Absolute Neutrophils 05/10/2024 6.53     Absolute Immature Grans 05/10/2024 0.03     Absolute Lymphocytes 05/10/2024 1.59     Absolute Monocytes 05/10/2024 0.76     Eosinophils Absolute 05/10/2024 0.18     Basophils Absolute 05/10/2024 0.04     Lipase 05/10/2024 50     Bilirubin, Direct 05/10/2024 2.73 (H)     WBC 05/11/2024 6.29     RBC 05/11/2024 4.10     Hemoglobin 05/11/2024 12.2     Hematocrit 05/11/2024 37.4     MCV 05/11/2024 91     MCH 05/11/2024 29.8     MCHC 05/11/2024 32.6     RDW 05/11/2024 13.2     MPV 05/11/2024 9.8     Platelets 05/11/2024 302     nRBC 05/11/2024 0     Segmented % 05/11/2024 63     Immature Grans % 05/11/2024 0     Lymphocytes % 05/11/2024 25     Monocytes % 05/11/2024 8     Eosinophils Relative 05/11/2024 3     Basophils Relative 05/11/2024 1     Absolute Neutrophils 05/11/2024 3.96     Absolute Immature Grans 05/11/2024 0.02     Absolute Lymphocytes 05/11/2024 1.57     Absolute Monocytes 05/11/2024 0.53     Eosinophils Absolute 05/11/2024 0.18     Basophils Absolute 05/11/2024 0.03     Sodium  05/11/2024 138     Potassium 05/11/2024 3.6     Chloride 05/11/2024 106     CO2 05/11/2024 26     ANION GAP 05/11/2024 6     BUN 05/11/2024 6     Creatinine 05/11/2024 0.73     Glucose 05/11/2024 79     Calcium 05/11/2024 8.4     eGFR 05/11/2024 108     Total Bilirubin 05/11/2024 4.18 (H)     Bilirubin, Direct 05/11/2024 2.34 (H)     Alkaline Phosphatase 05/11/2024 175 (H)     AST 05/11/2024 194 (H)     ALT 05/11/2024 530 (H)     Total Protein 05/11/2024 6.2 (L)     Albumin 05/11/2024 3.6     Magnesium 05/12/2024 1.9     Phosphorus 05/12/2024 3.5     Sodium 05/12/2024 136     Potassium 05/12/2024 4.0     Chloride 05/12/2024 107     CO2 05/12/2024 24     ANION GAP 05/12/2024 5     BUN 05/12/2024 5     Creatinine 05/12/2024 0.65     Glucose 05/12/2024 87     Calcium 05/12/2024 8.4     eGFR 05/12/2024 117     WBC 05/12/2024 7.84     RBC 05/12/2024 4.22     Hemoglobin 05/12/2024 12.6     Hematocrit 05/12/2024 38.4     MCV 05/12/2024 91     MCH 05/12/2024 29.9     MCHC 05/12/2024 32.8     RDW 05/12/2024 13.2     MPV 05/12/2024 10.1     Platelets 05/12/2024 333     nRBC 05/12/2024 0     Segmented % 05/12/2024 66     Immature Grans % 05/12/2024 0     Lymphocytes % 05/12/2024 22     Monocytes % 05/12/2024 9     Eosinophils Relative 05/12/2024 2     Basophils Relative 05/12/2024 1     Absolute Neutrophils 05/12/2024 5.18     Absolute Immature Grans 05/12/2024 0.02     Absolute Lymphocytes 05/12/2024 1.69     Absolute Monocytes 05/12/2024 0.72     Eosinophils Absolute 05/12/2024 0.19     Basophils Absolute 05/12/2024 0.04     Total Bilirubin 05/12/2024 1.76 (H)     Bilirubin, Direct 05/12/2024 0.61 (H)     Alkaline Phosphatase 05/12/2024 172 (H)     AST 05/12/2024 102 (H)     ALT 05/12/2024 365 (H)     Total Protein 05/12/2024 6.2 (L)     Albumin 05/12/2024 3.5        MRI OF THE ABDOMEN WITHOUT CONTRAST WITH MRCP     INDICATION: 34 years / Female. MRCP for stones. Gallstones on CT and ultrasound. Elevated bilirubin.      COMPARISON: CT scan and ultrasound from yesterday.     TECHNIQUE: Multiplanar/multisequence MRI of the abdomen with 3D MRCP was performed without the administration of contrast.     FINDINGS:     LOWER CHEST: Unremarkable.     LIVER:  Normal in size and configuration.  No suspicious mass.  Limited evaluation of hepatic and portal veins on this non-contrast MRI is unremarkable.     BILE DUCTS: No intrahepatic or extrahepatic bile duct dilation. Common bile duct is normal in caliber. No choledocholithiasis, biliary stricture or suspicious mass.     GALLBLADDER: Multiple gallstones noted.     PANCREAS: Unremarkable.     ADRENAL GLANDS: Unremarkable.     SPLEEN: Normal.     KIDNEYS/PROXIMAL URETERS: No hydroureteronephrosis. No suspicious renal mass.     BOWEL: No dilated loops of bowel.     PERITONEUM/RETROPERITONEUM: No ascites.     LYMPH NODES: No abdominal lymphadenopathy.     VESSELS: No aneurysm.     ABDOMINAL WALL: Unremarkable     BONES: No suspicious osseous lesion.     IMPRESSION:     Gallstones. No surrounding inflammation.     No choledocholithiasis.

## 2024-05-12 NOTE — DISCHARGE INSTR - AVS FIRST PAGE
Go to any [facility] lab to have your lab work drawn in 1 to 2 weeks.  Make sure that you have this lab work drawn before you see the surgeon in the office.    If you have recurrent pain, call the surgical office.  If the pain is severe and does not go away, go to the emergency department.    Try to maintain a low-fat diet, this may help with your pain and lower the risk of another episode in the near future    Please call the office [phone].                     [facility], [address], [place] [address]. [address] between [employer] [address] [employer].     Return to Work:   Okay to return to work when you feel well if you desire.        Diet:   You may return to your normal healthy diet.  Low fat     If you have increased pain, fever >101.5, increased drainage, redness or a bad smell at your surgery site, please call us immediately or come directly to the Emergency Room  ____________________________________________________________________________    For surgery-fasting after midnight.                      No aspirin or blood thinners such as Advil or Motrin for 1 week before surgery.                       You will need a  to bring your home.                       Please call the office to create a date to have surgery on the gallbladder.  [facility] will call you tonight before surgery with a time to arrive.  Any questions, please contact Danielle at the office.

## 2024-05-13 ENCOUNTER — TELEPHONE (OUTPATIENT)
Age: 34
End: 2024-05-13

## 2024-05-13 DIAGNOSIS — Z71.89 COMPLEX CARE COORDINATION: Primary | ICD-10-CM

## 2024-05-13 NOTE — UTILIZATION REVIEW
NOTIFICATION OF INPATIENT ADMISSION   AUTHORIZATION REQUEST   SERVICING FACILITY:   [place] [address]  Tax ID: [id number]  NPI: [id number]   ATTENDING PROVIDER:  Attending Name and NPI#: Mj James Md [[id number]]  Address: [address] [place] [address]  Phone: [phone]     ADMISSION INFORMATION:  Place of Service: Inpatient [facility]  Place of Service Code: 21  Inpatient Admission Date/Time: 24  2:31 AM  Discharge Date/Time: 2024 12:30 PM  Admitting Diagnosis Code/Description:  Abdominal pain [R10.9]  Hyperbilirubinemia of non- patient [E80.6]     UTILIZATION REVIEW CONTACT:  Delores Zavala Utilization   Network Utilization Review Department  Phone: [phone]  Fax: [phone]  Email: [email or web address]  Contact for approvals/pending authorizations, clinical reviews, and discharge.     PHYSICIAN ADVISORY SERVICES:  Medical Necessity Denial & Kvrn-fc-Bszm Review  Phone: [phone]  Fax: [phone]  Email: [email or web address]     DISCHARGE SUPPORT TEAM:  For Patients Discharge Needs & Updates  Phone: [phone] opt. 2 Fax: [phone]  Email: [email or web address]

## 2024-05-13 NOTE — TELEPHONE ENCOUNTER
Patient calling in and asking for surgery date for gallbladder removal surgery, told patient she needs consult will in Dr. James first and patient stated that Dr. James told patient due to seeing her in ED that she did not need consult that she can go right to being scheduled for surgery     [facility] patient needed to confirm first     If it is okay can surgical coordinator call to schedule patient for gallbladder surgery with Dr. James

## 2024-05-14 ENCOUNTER — PATIENT OUTREACH (OUTPATIENT)
Dept: INTERNAL MEDICINE CLINIC | Facility: CLINIC | Age: 34
End: 2024-05-14

## 2024-05-14 RX ORDER — ACETAMINOPHEN 500 MG
500 TABLET ORAL EVERY 6 HOURS PRN
COMMUNITY

## 2024-05-14 NOTE — PROGRESS NOTES
Left message with my contact information for patient to return my call  Scheduled for surgery on 5/17/24

## 2024-05-14 NOTE — UTILIZATION REVIEW
NOTIFICATION OF ADMISSION DISCHARGE   This is a Notification of Discharge from [facility]. Please be advised that this patient has been discharge from our facility. Below you will find the admission and discharge date and time including the patient’s disposition.   UTILIZATION REVIEW CONTACT:  Noreen Vang  Utilization   Network Utilization Review Department  Phone: [phone] x carefully listen to the prompts. All voicemails are confidential.  Email: [email or web address]     ADMISSION INFORMATION  PRESENTATION DATE: 5/10/2024  7:08 PM  OBERVATION ADMISSION DATE:   INPATIENT ADMISSION DATE: 5/11/24  2:31 AM   DISCHARGE DATE: 5/12/2024 12:30 PM   DISPOSITION:Home/Self Care    Network Utilization Review Department  ATTENTION: Please call with any questions or concerns to [phone] and carefully listen to the prompts so that you are directed to the right person. All voicemails are confidential.   For Discharge needs, contact Care Management DC Support Team at [phone] opt. 2  Send all requests for admission clinical reviews, approved or denied determinations and any other requests to dedicated fax number below belonging to the campus where the patient is receiving treatment. List of dedicated fax numbers for the Facilities:  FACILITY NAME UR FAX NUMBER   ADMISSION DENIALS (Administrative/Medical Necessity) [phone]   DISCHARGE SUPPORT TEAM ([facility]) [phone]   PARENT CHILD HEALTH (Maternity/NICU/Pediatrics) [phone]   [facility] [phone]   [facility] [phone]   [facility] [phone]   [facility] [phone]   [facility] [phone]   [facility] [phone]   [facility] [phone]   [facility] [phone]   [facility]  [facility] [phone]   [facility] [phone]   [facility] [phone]   [facility] [phone]

## 2024-05-14 NOTE — PROGRESS NOTES
Mariam returned my phone call. She is doing good. Avoiding fried fatty foods. To have surgery on 5/17/24. A little worried about recovery period because she has a 3 year old. Knows she will have restrictions for lifting. No care management needs identified.

## 2024-05-14 NOTE — PRE-PROCEDURE INSTRUCTIONS
Pre-Surgery Instructions:   Medication Instructions    acetaminophen (TYLENOL) 500 mg tablet Uses PRN- OK to take day of surgery    albuterol (PROVENTIL HFA,VENTOLIN HFA) 90 mcg/act inhaler Uses PRN- OK to take day of surgery    Cetirizine HCl (ZyrTEC Allergy) 10 MG CAPS Uses PRN- OK to take day of surgery    DUPIXENT subcutaneous injection Takes q 14 days last dose 5/10/24    hydrocortisone 2.5 % ointment Uses PRN- DO NOT take day of surgery    LORazepam (Ativan) 0.5 mg tablet Uses PRN- OK to take day of surgery    phentermine 15 MG capsule 5/14/24-pt reports took last dose 3 weeks ago.    Medication instructions for day surgery reviewed. Please use only a sip of water to take your instructed medications. Avoid all over the counter vitamins, supplements and NSAIDS for one week prior to surgery per anesthesia guidelines. Tylenol is ok to take as needed.     You will receive a call one business day prior to surgery with an arrival time and hospital directions. If your surgery is scheduled on a Monday, the hospital will be calling you on the Friday prior to your surgery. If you have not heard from anyone by 8pm, please call the hospital supervisor through the hospital  at [phone]. ([place] [phone] or [place] [phone]).    Do not eat or drink anything after midnight the night before your surgery, including candy, mints, lifesavers, or chewing gum. Do not drink alcohol 24hrs before your surgery. Try not to smoke at least 24hrs before your surgery.       Follow the pre surgery showering instructions as listed in the “My Surgical Experience Booklet” or otherwise provided by your surgeon's office. Do not use a blade to shave the surgical area 1 week before surgery. It is okay to use a clean electric clippers up to 24 hours before surgery. Do not apply any lotions, creams, including makeup, cologne, deodorant, or perfumes after showering on the day of your surgery. Do not use dry shampoo, hair spray,  hair gel, or any type of hair products.     No contact lenses, eye make-up, or artificial eyelashes. Remove nail polish, including gel polish, and any artificial, gel, or acrylic nails if possible. Remove all jewelry including rings and body piercing jewelry.     Wear causal clothing that is easy to take on and off. Consider your type of surgery.    Keep any valuables, jewelry, piercings at home. Please bring any specially ordered equipment (sling, braces) if indicated.    Arrange for a responsible person to drive you to and from the hospital on the day of your surgery. Please confirm the visitor policy for the day of your procedure when you receive your phone call with an arrival time.     Call the surgeon's office with any new illnesses, exposures, or additional questions prior to surgery.    Please reference your “My Surgical Experience Booklet” for additional information to prepare for your upcoming surgery.

## 2024-05-16 RX ORDER — OXYCODONE HYDROCHLORIDE AND ACETAMINOPHEN 5; 325 MG/1; MG/1
1 TABLET ORAL EVERY 4 HOURS PRN
Qty: 10 TABLET | Refills: 0 | Status: SHIPPED | OUTPATIENT
Start: 2024-05-16 | End: 2024-05-17

## 2024-05-16 NOTE — DISCHARGE INSTR - AVS FIRST PAGE
Please call the office when you leave to schedule an appointment for 2 weeks.              Please call [phone]. [address], [address], [place], [address]. [address] between [address] [address] [employer].     Activity:    May lift 10 lb as many times as desired the 1st week,       20 lb in 2 weeks,       30 lb in 3 weeks.                Walking is encouraged  Normal daily activities including climbing steps are okay  Do not engage in strenuous activity ( sit-ups or crutches) or contact sports for 4-6 weeks post-operatively    Return to Work:   Okay to return to work when you feel well if you desire.        Diet:   You may return to your normal healthy diet.    Wound Care:  Your wound is closed with dissolvable stitches and glue.  It is okay to shower. Wash incision gently with soap and water and pat dry. Do not soak incisions in bath water or swim for two weeks. Do not apply any creams or ointments.    Pain Medication:   Please take as directed if needed. May use Advil or Motrin in addition.  Recall, the pain medicine and anesthesia is associated with constipation.    No driving while taking narcotic pain medications.      Other:  It is normal to developed a “healing ridge” / firm incision after surgery.  This is your body making scar tissue.  It is a good sign  Constipation is very common after general anesthesia.  Please use milk of magnesia as needed in order to help prevent constipation.  It is normal to get bruising after surgery.  If you have questions after discharge please call the office.    If you have increased pain, fever >101.5, increased drainage, redness or a bad smell at your surgery site, please call us immediately or come directly to the Emergency Room

## 2024-05-17 ENCOUNTER — HOSPITAL ENCOUNTER (OUTPATIENT)
Facility: HOSPITAL | Age: 34
Setting detail: OUTPATIENT SURGERY
Discharge: HOME | End: 2024-05-17
Attending: SURGERY | Admitting: SURGERY
Payer: COMMERCIAL

## 2024-05-17 ENCOUNTER — ANESTHESIA (OUTPATIENT)
Dept: PERIOP | Facility: HOSPITAL | Age: 34
End: 2024-05-17
Payer: COMMERCIAL

## 2024-05-17 ENCOUNTER — ANESTHESIA EVENT (OUTPATIENT)
Dept: PERIOP | Facility: HOSPITAL | Age: 34
End: 2024-05-17
Payer: COMMERCIAL

## 2024-05-17 ENCOUNTER — APPOINTMENT (OUTPATIENT)
Dept: RADIOLOGY | Facility: HOSPITAL | Age: 34
End: 2024-05-17
Payer: COMMERCIAL

## 2024-05-17 VITALS
TEMPERATURE: 98.9 F | HEART RATE: 80 BPM | SYSTOLIC BLOOD PRESSURE: 137 MMHG | DIASTOLIC BLOOD PRESSURE: 76 MMHG | RESPIRATION RATE: 17 BRPM | HEIGHT: 63 IN | BODY MASS INDEX: 40.75 KG/M2 | WEIGHT: 230 LBS | OXYGEN SATURATION: 99 %

## 2024-05-17 DIAGNOSIS — R10.11 RIGHT UPPER QUADRANT PAIN: ICD-10-CM

## 2024-05-17 DIAGNOSIS — K80.20 CHOLELITHIASIS: Primary | ICD-10-CM

## 2024-05-17 LAB
EXT PREGNANCY TEST URINE: NEGATIVE
EXT. CONTROL: NORMAL

## 2024-05-17 PROCEDURE — 81025 URINE PREGNANCY TEST: CPT | Performed by: SURGERY

## 2024-05-17 PROCEDURE — 88304 TISSUE EXAM BY PATHOLOGIST: CPT | Performed by: STUDENT IN AN ORGANIZED HEALTH CARE EDUCATION/TRAINING PROGRAM

## 2024-05-17 PROCEDURE — 74300 X-RAY BILE DUCTS/PANCREAS: CPT

## 2024-05-17 PROCEDURE — C1729 CATH, DRAINAGE: HCPCS | Performed by: SURGERY

## 2024-05-17 PROCEDURE — 47562 LAPAROSCOPIC CHOLECYSTECTOMY: CPT | Performed by: SURGERY

## 2024-05-17 RX ORDER — ROCURONIUM BROMIDE 10 MG/ML
INJECTION, SOLUTION INTRAVENOUS AS NEEDED
Status: DISCONTINUED | OUTPATIENT
Start: 2024-05-17 | End: 2024-05-17

## 2024-05-17 RX ORDER — LIDOCAINE HYDROCHLORIDE 20 MG/ML
INJECTION, SOLUTION EPIDURAL; INFILTRATION; INTRACAUDAL; PERINEURAL AS NEEDED
Status: DISCONTINUED | OUTPATIENT
Start: 2024-05-17 | End: 2024-05-17

## 2024-05-17 RX ORDER — SODIUM CHLORIDE 9 MG/ML
INJECTION INTRAVENOUS AS NEEDED
Status: DISCONTINUED | OUTPATIENT
Start: 2024-05-17 | End: 2024-05-17 | Stop reason: HOSPADM

## 2024-05-17 RX ORDER — ONDANSETRON 2 MG/ML
4 INJECTION INTRAMUSCULAR; INTRAVENOUS ONCE AS NEEDED
Status: DISCONTINUED | OUTPATIENT
Start: 2024-05-17 | End: 2024-05-17 | Stop reason: SDUPTHER

## 2024-05-17 RX ORDER — HYDROMORPHONE HCL/PF 1 MG/ML
0.5 SYRINGE (ML) INJECTION
Status: DISCONTINUED | OUTPATIENT
Start: 2024-05-17 | End: 2024-05-17 | Stop reason: HOSPADM

## 2024-05-17 RX ORDER — SODIUM CHLORIDE, SODIUM LACTATE, POTASSIUM CHLORIDE, CALCIUM CHLORIDE 600; 310; 30; 20 MG/100ML; MG/100ML; MG/100ML; MG/100ML
INJECTION, SOLUTION INTRAVENOUS CONTINUOUS PRN
Status: DISCONTINUED | OUTPATIENT
Start: 2024-05-17 | End: 2024-05-17

## 2024-05-17 RX ORDER — FENTANYL CITRATE 50 UG/ML
INJECTION, SOLUTION INTRAMUSCULAR; INTRAVENOUS AS NEEDED
Status: DISCONTINUED | OUTPATIENT
Start: 2024-05-17 | End: 2024-05-17

## 2024-05-17 RX ORDER — OXYCODONE HYDROCHLORIDE AND ACETAMINOPHEN 5; 325 MG/1; MG/1
1 TABLET ORAL EVERY 4 HOURS PRN
Qty: 10 TABLET | Refills: 0 | Status: SHIPPED | OUTPATIENT
Start: 2024-05-17 | End: 2024-05-27

## 2024-05-17 RX ORDER — FENTANYL CITRATE/PF 50 MCG/ML
50 SYRINGE (ML) INJECTION
Status: COMPLETED | OUTPATIENT
Start: 2024-05-17 | End: 2024-05-17

## 2024-05-17 RX ORDER — HYDROMORPHONE HCL/PF 1 MG/ML
SYRINGE (ML) INJECTION AS NEEDED
Status: DISCONTINUED | OUTPATIENT
Start: 2024-05-17 | End: 2024-05-17

## 2024-05-17 RX ORDER — OXYCODONE HYDROCHLORIDE AND ACETAMINOPHEN 5; 325 MG/1; MG/1
1 TABLET ORAL EVERY 4 HOURS PRN
Status: DISCONTINUED | OUTPATIENT
Start: 2024-05-17 | End: 2024-05-17 | Stop reason: HOSPADM

## 2024-05-17 RX ORDER — CEFAZOLIN SODIUM 2 G/50ML
2000 SOLUTION INTRAVENOUS ONCE
Status: COMPLETED | OUTPATIENT
Start: 2024-05-17 | End: 2024-05-17

## 2024-05-17 RX ORDER — DEXAMETHASONE SODIUM PHOSPHATE 10 MG/ML
INJECTION, SOLUTION INTRAMUSCULAR; INTRAVENOUS AS NEEDED
Status: DISCONTINUED | OUTPATIENT
Start: 2024-05-17 | End: 2024-05-17

## 2024-05-17 RX ORDER — HYDROMORPHONE HCL/PF 1 MG/ML
0.5 SYRINGE (ML) INJECTION
Status: DISCONTINUED | OUTPATIENT
Start: 2024-05-17 | End: 2024-05-17 | Stop reason: SDUPTHER

## 2024-05-17 RX ORDER — MIDAZOLAM HYDROCHLORIDE 2 MG/2ML
INJECTION, SOLUTION INTRAMUSCULAR; INTRAVENOUS AS NEEDED
Status: DISCONTINUED | OUTPATIENT
Start: 2024-05-17 | End: 2024-05-17

## 2024-05-17 RX ORDER — SODIUM CHLORIDE 9 MG/ML
INJECTION, SOLUTION INTRAVENOUS AS NEEDED
Status: DISCONTINUED | OUTPATIENT
Start: 2024-05-17 | End: 2024-05-17 | Stop reason: HOSPADM

## 2024-05-17 RX ORDER — ONDANSETRON 2 MG/ML
INJECTION INTRAMUSCULAR; INTRAVENOUS AS NEEDED
Status: DISCONTINUED | OUTPATIENT
Start: 2024-05-17 | End: 2024-05-17

## 2024-05-17 RX ORDER — ACETAMINOPHEN 325 MG/1
650 TABLET ORAL EVERY 4 HOURS PRN
Status: DISCONTINUED | OUTPATIENT
Start: 2024-05-17 | End: 2024-05-17 | Stop reason: HOSPADM

## 2024-05-17 RX ORDER — MAGNESIUM HYDROXIDE 1200 MG/15ML
LIQUID ORAL AS NEEDED
Status: DISCONTINUED | OUTPATIENT
Start: 2024-05-17 | End: 2024-05-17 | Stop reason: HOSPADM

## 2024-05-17 RX ORDER — PROPOFOL 10 MG/ML
INJECTION, EMULSION INTRAVENOUS AS NEEDED
Status: DISCONTINUED | OUTPATIENT
Start: 2024-05-17 | End: 2024-05-17

## 2024-05-17 RX ORDER — ONDANSETRON 2 MG/ML
4 INJECTION INTRAMUSCULAR; INTRAVENOUS EVERY 4 HOURS PRN
Status: DISCONTINUED | OUTPATIENT
Start: 2024-05-17 | End: 2024-05-17 | Stop reason: HOSPADM

## 2024-05-17 RX ORDER — SODIUM CHLORIDE, SODIUM LACTATE, POTASSIUM CHLORIDE, CALCIUM CHLORIDE 600; 310; 30; 20 MG/100ML; MG/100ML; MG/100ML; MG/100ML
20 INJECTION, SOLUTION INTRAVENOUS CONTINUOUS
Status: DISCONTINUED | OUTPATIENT
Start: 2024-05-17 | End: 2024-05-17 | Stop reason: HOSPADM

## 2024-05-17 RX ORDER — BUPIVACAINE HYDROCHLORIDE AND EPINEPHRINE 2.5; 5 MG/ML; UG/ML
INJECTION, SOLUTION EPIDURAL; INFILTRATION; INTRACAUDAL; PERINEURAL AS NEEDED
Status: DISCONTINUED | OUTPATIENT
Start: 2024-05-17 | End: 2024-05-17 | Stop reason: HOSPADM

## 2024-05-17 RX ADMIN — FENTANYL CITRATE 50 MCG: 50 INJECTION INTRAMUSCULAR; INTRAVENOUS at 17:35

## 2024-05-17 RX ADMIN — SUGAMMADEX 200 MG: 100 INJECTION, SOLUTION INTRAVENOUS at 16:55

## 2024-05-17 RX ADMIN — MIDAZOLAM 2 MG: 1 INJECTION INTRAMUSCULAR; INTRAVENOUS at 15:27

## 2024-05-17 RX ADMIN — FENTANYL CITRATE 100 MCG: 50 INJECTION INTRAMUSCULAR; INTRAVENOUS at 15:36

## 2024-05-17 RX ADMIN — ONDANSETRON 4 MG: 2 INJECTION INTRAMUSCULAR; INTRAVENOUS at 15:36

## 2024-05-17 RX ADMIN — LIDOCAINE HYDROCHLORIDE 100 MG: 20 INJECTION, SOLUTION EPIDURAL; INFILTRATION; INTRACAUDAL at 15:36

## 2024-05-17 RX ADMIN — ROCURONIUM BROMIDE 50 MG: 50 INJECTION INTRAVENOUS at 15:36

## 2024-05-17 RX ADMIN — PROPOFOL 200 MG: 10 INJECTION, EMULSION INTRAVENOUS at 15:36

## 2024-05-17 RX ADMIN — CEFAZOLIN SODIUM 2000 MG: 2 SOLUTION INTRAVENOUS at 15:30

## 2024-05-17 RX ADMIN — SODIUM CHLORIDE, SODIUM LACTATE, POTASSIUM CHLORIDE, AND CALCIUM CHLORIDE: .6; .31; .03; .02 INJECTION, SOLUTION INTRAVENOUS at 15:32

## 2024-05-17 RX ADMIN — DEXAMETHASONE SODIUM PHOSPHATE 10 MG: 10 INJECTION INTRAMUSCULAR; INTRAVENOUS at 15:36

## 2024-05-17 RX ADMIN — HYDROMORPHONE HYDROCHLORIDE 0.5 MG: 1 INJECTION, SOLUTION INTRAMUSCULAR; INTRAVENOUS; SUBCUTANEOUS at 16:14

## 2024-05-17 RX ADMIN — FENTANYL CITRATE 50 MCG: 50 INJECTION INTRAMUSCULAR; INTRAVENOUS at 17:25

## 2024-05-17 RX ADMIN — HYDROMORPHONE HYDROCHLORIDE 0.5 MG: 1 INJECTION, SOLUTION INTRAMUSCULAR; INTRAVENOUS; SUBCUTANEOUS at 16:08

## 2024-05-17 RX ADMIN — OXYCODONE HYDROCHLORIDE AND ACETAMINOPHEN 1 TABLET: 5; 325 TABLET ORAL at 19:09

## 2024-05-17 RX ADMIN — HYDROMORPHONE HYDROCHLORIDE 0.5 MG: 1 INJECTION, SOLUTION INTRAMUSCULAR; INTRAVENOUS; SUBCUTANEOUS at 17:53

## 2024-05-17 NOTE — OP NOTE
OPERATIVE REPORT  PATIENT NAME: Mariam Streeter    :  1990  MRN: [id number]  Pt Location: AN OR ROOM 04    SURGERY DATE: 2024    Surgeons and Role:     * Mj James MD - Primary     * Fernando Tai MD - Assisting    Preop Diagnosis:  Cholelithiasis [K80.20]    Post-Op Diagnosis Codes:     * Cholelithiasis [K80.20]    Procedure(s):  CHOLECYSTECTOMY LAPAROSCOPIC W/ INTRAOP CHOLANGIOGRAM    Specimen(s):  ID Type Source Tests Collected by Time Destination   1 :  Tissue Gallbladder TISSUE EXAM Mj James MD 2024  4:12 PM        Estimated Blood Loss:   Minimal    Drains:  * No LDAs found *    Anesthesia Type:   General    Operative Indications:  Cholelithiasis [K80.20]    Independent, non-smoker, ASA 3, wound class II, BMI 41, weight 230, height 53    NAYA (generalized anxiety disorder)    (+) Postpartum depression       PULMONARY   (+) Mild intermittent asthma                       Complications:   None    Procedure and Technique:  Mariam Streeter is a 34 y.o. female was brought into the operative suite and identified visually and by arm band. The patient was placed in the supine position.  Careful attention towards positioning of extremities was completed.  After sterile prep and drape a timeout was completed. Athrombic pumps in place.  Antibiotics provided.    After instillation of local analgesia an incision was made at the umbilicus . With blunt dissection the peritoneum was identified.  This was pulled upward using Kocher clamps.  An incision was made.  Hemostat was used to bluntly puncture the peritoneum.  Intra-abdominal location was verified.  A trocar was then inserted. CO2 was then insufflated with a back pressure of 15. After Marcaine instillation at each additional site, additional trochars are placed under direct vision into the upper aspect of the abdomen.    Laparoscopic visualization revealed a normal liver and normal stomach.  No excess peritoneal fluid.                 The gallbladder was pulled with traction inferiorly, and the liver was pushed superior.  A dome down technique was completed using electrocautery. This technique carried down to the level of Lamb's pouch.  Careful attention was made towards location of the right hepatic artery, cystic artery, common bile duct, right hepatic duct and the cystic duct.    Careful attention was made towards the critical anatomy at this region. The cystic duct was identified inserting into the base of the gallbladder.  Cystic artery was identified also inserting into the base of the gallbladder.     The cystic duct was clipped at the gallbladder junction.  It is partially transected.  They also placed her seemed prominent.  The catheter was able to be placed into the cystic duct remnant, however injection revealed some leakage of dye.  It was felt that placement became adequate.  At this point fluoroscopy was brought to the field.  Injection was completed however it was not a satisfactory cholangiogram technically.    The C arm was retracted from the operating area.  The catheter was reintroduced.  It was flushed with half-strength dye.  Once again C arm was obtained there is no evidence for dye in the common bile duct.    This point intraoperative cholangiogram was abandoned.  It was not technically feasible.  The cystic duct was then clipped ×3 and divided. The cystic artery was clipped ×3 and divided. The gallbladder was then removed from the liver bed with additional electrocautery.    The area was copiously irrigated. Hemostasis was assured.  The gallbladder was placed into an Endo-Catch bag, and was then removed through the umbilical incision.      Fascia was closed with 0 Vicryl suture.  The subcutaneous components were irrigated.  The subcuticular incisions were then closed. Histacryl was then applied. The patient was awakened from general anesthesia and transferred to the recovery room in stable condition. Sponge and  instrument count correct ×2.  A postoperative deep briefing was completed.     I was present for the entire procedure.    Patient Disposition:  PACU         SIGNATURE: Mj James MD  DATE: May 17, 2024  TIME: 4:58 PM

## 2024-05-17 NOTE — INTERVAL H&P NOTE
H&P reviewed. After examining the patient I find no changes in the patients condition since the H&P had been written.  Patient with history of choledocholithiasis.  Presumed passed stone as MRCP was normal.  Recommend laparoscopic cholecystectomy with intraoperative cholangiogram.  Risks and benefits explained.  Patient is agreeable.  Vitals:    05/17/24 1353   BP: 115/67   Pulse: 81   Resp: 18   Temp: (!) 97.3 °F (36.3 °C)   SpO2: 98%

## 2024-05-17 NOTE — ANESTHESIA PREPROCEDURE EVALUATION
Procedure:  CHOLECYSTECTOMY LAPAROSCOPIC W/ INTRAOP CHOLANGIOGRAM (Abdomen)    Relevant Problems   NEURO/PSYCH   (+) NAYA (generalized anxiety disorder)   (+) Postpartum depression      PULMONARY   (+) Mild intermittent asthma        Physical Exam    Airway    Mallampati score: I  TM Distance: >3 FB  Neck ROM: full     Dental   No notable dental hx     Cardiovascular  Rhythm: regular, Rate: normal, Cardiovascular exam normal    Pulmonary  Pulmonary exam normal Breath sounds clear to auscultation    Other Findings  post-pubertal.      Anesthesia Plan  ASA Score- 3     Anesthesia Type- general with ASA Monitors.         Additional Monitors:     Airway Plan: ETT.    Comment: Risks of general anesthesia discussed including likely possibility of PONV and sore throat, as well as the rare possibilities of aspiration, dental/oropharyngeal/ocular injuries, or grave/life threatening anesthetic and surgical emergencies..       Plan Factors-Exercise tolerance (METS): >4 METS.    Chart reviewed.    Patient summary reviewed.      Patient instructed to abstain from smoking on day of procedure. Patient did not smoke on day of surgery.            Induction- intravenous.    Postoperative Plan- Plan for postoperative opioid use. Planned trial extubation        Informed Consent- Anesthetic plan and risks discussed with patient.  I personally reviewed this patient with the CRNA. Discussed and agreed on the Anesthesia Plan with the CRNA..

## 2024-05-17 NOTE — ANESTHESIA POSTPROCEDURE EVALUATION
Post-Op Assessment Note    CV Status:  Stable  Pain Score: 0    Pain management: adequate       Mental Status:  Sleepy   Hydration Status:  Euvolemic   PONV Controlled:  Controlled   Airway Patency:  Patent     Post Op Vitals Reviewed: Yes    No anethesia notable event occurred.    Staff: CRNA   Comments: vss sv nonobstructed uneventful              BP   136/79   Temp   98.9   Pulse 90   Resp 24   SpO2 98

## 2024-05-21 PROCEDURE — 88304 TISSUE EXAM BY PATHOLOGIST: CPT | Performed by: STUDENT IN AN ORGANIZED HEALTH CARE EDUCATION/TRAINING PROGRAM

## 2024-06-18 ENCOUNTER — OFFICE VISIT (OUTPATIENT)
Dept: SURGERY | Facility: CLINIC | Age: 34
End: 2024-06-18

## 2024-06-18 DIAGNOSIS — R10.11 RIGHT UPPER QUADRANT PAIN: Primary | ICD-10-CM

## 2024-06-18 PROCEDURE — 99024 POSTOP FOLLOW-UP VISIT: CPT | Performed by: SURGERY

## 2024-06-18 NOTE — PROGRESS NOTES
Assessment/Plan: Patient is status post laparoscopic cholecystectomy.  Overall she is doing well.  She offers no complaints.  She is advance her activities nicely.  Incisions clean healing well.  All questions answered.    There are no diagnoses linked to this encounter.    Pathology: Reviewed with patient, all questions answered.       Postoperative restrictions reviewed. All questions answered.       ______________________________________________________  HPI: Patient presents post operatively.  Laparoscopic cholecystectomy 5/17/2024   Final Diagnosis  A. Gallbladder and lymph node (1); cholecystectomy:      - Chronic cholecystitis with cholelithiasis       - One benign lymph node (0/1)       - Negative for malignancy                       ROS:  General ROS: negative for - chills, fatigue, fever or night sweats, weight loss  Respiratory ROS: no cough, shortness of breath, or wheezing  Cardiovascular ROS: no chest pain or dyspnea on exertion  Genito-Urinary ROS: no dysuria, trouble voiding, or hematuria  Musculoskeletal ROS: negative for - gait disturbance, joint pain or muscle pain  Neurological ROS: no TIA or stroke symptoms  GI ROS: see HPI  Skin ROS: no new rashes or lesions   Lymphatic ROS: no new adenopathy noted by pt.   GYN ROS: see HPI, no new GYN history or bleeding noted  Psy ROS: no new mental or behavioral disturbances         Patient Active Problem List   Diagnosis    Constipation    Mild intermittent asthma    Allergic rhinitis due to pollen    Primary insomnia    Class 2 obesity due to excess calories without serious comorbidity in adult    Cervical spinal stenosis    Postpartum depression    NAYA (generalized anxiety disorder)    Right upper quadrant pain       Allergies:  Cat hair extract, Dog epithelium, and Penicillins      Current Outpatient Medications:     acetaminophen (TYLENOL) 500 mg tablet, Take 500 mg by mouth every 6 (six) hours as needed for mild pain, Disp: , Rfl:     albuterol  "(PROVENTIL HFA,VENTOLIN HFA) 90 mcg/act inhaler, Inhale 2 puffs 4 (four) times a day (Patient taking differently: Inhale 2 puffs every 6 (six) hours as needed), Disp: 8 g, Rfl: 3    Cetirizine HCl (ZyrTEC Allergy) 10 MG CAPS, Take by mouth as needed, Disp: , Rfl:     DUPIXENT subcutaneous injection, Inject 300 mg under the skin every 14 (fourteen) days 300/2ml--Most recent dose 5/10/24, Disp: , Rfl:     hydrocortisone 2.5 % ointment, APPLY TO ECZEMA TWICE DAILY UNTIL CLEAR, THEN TWICE DAILY 1 - 2 DAYS A WEEK AS DIRECTED, Disp: , Rfl:     hydrOXYzine HCL (ATARAX) 25 mg tablet, TAKE 1 TABLET BY MOUTH 3 TIMES A DAY AS NEEDED FOR ANXIETY., Disp: 270 tablet, Rfl: 1    LORazepam (Ativan) 0.5 mg tablet, Take 1 tablet (0.5 mg total) by mouth 2 (two) times a day as needed for anxiety, Disp: 60 tablet, Rfl: 0    phentermine 15 MG capsule, Take 15 mg by mouth every morning, Disp: , Rfl:     SUMAtriptan (Imitrex) 50 mg tablet, Take 1 tablet (50 mg total) by mouth once as needed for migraine for up to 1 dose, Disp: 30 tablet, Rfl: 0    Past Medical History:   Diagnosis Date    Abnormal Pap smear of cervix     Allergy     cat and dog dander    Anemia     Anxiety     Asthma     \"bronchial \"    Bronchitis     past     delivery delivered 09/15/2020    Eczema     Family history of diabetes mellitus     \"Diabetes\"    Family history of high blood pressure     Gall stones     Hypertension     \"with pregnancy-mildly high\"    Migraine     Obesity     Pneumonia     in past    Pollen allergies     Post partum depression     Varicella     immune per chart    Wears contact lenses     will wear glasses DOS       Past Surgical History:   Procedure Laterality Date    BARIATRIC SURGERY  2019     SECTION      CHOLECYSTECTOMY      EXPLORATORY LAPAROTOMY      2016, no findings     GASTRECTOMY SLEEVE LAPAROSCOPIC  2019    DC  DELIVERY ONLY N/A 09/15/2020    Procedure:  SECTION ();  Surgeon: Dora GANDHI" MD Shobha;  [facility];  Service: Obstetrics    PA LAPS SURG CHOLECYSTECTOMY W/CHOLANGIOGRAPHY N/A 2024    Procedure: CHOLECYSTECTOMY LAPAROSCOPIC W/ INTRAOP CHOLANGIOGRAM;  Surgeon: Mj James MD;  [facility];  Service: General    WISDOM TOOTH EXTRACTION         Family History   Problem Relation Age of Onset    Hypertension Mother     Migraines Mother     Gestational diabetes Mother     Diabetes Maternal Grandmother     Heart disease Maternal Grandmother     Heart failure Maternal Grandmother     Diabetes Maternal Grandfather     Liver cancer Maternal Grandfather     Asthma Father             Lung cancer Paternal Grandmother     No Known Problems Sister     No Known Problems Brother     No Known Problems Sister     No Known Problems Sister         reports that she has never smoked. She has never used smokeless tobacco. She reports current alcohol use of about 2.0 standard drinks of alcohol per week. She reports that she does not use drugs.    PHYSICAL EXAM    There were no vitals taken for this visit.    General: normal, cooperative, no distress  Abdominal: soft, nondistended, or nontender  Incision: clean, dry, and intact and healing well      Mj James MD    Date: 2024 Time: 3:48 PM

## 2024-11-20 ENCOUNTER — ANNUAL EXAM (OUTPATIENT)
Dept: OBGYN CLINIC | Facility: CLINIC | Age: 34
End: 2024-11-20
Payer: COMMERCIAL

## 2024-11-20 VITALS
BODY MASS INDEX: 37.92 KG/M2 | HEIGHT: 63 IN | SYSTOLIC BLOOD PRESSURE: 118 MMHG | WEIGHT: 214 LBS | DIASTOLIC BLOOD PRESSURE: 72 MMHG

## 2024-11-20 DIAGNOSIS — Z01.419 WELL WOMAN EXAM WITH ROUTINE GYNECOLOGICAL EXAM: Primary | ICD-10-CM

## 2024-11-20 DIAGNOSIS — N94.6 DYSMENORRHEA: ICD-10-CM

## 2024-11-20 PROCEDURE — 99395 PREV VISIT EST AGE 18-39: CPT

## 2024-11-20 NOTE — PROGRESS NOTES
"ASSESSMENT & PLAN:   - Patient plans to continue use of ibuprofen/heating pads as management of painful menses. She understands that there are other options that may relieve symptoms including OCPs.    Diagnoses and all orders for this visit:    Well woman exam with routine gynecological exam    Dysmenorrhea      The following were reviewed in today's visit: ASCCP guidelines, Gardisil vaccination, STD testing breast self exam.    Patient to return to office in yearly for annual exam.     All questions have been answered to her satisfaction.    CC:  Annual Gynecologic Examination  Chief Complaint   Patient presents with    Gynecologic Exam     Annual exam, pap not indicated. Pt c/o severe cramping duringe menses.   Last pap 2023 Neg/HPV neg     HPI: Mariam Streeter is a 34 y.o.  who presents for annual gynecologic examination.  She has the following concerns:  cramping during menses which has been present for quite some time, at least since pregnancy (delivered ). She feels she has been managing this well on her own. She is often still able to complete ADLs.  Recent cholecystectomy, struggling when she eats fatty/greasy foods.     Health Maintenance:    Exercise: intermittently  Breast exams/breast awareness: yes    Past Medical History:   Diagnosis Date    Abnormal Pap smear of cervix     Allergy     cat and dog dander    Anemia     Anxiety     Asthma     \"bronchial \"    Bronchitis     past     delivery delivered 09/15/2020    Eczema     Family history of diabetes mellitus     \"Diabetes\"    Family history of high blood pressure     Gall stones     Hypertension     \"with pregnancy-mildly high\"    Migraine     Obesity     Pneumonia     in past    Pollen allergies     Post partum depression     Varicella     immune per chart    Wears contact lenses     will wear glasses DOS     Past Surgical History:   Procedure Laterality Date    BARIATRIC SURGERY  2019     SECTION      " CHOLECYSTECTOMY      EXPLORATORY LAPAROTOMY      2016, no findings     GASTRECTOMY SLEEVE LAPAROSCOPIC  2019    MD  DELIVERY ONLY N/A 09/15/2020    Procedure:  SECTION ();  Surgeon: Dora Yeager MD;  [facility];  Service: Obstetrics    MD LAPS SURG CHOLECYSTECTOMY W/CHOLANGIOGRAPHY N/A 2024    Procedure: CHOLECYSTECTOMY LAPAROSCOPIC W/ INTRAOP CHOLANGIOGRAM;  Surgeon: Mj James MD;  [facility];  Service: General    WISDOM TOOTH EXTRACTION       Past OB/Gyn History:  Period Cycle (Days):  (28-29)  Period Duration (Days): 7  Period Pattern: Regular  Menstrual Flow: Light, Moderate, Heavy  Menstrual Control: Tampon, Maxi pad, Thin pad  Dysmenorrhea: (!) Severe  Dysmenorrhea Symptoms: CrampingNo LMP recorded.    Last Pap: 2023 : no abnormalities  History of abnormal Pap smear: yes, ~ 10 yrs ago  HPV vaccine completed: no    Patient is currently sexually active.   STD testing: no  Current contraception:none    Family History  Family History   Problem Relation Age of Onset    Hypertension Mother     Migraines Mother     Gestational diabetes Mother     Asthma Father             No Known Problems Sister     No Known Problems Sister     No Known Problems Sister     No Known Problems Brother     Diabetes Maternal Grandmother     Heart disease Maternal Grandmother     Heart failure Maternal Grandmother     Diabetes Maternal Grandfather     Liver cancer Maternal Grandfather     Lung cancer Paternal Grandmother     Breast cancer Neg Hx     Colon cancer Neg Hx     Ovarian cancer Neg Hx      Family history of uterine or ovarian cancer: no  Family history of breast cancer: no  Family history of colon cancer: no    Social History:  Social History     Socioeconomic History    Marital status: Single     Spouse name: Not on file    Number of children: 1    Years of education: Not on file    Highest education level: Some college, no degree   Occupational  History    Not on file   Tobacco Use    Smoking status: Never    Smokeless tobacco: Never   Vaping Use    Vaping status: Never Used   Substance and Sexual Activity    Alcohol use: Yes     Alcohol/week: 2.0 standard drinks of alcohol     Types: 2 Glasses of wine per week     Comment: social    Drug use: Never    Sexual activity: Yes     Partners: Male     Birth control/protection: None   Other Topics Concern    Not on file   Social History Narrative    Not on file     Social Drivers of Health     Financial Resource Strain: Not on file   Food Insecurity: Not on file   Transportation Needs: Not on file   Physical Activity: Not on file   Stress: Not on file   Social Connections: Not on file   Intimate Partner Violence: Not on file   Housing Stability: Not on file     Domestic violence screen: negative    Allergies:  Allergies   Allergen Reactions    Cat Hair Extract Hives    Dog Epithelium Hives    Penicillins Abdominal Pain     Ancef ok            PCN-positive skin test result       Medications:    Current Outpatient Medications:     acetaminophen (TYLENOL) 500 mg tablet, Take 500 mg by mouth every 6 (six) hours as needed for mild pain, Disp: , Rfl:     DUPIXENT subcutaneous injection, Inject 300 mg under the skin every 14 (fourteen) days 300/2ml--Most recent dose 5/10/24, Disp: , Rfl:     Cetirizine HCl (ZyrTEC Allergy) 10 MG CAPS, Take by mouth as needed (Patient not taking: Reported on 11/20/2024), Disp: , Rfl:     hydrocortisone 2.5 % ointment, APPLY TO ECZEMA TWICE DAILY UNTIL CLEAR, THEN TWICE DAILY 1 - 2 DAYS A WEEK AS DIRECTED, Disp: , Rfl:     Review of Systems:  Review of Systems   Constitutional:  Negative for chills and fever.   Respiratory:  Negative for shortness of breath.    Cardiovascular:  Negative for chest pain.   Gastrointestinal:  Negative for abdominal pain, constipation and diarrhea.   Genitourinary:  Positive for menstrual problem and pelvic pain (during menses). Negative for dysuria,  "frequency, vaginal discharge and vaginal pain.   Psychiatric/Behavioral:  Negative for self-injury and suicidal ideas.          Physical Exam:  /72 (BP Location: Left arm, Patient Position: Sitting, Cuff Size: Standard)   Ht 5' 2.5\" (1.588 m)   Wt 97.1 kg (214 lb)   BMI 38.52 kg/m²    Physical Exam  Constitutional:       Appearance: Normal appearance.   Genitourinary:      Vulva and urethral meatus normal.      No labial fusion noted.      No vaginal erythema or tenderness.        Right Adnexa: not tender.     Left Adnexa: not tender.     No cervical discharge or friability.      Uterus is not tender.   Breasts:     Breasts are symmetrical.      Right: Normal.      Left: Normal.   HENT:      Head: Normocephalic and atraumatic.   Neck:      Thyroid: No thyroid mass or thyroid tenderness.   Cardiovascular:      Rate and Rhythm: Normal rate and regular rhythm.      Heart sounds: Normal heart sounds. No murmur heard.  Pulmonary:      Effort: Pulmonary effort is normal.      Breath sounds: Normal breath sounds. No wheezing.   Abdominal:      Palpations: Abdomen is soft. There is no mass.      Tenderness: There is no abdominal tenderness.   Lymphadenopathy:      Cervical: No cervical adenopathy.   Neurological:      General: No focal deficit present.      Mental Status: She is alert and oriented to person, place, and time.   Skin:     General: Skin is warm and dry.   Psychiatric:         Mood and Affect: Mood normal.         Behavior: Behavior normal.   Vitals and nursing note reviewed.                              "

## 2025-01-20 ENCOUNTER — OFFICE VISIT (OUTPATIENT)
Dept: URGENT CARE | Facility: CLINIC | Age: 35
End: 2025-01-20
Payer: COMMERCIAL

## 2025-01-20 VITALS
BODY MASS INDEX: 40.48 KG/M2 | SYSTOLIC BLOOD PRESSURE: 133 MMHG | OXYGEN SATURATION: 100 % | HEART RATE: 110 BPM | RESPIRATION RATE: 20 BRPM | DIASTOLIC BLOOD PRESSURE: 80 MMHG | HEIGHT: 62 IN | TEMPERATURE: 102.5 F | WEIGHT: 220 LBS

## 2025-01-20 DIAGNOSIS — J06.9 VIRAL URI WITH COUGH: Primary | ICD-10-CM

## 2025-01-20 PROCEDURE — 87636 SARSCOV2 & INF A&B AMP PRB: CPT | Performed by: FAMILY MEDICINE

## 2025-01-20 PROCEDURE — 99204 OFFICE O/P NEW MOD 45 MIN: CPT | Performed by: FAMILY MEDICINE

## 2025-01-20 RX ORDER — BROMPHENIRAMINE MALEATE, PSEUDOEPHEDRINE HYDROCHLORIDE, AND DEXTROMETHORPHAN HYDROBROMIDE 2; 30; 10 MG/5ML; MG/5ML; MG/5ML
10 SYRUP ORAL 3 TIMES DAILY PRN
Qty: 200 ML | Refills: 0 | Status: SHIPPED | OUTPATIENT
Start: 2025-01-20

## 2025-01-20 NOTE — LETTER
To whom it may concern,      Mariam Streeter was seen in my office on 01/20/25. She may return to work, as long as she remains fever free for 24 hours, without the aid of any fever-reducing medication. She may consider wearing a mask while at work for the subsequent 5 days after returning to work. Thank you!      Sincerely,    Hunter Porter, DO

## 2025-01-20 NOTE — PROGRESS NOTES
[facility]        NAME: Mariam Streeter is a 34 y.o. female  : 1990    MRN: [id number]  DATE: 2025  TIME: 5:16 PM    Assessment and Plan   Viral URI with cough [J06.9]  1. Viral URI with cough  brompheniramine-pseudoephedrine-DM 30-2-10 MG/5ML syrup    Covid/Flu- Office Collect Normal          Flu/COVID swab collected and was sent to the lab. Results to return in 24 hours. Treatment guidelines discussed. Follow-up with PCP in the next 1-2 days for re-evaluation if symptoms continue or worsen. Go to the ED if any fevers, unable to stay hydrated, abdominal pain, chest pain, shortness of breath, wheezing, chest tightness, cough or cold symptoms, new or worsening symptoms or other concerning symptoms.     Medical Decision Making     PROBLEM: 1 undiagnosed new problem with uncertain prognosis    DATA: Test(s) Ordered: yes, covid flu    RISK: Prescription drug management      Chief Complaint     Chief Complaint   Patient presents with   • Fever     Fever with chills and body aches with headache. S/S started approx 4 days ago. Pt reports of Rib pain while coughing.          History of Present Illness     Mariam Streeter is a 34 y.o. female presenting to the office today for upper respiratory complaints. Symptoms have been present for 4 days, and include cough, congestion, body aches, headaches and fevers.  She has tried OTC cough medicine for her symptoms, with no relief.       Review of Systems     Review of Systems   Constitutional:  Positive for chills, fatigue and fever.   HENT:  Positive for congestion, postnasal drip and sore throat. Negative for ear discharge, ear pain, sinus pressure and sinus pain.    Eyes:  Negative for pain and discharge.   Respiratory:  Positive for cough. Negative for shortness of breath.    Cardiovascular:  Negative for chest pain and palpitations.   Gastrointestinal:  Negative for abdominal pain, diarrhea, nausea and vomiting.   Genitourinary:  Negative for  "difficulty urinating and dysuria.   Musculoskeletal:  Positive for arthralgias and myalgias.   Skin:  Negative for rash.   Neurological:  Positive for headaches. Negative for dizziness, syncope, light-headedness and numbness.   Psychiatric/Behavioral:  Negative for agitation.    All other systems reviewed and are negative.      Current Medications       Current Outpatient Medications:   •  brompheniramine-pseudoephedrine-DM 30-2-10 MG/5ML syrup, Take 10 mL by mouth 3 (three) times a day as needed for cough or congestion, Disp: 200 mL, Rfl: 0  •  acetaminophen (TYLENOL) 500 mg tablet, Take 500 mg by mouth every 6 (six) hours as needed for mild pain, Disp: , Rfl:   •  Cetirizine HCl (ZyrTEC Allergy) 10 MG CAPS, Take by mouth as needed (Patient not taking: Reported on 2024), Disp: , Rfl:   •  DUPIXENT subcutaneous injection, Inject 300 mg under the skin every 14 (fourteen) days 300/2ml--Most recent dose 5/10/24, Disp: , Rfl:   •  hydrocortisone 2.5 % ointment, APPLY TO ECZEMA TWICE DAILY UNTIL CLEAR, THEN TWICE DAILY 1 - 2 DAYS A WEEK AS DIRECTED, Disp: , Rfl:     Current Allergies     Allergies as of 2025 - Reviewed 2025   Allergen Reaction Noted   • Cat hair extract Hives 2018   • Dog epithelium Hives 2018   • Penicillins Abdominal Pain 2018            The following portions of the patient's history were reviewed and updated as appropriate: allergies, current medications, past family history, past medical history, past social history, past surgical history and problem list.     Past Medical History:   Diagnosis Date   • Abnormal Pap smear of cervix    • Allergy     cat and dog dander   • Anemia    • Anxiety    • Asthma     \"bronchial \"   • Bronchitis     past   •  delivery delivered 09/15/2020   • Eczema    • Family history of diabetes mellitus     \"Diabetes\"   • Family history of high blood pressure    • Gall stones    • Hypertension     \"with pregnancy-mildly high\"   • " "Migraine    • Obesity    • Pneumonia     in past   • Pollen allergies    • Post partum depression    • Varicella     immune per chart   • Wears contact lenses     will wear glasses DOS       Past Surgical History:   Procedure Laterality Date   • BARIATRIC SURGERY  2019   •  SECTION     • CHOLECYSTECTOMY     • EXPLORATORY LAPAROTOMY      2016, no findings    • GASTRECTOMY SLEEVE LAPAROSCOPIC  2019   • ID  DELIVERY ONLY N/A 09/15/2020    Procedure:  SECTION ();  Surgeon: Dora Yeager MD;  [facility];  Service: Obstetrics   • ID LAPS SURG CHOLECYSTECTOMY W/CHOLANGIOGRAPHY N/A 2024    Procedure: CHOLECYSTECTOMY LAPAROSCOPIC W/ INTRAOP CHOLANGIOGRAM;  Surgeon: Mj James MD;  [facility];  Service: General   • WISDOM TOOTH EXTRACTION         Family History   Problem Relation Age of Onset   • Hypertension Mother    • Migraines Mother    • Gestational diabetes Mother    • Asthma Father            • No Known Problems Sister    • No Known Problems Sister    • No Known Problems Sister    • No Known Problems Brother    • Diabetes Maternal Grandmother    • Heart disease Maternal Grandmother    • Heart failure Maternal Grandmother    • Diabetes Maternal Grandfather    • Liver cancer Maternal Grandfather    • Lung cancer Paternal Grandmother    • Breast cancer Neg Hx    • Colon cancer Neg Hx    • Ovarian cancer Neg Hx        Medications have been verified.    Objective     /80   Pulse (!) 110   Temp (!) 102.5 °F (39.2 °C)   Resp 20   Ht 5' 2\" (1.575 m)   Wt 99.8 kg (220 lb)   SpO2 100%   BMI 40.24 kg/m²   No LMP recorded.     Physical Exam     Physical Exam  Vitals reviewed.   Constitutional:       General: She is not in acute distress.     Appearance: Normal appearance. She is not ill-appearing.   HENT:      Head: Normocephalic and atraumatic.      Right Ear: Ear canal normal. A middle ear effusion is present.      Left Ear: Ear " canal normal. A middle ear effusion is present.      Mouth/Throat:      Mouth: Mucous membranes are moist.      Pharynx: Postnasal drip present. No oropharyngeal exudate.      Tonsils: No tonsillar exudate.   Eyes:      Extraocular Movements: Extraocular movements intact.      Conjunctiva/sclera: Conjunctivae normal.   Cardiovascular:      Rate and Rhythm: Normal rate and regular rhythm.      Pulses: Normal pulses.      Heart sounds: Normal heart sounds. No murmur heard.  Pulmonary:      Effort: Pulmonary effort is normal. No respiratory distress.      Breath sounds: Normal breath sounds. No wheezing.   Musculoskeletal:      Cervical back: Normal range of motion and neck supple. No tenderness.   Skin:     General: Skin is warm.   Neurological:      General: No focal deficit present.      Mental Status: She is alert.   Psychiatric:         Mood and Affect: Mood normal.         Behavior: Behavior normal.         Judgment: Judgment normal.

## 2025-01-22 LAB
FLUAV RNA RESP QL NAA+PROBE: POSITIVE
FLUBV RNA RESP QL NAA+PROBE: NEGATIVE
SARS-COV-2 RNA RESP QL NAA+PROBE: NEGATIVE

## 2025-02-26 ENCOUNTER — HOSPITAL ENCOUNTER (OUTPATIENT)
Dept: RADIOLOGY | Facility: HOSPITAL | Age: 35
Discharge: HOME | End: 2025-02-26
Payer: COMMERCIAL

## 2025-02-26 ENCOUNTER — OFFICE VISIT (OUTPATIENT)
Dept: INTERNAL MEDICINE CLINIC | Facility: CLINIC | Age: 35
End: 2025-02-26
Payer: COMMERCIAL

## 2025-02-26 VITALS
DIASTOLIC BLOOD PRESSURE: 82 MMHG | HEART RATE: 78 BPM | BODY MASS INDEX: 37.21 KG/M2 | SYSTOLIC BLOOD PRESSURE: 124 MMHG | OXYGEN SATURATION: 100 % | TEMPERATURE: 97.5 F | HEIGHT: 63 IN | WEIGHT: 210 LBS

## 2025-02-26 DIAGNOSIS — R05.2 SUBACUTE COUGH: ICD-10-CM

## 2025-02-26 DIAGNOSIS — J45.20 MILD INTERMITTENT ASTHMA, UNSPECIFIED WHETHER COMPLICATED: ICD-10-CM

## 2025-02-26 DIAGNOSIS — L20.9 ATOPIC DERMATITIS, UNSPECIFIED TYPE: ICD-10-CM

## 2025-02-26 DIAGNOSIS — R05.2 SUBACUTE COUGH: Primary | ICD-10-CM

## 2025-02-26 PROCEDURE — 99204 OFFICE O/P NEW MOD 45 MIN: CPT | Performed by: INTERNAL MEDICINE

## 2025-02-26 PROCEDURE — 71046 X-RAY EXAM CHEST 2 VIEWS: CPT

## 2025-02-26 NOTE — PROGRESS NOTES
Name: Mariam Streeter      : 1990      MRN: [id number]  Encounter Provider: Jed Salcedo MD  Encounter Date: 2025   Encounter department: [facility]  :  Assessment & Plan  Subacute cough  Likely related to recent influenza, and possibility of underlying asthma.  Lung exam today is normal.  Discussed further workup to include chest x-ray and pulmonary function tests.  Pulse ox is 100%  Orders:  •  XR chest pa and lateral; Future  •  Complete PFT with post bronchodilator; Future    Mild intermittent asthma, unspecified whether complicated  Patient has had albuterol in the past but never used it on a regular basis, never on steroid inhaler.  Orders:  •  Complete PFT with post bronchodilator; Future    Atopic dermatitis, unspecified type  Patient on Dupixent as per dermatology             Depression Screening and Follow-up Plan: Patient was screened for depression during today's encounter. They screened negative with a PHQ-9 score of 0.        History of Present Illness   Patient here to establish care and a concern.  Pt reports having a cough for about a month.  Pt reports having influenza about a month ago, and has been coughing since that time.  Pt reports having asthma.  Patient denies postnasal drip, no itchy eyes or runny nose, no significant heartburn, no difficulty swallowing or problems with food getting stuck.  Patient has wheezing.  Patient denies any irritant or allergen exposure on a regular basis.      Review of Systems   Constitutional:  Negative for chills, fatigue and fever.   HENT:  Negative for congestion, nosebleeds, postnasal drip, sore throat and trouble swallowing.    Eyes:  Negative for pain.   Respiratory:  Positive for cough and wheezing. Negative for chest tightness and shortness of breath.    Cardiovascular:  Negative for chest pain, palpitations and leg swelling.   Gastrointestinal:  Negative for abdominal pain, constipation, diarrhea, nausea and  "vomiting.   Endocrine: Negative for polydipsia and polyuria.   Genitourinary:  Negative for dysuria, flank pain and hematuria.   Musculoskeletal:  Negative for arthralgias.   Skin:  Negative for rash.   Neurological:  Positive for headaches (occasoinal). Negative for dizziness, tremors and light-headedness.   Hematological:  Does not bruise/bleed easily.   Psychiatric/Behavioral:  Negative for confusion and dysphoric mood. The patient is not nervous/anxious.        Objective   /82 (BP Location: Right arm, Patient Position: Sitting, Cuff Size: Standard)   Pulse 78   Temp 97.5 °F (36.4 °C) (Tympanic)   Ht 5' 2.5\" (1.588 m)   Wt 95.3 kg (210 lb)   SpO2 100%   BMI 37.80 kg/m²      Physical Exam  Constitutional:       Appearance: Normal appearance. She is well-developed.   HENT:      Head: Normocephalic and atraumatic.      Right Ear: External ear normal.      Left Ear: External ear normal.      Nose: Nose normal.      Mouth/Throat:      Mouth: Mucous membranes are moist.      Pharynx: No oropharyngeal exudate or posterior oropharyngeal erythema.   Eyes:      General: No scleral icterus.     Conjunctiva/sclera: Conjunctivae normal.   Neck:      Thyroid: No thyromegaly.      Trachea: No tracheal deviation.   Cardiovascular:      Rate and Rhythm: Normal rate and regular rhythm.      Heart sounds: Normal heart sounds. No murmur heard.  Pulmonary:      Effort: No respiratory distress.      Breath sounds: Normal breath sounds. No stridor. No wheezing, rhonchi or rales.   Abdominal:      General: Bowel sounds are normal.      Palpations: Abdomen is soft. There is no mass.      Tenderness: There is no abdominal tenderness. There is no guarding.   Musculoskeletal:      Cervical back: Normal range of motion and neck supple.      Right lower leg: No edema.      Left lower leg: No edema.   Lymphadenopathy:      Cervical: No cervical adenopathy.   Neurological:      General: No focal deficit present.      Mental Status: " She is alert and oriented to person, place, and time.   Psychiatric:         Mood and Affect: Mood normal.         Behavior: Behavior normal.         Thought Content: Thought content normal.         Judgment: Judgment normal.

## 2025-02-26 NOTE — ASSESSMENT & PLAN NOTE
Likely related to recent influenza, and possibility of underlying asthma.  Lung exam today is normal.  Discussed further workup to include chest x-ray and pulmonary function tests.  Pulse ox is 100%  Orders:  •  XR chest pa and lateral; Future  •  Complete PFT with post bronchodilator; Future

## 2025-02-26 NOTE — PATIENT INSTRUCTIONS
Problem List Items Addressed This Visit          Respiratory    Mild intermittent asthma    Patient has had albuterol in the past but never used it on a regular basis, never on steroid inhaler.         Relevant Orders    Complete PFT with post bronchodilator       Musculoskeletal and Integument    Atopic dermatitis    Patient on Dupixent as per dermatology            Other    Subacute cough - Primary    Likely related to recent influenza, and possibility of underlying asthma.  Lung exam today is normal.  Discussed further workup to include chest x-ray and pulmonary function tests.  Pulse ox is 100%         Relevant Orders    XR chest pa and lateral    Complete PFT with post bronchodilator

## 2025-02-26 NOTE — ASSESSMENT & PLAN NOTE
Patient has had albuterol in the past but never used it on a regular basis, never on steroid inhaler.  Orders:  •  Complete PFT with post bronchodilator; Future

## 2025-02-27 ENCOUNTER — RESULTS FOLLOW-UP (OUTPATIENT)
Dept: INTERNAL MEDICINE CLINIC | Facility: CLINIC | Age: 35
End: 2025-02-27

## 2025-03-11 ENCOUNTER — HOSPITAL ENCOUNTER (OUTPATIENT)
Dept: PULMONOLOGY | Facility: HOSPITAL | Age: 35
Discharge: HOME | End: 2025-03-11
Payer: COMMERCIAL

## 2025-03-11 DIAGNOSIS — R05.2 SUBACUTE COUGH: ICD-10-CM

## 2025-03-11 DIAGNOSIS — J45.20 MILD INTERMITTENT ASTHMA, UNSPECIFIED WHETHER COMPLICATED: ICD-10-CM

## 2025-03-11 PROCEDURE — 94729 DIFFUSING CAPACITY: CPT | Performed by: STUDENT IN AN ORGANIZED HEALTH CARE EDUCATION/TRAINING PROGRAM

## 2025-03-11 PROCEDURE — 94726 PLETHYSMOGRAPHY LUNG VOLUMES: CPT

## 2025-03-11 PROCEDURE — 94760 N-INVAS EAR/PLS OXIMETRY 1: CPT

## 2025-03-11 PROCEDURE — 94060 EVALUATION OF WHEEZING: CPT | Performed by: STUDENT IN AN ORGANIZED HEALTH CARE EDUCATION/TRAINING PROGRAM

## 2025-03-11 PROCEDURE — 94729 DIFFUSING CAPACITY: CPT

## 2025-03-11 PROCEDURE — 94726 PLETHYSMOGRAPHY LUNG VOLUMES: CPT | Performed by: STUDENT IN AN ORGANIZED HEALTH CARE EDUCATION/TRAINING PROGRAM

## 2025-03-11 PROCEDURE — 94060 EVALUATION OF WHEEZING: CPT

## 2025-03-11 RX ORDER — ALBUTEROL SULFATE 0.83 MG/ML
2.5 SOLUTION RESPIRATORY (INHALATION) ONCE
Status: COMPLETED | OUTPATIENT
Start: 2025-03-11 | End: 2025-03-11

## 2025-03-11 RX ADMIN — ALBUTEROL SULFATE 2.5 MG: 2.5 SOLUTION RESPIRATORY (INHALATION) at 08:34

## 2025-03-28 ENCOUNTER — RESULTS FOLLOW-UP (OUTPATIENT)
Dept: URGENT CARE | Facility: CLINIC | Age: 35
End: 2025-03-28

## 2025-03-28 PROBLEM — R05.2 SUBACUTE COUGH: Status: RESOLVED | Noted: 2025-02-26 | Resolved: 2025-03-28

## 2025-05-17 ENCOUNTER — NURSE TRIAGE (OUTPATIENT)
Dept: OTHER | Facility: OTHER | Age: 35
End: 2025-05-17

## 2025-05-17 NOTE — TELEPHONE ENCOUNTER
"FOLLOW UP: None needed    REASON FOR CONVERSATION: Vaginal Bleeding - Pregnant    SYMPTOMS: Spotting x2    OTHER:      DISPOSITION: Home Care    Reason for Disposition   SPOTTING (single or brief episode)    Answer Assessment - Initial Assessment Questions  1. ONSET: \"When did this bleeding start?\"        Here and there yesterday but heavier today     2. BLEEDING SEVERITY: \"Describe the bleeding that you are having.\" \"How much bleeding is there?\"       \"It's not coming on a pad, it's every time I wipe.\"     3. ABDOMEN PAIN: \"Do you have any pain?\" \"How bad is the pain?\"  (e.g., Scale 0-10; none, mild, moderate, or severe)      Cramping     4. PREGNANCY: \"Do you know how many weeks or months pregnant you are?\" \"When was the first day of your last normal menstrual period?\"      6weeks     5. ULTRASOUND: \"Have you had an ultrasound during this pregnancy?\"  Note: To confirm intrauterine pregnancy, placenta location.      Scheduled for      6. HEMODYNAMIC STATUS: \"Are you weak or feeling lightheaded?\" If Yes, ask: \"Can you stand and walk normally?\"       Denies dizziness/weakness/lightness     7. OTHER SYMPTOMS: \"What other symptoms are you having with the bleeding?\" (e.g., passed tissue, vaginal discharge, fever, menstrual-type cramps)      Cramping    Protocols used: Pregnancy - Vaginal Bleeding Less Than 20 Weeks [id number]    "

## 2025-05-19 ENCOUNTER — HOSPITAL ENCOUNTER (EMERGENCY)
Facility: HOSPITAL | Age: 35
Discharge: HOME | End: 2025-05-19
Attending: EMERGENCY MEDICINE
Payer: COMMERCIAL

## 2025-05-19 ENCOUNTER — APPOINTMENT (EMERGENCY)
Dept: ULTRASOUND IMAGING | Facility: HOSPITAL | Age: 35
End: 2025-05-19
Payer: COMMERCIAL

## 2025-05-19 ENCOUNTER — NURSE TRIAGE (OUTPATIENT)
Age: 35
End: 2025-05-19

## 2025-05-19 VITALS
RESPIRATION RATE: 18 BRPM | DIASTOLIC BLOOD PRESSURE: 85 MMHG | SYSTOLIC BLOOD PRESSURE: 152 MMHG | HEART RATE: 101 BPM | OXYGEN SATURATION: 99 % | TEMPERATURE: 98.2 F

## 2025-05-19 DIAGNOSIS — O20.9 VAGINAL BLEEDING IN PREGNANCY, FIRST TRIMESTER: Primary | ICD-10-CM

## 2025-05-19 DIAGNOSIS — O46.90 VAGINAL BLEEDING IN PREGNANCY: Primary | ICD-10-CM

## 2025-05-19 LAB
ABO GROUP BLD: NORMAL
ALBUMIN SERPL BCG-MCNC: 4.2 G/DL (ref 3.5–5)
ALP SERPL-CCNC: 66 U/L (ref 34–104)
ALT SERPL W P-5'-P-CCNC: 15 U/L (ref 7–52)
ANION GAP SERPL CALCULATED.3IONS-SCNC: 8 MMOL/L (ref 4–13)
AST SERPL W P-5'-P-CCNC: 20 U/L (ref 13–39)
B-HCG SERPL-ACNC: 582.7 MIU/ML (ref 0–5)
BACTERIA UR QL AUTO: ABNORMAL /HPF
BASOPHILS # BLD AUTO: 0.03 THOUSANDS/ÂΜL (ref 0–0.1)
BASOPHILS NFR BLD AUTO: 0 % (ref 0–1)
BILIRUB SERPL-MCNC: 0.46 MG/DL (ref 0.2–1)
BILIRUB UR QL STRIP: NEGATIVE
BLD GP AB SCN SERPL QL: NEGATIVE
BUN SERPL-MCNC: 11 MG/DL (ref 5–25)
CALCIUM SERPL-MCNC: 9.1 MG/DL (ref 8.4–10.2)
CHLORIDE SERPL-SCNC: 106 MMOL/L (ref 96–108)
CLARITY UR: CLEAR
CO2 SERPL-SCNC: 25 MMOL/L (ref 21–32)
COLOR UR: YELLOW
CREAT SERPL-MCNC: 0.76 MG/DL (ref 0.6–1.3)
EOSINOPHIL # BLD AUTO: 0.12 THOUSAND/ÂΜL (ref 0–0.61)
EOSINOPHIL NFR BLD AUTO: 1 % (ref 0–6)
ERYTHROCYTE [DISTWIDTH] IN BLOOD BY AUTOMATED COUNT: 13 % (ref 11.6–15.1)
GFR SERPL CREATININE-BSD FRML MDRD: 102 ML/MIN/1.73SQ M
GLUCOSE SERPL-MCNC: 113 MG/DL (ref 65–140)
GLUCOSE UR STRIP-MCNC: NEGATIVE MG/DL
HCT VFR BLD AUTO: 37.7 % (ref 34.8–46.1)
HGB BLD-MCNC: 12.3 G/DL (ref 11.5–15.4)
HGB UR QL STRIP.AUTO: ABNORMAL
IMM GRANULOCYTES # BLD AUTO: 0.05 THOUSAND/UL (ref 0–0.2)
IMM GRANULOCYTES NFR BLD AUTO: 0 % (ref 0–2)
KETONES UR STRIP-MCNC: NEGATIVE MG/DL
LEUKOCYTE ESTERASE UR QL STRIP: NEGATIVE
LYMPHOCYTES # BLD AUTO: 2.05 THOUSANDS/ÂΜL (ref 0.6–4.47)
LYMPHOCYTES NFR BLD AUTO: 18 % (ref 14–44)
MCH RBC QN AUTO: 29.6 PG (ref 26.8–34.3)
MCHC RBC AUTO-ENTMCNC: 32.6 G/DL (ref 31.4–37.4)
MCV RBC AUTO: 91 FL (ref 82–98)
MONOCYTES # BLD AUTO: 0.65 THOUSAND/ÂΜL (ref 0.17–1.22)
MONOCYTES NFR BLD AUTO: 6 % (ref 4–12)
MUCOUS THREADS UR QL AUTO: ABNORMAL
NEUTROPHILS # BLD AUTO: 8.84 THOUSANDS/ÂΜL (ref 1.85–7.62)
NEUTS SEG NFR BLD AUTO: 75 % (ref 43–75)
NITRITE UR QL STRIP: NEGATIVE
NON-SQ EPI CELLS URNS QL MICRO: ABNORMAL /HPF
NRBC BLD AUTO-RTO: 0 /100 WBCS
PH UR STRIP.AUTO: 6 [PH]
PLATELET # BLD AUTO: 361 THOUSANDS/UL (ref 149–390)
PMV BLD AUTO: 9.9 FL (ref 8.9–12.7)
POTASSIUM SERPL-SCNC: 3.7 MMOL/L (ref 3.5–5.3)
PROT SERPL-MCNC: 6.9 G/DL (ref 6.4–8.4)
PROT UR STRIP-MCNC: NEGATIVE MG/DL
RBC # BLD AUTO: 4.15 MILLION/UL (ref 3.81–5.12)
RBC #/AREA URNS AUTO: ABNORMAL /HPF
RH BLD: POSITIVE
SODIUM SERPL-SCNC: 139 MMOL/L (ref 135–147)
SP GR UR STRIP.AUTO: 1.03 (ref 1–1.03)
SPECIMEN EXPIRATION DATE: NORMAL
UROBILINOGEN UR STRIP-ACNC: <2 MG/DL
WBC # BLD AUTO: 11.74 THOUSAND/UL (ref 4.31–10.16)
WBC #/AREA URNS AUTO: ABNORMAL /HPF

## 2025-05-19 PROCEDURE — 86900 BLOOD TYPING SEROLOGIC ABO: CPT

## 2025-05-19 PROCEDURE — 85025 COMPLETE CBC W/AUTO DIFF WBC: CPT

## 2025-05-19 PROCEDURE — 87086 URINE CULTURE/COLONY COUNT: CPT

## 2025-05-19 PROCEDURE — 86901 BLOOD TYPING SEROLOGIC RH(D): CPT

## 2025-05-19 PROCEDURE — 36415 COLL VENOUS BLD VENIPUNCTURE: CPT

## 2025-05-19 PROCEDURE — 76815 OB US LIMITED FETUS(S): CPT

## 2025-05-19 PROCEDURE — 99284 EMERGENCY DEPT VISIT MOD MDM: CPT

## 2025-05-19 PROCEDURE — 80053 COMPREHEN METABOLIC PANEL: CPT

## 2025-05-19 PROCEDURE — 81001 URINALYSIS AUTO W/SCOPE: CPT

## 2025-05-19 PROCEDURE — 84702 CHORIONIC GONADOTROPIN TEST: CPT

## 2025-05-19 PROCEDURE — 86850 RBC ANTIBODY SCREEN: CPT

## 2025-05-19 NOTE — TELEPHONE ENCOUNTER
"FOLLOW UP:   Epic Secure Chat sent to Dr Bowling:  [employer] Secure Chat received: monitor, heavy bleeding    Pt is notified of the providers recommendations, pt verbalized understanding.     REASON FOR CONVERSATION: Vaginal Bleeding - Pregnant    SYMPTOMS: cramping 2/10 sharp pain, vaginal bleeding since Friday     OTHER: Pt calling in, she's pregnant LMP 4/1/25, D&V US scheduled 5/28/25. Pt is reporting spotting when wiping starting on 5/16/25 and got heavier on Saturday. Pt has bleeding size if half dollar coin in size for reference, bright red bleeding and mixed with dark brown in color. Pt is cramping 2/10 stabbing pain with lower back 2-3/10.     Pt is advised to monitor the bleeding and to call back if symptoms worsen, pt verbalized understanding. Pt is advised to use tylenol for cramping and a heating pad for lower back deu to pain, pt verbalized understanding. Pt is advised when to call back, pt verbalized understanding.     DISPOSITION:     Reason for Disposition   SPOTTING (single or brief episode)    Answer Assessment - Initial Assessment Questions  1. ONSET: \"When did this bleeding start?\"        Friday 5/16/25.   2. BLEEDING SEVERITY: \"Describe the bleeding that you are having.\" \"How much bleeding is there?\"       Bleeding on a pad, half dollar coin in size for reference.   3. ABDOMEN PAIN: \"Do you have any pain?\" \"How bad is the pain?\"  (e.g., Scale 0-10; none, mild, moderate, or severe)      Cramping 2/10, stabbing pain   4. PREGNANCY: \"Do you know how many weeks or months pregnant you are?\" \"When was the first day of your last normal menstrual period?\"      LMP 4/1/25   5. ULTRASOUND: \"Have you had an ultrasound during this pregnancy?\"  Note: To confirm intrauterine pregnancy, placenta location.      no  6. HEMODYNAMIC STATUS: \"Are you weak or feeling lightheaded?\" If Yes, ask: \"Can you stand and walk normally?\"       Pt denies feeling weak or lightheaded   7. OTHER SYMPTOMS: \"What other symptoms are you " "having with the bleeding?\" (e.g., passed tissue, vaginal discharge, fever, menstrual-type cramps)      Pt denies passed tissue, vaginal discharge, fevers.    Protocols used: Pregnancy - Vaginal Bleeding Less Than 20 Weeks EGA-Adult-OH    "

## 2025-05-19 NOTE — ED PROVIDER NOTES
"Time reflects when diagnosis was documented in both MDM as applicable and the Disposition within this note       Time User Action Codes Description Comment    2025  8:57 PM Crissy Todd Add [O20.9] Vaginal bleeding in pregnancy, first trimester           ED Disposition       ED Disposition   Discharge    Condition   Stable    Date/Time   Mon May 19, 2025  8:57 PM    Comment   Mariam Streeter discharge to home/self care.                   Assessment & Plan       Medical Decision Making  35-year-old  female who is currently about 6 weeks pregnant based on her last menstrual period, which was , here for 2 days of abdominal pain and vaginal bleeding.  States the abdominal pain has kind of subsided, it is more like a cramping in the suprapubic region now.  Admits to filling several pads a day.  Patient is well-appearing on exam, in no distress.  Vitals are normal.  Labs were grossly unremarkable, UA negative for infection. U/S showed \"Equivocal tiny intrauterine gestational sac. No adnexal mass identified. Differential remains early IUP, spontaneous  and ectopic pregnancy. Correlate with serial quantitative bHCG.\"  Discussed findings with patient.  Discussed follow-up with repeat beta-hCG, following up with OB/GYN, risks and strict return precautions.  Patient was agreeable to plan and was discharged home.      Amount and/or Complexity of Data Reviewed  Labs: ordered. Decision-making details documented in ED Course.  Radiology: ordered.        ED Course as of 25 2104   Mon May 19, 2025   1850 UA w Reflex to Microscopic w Reflex to Culture(!)  Negative for infection   1850 Comprehensive metabolic panel  Normal        Medications - No data to display    ED Risk Strat Scores                    No data recorded        SBIRT 20yo+      Flowsheet Row Most Recent Value   Initial Alcohol Screen: US AUDIT-C     1. How often do you have a drink containing alcohol? 0 Filed at: 2025 1538 " "  2. How many drinks containing alcohol do you have on a typical day you are drinking?  0 Filed at: 2025 1530   3b. FEMALE Any Age, or MALE 65+: How often do you have 4 or more drinks on one occassion? 0 Filed at: 2025 1530   Audit-C Score 0 Filed at: 2025 1532   SHADE: How many times in the past year have you...    Used an illegal drug or used a prescription medication for non-medical reasons? Never Filed at: 2025 1537                            History of Present Illness       Chief Complaint   Patient presents with    Vaginal Bleeding - Pregnant     Pt is 6 weeks pregnant, having painful vaginal bleeding on Saturday. Denies clots, going through a pad a day. +abdominal cramping, relieved by walking. Hx of preeclampsia in previous pregnancy. Denies SOB/chest pain       Past Medical History:   Diagnosis Date    Abnormal Pap smear of cervix     Allergy     cat and dog dander    Anemia     Anxiety     Asthma     \"bronchial \"    Bronchitis     past     delivery delivered 09/15/2020    Eczema     Family history of diabetes mellitus     \"Diabetes\"    Family history of high blood pressure     Gall stones     Hypertension     \"with pregnancy-mildly high\"    Migraine     Obesity     Pneumonia     in past    Pollen allergies     Post partum depression     Varicella     immune per chart    Wears contact lenses     will wear glasses DOS      Past Surgical History:   Procedure Laterality Date    BARIATRIC SURGERY  2019     SECTION      CHOLECYSTECTOMY      EXPLORATORY LAPAROTOMY      2016, no findings     GALLBLADDER SURGERY      GASTRECTOMY SLEEVE LAPAROSCOPIC  2019    [place]  DELIVERY ONLY N/A 09/15/2020    Procedure:  SECTION ();  Surgeon: Dora Yeager MD;  Location: [facility] ;  Service: Obstetrics    [place] LAPS SURG CHOLECYSTECTOMY W/CHOLANGIOGRAPHY N/A 2024    Procedure: CHOLECYSTECTOMY LAPAROSCOPIC W/ INTRAOP CHOLANGIOGRAM;  Surgeon: Mj Jaime " MD Jacob;  Location: [facility];  Service: General    WISDOM TOOTH EXTRACTION        Family History   Problem Relation Age of Onset    Hypertension Mother     Migraines Mother     Gestational diabetes Mother     Asthma Father             No Known Problems Sister     No Known Problems Sister     No Known Problems Sister     No Known Problems Brother     Diabetes Maternal Grandmother     Heart disease Maternal Grandmother     Heart failure Maternal Grandmother     Diabetes Maternal Grandfather     Liver cancer Maternal Grandfather     Lung cancer Paternal Grandmother     Breast cancer Neg Hx     Colon cancer Neg Hx     Ovarian cancer Neg Hx       Social History[1]   E-Cigarette/Vaping    E-Cigarette Use Never User       E-Cigarette/Vaping Substances    Nicotine No     THC No     CBD No     Flavoring No     Other No     Unknown No       I have reviewed and agree with the history as documented.     Patient is a 35-year-old  female who is currently about 6 weeks pregnant based on her last menstrual period, which was , here for 2 days of abdominal pain and vaginal bleeding.  States the abdominal pain has kind of subsided, it is more like a cramping in the suprapubic region now.  Admits to filling several pads a day, which is much heavier than any menstrual cycle she has had in the past.          Review of Systems   Constitutional:  Negative for chills and fever.   HENT:  Negative for congestion, ear pain, rhinorrhea and sore throat.    Eyes:  Negative for pain and visual disturbance.   Respiratory:  Negative for cough, chest tightness, shortness of breath and wheezing.    Cardiovascular:  Negative for chest pain and palpitations.   Gastrointestinal:  Positive for abdominal pain. Negative for diarrhea, nausea and vomiting.   Genitourinary:  Positive for vaginal bleeding. Negative for difficulty urinating, dysuria, flank pain, frequency, hematuria, urgency and vaginal discharge.   Musculoskeletal:   Negative for arthralgias, back pain, myalgias, neck pain and neck stiffness.   Skin:  Negative for color change and rash.   Neurological:  Negative for dizziness, seizures, syncope, weakness, light-headedness and headaches.   All other systems reviewed and are negative.      Objective       ED Triage Vitals [05/19/25 1534]   Temperature Pulse Blood Pressure Respirations SpO2 Patient Position - Orthostatic VS   98.2 °F (36.8 °C) 101 152/85 18 99 % Sitting      Temp Source Heart Rate Source BP Location FiO2 (%) Pain Score    Oral Monitor Right arm -- --      Vitals      Date and Time Temp Pulse SpO2 Resp BP Pain Score FACES Pain Rating User   05/19/25 1534 98.2 °F (36.8 °C) 101 99 % 18 152/85 -- -- EV            Physical Exam  Vitals and nursing note reviewed.   Constitutional:       General: She is not in acute distress.     Appearance: Normal appearance. She is not ill-appearing, toxic-appearing or diaphoretic.   HENT:      Head: Normocephalic and atraumatic.      Mouth/Throat:      Mouth: Mucous membranes are moist.      Pharynx: Oropharynx is clear. No oropharyngeal exudate or posterior oropharyngeal erythema.     Eyes:      Extraocular Movements: Extraocular movements intact.      Conjunctiva/sclera: Conjunctivae normal.      Pupils: Pupils are equal, round, and reactive to light.       Cardiovascular:      Rate and Rhythm: Normal rate and regular rhythm.      Pulses: Normal pulses.      Heart sounds: Normal heart sounds.   Pulmonary:      Effort: Pulmonary effort is normal. No tachypnea, accessory muscle usage or respiratory distress.      Breath sounds: Normal breath sounds. No stridor. No wheezing, rhonchi or rales.   Chest:      Chest wall: No tenderness.   Abdominal:      General: There is no distension.      Palpations: Abdomen is soft.      Tenderness: There is no abdominal tenderness. There is no guarding or rebound.     Musculoskeletal:         General: Normal range of motion.      Cervical back: Normal  range of motion and neck supple.     Skin:     General: Skin is warm and dry.      Capillary Refill: Capillary refill takes less than 2 seconds.     Neurological:      General: No focal deficit present.      Mental Status: She is alert and oriented to person, place, and time.     Psychiatric:         Mood and Affect: Mood normal.         Behavior: Behavior normal.         Thought Content: Thought content normal.         Judgment: Judgment normal.         Results Reviewed       Procedure Component Value Units Date/Time    Urine Microscopic [[id number]]  (Abnormal) Collected: 05/19/25 1821    Lab Status: Final result Specimen: Urine, Clean Catch Updated: 05/19/25 1842     RBC, UA 1-2 /hpf      WBC, UA 2-4 /hpf      Epithelial Cells Occasional /hpf      Bacteria, UA Occasional /hpf      MUCUS THREADS Occasional     URINE COMMENT --    UA w Reflex to Microscopic w Reflex to Culture [[id number]]  (Abnormal) Collected: 05/19/25 1821    Lab Status: Final result Specimen: Urine, Clean Catch Updated: 05/19/25 1839     Color, UA Yellow     Clarity, UA Clear     Specific Gravity, UA 1.029     pH, UA 6.0     Leukocytes, UA Negative     Nitrite, UA Negative     Protein, UA Negative mg/dl      Glucose, UA Negative mg/dl      Ketones, UA Negative mg/dl      Urobilinogen, UA <2.0 mg/dl      Bilirubin, UA Negative     Occult Blood, UA Large     URINE COMMENT --    Urine culture [[id number]] Collected: 05/19/25 1821    Lab Status: In process Specimen: Urine, Clean Catch Updated: 05/19/25 1839    hCG, quantitative, pregnancy [[id number]]  (Abnormal) Collected: 05/19/25 1541    Lab Status: Final result Specimen: Blood from Arm, Right Updated: 05/19/25 1624     HCG, Quant 582.7 mIU/mL     Narrative:       Expected Ranges:    HCG results between 5.0 and 25.0 mIU/mL may be indicative of early pregnancy but should be interpreted in light of the total clinical presentation.    HCG can rise to detectable levels in colton and post menopausal women  (0-11.6 mIU/mL).     Approximate               Approximate HCG  Gestation age          Concentration ( mIU/mL)  _____________          ______________________   Weeks                      HCG values  0.2-1                       5-50  1-2                           2-3                         100-5000  3-4                         500-02518  4-5                         1000-29190  5-6                         [id number]  6-8                         [id number]  8-12                        [id number]      Comprehensive metabolic panel [[id number]] Collected: 05/19/25 1541    Lab Status: Final result Specimen: Blood from Arm, Right Updated: 05/19/25 1614     Sodium 139 mmol/L      Potassium 3.7 mmol/L      Chloride 106 mmol/L      CO2 25 mmol/L      ANION GAP 8 mmol/L      BUN 11 mg/dL      Creatinine 0.76 mg/dL      Glucose 113 mg/dL      Calcium 9.1 mg/dL      AST 20 U/L      ALT 15 U/L      Alkaline Phosphatase 66 U/L      Total Protein 6.9 g/dL      Albumin 4.2 g/dL      Total Bilirubin 0.46 mg/dL      eGFR 102 ml/min/1.73sq m     Narrative:      National Kidney Disease Foundation guidelines for Chronic Kidney Disease (CKD):     Stage 1 with normal or high GFR (GFR > 90 mL/min/1.73 square meters)    Stage 2 Mild CKD (GFR = 60-89 mL/min/1.73 square meters)    Stage 3A Moderate CKD (GFR = 45-59 mL/min/1.73 square meters)    Stage 3B Moderate CKD (GFR = 30-44 mL/min/1.73 square meters)    Stage 4 Severe CKD (GFR = 15-29 mL/min/1.73 square meters)    Stage 5 End Stage CKD (GFR <15 mL/min/1.73 square meters)  Note: GFR calculation is accurate only with a steady state creatinine    CBC and differential [[id number]]  (Abnormal) Collected: 05/19/25 1541    Lab Status: Final result Specimen: Blood from Arm, Right Updated: 05/19/25 1603     WBC 11.74 Thousand/uL      RBC 4.15 Million/uL      Hemoglobin 12.3 g/dL      Hematocrit 37.7 %      MCV 91 fL      MCH 29.6 pg      MCHC 32.6 g/dL      RDW 13.0 %      MPV 9.9 fL       Platelets 361 Thousands/uL      nRBC 0 /100 WBCs      Segmented % 75 %      Immature Grans % 0 %      Lymphocytes % 18 %      Monocytes % 6 %      Eosinophils Relative 1 %      Basophils Relative 0 %      Absolute Neutrophils 8.84 Thousands/µL      Absolute Immature Grans 0.05 Thousand/uL      Absolute Lymphocytes 2.05 Thousands/µL      Absolute Monocytes 0.65 Thousand/µL      Eosinophils Absolute 0.12 Thousand/µL      Basophils Absolute 0.03 Thousands/µL             US OB pregnancy limited with transvaginal   Final Interpretation by Carl Oliver MD (2048)      Equivocal tiny intrauterine gestational sac. No adnexal mass identified.      Differential remains early IUP, spontaneous  and ectopic pregnancy. Correlate with serial quantitative bHCG.      The study was marked in EPIC for immediate notification.      Workstation performed: [id number]             Procedures    ED Medication and Procedure Management   Prior to Admission Medications   Prescriptions Last Dose Informant Patient Reported? Taking?   Cetirizine HCl (ZyrTEC Allergy) 10 MG CAPS  Self Yes No   Sig: Take by mouth as needed   Patient not taking: Reported on 2024   DUPIXENT subcutaneous injection  Self Yes No   Sig: Inject 300 mg under the skin every 14 (fourteen) days 300/2ml--Most recent dose 5/10/24   acetaminophen (TYLENOL) 500 mg tablet   Yes No   Sig: Take 500 mg by mouth every 6 (six) hours as needed for mild pain   brompheniramine-pseudoephedrine-DM 30-2-10 MG/5ML syrup   No No   Sig: Take 10 mL by mouth 3 (three) times a day as needed for cough or congestion   hydrocortisone 2.5 % ointment  Self Yes No   Sig: APPLY TO ECZEMA TWICE DAILY UNTIL CLEAR, THEN TWICE DAILY 1 - 2 DAYS A WEEK AS DIRECTED   Patient not taking: Reported on 2025      Facility-Administered Medications: None     Patient's Medications   Discharge Prescriptions    No medications on file     Outpatient Discharge Orders   hCG, quantitative    Standing Status: Future Standing Exp. Date: 07/19/26     ED SEPSIS DOCUMENTATION   Time reflects when diagnosis was documented in both MDM as applicable and the Disposition within this note       Time User Action Codes Description Comment    5/19/2025  8:57 PM Crissy Todd Add [O20.9] Vaginal bleeding in pregnancy, first trimester                      [1]   Social History  Tobacco Use    Smoking status: Never    Smokeless tobacco: Never   Vaping Use    Vaping status: Never Used   Substance Use Topics    Alcohol use: Yes     Alcohol/week: 2.0 standard drinks of alcohol     Types: 2 Glasses of wine per week     Comment: social    Drug use: Never        Crissy Todd PA-C  05/19/25 2774

## 2025-05-20 LAB — BACTERIA UR CULT: NORMAL

## 2025-05-20 NOTE — DISCHARGE INSTRUCTIONS
Have repeat hCG level done in 48 hours.  Take Tylenol as needed for pain.  Follow-up with OB/GYN this week.  If symptoms worsen, return to the emergency department for reevaluation.

## 2025-05-21 ENCOUNTER — APPOINTMENT (OUTPATIENT)
Dept: LAB | Facility: CLINIC | Age: 35
End: 2025-05-21
Payer: COMMERCIAL

## 2025-05-21 ENCOUNTER — TELEPHONE (OUTPATIENT)
Age: 35
End: 2025-05-21

## 2025-05-21 DIAGNOSIS — O20.9 VAGINAL BLEEDING IN PREGNANCY, FIRST TRIMESTER: ICD-10-CM

## 2025-05-21 LAB — B-HCG SERPL-ACNC: 451.5 MIU/ML (ref 0–5)

## 2025-05-21 PROCEDURE — 36415 COLL VENOUS BLD VENIPUNCTURE: CPT

## 2025-05-21 PROCEDURE — 84702 CHORIONIC GONADOTROPIN TEST: CPT

## 2025-05-21 NOTE — TELEPHONE ENCOUNTER
Patient received [employer] notification of HCG level results. She is concerned because the number has dropped from previous, and she has had ongoing vaginal bleeding. Is changing 1 pad per day, with intermittent abdominal pain. Seen in ER for same 5/19. Advised will review with provider for additional recommendations, reviewed going back to ER for heavy bleeding, clots, or severe abdominal pain.     [facility] Dr Das

## 2025-05-22 NOTE — TELEPHONE ENCOUNTER
Per Dr Das, recommending HCG level on Friday. Called patient to review. Patient states her bleeding has stopped at this time, she will continue to monitor. Patient verbalized understanding and is thankful.

## 2025-05-23 ENCOUNTER — LAB (OUTPATIENT)
Dept: LAB | Facility: CLINIC | Age: 35
End: 2025-05-23
Payer: COMMERCIAL

## 2025-05-23 DIAGNOSIS — O46.90 VAGINAL BLEEDING IN PREGNANCY: ICD-10-CM

## 2025-05-23 LAB — B-HCG SERPL-ACNC: 486.8 MIU/ML (ref 0–5)

## 2025-05-23 PROCEDURE — 36415 COLL VENOUS BLD VENIPUNCTURE: CPT

## 2025-05-23 PROCEDURE — 84702 CHORIONIC GONADOTROPIN TEST: CPT

## 2025-05-27 NOTE — TELEPHONE ENCOUNTER
04/20/22 11:34 AM     See documentation in the [employer] Smart[employer]       Kat Heading Rerouting back to office/provider

## 2025-05-28 ENCOUNTER — APPOINTMENT (OUTPATIENT)
Dept: LAB | Facility: AMBULARY SURGERY CENTER | Age: 35
End: 2025-05-28
Attending: PHYSICIAN ASSISTANT
Payer: COMMERCIAL

## 2025-05-28 ENCOUNTER — ULTRASOUND (OUTPATIENT)
Dept: OBGYN CLINIC | Facility: CLINIC | Age: 35
End: 2025-05-28
Payer: COMMERCIAL

## 2025-05-28 VITALS
WEIGHT: 213.2 LBS | SYSTOLIC BLOOD PRESSURE: 108 MMHG | HEIGHT: 63 IN | DIASTOLIC BLOOD PRESSURE: 78 MMHG | BODY MASS INDEX: 37.78 KG/M2

## 2025-05-28 DIAGNOSIS — O02.1 MISSED AB: Primary | ICD-10-CM

## 2025-05-28 LAB — B-HCG SERPL-ACNC: 482.6 MIU/ML (ref 0–5)

## 2025-05-28 PROCEDURE — 36415 COLL VENOUS BLD VENIPUNCTURE: CPT | Performed by: PHYSICIAN ASSISTANT

## 2025-05-28 PROCEDURE — 84702 CHORIONIC GONADOTROPIN TEST: CPT | Performed by: PHYSICIAN ASSISTANT

## 2025-05-28 PROCEDURE — 99214 OFFICE O/P EST MOD 30 MIN: CPT | Performed by: PHYSICIAN ASSISTANT

## 2025-05-28 PROCEDURE — 76817 TRANSVAGINAL US OBSTETRIC: CPT | Performed by: PHYSICIAN ASSISTANT

## 2025-05-28 NOTE — PROGRESS NOTES
Assessment/Plan:  - TVUS reveals very small 2mm Intrauterine GS  - Minimal bleeding at this time  - Repeat HCG ordered, will trend to 0  -Blood type b pos  -Patient to continue to monitor bleeding  -Discussed contraception/conception counseling  -Recommend patient wait until next normal period to try again for conception  -Patient to call for concerns or if she develops fevers, chills, severe abdominal pain  -RTO for annual or sooner for pregnancy/contraception visit    Encounter Diagnosis     ICD-10-CM    1. Missed ab  O02.1 hCG, quantitative              Subjective:       Patient ID: Mariam Streeter [id number]        Mariam Streeter is a 35 y.o.  presenting to the office for f/u US. Patient was seen in the ER 9 days ago for bleeding in early pregnancy. She had a very small intrauterine gestational sac on US. Her HCG was ~500 and has since decreased. She had heavy bleeding for 1 week.      25  4:10 PM 25  3:47 PM 25  3:41 PM    HCG, Quant 486.8 High  451.5 High  582.7 High                  OB History    Para Term  AB Living   4 1 0 1 2 1   SAB IAB Ectopic Multiple Live Births   0 2 0 0 1      # Outcome Date GA Lbr Garry/2nd Weight Sex Type Anes PTL Lv   4 Current            3  09/15/20 30w2d  770 g (1 lb 11.2 oz) F CS-LTranv EPI, Spinal N JERRY   2 IAB            1 IAB               Obstetric Comments   Menarche 14         The following portions of the patient's history were reviewed and updated as appropriate: allergies, current medications, past family history, past medical history, past social history, past surgical history, and problem list.    Allergies:  Cat dander, Dog epithelium, and Penicillins    Medications:  Current Medications[1]      Review of Systems:   Review of Systems   Genitourinary:  Positive for vaginal bleeding. Negative for menstrual problem, vaginal discharge and vaginal pain.          Objective:       Visit Vitals  /78 (BP Location: Left arm,  "Patient Position: Sitting, Cuff Size: Large)   Ht 5' 2.5\" (1.588 m)   Wt 96.7 kg (213 lb 3.2 oz)   LMP 04/01/2025 (Exact Date)   Breastfeeding No   BMI 38.37 kg/m²   OB Status Pregnant   Smoking Status Never   BSA 1.98 m²        GEN: The patient was alert and oriented x3, pleasant well-appearing female in no acute distress.   PULM: nonlabored respirations  MSK: Normal gait  : WNl  Skin: warm, dry  Neuro: no focal deficits  Psych: normal affect and judgement, cooperative    Ultrasound:     Viability US     Gestational sac: present                Location: intrauterine    Size: 2-3mm  Yolk sac: absent  Fetal pole: absent                 Ovaries: normal appearing bilaterally  Cul de sac: absence of free fluid  Uterus: normal in appearance           Ultrasound Probe Disinfection    A transvaginal ultrasound was performed.   Prior to use, disinfection was performed with High Level Disinfection Process ([employer])  Probe serial number [id number]: [id number] was used    Eva Martinez PA-C  05/28/25  2:19 PM         [1]   Current Outpatient Medications:     acetaminophen (TYLENOL) 500 mg tablet, Take 500 mg by mouth every 6 (six) hours as needed for mild pain, Disp: , Rfl:     brompheniramine-pseudoephedrine-DM 30-2-10 MG/5ML syrup, Take 10 mL by mouth 3 (three) times a day as needed for cough or congestion, Disp: 200 mL, Rfl: 0    Cetirizine HCl (ZyrTEC Allergy) 10 MG CAPS, Take by mouth as needed (Patient not taking: Reported on 11/20/2024), Disp: , Rfl:     DUPIXENT subcutaneous injection, Inject 300 mg under the skin every 14 (fourteen) days 300/2ml--Most recent dose 5/10/24 (Patient not taking: Reported on 5/28/2025), Disp: , Rfl:     hydrocortisone 2.5 % ointment, APPLY TO ECZEMA TWICE DAILY UNTIL CLEAR, THEN TWICE DAILY 1 - 2 DAYS A WEEK AS DIRECTED (Patient not taking: Reported on 2/26/2025), Disp: , Rfl:     "

## 2025-05-29 ENCOUNTER — RESULTS FOLLOW-UP (OUTPATIENT)
Dept: OBGYN CLINIC | Facility: CLINIC | Age: 35
End: 2025-05-29

## 2025-05-30 ENCOUNTER — OFFICE VISIT (OUTPATIENT)
Dept: OBGYN CLINIC | Facility: CLINIC | Age: 35
End: 2025-05-30
Payer: COMMERCIAL

## 2025-05-30 VITALS
WEIGHT: 217.4 LBS | SYSTOLIC BLOOD PRESSURE: 100 MMHG | BODY MASS INDEX: 38.52 KG/M2 | DIASTOLIC BLOOD PRESSURE: 74 MMHG | HEIGHT: 63 IN

## 2025-05-30 DIAGNOSIS — Z3A.01 5 WEEKS GESTATION OF PREGNANCY: ICD-10-CM

## 2025-05-30 DIAGNOSIS — O02.1 MISSED ABORTION: Primary | ICD-10-CM

## 2025-05-30 PROCEDURE — S0191 MISOPROSTOL, ORAL, 200 MCG: HCPCS | Performed by: PHYSICIAN ASSISTANT

## 2025-05-30 PROCEDURE — S0190 MIFEPRISTONE, ORAL, 200 MG: HCPCS | Performed by: PHYSICIAN ASSISTANT

## 2025-05-30 PROCEDURE — 99213 OFFICE O/P EST LOW 20 MIN: CPT | Performed by: PHYSICIAN ASSISTANT

## 2025-05-30 RX ORDER — MIFEPRISTONE 200 MG/1
200 TABLET ORAL ONCE
Status: COMPLETED | OUTPATIENT
Start: 2025-05-30 | End: 2025-05-30

## 2025-05-30 RX ORDER — MISOPROSTOL 200 UG/1
800 TABLET ORAL ONCE
Status: COMPLETED | OUTPATIENT
Start: 2025-05-30 | End: 2025-05-30

## 2025-05-30 RX ADMIN — MISOPROSTOL 800 MCG: 200 TABLET ORAL at 07:57

## 2025-05-30 RX ADMIN — MIFEPRISTONE 200 MG: 200 TABLET ORAL at 07:57

## 2025-05-30 NOTE — PROGRESS NOTES
Assessment/Plan:  - Discussed expectant management, medication managment vs D&E  - Patient desires to proceed with medication management with Mifeprex 200mg and misoprostol 800mcg  - Reviewed how to take medication, dosing, expected course and timeframe of bleeding.   - Pt agreement form signed. Patient provided with medications and informational booklet  - Will follow up with serial HCG  - Type & Screen B pos, rhogam not indicated  - Patient expresses understanding. All questions answered  - Patient is to call with any questions or concerns    Encounter Diagnosis     ICD-10-CM    1. Missed   O02.1 miFEPRIStone (MIFEPREX) 200 mg     miSOPROStol (Cytotec) tablet 800 mcg      2. 5 weeks gestation of pregnancy  Z3A.01 miFEPRIStone (MIFEPREX) 200 mg     miSOPROStol (Cytotec) tablet 800 mcg              Subjective:       Patient ID: Mariam Streeter 1990        Mariam Streeter is a 35 y.o.  presenting to the office to receive management for missed ab and retained POC. Patient was seen earlier this week and found to have a 2-3mm GS present on US. Her HCG has remained stagnant at 482.  She would like to proceed with medication management.       Ref Range & Units (hover) 25  2:16 PM 25  4:10 PM 25  3:47 PM 25  3:41 PM   HCG, Quant 482.6 High  486.8 High  451.5 High  582.7 High                   OB History    Para Term  AB Living   4 1 0 1 2 1   SAB IAB Ectopic Multiple Live Births   0 2 0 0 1      # Outcome Date GA Lbr Garry/2nd Weight Sex Type Anes PTL Lv   4 Current            3  09/15/20 30w2d  770 g (1 lb 11.2 oz) F CS-LTranv EPI, Spinal N JERRY   2 IAB            1 IAB               Obstetric Comments   Menarche 14         The following portions of the patient's history were reviewed and updated as appropriate: allergies, current medications, past family history, past medical history, past social history, past surgical history, and problem list.    Allergies:  Cat  "dander, Dog epithelium, and Penicillins    Medications:  Current Medications[1]      Review of Systems:   Review of Systems   Genitourinary:  Negative for vaginal bleeding.          Objective:       Visit Vitals  /74 (BP Location: Right arm, Patient Position: Sitting, Cuff Size: Large)   Ht 5' 2.5\" (1.588 m)   Wt 98.6 kg (217 lb 6.4 oz)   LMP 04/01/2025 (Exact Date)   Breastfeeding No   BMI 39.13 kg/m²   OB Status Pregnant   Smoking Status Never   BSA 1.99 m²        GEN: The patient was alert and oriented x3, pleasant well-appearing female in no acute distress.   PULM: nonlabored respirations  MSK: Normal gait  Skin: warm, dry  Neuro: no focal deficits  Psych: normal affect and judgement, cooperative                 [1]   Current Outpatient Medications:     acetaminophen (TYLENOL) 500 mg tablet, Take 500 mg by mouth every 6 (six) hours as needed for mild pain, Disp: , Rfl:   No current facility-administered medications for this visit.    "

## 2025-06-05 ENCOUNTER — PATIENT MESSAGE (OUTPATIENT)
Dept: OBGYN CLINIC | Facility: CLINIC | Age: 35
End: 2025-06-05

## 2025-06-06 ENCOUNTER — APPOINTMENT (OUTPATIENT)
Dept: LAB | Facility: CLINIC | Age: 35
End: 2025-06-06
Payer: COMMERCIAL

## 2025-06-06 DIAGNOSIS — O02.1 MISSED ABORTION: ICD-10-CM

## 2025-06-06 DIAGNOSIS — Z3A.01 5 WEEKS GESTATION OF PREGNANCY: ICD-10-CM

## 2025-06-06 LAB — B-HCG SERPL-ACNC: 4.7 MIU/ML (ref 0–5)

## 2025-06-06 PROCEDURE — 36415 COLL VENOUS BLD VENIPUNCTURE: CPT

## 2025-06-06 PROCEDURE — 84702 CHORIONIC GONADOTROPIN TEST: CPT

## 2025-06-09 ENCOUNTER — RESULTS FOLLOW-UP (OUTPATIENT)
Dept: OBGYN CLINIC | Facility: CLINIC | Age: 35
End: 2025-06-09

## 2025-06-11 ENCOUNTER — OFFICE VISIT (OUTPATIENT)
Dept: DENTISTRY | Facility: CLINIC | Age: 35
End: 2025-06-11

## 2025-06-11 VITALS — DIASTOLIC BLOOD PRESSURE: 72 MMHG | SYSTOLIC BLOOD PRESSURE: 115 MMHG

## 2025-06-11 DIAGNOSIS — Z01.20 ENCOUNTER FOR DENTAL EXAMINATION: Primary | ICD-10-CM

## 2025-06-11 PROCEDURE — D0210 INTRAORAL - COMPLETE SERIES OF RADIOGRAPHIC IMAGES: HCPCS | Performed by: DENTIST

## 2025-06-11 NOTE — DENTAL PROCEDURE DETAILS
"FMX    [facility] LOIS Streeter presents 20 mintues late for periodic exam and to update radiographs. Reviewed PMH, no changes.  ASA II.  Patient reports current pain level 0.    Discussed with patient that due to time constraints from late arrival we can only complete Radiographs at this visit and she will need to return for full exam.  Patient understands.    FMX taken, needs full review at periodic exam appointment.    Patient reports \"large hole\" in tooth in upper right.  Reports no pain, but occasional sensitivity when food gets stuck in it.  Clinical exam shows large decay #2 buccal which may undermine buccal cusps.  Discussed with patient that we will attempt to restore with composite but due to existing amalgam restoration and depending on extent of decay, tooth may need crown or other additional treatment.  Patient understands.    NV: #2-B(V), 60 mins large decay and difficult to access.  NNV: Periodic Exam  "

## 2025-06-11 NOTE — PROGRESS NOTES
"Procedure Details   - INTRAORAL - COMPLETE SERIES OF RADIOGRAPHIC IMAGES  FMX    Mariam Streeter presents 20 mintues late for periodic exam and to update radiographs. Reviewed PMH, no changes.  ASA II.  Patient reports current pain level 0.    Discussed with patient that due to time constraints from late arrival we can only complete Radiographs at this visit and she will need to return for full exam.  Patient understands.    FMX taken, needs full review at periodic exam appointment.    Patient reports \"large hole\" in tooth in upper right.  Reports no pain, but occasional sensitivity when food gets stuck in it.  Clinical exam shows large decay #2 buccal which may undermine buccal cusps.  Discussed with patient that we will attempt to restore with composite but due to existing amalgam restoration and depending on extent of decay, tooth may need crown or other additional treatment.  Patient understands.    NV: #2-B(V), 60 mins large decay and difficult to access.  NNV: Periodic Exam    "

## 2025-08-01 ENCOUNTER — OFFICE VISIT (OUTPATIENT)
Dept: DENTISTRY | Facility: CLINIC | Age: 35
End: 2025-08-01

## 2025-08-01 VITALS — SYSTOLIC BLOOD PRESSURE: 112 MMHG | DIASTOLIC BLOOD PRESSURE: 79 MMHG

## 2025-08-01 DIAGNOSIS — K02.9 CARIES: Primary | ICD-10-CM

## 2025-08-01 PROCEDURE — D9110 PALLIATIVE (EMERGENCY) TREATMENT OF DENTAL PAIN - MINOR PROCEDURE: HCPCS

## (undated) DEVICE — SUT PDS II 1 CTX 36 IN Z371T

## (undated) DEVICE — ELECTRODE LAP J HOOK SPLIT STEM E-Z CLEAN 33CM -0021S

## (undated) DEVICE — SKIN MARKER DUAL TIP WITH RULER CAP, FLEXIBLE RULER AND LABELS: Brand: DEVON

## (undated) DEVICE — SYRINGE 20ML LL

## (undated) DEVICE — DECANTER: Brand: UNBRANDED

## (undated) DEVICE — INTENDED FOR TISSUE SEPARATION, AND OTHER PROCEDURES THAT REQUIRE A SHARP SURGICAL BLADE TO PUNCTURE OR CUT.: Brand: BARD-PARKER SAFETY BLADES SIZE 11, STERILE

## (undated) DEVICE — TROCAR: Brand: KII FIOS FIRST ENTRY

## (undated) DEVICE — SUT MONOCRYL 4-0 PS-2 27 IN Y426H

## (undated) DEVICE — SUT MONOCRYL 0 CTX 36 IN Y398H

## (undated) DEVICE — DRAPE C-ARM X-RAY

## (undated) DEVICE — UNDYED BRAIDED (POLYGLACTIN 910), SYNTHETIC ABSORBABLE SUTURE: Brand: COATED VICRYL

## (undated) DEVICE — STERILE LUBRICATING JELLY, TUBE: Brand: HR LUBRICATING JELLY

## (undated) DEVICE — SPECIMEN CONTAINER STERILE PEEL PACK

## (undated) DEVICE — EXOFIN PRECISION PEN HIGH VISCOSITY TOPICAL SKIN ADHESIVE: Brand: EXOFIN PRECISION PEN, 1G

## (undated) DEVICE — LIGAMAX 5 MM ENDOSCOPIC MULTIPLE CLIP APPLIER: Brand: LIGAMAX

## (undated) DEVICE — TELFA NON-ADHERENT ABSORBENT DRESSING: Brand: TELFA

## (undated) DEVICE — Device

## (undated) DEVICE — ADHESIVE SKIN HIGH VISCOSITY EXOFIN 1ML

## (undated) DEVICE — PACK C-SECTION PBDS

## (undated) DEVICE — CHLORAPREP HI-LITE 26ML ORANGE

## (undated) DEVICE — CHOLE CATH KIT ARROW

## (undated) DEVICE — TROCAR APPPLE 5MM EXTENDED LENGTH

## (undated) DEVICE — GLOVE SRG BIOGEL ECLIPSE 7

## (undated) DEVICE — TOWEL SURG XR DETECT GREEN STRL RFD

## (undated) DEVICE — SUT VICRYL 0 CT-1 36 IN J946H

## (undated) DEVICE — SUT PLAIN 2-0 CTX 27 IN 872H

## (undated) DEVICE — PACK PBDS LAP CHOLE RF

## (undated) DEVICE — GLOVE INDICATOR PI UNDERGLOVE SZ 7 BLUE

## (undated) DEVICE — GLOVE SRG BIOGEL ECLIPSE 6.5

## (undated) DEVICE — TISSUE RETRIEVAL SYSTEM: Brand: INZII RETRIEVAL SYSTEM

## (undated) DEVICE — NEEDLE 20 G X 1 1/2